# Patient Record
Sex: MALE | Race: WHITE
[De-identification: names, ages, dates, MRNs, and addresses within clinical notes are randomized per-mention and may not be internally consistent; named-entity substitution may affect disease eponyms.]

---

## 2020-09-04 ENCOUNTER — HOSPITAL ENCOUNTER (OUTPATIENT)
Dept: HOSPITAL 53 - M LAB | Age: 70
End: 2020-09-04
Attending: PSYCHIATRY & NEUROLOGY
Payer: MEDICARE

## 2020-09-04 DIAGNOSIS — I10: Primary | ICD-10-CM

## 2020-09-04 LAB
BUN SERPL-MCNC: 28 MG/DL (ref 7–18)
CREAT SERPL-MCNC: 1.61 MG/DL (ref 0.7–1.3)
GFR SERPL CREATININE-BSD FRML MDRD: 45.7 ML/MIN/{1.73_M2} (ref 49–?)

## 2020-10-08 ENCOUNTER — HOSPITAL ENCOUNTER (EMERGENCY)
Dept: HOSPITAL 53 - M ED | Age: 70
Discharge: HOME | End: 2020-10-08
Payer: MEDICARE

## 2020-10-08 VITALS — BODY MASS INDEX: 22.32 KG/M2 | HEIGHT: 67 IN | WEIGHT: 142.2 LBS

## 2020-10-08 VITALS — DIASTOLIC BLOOD PRESSURE: 90 MMHG | SYSTOLIC BLOOD PRESSURE: 210 MMHG
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
## 2020-10-08 DIAGNOSIS — I10: ICD-10-CM

## 2020-10-08 DIAGNOSIS — Z79.899: ICD-10-CM

## 2020-10-08 DIAGNOSIS — M25.421: ICD-10-CM

## 2020-10-08 DIAGNOSIS — S52.001A: Primary | ICD-10-CM

## 2020-10-08 DIAGNOSIS — E11.9: ICD-10-CM
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
## 2020-10-08 DIAGNOSIS — G35: ICD-10-CM

## 2020-10-08 DIAGNOSIS — Z79.4: ICD-10-CM

## 2020-10-08 DIAGNOSIS — M77.9: ICD-10-CM
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
## 2020-10-08 DIAGNOSIS — F17.200: ICD-10-CM

## 2020-10-08 NOTE — REPVR
PROCEDURE INFORMATION: 

Exam: XR Right Elbow 

Exam date and time: 10/8/2020 3:29 PM 

Age: 70 years old 

Clinical indication: Pain and injury or trauma; Fall; Sprain or strain; Right; 

Injury details: Best images possible PT unable to straighten out arm and PT 

states he fell on posterior elbow 



TECHNIQUE: 

Imaging protocol: XR Right elbow. 

Views: 3 or more views. 



COMPARISON: 

No relevant prior studies available. 



FINDINGS: 

Bones/joints: There appears to be a fracture through ulnar enthesophyte which 

likely has associated attachment of the triceps tendon. This likely represents 

an intra-articular fracture. The remainder of the ulna, humerus and radius 

appear intact. 

Soft tissues:  Moderate to large joint effusion.



IMPRESSION: 

There is a large enthesophyte of the proximal ulna with a fracture through the 

space which is likely intra-articular with an associated moderate to large 

joint effusion.



Electronically signed by: Emilia Whitley On 10/08/2020  16:04:36 PM

## 2020-10-21 ENCOUNTER — HOSPITAL ENCOUNTER (OUTPATIENT)
Dept: HOSPITAL 53 - M RAD | Age: 70
End: 2020-10-21
Attending: SPECIALIST
Payer: MEDICARE

## 2020-10-21 DIAGNOSIS — J33.0: Primary | ICD-10-CM

## 2020-10-21 NOTE — REPVR
PROCEDURE INFORMATION: 

Exam: CT Maxillofacial Without Contrast, Sinus 

Exam date and time: 10/21/2020 10:21 AM 

Age: 70 years old 

Clinical indication: Nose pain; Additional info: Polyp of nasal cavity 



TECHNIQUE: 

Imaging protocol: CT Maxillofacial without contrast. Focus on the sinuses. 

Radiation optimization: All CT scans at this facility use at least one of these 

dose optimization techniques: automated exposure control; mA and/or kV 

adjustment per patient size (includes targeted exams where dose is matched to 

clinical indication); or iterative reconstruction. 



COMPARISON: 

No relevant prior studies available. 



FINDINGS: 

Frontal sinuses:  There is opacification of the left frontal sinus.

Ethmoid air cells:  There is opacification of left anterior ethmoid air cells.

Sphenoid sinuses: Normal. No air-fluid levels. 

Maxillary sinuses:  Polypoid soft tissue projects over the left maxillary sinus 

infundibulum and extends into the left nasal cavity.

Nasal cavity/Septum:  There is polypoid soft tissue within the left nasal 

cavity.  There is rightward nasal septal deviation with a spur.  There is 

hypertrophy of the nasal turbinates.



Orbital cavity: Orbits are normal. Globes are unremarkable. 

Bones/joints: Unremarkable. 

Soft tissues: Unremarkable. 



IMPRESSION: 

Findings worrisome for left-sided sinonasal polyposis.



Electronically signed by: Zulay Ramirez On 10/21/2020  10:33:11 AM

## 2020-10-26 ENCOUNTER — HOSPITAL ENCOUNTER (OUTPATIENT)
Dept: HOSPITAL 53 - M LAB REF | Age: 70
End: 2020-10-26
Attending: SPECIALIST
Payer: MEDICARE

## 2020-10-26 DIAGNOSIS — J33.0: Primary | ICD-10-CM

## 2021-03-03 ENCOUNTER — HOSPITAL ENCOUNTER (OUTPATIENT)
Dept: HOSPITAL 53 - M LABSMTC | Age: 71
End: 2021-03-03
Attending: ANESTHESIOLOGY
Payer: MEDICARE

## 2021-03-03 DIAGNOSIS — Z01.812: Primary | ICD-10-CM

## 2021-03-03 DIAGNOSIS — Z20.822: ICD-10-CM

## 2021-03-08 ENCOUNTER — HOSPITAL ENCOUNTER (OUTPATIENT)
Dept: HOSPITAL 53 - M SDC | Age: 71
Discharge: HOME | End: 2021-03-08
Attending: SPECIALIST
Payer: MEDICARE

## 2021-03-08 VITALS — SYSTOLIC BLOOD PRESSURE: 179 MMHG | DIASTOLIC BLOOD PRESSURE: 63 MMHG

## 2021-03-08 VITALS — BODY MASS INDEX: 21.19 KG/M2 | WEIGHT: 135 LBS | HEIGHT: 67 IN

## 2021-03-08 DIAGNOSIS — Z85.038: ICD-10-CM

## 2021-03-08 DIAGNOSIS — J44.9: ICD-10-CM

## 2021-03-08 DIAGNOSIS — J32.2: Primary | ICD-10-CM

## 2021-03-08 DIAGNOSIS — G35: ICD-10-CM

## 2021-03-08 DIAGNOSIS — E11.40: ICD-10-CM

## 2021-03-08 DIAGNOSIS — F32.9: ICD-10-CM

## 2021-03-08 DIAGNOSIS — F17.218: ICD-10-CM

## 2021-03-08 DIAGNOSIS — Z79.899: ICD-10-CM

## 2021-03-08 DIAGNOSIS — Z79.4: ICD-10-CM

## 2021-03-08 DIAGNOSIS — N18.30: ICD-10-CM

## 2021-03-08 DIAGNOSIS — I12.9: ICD-10-CM

## 2021-03-08 DIAGNOSIS — K21.9: ICD-10-CM

## 2021-03-08 DIAGNOSIS — Z79.02: ICD-10-CM

## 2021-03-08 DIAGNOSIS — J33.9: ICD-10-CM

## 2021-03-08 DIAGNOSIS — Z86.73: ICD-10-CM

## 2021-03-08 DIAGNOSIS — F41.9: ICD-10-CM

## 2021-03-08 PROCEDURE — 31237 NSL/SINS NDSC SURG BX POLYPC: CPT

## 2021-03-08 PROCEDURE — 88304 TISSUE EXAM BY PATHOLOGIST: CPT

## 2021-03-08 NOTE — RO
OPERATIVE NOTE



DATE OF OPERATION: 03/08/2021



PREOPERATIVE DIAGNOSIS: Left maxillary sinusitis and ethmoiditis with nasal

polyposis.



POSTOPERATIVE DIAGNOSIS: Left maxillary sinusitis and ethmoiditis with nasal

polyposis.



PROCEDURE: Left-sided endoscopic nasal polypectomy, ethmoidectomy and maxillary

antrostomy.



SURGEON: Valdo Segal MD



ASSISTANT: 



ANESTHESIA: 



INDICATIONS: This is a 70-year-old who presents with left-sided nasal

obstruction, polyp disease filling the left middle meatus into the nasal cavity.



DESCRIPTION OF PROCEDURE: Satisfactory general endotracheal anesthesia was

administered, oropharyngeal pack placed, nose prepared for surgery by placing

Cottonoid pledgets with Afrin solution in nasal cavities bilaterally mostly on

the left side. They were removed from the right. Examination revealed teardrop

polyp emerging from the middle meatus falling anteriorly and obstructing the

nose. 1% Xylocaine with 1:100,000 Epinephrine was used to inject the nasal

polyp as well as the lateral nasal wall. 0-degree telescope and micro debrider

was the primary instrument, the gross polyp disease was resected away back to

the landmarks of the middle turbinate and lateral nasal wall which were quite

identifiable. The uncinate process was then resected with micro debrider, the

ethmoidal bulla was identified and then entered and resected away resecting

more polyp disease here. The ground lamella was resected and entered in medial

inferior quadrant and posterior ethmoid cells entered and working posteriorly

to anteriorly the lamella bone and hyperplastic mucosa was resected. Superiorly

the nasofrontal recess, uncinate processes down, the superior ethmoid cell was

opened and large polyp mass was pulled from the nasofrontal recess. Finally

natural maxillary sinus ostia was identified, it was enlarged using combination

of side-biting and upbiting forceps and polyp disease was resected from the

area of the natural sinus ostia. Adrenalin pledgets were placed for 2 minutes

and removed with no significant bleeding. Sinu-Foam was placed in nasal cavity.

The patient was awakened after throat pack was removed and sent to recovery in

satisfactory condition.

## 2021-04-14 ENCOUNTER — HOSPITAL ENCOUNTER (OUTPATIENT)
Dept: HOSPITAL 53 - M LAB REF | Age: 71
End: 2021-04-14
Attending: INTERNAL MEDICINE
Payer: MEDICARE

## 2021-04-14 DIAGNOSIS — D64.9: Primary | ICD-10-CM

## 2021-04-14 DIAGNOSIS — R80.9: ICD-10-CM

## 2021-04-14 LAB
CREAT UR-MCNC: 121 MG/DL
PROT UR-MCNC: 317.1 MG/DL (ref 0–12)

## 2021-04-15 LAB
ALBUMIN MFR UR ELPH: 57.4 % (ref 55.8–66.1)
ALBUMIN SERPL-MCNC: 3.5 GM/DL (ref 3.29–5.55)
ALPHA1 GLOB MFR SERPL ELPH: 5.1 % (ref 2.9–4.9)
ALPHA1 GLOB SERPL ELPH-MCNC: 0.31 GM/DL (ref 0.17–0.41)
ALPHA2 GLOB SERPL ELPH-MCNC: 0.7 GM/DL (ref 0.42–0.99)
ALPHA2 GLOB SERPL ELPH-MCNC: 11.4 % (ref 7.1–11.8)
B-GLOBULIN SERPL ELPH-MCNC: 0.42 GM/DL (ref 0.28–0.6)
BETA1 GLOB MFR SERPL ELPH: 6.9 % (ref 4.7–7.2)
BETA2 GLOB MFR SERPL ELPH: 7.6 % (ref 3.2–6.5)
BETA2 GLOB SERPL ELPH-MCNC: 0.46 GM/DL (ref 0.19–0.55)
C3 SERPL-MCNC: 86 MG/DL (ref 90–180)
C4 SERPL-MCNC: 25 MG/DL (ref 10–40)
FERRITIN SERPL-MCNC: 116 NG/ML (ref 26–388)
FOLATE SERPL-MCNC: 6.9 NG/ML
GAMMA GLOB 24H MFR UR ELPH: 11.6 % (ref 11.1–18.8)
GAMMA GLOB SERPL ELPH-MCNC: 0.71 GM/DL (ref 0.65–1.58)
HBV CORE IGM SER QL: NEGATIVE
HBV SURFACE AB SER QL: NEGATIVE
HBV SURFACE AB SER-ACNC: NEGATIVE M[IU]/ML
HCV AB SER QL: < 0 INDEX (ref ?–0.8)
IRON SATN MFR SERPL: 31.9 % (ref 19.7–50)
IRON SERPL-MCNC: 60 UG/DL (ref 65–175)
PROT SERPL-MCNC: 6.1 GM/DL (ref 6.4–8.2)
TIBC SERPL-MCNC: 188 UG/DL (ref 250–450)
VIT B12 SERPL-MCNC: 321 PG/ML

## 2021-04-19 LAB
C-ANCA TITR SER IF: (no result) TITER
ENA SS-A AB SER-ACNC: <0.2 AI (ref 0–0.9)
ENA SS-B AB SER-ACNC: <0.2 AI (ref 0–0.9)
KAPPA LC FREE SER-MCNC: 66.9 MG/L (ref 3.3–19.4)
KAPPA LC/LAMBDA SER: 1.26 {RATIO} (ref 0.26–1.65)
LAMBDA LC FREE SERPL-MCNC: 53.3 MG/L (ref 5.7–26.3)
P-ANCA ATYPICAL TITR SER IF: (no result) TITER
P-ANCA TITR SER IF: (no result) TITER

## 2021-10-13 ENCOUNTER — HOSPITAL ENCOUNTER (OUTPATIENT)
Dept: HOSPITAL 53 - M LAB REF | Age: 71
End: 2021-10-13
Attending: INTERNAL MEDICINE
Payer: MEDICARE

## 2021-10-13 DIAGNOSIS — A04.8: Primary | ICD-10-CM

## 2021-10-13 DIAGNOSIS — B96.81: ICD-10-CM

## 2021-11-12 ENCOUNTER — HOSPITAL ENCOUNTER (OUTPATIENT)
Dept: HOSPITAL 53 - M LAB REF | Age: 71
End: 2021-11-12
Attending: NURSE PRACTITIONER
Payer: MEDICARE

## 2021-11-12 DIAGNOSIS — N18.32: ICD-10-CM

## 2021-11-12 DIAGNOSIS — R80.9: Primary | ICD-10-CM

## 2021-11-12 LAB
PROT 24H UR-MRATE: 2277.8 MG/24HR (ref 50–150)
PROT UR-MCNC: 276.1 MG/DL (ref 0–12)

## 2021-11-29 ENCOUNTER — HOSPITAL ENCOUNTER (OUTPATIENT)
Dept: HOSPITAL 53 - M RAD | Age: 71
End: 2021-11-29
Attending: NURSE PRACTITIONER
Payer: MEDICARE

## 2021-11-29 DIAGNOSIS — N18.32: Primary | ICD-10-CM

## 2021-11-29 DIAGNOSIS — I12.9: ICD-10-CM

## 2021-11-29 DIAGNOSIS — R39.198: ICD-10-CM

## 2021-11-29 DIAGNOSIS — R33.9: ICD-10-CM

## 2021-12-10 ENCOUNTER — HOSPITAL ENCOUNTER (EMERGENCY)
Dept: HOSPITAL 53 - M ED | Age: 71
Discharge: LEFT BEFORE DISCHARGE | End: 2021-12-10
Payer: MEDICARE

## 2021-12-10 VITALS — DIASTOLIC BLOOD PRESSURE: 91 MMHG | SYSTOLIC BLOOD PRESSURE: 183 MMHG

## 2021-12-10 VITALS — HEIGHT: 67 IN | BODY MASS INDEX: 22.02 KG/M2 | WEIGHT: 140.3 LBS

## 2021-12-10 DIAGNOSIS — Z53.21: Primary | ICD-10-CM

## 2021-12-10 NOTE — CCD
Continuity of Care Document (CCD)

                             Created on: 10/29/2021



Palladino, Lewis W

External Reference #: [id number]

: 1950

Sex: Male



Demographics





                          Address                   [address] [place]  [address]

 

                          Home Phone                [phone]

 

                          Preferred Language        Unknown

 

                          Marital Status            Unknown

 

                          Catholic Affiliation     Unknown

 

                          Race                      White

 

                          Ethnic Group              Not  or 





Author





                          Author                    Zack HALL DPM

 

                          Organization              Unknown

 

                          Address                   [address] [place] [address], [facility]

 [address]

[place]  [address]



 

                          Phone                     [phone]







Care Team Providers





                    Care Team Member Name Role                Phone

 

                    J Carlos Kiser M.D.                [phone]







Problems





                    Active Problems     Provider            Date

 

                    Hammer toe          Westley Hall DPM Onset: 10/15/2020

 

                    Type 2 diabetes mellitus with diabetic polyneuropathy Westley Hall DPM 

Onset: 10/15/2020

 

                    Onychomycosis       Westley Hall DPM Onset: 10/15/2020

 

                    Corns and callosities Westley Hall DPM Onset: 10/15/2020

 

                    Plantar fascial fibromatosis Westley Hall DPM Onset: 







Social History





                Type            Date            Description     Comments

 

                Birth Sex                       Unknown          

 

                ETOH Use                        Denies alcohol use  

 

                Tobacco Use     Start: Unknown  Patient is a current smoker, smo

kes every day smokes 

1ppd for 42 years 







Allergies and adverse reactions





                                        Description

 

                                        No Known Drug Allergies







Medications





           Active Medications SIG        Qnty       Indications Ordering Provide

r Date

 

                          Ammonium Lactate                     12% Cream        

           apply to feet 

daily           140gm                           Westley Hall DPM 03/10/2021

 

             Oxycontin                     20mg Tab ER 12H Abuse-Det            

                                              

Unknown                                 

 

           Acetazolamide                     250mg Tablets                      

                              Unknown    



 

             Omeprazole                     40mg Capsules DR Rashel QUINTERO, J Carlos                           

 

                          Hydrocodone-Acetaminophen                     5-325mg 

Tablets                   

                                                Unknown         

 

             Ropinirole HCL                     2mg Tablets                     

                                     Rashel QUINTERO, J Carlos                           

 

           Alphagan P                     0.1% Solution                         

                           Unknown    



 

             Clopidogrel Bisulfate                     75mg Tablets             

                                             

Rashel QUINTERO, J Carlos                      

 

                                        Onetouch Delica Plus Lancets Extra Fine 

33G                     Plus 33G Misc   

               U Utd bid                              Unknown      

 

             Duloxetine HCL                     30mg Caps DR Price Kiser M.D., [place]                      

 

             Atorvastatin Calcium                     20mg Tablets              

                                            

Rashel QUINTERO, J Carlos                      

 

             Brimonidine Tartrate                     0.2% Solution             

                                             

Unknown                                 

 

             Ranitidine HCL                     150mg Tablets                   

                                       Rashel QUINTERO, [place]                           

 

             Mirtazapine                     15mg Tablets                       

                                   Jana Sandoval                              







Immunizations





                                        Description

 

                                        No Information Available







Vital Signs





                Date            Vital           Result          Comment

 

                10/05/2020 10:21am Height          67 inches       5'7"

 

                    Weight              140.00 lb            

 

                    BP Systolic         180 mmHg             

 

                    BP Diastolic        82 mmHg              

 

                    Heart Rate          51 /min              

 

                    BMI (Body Mass Index) 21.9 kg/m2           







Results





                                        Description

 

                                        No Information Available







Procedures





                Date            Code            Description     Status

 

                2021      83044           Debridement 6-10 [employer] 

Completed

 

                2021      88668           Debridement 6-10 [employer] 

Completed







Medical Devices





                                        Description

 

                                        No Information Available







Encounters





                                        Description

 

                                        No Information Available







Assessments





                Date            Code            Description     Provider

 

                2021      E11.42          Type 2 diabetes mellitus with di

abetic polyneuropathy Westley Hall DPM

 

                2021      B35.1           Tinea unguium   Westley Hall DPM

 

                2021      E11.42          Type 2 diabetes mellitus with di

abetic polyneuropathy Westley Hall DPM

 

                2021      B35.1           Tinea unguium   Westley Hall DPM

 

                2021      E11.42          Type 2 diabetes mellitus with di

abetic polyneuropathy Westley Hall DPM







Plan of Treatment

Future Appointment(s):* 2022  2:45 pm - Westley Hall DPM at [place] 
  Office





Functional Status





                                        Description

 

                                        No Information Available







Mental Status





                                        Description

 

                                        No Information Available







Referrals





                                        Description

 

                                        No Information Available

## 2021-12-10 NOTE — CCD
Summarization of Episode Note

                             Created on: 10/09/2021



PALLADINO, LEWIS WAYNE

External Reference #: [id number]

: 1950

Sex: Male



Demographics





                          Address                   [address] [place]  [address]

 

                          Home Phone                [phone]

 

                          Preferred Language        Unknown

 

                          Marital Status            Unknown

 

                          Congregation Affiliation     Unknown

 

                          Race                      White

 

                          Ethnic Group              Not  or 





Author





                          Author                    [facility]

ems

 

                          Organization              [facility]

ems

 

                          Address                   Unknown

 

                          Phone                     Unavailable







Support





                Name            Relationship    Address         Phone

 

                    PALLADINO, LEWIS    GUAR                [address] [place]  [address]                    [phone]

 

                    Palladino, Karen    ECON                [address], [place]  [address] [phone]







Care Team Providers





                    Care Team Member Name Role                Phone

 

                    Cesar,  Catarino        Unavailable         [phone]







PROBLEMS





          Type      Condition ICD9-CM Code SPD96-YX Code Onset Dates Condition S

tatus W/U 

Status              Risk                SNOMED Code         Notes

 

       Problem Left peroneal nerve palsy        G57.32        Active confirmed  

      [id number] Stable

in his brace, using a cane

 

       Problem Intervertebral disc disease        M51.9         Active confirmed

        [id number] We 

will continue his pain medications without change (oxycodone and hydrocodone, 
Lyrica, tizanidine)

 

        Problem Hemispheric carotid artery syndrome         G45.1           Acti

ve  confirmed         

[id number]                                

 

       Problem Multiple sclerosis        G35           Active confirmed        2

[id number]  

 

                          Problem                   Controlled type 2 diabetes m

ellitus without complication, without long-

term current use of insulin         E11.9           Active  confirmed         31

[id number]  

 

       Problem Mixed hyperlipidemia        E78.2         Active confirmed       

 [id number]  

 

        Problem Bilateral carotid artery stenosis         I65.23          Active

  confirmed         [id number]

                                         

 

        Problem Current use of long term anticoagulation         Z79.01         

 Active  confirmed         

[id number]                                

 

       Problem Essential hypertension        I10           Active confirmed     

   [id number]  

 

       Problem Smoker        F17.200        Active confirmed        [id number]  







ALLERGIES

No Known Allergies



ENCOUNTERS from 1950 to 2021-10-08





             Encounter    Location     Date         Provider     Diagnosis

 

                    [facility]          [address] [place] [address] [phone] [place] [address] 08 Oct, 2021

                          Catarino Guerrero                Left peroneal nerve palsy G5

7.32







IMMUNIZATIONS

No Information



SOCIAL HISTORY

Tobacco Use:



                    Social History Observation Description         Date

 

                    Details (start date - stop date) Current Smoker       



Sex Assigned At Birth:



                          Social History Observation Description

 

                          Sex Assigned At Birth     Unknown



Adventism:



                    Question            Answer              Notes

 

                    Adventism                                No Orthodoxy beliefs

 that would impact health care.



Alcohol Screening:



                    Question            Answer              Notes

 

                    Did you have a drink containing alcohol in the past year? No

                   

 

                    Points              0                    

 

                    Interpretation      Negative             



Tobacco Use:



                    Question            Answer              Notes

 

                    Are you a:          current smoker      started smoking at 3

2 years old

 

                    Patient counseled on the dangers of tobacco use and urged to

 quit: 2021           

 

                    How many cigarettes a day do you smoke? 11-20               

 

 

                    Are you interested in quitting? Not ready to quit    

 

                    Counseled the patient on smoking effects, education provided

 2021           







REASON FOR REFERRAL

No Information



VITAL SIGNS

No information



MEDICATIONS





           Medication SIG (Take, Route, Frequency, Duration) Notes      Start Da

te End Date   

Status

 

           Atorvastatin Calcium 20 MG 1 tablet Orally Once a day                

                  Active

 

                          Omeprazole 40 MG          1 capsule 30 minutes before 

morning meal Orally Once a day for 

30 day(s)                                                       Active

 

                Brace           as directed for left lower leg, dx=G57.32 daily 

for 99 months                                                                 Active

 

                          Mirtazapine 15 MG         1 tablet Orally for Worker's

 Comp Once a day at bedtime for 30

days                                                Active

 

                          HYDROcodone-Acetaminophen 5-325 MG 1 tablet as needed 

Orally Every 8 hours, 

mdd=3 for 30 Days Please do not fill until 2021 08 Oct, 2021              

      Active

 

           rOPINIRole HCl 2 MG 1 tablet before bedtime Orally twice a day       

                           Active

 

           Toujeo SoloStar 300 UNIT/ML 40 units Subcutaneous once a day         

                         Active

 

             acetaZOLAMIDE 250 MG 1/2 tablet Orally twice a day one in AM and on

e in PM                            

                                        Active

 

           Alphagan P 0.1 % 1 drop into affected eye Ophthalmic every 8 hrs     

                             Active

 

           Clopidogrel Bisulfate 75 MG 1 tablet Orally Once a day               

                   Active

 

           tiZANidine HCl 4 1 tablet as needed Orally Three times a day         

                         Active

 

           iron                                                   Active

 

           DULoxetine HCl 30 MG 1 capsule Orally Once a day                     

             Active

 

                          HYDROcodone-Acetaminophen 5-325 MG 1 tablet as needed,

 Worker's Compensation 

Orally 3 times a day, mdd=3 for 30 Days                             

        Active

 

                    oxyCODONE HCl ER 20 MG 1 tablet Orally every 12 hrs, mdd=2 f

or 30 Days Please do

not fill until due  08 Oct, 2021                            Active







PROCEDURES

No Information



RESULTS

No Results



REASON FOR VISIT

meds refill 



MEDICAL (GENERAL) HISTORY





                    Type                Description         Date

 

                    Medical History     Multiple sclerosis   

 

                    Medical History     Hypertension         

 

                    Medical History     Hyperlipidemia       

 

                    Medical History     Diabetes             

 

                    Medical History     posterior carotid stenosis  

 

                    Medical History     multiple TIAs        

 

                    Medical History     long term anticoagulation  

 

                    Medical History     smoker               

 

                    Medical History     lumbar intervertebral disc disease (Work

er's Compensation)  

 

                    Surgical History    2 lipomas removed   1972

 

                    Surgical History    Dr. Rodriguez, Lumbar spinal surgery - 

?discs 2003

 

                    Surgical History    Left carpal tunnel release, ulnar releas

e 2017

 

                    Surgical History    colectomy of ascending colon due to canc

er 2018

 

                    Surgical History    cataracts both eyes 10/14/202 & 10/28/20

20

 

                    Hospitalization History surgeries as above   







Goals Section

No Information



Health Concerns

No Information



MEDICAL EQUIPMENT

No Information



MENTAL STATUS

No Information



FUNCTIONAL STATUS

No Information



ASSESSMENTS





             Encounter Date Diagnosis    Assessment Notes Treatment Notes Treatm

ent Clinical 

Notes

 

                08 Oct, 2021    Left peroneal nerve palsy (ICD-10 - G57.32)     

            



                                        











PLAN OF TREATMENT

Medication



                Medication Name Sig             Start Date      Stop Date

 

                          HYDROcodone-Acetaminophen 5-325 MG 1 tablet as needed,

 Worker's Compensation 

Orally 3 times a day, mdd=3 for 30 Days                

 

                          HYDROcodone-Acetaminophen 5-325 MG 1 tablet as needed 

Orally Every 8 hours, 

mdd=3 for 30 Days         08 Oct, 2021               

 

                    oxyCODONE HCl ER 20 MG 1 tablet Orally every 12 hrs, mdd=2 f

or 30 Days 08 Oct, 

2021                                     



Next Appt



                                        Details

 

                                        Provider Name:Jana Sharma, -10-2

8 02:00:00 PM, [address] [facility], 

[phone], [place], [address], [phone]







Insurance Providers





             Payer Name   Payer Address Payer Phone  Insured Name Patient Relati

onship to 

Insured                   Coverage Start Date       Coverage End Date

 

                [employer] [facility] [address]  [place] [address] [phone]    

PALLADINO,LEWIS WAYNE self

## 2021-12-10 NOTE — CCD
Continuity of Care Document (CCD)

                             Created on: 2021



Palladino, Lewis W

External Reference #: [id number]

: 1950

Sex: Male



Demographics





                          Address                   [address] [place]  [address]

 

                          Home Phone                [phone]

 

                          Preferred Language        Unknown

 

                          Marital Status            Unknown

 

                          Worship Affiliation     Unknown

 

                          Race                      White

 

                          Ethnic Group              Not  or 





Author





                          Author                    Zack CLEMENTS M.D.

 

                          Organization              Unknown

 

                          Address                   [address]

[place]  [address]



 

                          Phone                     [phone]







Care Team Providers





                    Care Team Member Name Role                Phone

 

                    J Carlos Kiser ALEX DE OLIVEIRA AUTM                [phone]

 

                    Christine Gomez  AUTM                [phone]

 

                    [facility] AUT[facility]                [phone]

 

                    Patsy Marina M.D.   AUTM                [phone]

 

                          AUTM                      Unavailable

 

                    Central Sched[employer]  AUTM                [phone]

 

                    Sdio                AUTM                [phone]







Problems





                    Active Problems     Provider            Date

 

                    Chronic nasopharyngitis Paulo Richmond MD    Onset: 

5

 

                    Deviated nasal septum Paulo Richmond MD    Onset: 2015

 

                    Head and neck swelling Paulo Richmond MD    Onset: 2015

 

                    Essential hypertension Jarrett Kyle M.D. Onset: 2016







Social History





                Type            Date            Description     Comments

 

                Birth Sex                       Unknown          

 

                Smokeless Tobacco                 Never Used Smokeless Tobacco  

 

                ETOH Use                        Denies alcohol use  

 

                Tobacco Use     Start: Unknown  Smokes 1 Pack A Day 52 YEAR HX O

F SMOKING  

 

                Recreational Drug Use                 Denies Drug Use  







Allergies, Adverse Reactions, Alerts





                                        Description

 

                                        No Known Drug Allergies







Medications





           Active Medications SIG        Qnty       Indications Ordering Provide

r Date

 

                                        Benazepril HCL/Hydrochlorothiazide      

               20-12.5mg Tablets        

             1t/d                                   Unknown      

 

                          Oxycontin                     20mg Tab ER 12H Abuse-De

t                   

1t/q12h                                         Unknown         

 

                Atorvastatin Calcium                     20mg Tablets           

        1t/d                             

                          Unknown                   

 

                Clopidogrel Bisulfate                     75mg Tablets          

         1t/d                            

                          Unknown                   

 

             Duloxetine HCL                     30mg Caps DR Part               

    1t/d                                   

Unknown                                 

 

             Ropinirole HCL                     2mg Tablets                   1t

/bid                                 

Unknown                                 

 

                Amlodipine Besylate                     2.5mg Tablets           

        1t/d                             

                          Unknown                   

 

           Bioastin 12MG-OTC 1t/d                             Unknown    

000

 

           Calcium 600MG-Vit D3 10mcg 1t/d                             Unknown  

  

 

             Ferate                     240(27Fe) mg Tablets                   1

t/d                                   

Unknown                                 

 

             Omeprazole                     40mg Capsules DR                   1

t/d                                   

Unknown                                 

 

                                        Hydrocodone Bitartrate/Acetaminophen    

                 5-325mg Tablets        

             1t/tid                                 Unknown      

 

           Alphagan P                     0.1% Solution                   bid   

                           Unknown    



 

           Insulin Glaring Injection 40u/prn                          Unknown   

 







Immunizations





                                        Description

 

                                        No Information Available







Vital Signs





                Date            Vital           Result          Comment

 

                2021 11:23am Height          67 inches       5'7"

 

                2021  9:56am Height          67 inches       5'7"

 

                    Weight              140.00 lb            

 

                    BMI (Body Mass Index) 21.9 kg/m2           

 

                    Ideal Body Weight   148 lb               

 

                    Weight              63.504 kg            

 

                    BSA (Body Surface Area) 1.74 m2              







Results





                                        Description

 

                                        No Information Available







Procedures





                                        Description

 

                                        No Information Available







Medical Devices





                                        Description

 

                                        No Information Available







Encounters





                                        Description

 

                                        No Information Available







Assessments





                Date            Code            Description     Provider

 

                    2021          B96.81              Helicobacter pylori 

[H. pylori] as the cause of diseases 

classified elsewhere                    Aníbal Clements M.D.

 

                2021      K63.5           Polyp of colon  Aníbal Pitts ala, M.D.

 

                2021      D37.4           Neoplasm of uncertain behavior o

f colon Aníbal Clements M.D.

 

                2021      K59.00          Constipation, unspecified Davie Clements M.D.







Plan of Treatment





No Information Available



Functional Status





                                        Description

 

                                        No Information Available







Mental Status





                                        Description

 

                                        No Information Available







Referrals





                Refer to      Reason for Referral Status          Appt Date

 

                Aníbal Clements M.D. COLO SCREEN     Scheduled       

 

                                        [facility]

 

                                        [address], [address]

 

                                        [place] [address]

 

                                        [phone] Patient is waiting for the order for a home health nurse to be put in. Please call.

## 2021-12-10 NOTE — CCD
Continuity of Care Document (CCD)

                             Created on: 10/04/2021



Palladino, Lewis W

External Reference #: [id number]

: 1950

Sex: Male



Demographics





                          Address                   [address] [place]  [address]

 

                          Home Phone                [phone]

 

                          Preferred Language        Unknown

 

                          Marital Status            Unknown

 

                          Yarsanism Affiliation     Unknown

 

                          Race                      White

 

                          Ethnic Group              Not  or 





Author





                          Author                    Zack CLEMENTS M.D.

 

                          Organization              Unknown

 

                          Address                   [address]

[place]  [address]



 

                          Phone                     [phone]







Care Team Providers





                    Care Team Member Name Role                Phone

 

                    J Carlos Kiser ALEX DE OLIVEIRA AUTM                [phone]

 

                    Christine Gomez  AUTM                [phone]

 

                    [facility] AUTM                [phone]

 

                    Patsy Marina M.D.   AUTM                [phone]

 

                          AUTM                      Unavailable

 

                    Central Scheduling  AUTM                [phone]

 

                    Sdio                AUTM                [phone]







Problems





                    Active Problems     Provider            Date

 

                    Chronic nasopharyngitis Paulo Richmond MD    Onset: 

5

 

                    Deviated nasal septum Paulo Richmond MD    Onset: 2015

 

                    Head and neck swelling Paulo Richmond MD    Onset: 2015

 

                    Essential hypertension Jarrett Kyle M.D. Onset: 2016







Social History





                Type            Date            Description     Comments

 

                Birth Sex                       Unknown          

 

                Smokeless Tobacco                 Never Used Smokeless Tobacco  

 

                ETOH Use                        Denies alcohol use  

 

                Tobacco Use     Start: Unknown  Smokes 1 Pack A Day 52 YEAR HX O

F SMOKING  

 

                Recreational Drug Use                 Denies Drug Use  







Allergies, Adverse Reactions, Alerts





                                        Description

 

                                        No Known Drug Allergies







Medications





           Active Medications SIG        Qnty       Indications Ordering Provide

r Date

 

                                        Benazepril HCL/Hydrochlorothiazide      

               20-12.5mg Tablets        

             1t/d                                   Unknown      

 

                          Oxycontin                     20mg Tab ER 12H Abuse-De

t                   

1t/q12h                                         Unknown         

 

                Atorvastatin Calcium                     20mg Tablets           

        1t/d                             

                          Unknown                   

 

                Clopidogrel Bisulfate                     75mg Tablets          

         1t/d                            

                          Unknown                   

 

             Duloxetine HCL                     30mg Caps DR Part               

    1t/d                                   

Unknown                                 

 

             Ropinirole HCL                     2mg Tablets                   1t

/bid                                 

Unknown                                 

 

                Amlodipine Besylate                     2.5mg Tablets           

        1t/d                             

                          Unknown                   

 

           Bioastin 12MG-OTC 1t/d                             Unknown    

000

 

           Calcium 600MG-Vit D3 10mcg 1t/d                             Unknown  

  

 

             Ferate                     240(27Fe) mg Tablets                   1

t/d                                   

Unknown                                 

 

             Omeprazole                     40mg Capsules DR                   1

t/d                                   

Unknown                                 

 

                                        Hydrocodone Bitartrate/Acetaminophen    

                 5-325mg Tablets        

             1t/tid                                 Unknown      

 

           Alphagan P                     0.1% Solution                   bid   

                           Unknown    



 

           Insulin Glaring Injection 40u/prn                          Unknown   

 







Immunizations





                                        Description

 

                                        No Information Available







Vital Signs





                Date            Vital           Result          Comment

 

                2021 11:23am BP Systolic     136 mmHg         

 

                    BP Diastolic        74 mmHg              

 

                    Height              67 inches           5'7"

 

                    Weight              145.00 lb            

 

                    BMI (Body Mass Index) 22.7 kg/m2           

 

                    Ideal Body Weight   148 lb               

 

                    Weight              65.772 kg            

 

                    BSA (Body Surface Area) 1.76 m2              

 

                2021  9:56am Height          67 inches       5'7"

 

                    Weight              140.00 lb            

 

                    BMI (Body Mass Index) 21.9 kg/m2           

 

                    Ideal Body Weight   148 lb               

 

                    Weight              63.504 kg            

 

                    BSA (Body Surface Area) 1.74 m2              







Results





                                        Description

 

                                        No Information Available







Procedures





                Date            Code            Description     Status

 

                2021      23811           Office/Outpatient Established Mo

d MDM 30-39 Min Completed







Medical Devices





                                        Description

 

                                        No Information Available







Encounters





           Type       Date       Location   Provider   Dx         Diagnosis

 

                Office Visit    2021 10:30a [facility] Aníbal Clements M.D.           B96.81                    Helicobacter pylori as the c

ause of diseases classd 

elswhr

 

                          K63.5                     Polyp of colon

 

                          D37.4                     Neoplasm of uncertain behavi

or of colon

 

                          K59.00                    Constipation, unspecified







Assessments





                Date            Code            Description     Provider

 

                    2021          B96.81              Helicobacter pylori 

[H. pylori] as the cause of diseases 

classified elsewhere                    Aníbal Clements M.D.

 

                2021      K63.5           Polyp of colon  Aníbal Pitts ala, M.D.

 

                2021      D37.4           Neoplasm of uncertain behavior o

f colon Aníbal Clements M.D.

 

                2021      K59.00          Constipation, unspecified Heidiita

berny Clements M.D.







Plan of Treatment





No Information Available



Functional Status





                                        Description

 

                                        No Information Available







Mental Status





                                        Description

 

                                        No Information Available







Referrals





                Refer to Dr     Reason for Referral Status          Appt Date

 

                Aníbal Clements M.D. COLO SCREEN     Scheduled       

 

                                        [facility]

 

                                        [address] [address]

 

                                        [phone]

## 2021-12-10 NOTE — CCD
Summarization Of Episode

                             Created on: 12/10/2021



PALLADINO, LEWIS WAYNE

External Reference #: [id number]

: 1950

Sex: Undifferentiated



Demographics





                          Address                   [address] [place]  [address]

 

                          Home Phone                [phone]

 

                          Preferred Language        English

 

                          Marital Status            Unknown

 

                          Druze Affiliation     Unknown

 

                          Race                      Unknown

 

                          Ethnic Group              Not  or 





Author





                          Author                    [facility]

 

                          Organization              [facility]

 

                          Address                   Unknown

 

                          Phone                     Unavailable







Support





                Name            Relationship    Address         Phone

 

                    [facility]       Next Of Kin         [address]

[address]

[place]  [address]                    [phone]

 

                RE              Next Of Kin     Unknown         Unavailable

 

                UE              Next Of Kin     Unknown         Unavailable

 

                    PALLADINO, J JERAMIE Next Of Kin         [address]

[place] [id number]                     [phone]

 

                DISABLED        Next Of Kin     Unknown         Unavailable

 

                    NONE, PT PER        Next Of Kin         -

                          -, -  -                   -

 

                    PALLADINO, KAREN   Next Of Kin         [address] [place]  [address]                    [phone]

 

                    PALLADINO, JERAMIE J Next Of Kin         [address]

[place]  [address]                     [phone]

 

                    P,  DEONNA           ECON                [address] [place]  [address]                Unavailable

 

                    Palladino, Karen   ECON                [address] [place] [id number]                    [phone]







Care Team Providers





                    Care Team Member Name Role                Phone

 

                    LIU Segal MD Unavailable         Unavailable

 

                    Abriss B Valdo ORTIZ Unavailable         Unavailable

 

                    Abriss B Valdo ORTIZ Unavailable         Unavailable

 

                    AbrissLIU MD Unavailable         Unavailable

 

                    Abriss B Valdo ORTIZ Unavailable         Unavailable

 

                    Abriss B Valdo ORTIZ Unavailable         Unavailable

 

                    AbrissLIU MD Unavailable         Unavailable

 

                    Abriss B Valdo ORTIZ Unavailable         Unavailable

 

                    Abriss B Valdo ORTIZ Unavailable         Unavailable

 

                    Abriss B Valdo ORTIZ Unavailable         Unavailable

 

                    Abriss B Valdo ORTIZ Unavailable         Unavailable

 

                    Abriss B Valdo ORTIZ Unavailable         Unavailable

 

                    Abriss B Valdo ORTIZ Unavailable         Unavailable

 

                    Abriss B Valdo ORTIZ Unavailable         Unavailable

 

                    Abriss B Valdo ORTIZ Unavailable         Unavailable

 

                    Abriss B Valdo ORTIZ Unavailable         Unavailable

 

                    Abrgema B Valdo ORTIZ Unavailable         Unavailable

 

                    AbrLIU ribeiro MD Unavailable         Unavailable

 

                    Abrgema B Valdo ORTIZ Unavailable         Unavailable

 

                    Radha Linder MD   Unavailable         Unavailable

 

                    Radha Linder MD   Unavailable         Unavailable

 

                    Radha Linder MD   Unavailable         Unavailable

 

                    Koloms,  Radha MD   Unavailable         Unavailable

 

                    Koloms,  Radha MD   Unavailable         Unavailable

 

                    Koloms,  Radha MD   Unavailable         Unavailable

 

                    Koloms,  Radha MD   Unavailable         Unavailable

 

                    Koloms,  Radha MD   Unavailable         Unavailable

 

                    Koloms,  Radha MD   Unavailable         Unavailable

 

                    Koloms,  Radha MD   Unavailable         Unavailable

 

                    Koloms,  Radha MD   Unavailable         Unavailable

 

                    Koloms,  Radha MD   Unavailable         Unavailable

 

                    Koloms,  Radha MD   Unavailable         Unavailable

 

                    Koloms,  Radha MD   Unavailable         Unavailable

 

                    Koloms,  Radha MD   Unavailable         Unavailable

 

                    Koloms,  Radha MD   Unavailable         Unavailable

 

                    Koloms,  Radha MD   Unavailable         Unavailable

 

                    Koloms,  Radha MD   Unavailable         Unavailable

 

                    Koloms,  Radha MD   Unavailable         Unavailable

 

                    Koloms,  Radha MD   Unavailable         Unavailable

 

                    Koloms,  Radha MD   Unavailable         Unavailable

 

                    Koloms,  Radha MD   Unavailable         Unavailable

 

                    Koloms,  Radha MD   Unavailable         Unavailable

 

                    Koloms,  Radha MD   Unavailable         Unavailable

 

                    Koloms,  Radha MD   Unavailable         Unavailable

 

                    Koloms,  Radha MD   Unavailable         Unavailable

 

                    Koloms,  Radha MD   Unavailable         Unavailable

 

                    Koloms,  Radha MD   Unavailable         Unavailable

 

                    Koloms,  Radha MD   Unavailable         Unavailable

 

                    Koloms,  Radha MD   Unavailable         Unavailable

 

                    Koloms,  Radha MD   Unavailable         Unavailable

 

                    Koloms,  Radha MD   Unavailable         Unavailable

 

                    Koloms,  Radha MD   Unavailable         Unavailable

 

                    Barraclough, M Dulce PA Unavailable         Unavailable

 

                    Barraclough, M Dulce PA Unavailable         Unavailable

 

                    Barraclough, M Dulce PA Unavailable         Unavailable

 

                    Barraclough, M Dulce PA Unavailable         Unavailable

 

                    Barraclough, M Dulce PA Unavailable         Unavailable

 

                    Barraclough, M Dulce PA Unavailable         Unavailable

 

                    Barraclough, M Dulce PA Unavailable         Unavailable

 

                    Maykel, D Paulo PA Unavailable         Unavailable

 

                    Maykel, D Paulo PA Unavailable         Unavailable

 

                    Maykel, D Paulo PA Unavailable         Unavailable

 

                    Maykel, D Paulo PA Unavailable         Unavailable

 

                    Maykel, D Paulo PA Unavailable         Unavailable

 

                    Maykel, D Paulo PA Unavailable         Unavailable

 

                    Maykel, D Paulo PA Unavailable         Unavailable

 

                    Maykel, D Paulo PA Unavailable         Unavailable

 

                    Maykel, D Paulo PA Unavailable         Unavailable

 

                    Maykel, D Paulo PA Unavailable         Unavailable

 

                    Maykel, D Paulo PA Unavailable         Unavailable

 

                    Maykel, D Paulo PA Unavailable         Unavailable

 

                    Maykel, D Paulo PA Unavailable         Unavailable

 

                    Maykel, D Paulo PA Unavailable         Unavailable

 

                    Maykel, D Paulo PA Unavailable         Unavailable

 

                    Maykel, D Paulo PA Unavailable         Unavailable

 

                    Maykel, D Paulo PA Unavailable         Unavailable

 

                    Maykel, D Paulo PA Unavailable         Unavailable

 

                    Maykel, D Paulo PA Unavailable         Unavailable

 

                    Maykel, D Paulo PA Unavailable         Unavailable

 

                    Maykel, D Paulo PA Unavailable         Unavailable

 

                    Maykel, D Paulo PA Unavailable         Unavailable

 

                    Maykel, D Paulo PA Unavailable         Unavailable

 

                    Maykel, D Paulo PA Unavailable         Unavailable

 

                    Maykel, D Paulo PA Unavailable         Unavailable

 

                    Maykel, D Paulo PA Unavailable         Unavailable

 

                    Maykel, D Paulo PA Unavailable         Unavailable

 

                    Maykel, D Paulo PA Unavailable         Unavailable

 

                    Maykel, D Paulo PA Unavailable         Unavailable

 

                    Maykel, D Paulo PA Unavailable         Unavailable

 

                    Maykel, D Paulo PA Unavailable         Unavailable

 

                    Maykel, D Paulo PA Unavailable         Unavailable

 

                    Maykel, D Paulo PA Unavailable         Unavailable

 

                    Maykel, D Paulo PA Unavailable         Unavailable

 

                    Maykel, D Paulo PA Unavailable         Unavailable

 

                    Maykel, D Paulo PA Unavailable         Unavailable

 

                    Maykel, D Paulo PA Unavailable         Unavailable

 

                    Maykel, D Paulo PA Unavailable         Unavailable

 

                    Maykel, D Paulo PA Unavailable         Unavailable

 

                    Maykel, D Paulo PA Unavailable         Unavailable

 

                    Maykel, D Paulo PA Unavailable         Unavailable

 

                    Maykel, D Paulo PA Unavailable         Unavailable

 

                    Maykel, D Paulo PA Unavailable         Unavailable

 

                    Maykel, D Paulo PA Unavailable         Unavailable

 

                    Maykel, D Paulo PA Unavailable         Unavailable

 

                    Maykel, D Paulo PA Unavailable         Unavailable

 

                    Maykel, D Paulo PA Unavailable         Unavailable

 

                    Maykel, D Paulo PA Unavailable         Unavailable

 

                    Maykel, D Paulo PA Unavailable         Unavailable

 

                    Maykel, D Paulo PA Unavailable         Unavailable

 

                    Maykel, D Paulo PA Unavailable         Unavailable

 

                    Maykel, D Paulo PA Unavailable         Unavailable

 

                    Maykel, D Paulo PA Unavailable         Unavailable

 

                    Maykel, D Paulo PA Unavailable         Unavailable

 

                    Maykel, D Paulo PA Unavailable         Unavailable

 

                    Maykel, D Paulo PA Unavailable         Unavailable

 

                    Maykel, D Paulo PA Unavailable         Unavailable

 

                    Maykel, D Paulo PA Unavailable         Unavailable

 

                    Maykel, D Paulo PA Unavailable         Unavailable

 

                    Maykel, D Paulo PA Unavailable         Unavailable

 

                    Maykel, D Paulo PA Unavailable         Unavailable

 

                    Maykel, D Paulo PA Unavailable         Unavailable

 

                    Maykel, D Paulo PA Unavailable         Unavailable

 

                    Maykel, D Paulo PA Unavailable         Unavailable

 

                    Maykel, D Paulo PA Unavailable         Unavailable

 

                    Maykel, D Paulo PA Unavailable         Unavailable

 

                    Maykel, D Paulo PA Unavailable         Unavailable

 

                    Maykel, D Paulo PA Unavailable         Unavailable

 

                    STEVEN HALL DPM Unavailable         Unavailable

 

                    STEVEN HALL DPM Unavailable         Unavailable

 

                    STEVEN HALL DPM Unavailable         Unavailable

 

                    STEVEN HALL DPM Unavailable         Unavailable

 

                    STEVEN HALL DPM Unavailable         Unavailable

 

                    STEVEN HALL DPM Unavailable         Unavailable

 

                    STEVEN HALL DPM Unavailable         Unavailable

 

                    MAJAK, R WILLIAMS DPM Unavailable         Unavailable

 

                    MAJAK, R WILLIAMS DPM Unavailable         Unavailable

 

                    MAJAK, R WILLIAMS DPM Unavailable         Unavailable

 

                    MAJAK, R WILLIAMS DPM Unavailable         Unavailable

 

                    MAJAK, R WILLIAMS DPM Unavailable         Unavailable

 

                    MAJAK, R WILLIAMS DPM Unavailable         Unavailable

 

                    MAJAK, R WILLIAMS DPM Unavailable         Unavailable

 

                    MAJAK, R WILLIAMS DPM Unavailable         Unavailable

 

                    MAJAK, R WILLIAMS DPM Unavailable         Unavailable

 

                    MAJAK, R WILLIAMS DPM Unavailable         Unavailable

 

                    MAJAK, R WILLIAMS DPM Unavailable         Unavailable

 

                    MAJAK, R WILLIAMS DPM Unavailable         Unavailable

 

                    MAJAK, R WILLIAMS DPM Unavailable         Unavailable

 

                    MAJAK, R WILLIAMS DPM Unavailable         Unavailable

 

                    MAJAK, R WILLIAMS DPM Unavailable         Unavailable

 

                    MAJAK, R WILLIAMS DPM Unavailable         Unavailable

 

                    MAJAK, R WILLIAMS DPM Unavailable         Unavailable

 

                    MAJAK, R WILLIAMS DPM Unavailable         Unavailable

 

                    MAJAK, R WILLIAMS DPM Unavailable         Unavailable

 

                    MAJAK, R WILLIAMS DPM Unavailable         Unavailable

 

                    MAJAK, R WILLIAMS DPM Unavailable         Unavailable

 

                    MAJAK, R WILLIAMS DPM Unavailable         Unavailable

 

                    MAJAK, R WILLIAMS DPM Unavailable         Unavailable

 

                    MAJAK, R WILLIAMS DPM Unavailable         Unavailable

 

                    MAJAK, R WILLIAMS DPM Unavailable         Unavailable

 

                    MAJAK, R WILLIAMS DPM Unavailable         Unavailable

 

                    Fish, J Erwin     Unavailable         Unavailable

 

                    Fish, J Erwin     Unavailable         Unavailable

 

                    Fish, J Erwin     Unavailable         Unavailable

 

                    Fish, J Erwin     Unavailable         Unavailable

 

                    Fish, J Erwin     Unavailable         Unavailable

 

                    Fish, J Erwin     Unavailable         Unavailable

 

                    Fish, J Erwin     Unavailable         Unavailable

 

                    Fish, J Erwin     Unavailable         Unavailable

 

                    Fish, J Erwin     Unavailable         Unavailable

 

                    Fish, J Erwin     Unavailable         Unavailable

 

                    Fish, J Erwin     Unavailable         Unavailable

 

                    Fish, J Erwin     Unavailable         Unavailable

 

                    Fish, J Erwin     Unavailable         Unavailable

 

                    Fish, J Erwin     Unavailable         Unavailable

 

                    Fish, J Erwin     Unavailable         Unavailable

 

                    Fish, J Erwin     Unavailable         Unavailable

 

                    Fish, J Erwin     Unavailable         Unavailable

 

                    Fish, J Erwin     Unavailable         Unavailable

 

                    Fish, J Erwin     Unavailable         Unavailable

 

                    Fish, J Erwin     Unavailable         Unavailable

 

                    Fish, J Erwin     Unavailable         Unavailable

 

                    Fish, J Erwin     Unavailable         Unavailable

 

                    Fish, J Erwin     Unavailable         Unavailable

 

                    Fish, J Erwin     Unavailable         Unavailable

 

                    Fish, J Erwin     Unavailable         Unavailable

 

                    Fish, J Erwin     Unavailable         Unavailable

 

                    Fish, J Erwin     Unavailable         Unavailable

 

                    Fish, J Erwin     Unavailable         Unavailable

 

                    Fish, J Erwin     Unavailable         Unavailable

 

                    Fish, J Erwin     Unavailable         Unavailable

 

                    Fish, J Erwin     Unavailable         Unavailable

 

                    Fish, J Erwin     Unavailable         Unavailable

 

                    Fish, J Erwin     Unavailable         Unavailable

 

                    Fish, J Erwin     Unavailable         Unavailable

 

                    Fish, J Erwin     Unavailable         Unavailable

 

                    Fish, J Erwin     Unavailable         Unavailable

 

                    Fish, J Erwin     Unavailable         Unavailable

 

                    Fish, J Erwin     Unavailable         Unavailable

 

                    Fish, J Erwin     Unavailable         Unavailable

 

                    Fish, J Erwin     Unavailable         Unavailable

 

                    Fish, J Erwin     Unavailable         Unavailable

 

                    Fish, J Erwin     Unavailable         Unavailable

 

                    Fish, J Erwin     Unavailable         Unavailable

 

                    Fish, J Erwin     Unavailable         Unavailable

 

                    Fish, J Erwin     Unavailable         Unavailable

 

                    Fish, J Erwin     Unavailable         Unavailable

 

                    Fish, J Erwin     Unavailable         Unavailable

 

                    Fish, J Erwin     Unavailable         Unavailable

 

                    Fish, J Erwin     Unavailable         Unavailable

 

                    Fish, J Erwin     Unavailable         Unavailable

 

                    Fish, J Erwin     Unavailable         Unavailable

 

                    Fish, J Erwin     Unavailable         Unavailable

 

                    Fish, J Erwin     Unavailable         Unavailable

 

                    Fish, J Erwin     Unavailable         Unavailable

 

                    Fish, J Erwin     Unavailable         Unavailable

 

                    Fish, J Erwin     Unavailable         Unavailable

 

                    Fish, J Erwin     Unavailable         Unavailable

 

                    Fish, J Erwin     Unavailable         Unavailable

 

                    Fish, J Erwin     Unavailable         Unavailable

 

                    Fish, J Erwin     Unavailable         Unavailable

 

                    Fish, J Erwin     Unavailable         Unavailable

 

                    Fish, J Erwin     Unavailable         Unavailable

 

                    Fish, J Erwin     Unavailable         Unavailable

 

                    Fish, J Erwin     Unavailable         Unavailable

 

                    Fish, J Erwin     Unavailable         Unavailable

 

                    Fish, J Erwin     Unavailable         Unavailable

 

                    Fish, J Erwin     Unavailable         Unavailable

 

                    Fish, J Erwin     Unavailable         Unavailable

 

                    Fish, J Erwin     Unavailable         Unavailable

 

                    Fish, J Erwin     Unavailable         Unavailable

 

                    Fish, J Erwin     Unavailable         Unavailable

 

                    Fish, J Erwin     Unavailable         Unavailable

 

                    Fish, J Erwin     Unavailable         Unavailable

 

                    Fish, J Erwin     Unavailable         Unavailable

 

                    Fish, J Erwin     Unavailable         Unavailable

 

                    Fish, J Erwin     Unavailable         Unavailable

 

                    Fish, J Erwin     Unavailable         Unavailable

 

                    Fish, J Erwin     Unavailable         Unavailable

 

                    Fish, J Erwin     Unavailable         Unavailable

 

                    Fish, J Erwin     Unavailable         Unavailable

 

                    Fish, J Erwin     Unavailable         Unavailable

 

                    Fish, J Erwin     Unavailable         Unavailable

 

                    Fish, J Erwin     Unavailable         Unavailable

 

                    Fish, J Erwin     Unavailable         Unavailable

 

                    Fish, J Erwin     Unavailable         Unavailable

 

                    Fish, J Erwin     Unavailable         Unavailable

 

                    Maykel, D Paulo PA Unavailable         Unavailable

 

                    Maykel, D Paulo PA Unavailable         Unavailable

 

                    Maykel, D Paulo PA Unavailable         Unavailable

 

                    Maykel, D Paulo PA Unavailable         Unavailable

 

                    Maykel, D Paulo PA Unavailable         Unavailable

 

                    Maykel, D Paulo PA Unavailable         Unavailable

 

                    Maykel, D Paulo PA Unavailable         Unavailable

 

                    Maykel, D Paulo PA Unavailable         Unavailable

 

                    Maykel, D Paulo PA Unavailable         Unavailable

 

                    Maykel, D Paulo PA Unavailable         Unavailable

 

                    Maykel, D Paulo PA Unavailable         Unavailable

 

                    Maykel, D Paulo PA Unavailable         Unavailable

 

                    Maykel, D Paulo PA Unavailable         Unavailable

 

                    Maykle, D Paulo PA Unavailable         Unavailable

 

                    Maykel, D Paulo PA Unavailable         Unavailable

 

                    Maykel, D Paulo PA Unavailable         Unavailable

 

                    Maykel, D Paulo PA Unavailable         Unavailable

 

                    Maykel, D Paulo PA Unavailable         Unavailable

 

                    Maykel, D Paulo PA Unavailable         Unavailable

 

                    Maykel, D Paulo PA Unavailable         Unavailable

 

                    Maykel, D Paulo PA Unavailable         Unavailable

 

                    Maykel, D Paulo PA Unavailable         Unavailable

 

                    Maykel, D Paulo PA Unavailable         Unavailable

 

                    Maykel, D Paulo PA Unavailable         Unavailable

 

                    Maykel, D Paulo PA Unavailable         Unavailable

 

                    Maykel, D Paulo PA Unavailable         Unavailable

 

                    Maykel, D Paulo PA Unavailable         Unavailable

 

                    Maykel, D Paulo PA Unavailable         Unavailable

 

                    Maykel, D Paulo PA Unavailable         Unavailable

 

                    Maykel, D Paulo PA Unavailable         Unavailable

 

                    Maykel, D Paulo PA Unavailable         Unavailable

 

                    Maykel, D Paulo PA Unavailable         Unavailable

 

                    Maykel, D Paulo PA Unavailable         Unavailable

 

                    Maykel, D Paulo PA Unavailable         Unavailable

 

                    Maykel, D Paulo PA Unavailable         Unavailable

 

                    Maykel, D Paulo PA Unavailable         Unavailable

 

                    Maykel, D Paulo PA Unavailable         Unavailable

 

                    Maykel, D Paulo PA Unavailable         Unavailable

 

                    Maykel, D Paulo PA Unavailable         Unavailable

 

                    Maykel, D Paulo PA Unavailable         Unavailable

 

                    Maykel, D Paulo PA Unavailable         Unavailable

 

                    Maykel, D Paulo PA Unavailable         Unavailable

 

                    Maykel, D Paulo PA Unavailable         Unavailable

 

                    Maykel, D Paulo PA Unavailable         Unavailable

 

                    Maykel, D Paulo PA Unavailable         Unavailable

 

                    Maykel, D Paulo PA Unavailable         Unavailable

 

                    Maykel, D Paulo PA Unavailable         Unavailable

 

                    Maykel, D Paulo PA Unavailable         Unavailable

 

                    Maykel, D Paulo PA Unavailable         Unavailable

 

                    Maykel, D Paulo PA Unavailable         Unavailable

 

                    Maykel, D Paulo PA Unavailable         Unavailable

 

                    Maykel, D Paulo PA Unavailable         Unavailable

 

                    Maykel, D Paulo PA Unavailable         Unavailable

 

                    Maykel, D Paulo PA Unavailable         Unavailable

 

                    Maykel, D Paulo PA Unavailable         Unavailable

 

                    Maykel, D Paulo PA Unavailable         Unavailable

 

                    Maykel, D Paulo PA Unavailable         Unavailable

 

                    Maykel, D Paulo PA Unavailable         Unavailable

 

                    Maykel, D Paulo PA Unavailable         Unavailable

 

                    Maykel, D Paulo PA Unavailable         Unavailable

 

                    Maykel, D Paulo PA Unavailable         Unavailable

 

                    Maykel, D Paulo PA Unavailable         Unavailable

 

                    Maykel, D Paulo PA Unavailable         Unavailable

 

                    Maykel, D Paulo PA Unavailable         Unavailable

 

                    Maykel, D Paulo PA Unavailable         Unavailable

 

                    Maykel, D Paulo PA Unavailable         Unavailable

 

                    Maykel, D Paulo PA Unavailable         Unavailable

 

                    Maykel, D Paulo PA Unavailable         Unavailable

 

                    DRAZEK, I ALDO PA   Unavailable         Unavailable

 

                    DRAZEK, I ALDO PA   Unavailable         Unavailable

 

                    DRAZEK, I ALDO PA   Unavailable         Unavailable

 

                    DRAZEK, I ALDO PA   Unavailable         Unavailable

 

                    DRAZEK, I ALDO PA   Unavailable         Unavailable

 

                    DRAZEK, I ALDO PA   Unavailable         Unavailable

 

                    DRAZEK, I ALDO PA   Unavailable         Unavailable

 

                    DRAZEK, I ALDO PA   Unavailable         Unavailable

 

                    DRAZEK, I ALDO PA   Unavailable         Unavailable

 

                    DRAZEK, I ALDO PA   Unavailable         Unavailable

 

                    DRAZEK, I ALDO PA   Unavailable         Unavailable

 

                    DRAZEK, I ALDO PA   Unavailable         Unavailable

 

                    DRAZEK, I ALDO PA   Unavailable         Unavailable

 

                    DRAZEK, I ALDO PA   Unavailable         Unavailable

 

                    DRAZEK, I ALDO PA   Unavailable         Unavailable

 

                    DRAZEK, I ALDO PA   Unavailable         Unavailable

 

                    DRAZEK, I ALDO PA   Unavailable         Unavailable

 

                    DRAZEK, I ALDO PA   Unavailable         Unavailable

 

                    DRAZEK, I ALDO PA   Unavailable         Unavailable

 

                    DRAZEK, I ALDO PA   Unavailable         Unavailable

 

                    DRAZEK, I ALDO PA   Unavailable         Unavailable

 

                    DRAZEK, I ALDO PA   Unavailable         Unavailable

 

                    DRAZEK, I ALDO PA   Unavailable         Unavailable

 

                    DRAZEK, I ALDO PA   Unavailable         Unavailable

 

                    DRAZEK, I ALDO PA   Unavailable         Unavailable

 

                    DRAZEK, I ALDO PA   Unavailable         Unavailable

 

                    DRAZEK, I ALDO PA   Unavailable         Unavailable

 

                    DRAZEK, I ALDO PA   Unavailable         Unavailable

 

                    DRAZEK, I ALDO PA   Unavailable         Unavailable

 

                    DRAZEK, I ALDO PA   Unavailable         Unavailable

 

                    KARIE CLEMENTS MD Unavailable         Unavailable

 

                    KARIE CLEMENTS MD Unavailable         Unavailable

 

                    KARIE CLEMENTS MD Unavailable         Unavailable

 

                    KARIE CLEMENTS MD Unavailable         Unavailable

 

                    KARIE CLEMENTS MD Unavailable         Unavailable

 

                    KARIE CLEMENTS MD Unavailable         Unavailable

 

                    KARIE CLEMENTS MD Unavailable         Unavailable

 

                    KARIE CLEMENTS MD Unavailable         Unavailable

 

                    KARIE CLEMENTS MD Unavailable         Unavailable

 

                    KARIE CLEMENTS MD Unavailable         Unavailable

 

                    KARIE CLEMENTS MD Unavailable         Unavailable

 

                    KARIE CLEMENTS MD Unavailable         Unavailable

 

                    KARIE CLEMENTS MD Unavailable         Unavailable

 

                    KARIE CLEMENTS MD Unavailable         Unavailable

 

                    KARIE CLEMENTS MD Unavailable         Unavailable

 

                    KARIE CLEMENTS MD Unavailable         Unavailable

 

                    KARIE CLEMENTS MD Unavailable         Unavailable

 

                    KARIE CLEMENTS MD Unavailable         Unavailable

 

                    KARIE CLEMENTS MD Unavailable         Unavailable

 

                    KARIE CLEMENTS MD Unavailable         Unavailable

 

                    KARIE CLEMENTS MD Unavailable         Unavailable

 

                    KARIE CLEMENTS MD Unavailable         Unavailable

 

                    KARIE CLEMENTS MD Unavailable         Unavailable

 

                    KARIE CLEMENTS MD Unavailable         Unavailable

 

                    KARIE CLEMENTS MD Unavailable         Unavailable

 

                    KARIE CLEMENTS MD Unavailable         Unavailable

 

                    KARIE CLEMENTS MD Unavailable         Unavailable

 

                    KARIE CLEMENTS MD Unavailable         Unavailable

 

                    KARIE CLEMENTS MD Unavailable         Unavailable

 

                    KARIE CLEMENTS MD Unavailable         Unavailable

 

                    KARIE CLEMENTS MD Unavailable         Unavailable

 

                    KARIE CLEMENTS MD Unavailable         Unavailable

 

                    KARIE CLEMENTS MD Unavailable         Unavailable

 

                    KARIE CLEMENTS MD Unavailable         Unavailable

 

                    Fish, J Erwin     Unavailable         Unavailable

 

                    Fish, J Erwin     Unavailable         Unavailable

 

                    Fish, J Erwin     Unavailable         Unavailable

 

                    Fish, J Erwin     Unavailable         Unavailable

 

                    Fish, J Erwin     Unavailable         Unavailable

 

                    Fish, J Erwin     Unavailable         Unavailable

 

                    Fish, J Erwin     Unavailable         Unavailable

 

                    Fish, J Erwin     Unavailable         Unavailable

 

                    Fish, J Erwin     Unavailable         Unavailable

 

                    Fish, J Erwin     Unavailable         Unavailable

 

                    Fish, J Erwin     Unavailable         Unavailable

 

                    Fish, J Erwin     Unavailable         Unavailable

 

                    Fish, J Erwin     Unavailable         Unavailable

 

                    Fish, J Erwin     Unavailable         Unavailable

 

                    Fish, J Erwin     Unavailable         Unavailable

 

                    Fish, J Erwin     Unavailable         Unavailable

 

                    Fish, J Erwin     Unavailable         Unavailable

 

                    Fish, J Erwin     Unavailable         Unavailable

 

                    Fish, J Erwin     Unavailable         Unavailable

 

                    Fish, J Erwin     Unavailable         Unavailable

 

                    Fish, J Erwin     Unavailable         Unavailable

 

                    Fish, J Erwin     Unavailable         Unavailable

 

                    Fish, J Erwin     Unavailable         Unavailable

 

                    Fish, J Erwin     Unavailable         Unavailable

 

                    Fish, J Erwin     Unavailable         Unavailable

 

                    Fish, J Erwin     Unavailable         Unavailable

 

                    Fish, J Erwin     Unavailable         Unavailable

 

                    Fish, J Erwin     Unavailable         Unavailable

 

                    Fish, J Erwin     Unavailable         Unavailable

 

                    Fish, J Erwin     Unavailable         Unavailable

 

                    Fish, J Erwin     Unavailable         Unavailable

 

                    Fish, J Erwin     Unavailable         Unavailable

 

                    Fish, J Erwin     Unavailable         Unavailable

 

                    Fish, J Erwin     Unavailable         Unavailable

 

                    Fish, J Erwin     Unavailable         Unavailable

 

                    Fish, J Erwin     Unavailable         Unavailable

 

                    Fish, J Erwin     Unavailable         Unavailable

 

                    Fish, J Erwin     Unavailable         Unavailable

 

                    Fish, J Erwin     Unavailable         Unavailable

 

                    Fish, J Erwin     Unavailable         Unavailable

 

                    Fish, J Erwin     Unavailable         Unavailable

 

                    Fish, J Erwin     Unavailable         Unavailable

 

                    Fish, J Erwin     Unavailable         Unavailable

 

                    Fish, J Erwin     Unavailable         Unavailable

 

                    Fish, J Erwin     Unavailable         Unavailable

 

                    Fish, J Erwin     Unavailable         Unavailable

 

                    Fish, J Erwin     Unavailable         Unavailable

 

                    Fish, J Erwin     Unavailable         Unavailable

 

                    Fish, J Erwin     Unavailable         Unavailable

 

                    Fish, J Erwin     Unavailable         Unavailable

 

                    Fish, J Erwin     Unavailable         Unavailable

 

                    Fish, J Erwin     Unavailable         Unavailable

 

                    Fish, J Erwin     Unavailable         Unavailable

 

                    Fish, J Erwin     Unavailable         Unavailable

 

                    Fish, J Erwin     Unavailable         Unavailable

 

                    Fish, J Erwin     Unavailable         Unavailable

 

                    Fish, J Erwin     Unavailable         Unavailable

 

                    Fish, J Erwin     Unavailable         Unavailable

 

                    Fish, J Erwin     Unavailable         Unavailable

 

                    Fish, J Erwin     Unavailable         Unavailable

 

                    Fish, J Erwin     Unavailable         Unavailable

 

                    Fish, J Erwin     Unavailable         Unavailable

 

                    Fish, J Erwin     Unavailable         Unavailable

 

                    Fish, J Erwin     Unavailable         Unavailable

 

                    Fish, J Erwin     Unavailable         Unavailable

 

                    Fish, J Erwin     Unavailable         Unavailable

 

                    Fish, J Erwin     Unavailable         Unavailable

 

                    Fish, J Erwin     Unavailable         Unavailable

 

                    Fish, J Erwin     Unavailable         Unavailable

 

                    Fish, J Erwin     Unavailable         Unavailable

 

                    Fish, J Erwin     Unavailable         Unavailable

 

                    Fish, J Erwin     Unavailable         Unavailable

 

                    Fish, J Erwin     Unavailable         Unavailable

 

                    Fish, J Erwin     Unavailable         Unavailable

 

                    Fish, J Erwin     Unavailable         Unavailable

 

                    Fish, J Erwin     Unavailable         Unavailable

 

                    Fish, J Erwin     Unavailable         Unavailable

 

                    Fish, J Erwin     Unavailable         Unavailable

 

                    Fish, J Erwin     Unavailable         Unavailable

 

                    Fish, J Erwin     Unavailable         Unavailable

 

                    Fish, J Erwin     Unavailable         Unavailable

 

                    Fish, J Erwin     Unavailable         Unavailable

 

                    Fish, J Erwin     Unavailable         Unavailable

 

                    Fish, J Erwin     Unavailable         Unavailable

 

                    Fish, J Erwin     Unavailable         Unavailable

 

                    Fish, J Erwin     Unavailable         Unavailable



                                  



Re-disclosure Warning

          The records that you are about to access may contain information from 
federally-assisted alcohol or drug abuse programs. If such information is 
present, then the following federally mandated warning applies: This information
has been disclosed to you from records protected by federal confidentiality 
rules (42 CFR part 2). The federal rules prohibit you from making any further 
disclosure of this information unless further disclosure is expressly permitted 
by the written consent of the person to whom it pertains or as otherwise 
permitted by 42 CFR part 2. A general authorization for the release of medical 
or other information is NOT sufficient for this purpose. The Federal rules 
restrict any use of the information to criminally investigate or prosecute any 
alcohol or drug abuse patient.The records that you are about to access may 
contain highly sensitive health information, the redisclosure of which is 
protected by Article 27-F of the [facility] Public [facility] law. If you 
continue you may have access to information: Regarding HIV / AIDS; Provided by 
facilities licensed or operated by the [facility] Office of Mental Health; 
or Provided by the [facility] Office for People With Developmental 
Disabilities. If such information is present, then the following [place] 
mandated warning applies: This information has been disclosed to you from 
confidential records which are protected by state law. State law prohibits you 
from making any further disclosure of this information without the specific 
written consent of the person to whom it pertains, or as otherwise permitted by 
law. Any unauthorized further disclosure in violation of state law may result in
a fine or residential sentence or both. A general authorization for the release of 
medical or other information is NOT sufficient authorization for further disc
losure.                                                                         
    



Allergies and Adverse Reactions

          



           Type       Description Substance  Reaction   Status     Data Source(s

)

 

                      No Known Environmental Allergies No Known Environmental Al

lergies                       [facility]

 

                      No Known Food Allergies No Known Food Allergies           

            [facility]

 

           Propensity to adverse reactions NSAID      NSAID                     

       [facility]

 

                      No Known Drug Allergies No Known Drug Allergies           

            [facility]



                                                                                
                                     



Family History

          



             Family Member Name Family Member Gender Family Member Status Date o

f Status 

Description                             Data Source(s)

 

           Unknown    Unknown    Problem                          [facility]

 

                                        grandmother 

 

           Unknown    Male       Problem                          [facility]

 

           Unknown    Unknown    Problem                          [facility] ([facility]

 

           Unknown    Female     Problem                          [facility] ([facility]

 

           Unknown    Female     Problem                          [facility] ([facility])

 

           Unknown    Female     Problem                          [facility] ([facility])



                                                                                
                                     



Encounters

          



           Encounter  Providers  Location   Date       Indications Data Source(s

)

 

                Unknown                         [address] [facility]

[place] [address] 2021 12:00:00 AM 

EST                                                 eCW1 ([facility])

 

                Outpatient                      [address] [facility], 

[place] [address] 10/28/2021 12:00:00 AM

EDT                                                 [facility] ([facility]

 

                Unknown                         [address] [facility]

[place] [address] 10/08/2021 12:00:00 AM 

EDT                                                 eCW1 ([facility])

 

                Outpatient      Attender: HOMER Ndiaye/Negra/Chaz juan/Robles 2021 

10:30:00 AM EDT                                     [facility] ([facility]

 

                Unknown                         [address] [facility], 

[place] [address] 2021 12:00:00 AM 

EDT                                                 [facility]

 

           Outpatient Attender: [facility] Office 2021 01:30:0

0 PM EDT            

[facility] ([facility]

 

                Unknown                         [address] [facility]

[place] [address] 2021 12:00:00 AM 

EDT                                                 eCW1 ([facility]

 

                Unknown                         [address] [facility]

[place] [address] 2021 12:00:00 AM 

EDT                                                 [facility]

 

                Unknown                         [address] [facility]

[place] [address] 2021 12:00:00 AM 

EDT                                                 [facility]

 

                Unknown                         [address] [facility]

[place] [address] 2021 12:00:00 AM 

EDT                                                 eCW1 ([facility]

 

                Unknown                         [address] [facility], 

[place] [address] 2021 12:00:00 AM 

EDT                                                 [facility]

 

                Unknown                         [address] [facility]

[place] [address] 2021 12:00:00 AM 

EDT                                                 [facility])

 

           Outpatient Attender: [facility] Office 2021 01:30:0

0 PM EDT            

[facility] ([facility]

 

                Unknown                         [address] [facility]

[place] [address] 2021 12:00:00 AM 

EDT                                                 [facility]

 

                Outpatient                      [address] [facility] [address] [facility]

[place] [address] 2021 12:00:00 AM

EDT                                                 [facility]

[facility])

 

                Unknown                         [address] [facility] [facility], 

[place] [address] 2021 12:00:00 AM 

EDT                                                 [facility]CW[facility] ([facility]

[facility] [place])

 

                Unknown                         [address]

[place] [address] 2021 12:00:00 AM 

EDT                                                 [facility]CW1 [facility]

[facility])

 

                Outpatient      Attender: Valdo Ndiaye/Negra/Basilio/Re

indl 2021 

10:00:00 AM EDT                                     [facility]

 

                Unknown                         [address] [facility]

[place] [address] 2021 12:00:00 AM 

EDT                                                 eCW1 [facility]

[facility])

 

                Outpatient      Attender: Valdo Ndiaye/Negra/Basilio/Re

indl 03/15/2021 

03:00:00 PM EDT                                     [facility]

 

                Outpatient      Attender: WILLIAMS HALL [facility] Office  09:00:00 AM 

EST                                                 [facility] ([facility]

 

                Outpatient      Attender: Paulo Brar PAConsultant: Erwin knott                 2021 

07:19:00 AM EST - 2021 08:19:00 AM EST                           [facility]

 

                                        Patient discharged. 

 

                Unknown                         [address] [facility], 

[place] [address] 2021 12:00:00 AM 

EST                                                 [facility]

 [facility])

 

                Outpatient      Attender: Paulo Brar [facility] Office 2021 10:15:00 AM 

EST                                                 [facility] ([facility]

 

                Outpatient      Attender: Valdo Ndiaye/Negra/Basilio/Re

indl 2021 

12:00:00 PM EST                                     [facility] ([facility]

[facility])

 

                Unknown                         [address] [facility], 

[place] [address] 2021 12:00:00 AM 

EST                                                 eCW1 [facility]

[facility])

 

                Unknown                         [address] [facility]

[place] [address] 2021 12:00:00 AM 

EST                                                 eCW1 ([facility])

 

           Outpatient Attender: [facility] Office 2021 12:15:0

0 PM EST            

[facility] ([facility]

 

                Unknown                         [address] [facility]

[place] [address] 2021 12:00:00 AM 

EST                                                 eCW1 ([facility]

 [facility])

 

                Outpatient                      [address] [facility]

[place] [address] 2021 12:00:00 AM

EST                                                 eCW1 ([facility])

 

                Unknown                         [address] [facility]

[place] [address] 2021 12:00:00 AM 

EST                                                 [facility]CW1 ([facility]

 [facility])

 

           Outpatient Attender: [facility] Office 2021 01:00:0

0 PM EST            

[facility] ([facility]

 

                Unknown                         [address] [facility]

[place] [address] 2021 12:00:00 AM 

EST                                                 [facility]

 

                Unknown                         [address] [facility]

[place] [address] 2021 12:00:00 AM 

EST                                                 [facility]CW[facility] ([facility]

 

                Unknown                         [address] [facility]

[place] [address] 2021 12:00:00 AM 

EST                                                 [facility]CW[facility] ([facility]

 

                Outpatient      Attender: Valdo Ndiaye/Negra/Basilio/Re

indl 2021 

12:00:00 PM EST                                     [facility] ([facility]

 

                Unknown                         [address] [facility], 

[place] [address] 2020 12:00:00 AM 

EST                                                 eCW1 ([facility]

 

                Unknown                         [address] [facility], 

[place] [address] 12/10/2020 12:00:00 AM 

EST                                                 eCW1 ([facility]

 

                Unknown                         [address] [facility], 

[place] [address] 2020 12:00:00 AM 

EST                                                 eCW1 ([facility])

 

           Outpatient Attender: Erwin Kiser [place] [facility] 2020 02:20:0

0 PM EST            

MEDENT ([facility]

 

             Outpatient   Attender: ALDO GALLARDO Physical Therapy 2020 0

3:30:00 PM EST 

                                        MEDENT ([facility])

 

                Outpatient                      [address] [facility]

[place] [address] 2020 12:00:00 AM

EST                                                 eCW1 ([facility]

 

                Office Visit    Attender: Valdo Ndiaye/Negra/Basilio/Re

indl 2020 

10:15:00 AM EST                                     [facility] ([facility]

 

                Unknown                         [address] [facility], 

[place] [address] 10/30/2020 12:00:00 AM 

EDT                                                 eCW1 ([facility]

 

             Outpatient   Attender: ALDO GALLARDO Physical Therapy 10/29/2020 0

5:15:00 PM EDT 

                                        MEDENT ([facility])

 

                Outpatient      Attender: Radha Linder MDConsultant: Erwin [facility]                 10/28/2020 

08:00:00 AM EDT - 10/28/2020 11:58:00 AM EDT                           [facility]

 

                                        Patient discharged. 

 

                Outpatient      Attender: Dulce GALLARDO [place] [facility]

ce 10/27/2020 

11:15:00 AM EDT                                     [facility]

 

                Outpatient      Attender: Valdo Ndiaye/Negra/Basilio/Re

indl 10/23/2020 

11:15:00 AM EDT                                     MED[facility] ([facility]

 

                Outpatient      Attender: Radha Linder MDConsultant: Erwin [facility]

h                 10/14/2020 

06:45:00 AM EDT - 10/14/2020 09:44:00 AM EDT                           [facility]

 

                                        Patient discharged. 

 

                Unknown                         [address] [facility] [place]

[place] [address] 10/13/2020 12:00:00 AM 

EDT                                                 eCW1 ([facility])

 

                OFFICE OUTPATIENT NEW 30 MINUTES Attender: ALDO GALLARDO Physic

al Therapy 

10/12/2020 11:15:00 AM EDT                           [facility] ([facility]



                                                                                
                                                                                
                                                                                
                                                                                
                                                                                
                                                                                
                                                                                
                     



Immunizations

          



             Vaccine      Date         Status       Description  Data Source(s)

 

             New in .  IIV4 2021 01:32:00 PM EDT completed            

     [facility] ([facility]

 

                    COVID-19 VACCINE, MRNA-1273, LNP-S (MODERNA)/PF 2021 1

2:00:00 AM EDT 

completed                                           [facility]

 

             COVID-19 VACCINE Moderna 2021 12:00:00 AM EDT completed      

           [facility]

 

                                        Vaccine Series Complete: YESThis Data wa

s Submitted to [facility]. 

 

             COVID-19 VACCINE Moderna 2021 12:00:00 AM EST completed      

           [facility]

 

                                        Vaccine Series Complete: NOThis Data was

 Submitted to [facility]. 

 

                    COVID-19 VACCINE, MRNA-1273, LNP-S (MODERNA)/PF 2021 1

2:00:00 AM EST 

completed                                           [facility]



                                                                                
                                               



Medications

          



          Medication Brand Name Start Date Product Form Dose      Route     Admi

nistrative 

Instructions Pharmacy Instructions Status     Indications Reaction   Description

 Data 

Source(s)

 

             oxyCODONE HCl ER 20 MG oxyCODONE HCl ER 20 MG 2021 12:00:00 A

M EST              1.0 

{tablet}                         active                  oxyCODONE HCl ER 20 MG 

eCW1 ([facility])

 

                                        Acetaminophen 325 MG / Hydrocodone Igor

trate 5 MG Oral Tablet HYDROcodone-

Acetaminophen 5-325 MG    HYDROcodone-Acetaminophen 5-325 MG 2021 12:00:00

 AM

EST             1.0 {tablet_as_needed}                         active           

       HYDROcodone-Acetaminophen 5-325 MG

                                        eCW1 ([facility])

 

                                        Acetaminophen 325 MG / Hydrocodone Igor

trate 5 MG Oral Tablet HYDROcodone-

Acetaminophen 5-325 MG    HYDROcodone-Acetaminophen 5-325 MG 10/28/2021 12:00:00

 AM

EDT             1.0 {tablet_as_needed}                         active           

       HYDROcodone-Acetaminophen 5-325 MG

                                        eCW1 ([facility])

 

             oxyCODONE HCl ER 20 MG oxyCODONE HCl ER 20 MG 10/28/2021 12:00:00 A

M EDT              1.0 

{tablet}                         active                  oxyCODONE HCl ER 20 MG 

eCW1 ([facility])

 

             oxyCODONE HCl ER 20 MG oxyCODONE HCl ER 20 MG 10/08/2021 12:00:00 A

M EDT              1.0 

{tablet}                         active                  oxyCODONE HCl ER 20 MG 

eCW1 ([facility])

 

                                        Acetaminophen 325 MG / Hydrocodone Igor

trate 5 MG Oral Tablet HYDROcodone-

Acetaminophen 5-325 MG    HYDROcodone-Acetaminophen 5-325 MG 10/08/2021 12:00:00

 AM

EDT             1.0 {tablet_as_needed}                         active           

       HYDROcodone-Acetaminophen 5-325 MG

                                        eCW1 ([facility])

 

             oxyCODONE HCl ER 20 MG oxyCODONE HCl ER 20 MG 09/10/2021 12:00:00 A

M EDT              1.0 

{tablet}                         active                  oxyCODONE HCl ER 20 MG 

eCW1 ([facility])

 

                                        Acetaminophen 325 MG / Hydrocodone Igor

trate 5 MG Oral Tablet HYDROcodone-

Acetaminophen 5-325 MG    HYDROcodone-Acetaminophen 5-325 MG 09/10/2021 12:00:00

 AM

EDT             1.0 {tablet_as_needed}                         active           

       HYDROcodone-Acetaminophen 5-325 MG

                                        eCW1 ([facility])

 

             oxyCODONE HCl ER 20 MG oxyCODONE HCl ER 20 MG 2021 12:00:00 A

M EDT              1.0 

{tablet}                         active                  oxyCODONE HCl ER 20 MG 

eCW1 ([facility])

 

                                        Acetaminophen 325 MG / Hydrocodone Igor

trate 5 MG Oral Tablet HYDROcodone-

Acetaminophen 5-325 MG    HYDROcodone-Acetaminophen 5-325 MG 2021 12:00:00

 AM

EDT             1.0 {tablet_as_needed}                         active           

       HYDROcodone-Acetaminophen 5-325 MG

                                        eCW1 ([facility])

 

                          Benazepril hydrochloride 20 MG / Hydrochlorothiazide 1

2.5 MG Oral Tablet 

Benazepril HCL/Hydrochlorothiazide 07/15/2021 12:00:00 AM EDT                   

                      active   

                                                            MEDENT ([facility]

 

             oxyCODONE HCl ER 20 MG oxyCODONE HCl ER 20 MG 2021 12:00:00 A

M EDT              1.0 

{tablet}                         active                  oxyCODONE HCl ER 20 MG 

eCW1 ([facility])

 

                                        Acetaminophen 325 MG / Hydrocodone Igor

trate 5 MG Oral Tablet HYDROcodone-

Acetaminophen 5-325 MG    HYDROcodone-Acetaminophen 5-325 MG 2021 12:00:00

 AM

EDT             1.0 {tablet_as_needed}                         active           

       HYDROcodone-Acetaminophen 5-325 MG

                                        eCW1 ([facility])

 

                                        Acetaminophen 325 MG / Hydrocodone Igor

trate 5 MG Oral Tablet HYDROcodone-

Acetaminophen 5-325 MG    HYDROcodone-Acetaminophen 5-325 MG 2021 12:00:00

 AM

EDT             1.0 {tablet_as_needed}                         active           

       HYDROcodone-Acetaminophen 5-325 MG

                                        eCW1 ([facility])

 

                                        Acetaminophen 325 MG / Hydrocodone Igor

trate 5 MG Oral Tablet Hydrocodone-

Acetaminophen 5-325 MG    Hydrocodone-Acetaminophen 5-325 MG 2021 12:00:00

 AM

EDT                                       active               Hydrocodone-Aceta

minophen 5-325 MG eCW1 ([facility])

 

                                        Acetaminophen 325 MG / Hydrocodone Igor

trate 5 MG Oral Tablet HYDROcodone-

Acetaminophen 5-325 MG    HYDROcodone-Acetaminophen 5-325 MG 2021 12:00:00

 AM

EDT                                       active               HYDROcodone-Aceta

minophen 5-325 MG eCW1 ([facility])

 

                                        Acetaminophen 325 MG / Hydrocodone Igor

trate 5 MG Oral Tablet HYDROcodone-

Acetaminophen 5-325 MG    HYDROcodone-Acetaminophen 5-325 MG 2021 12:00:00

 AM

EDT                                       active               HYDROcodone-Aceta

minophen 5-325 MG eCW1 ([facility])

 

             oxyCODONE HCl ER 20 MG oxyCODONE HCl ER 20 MG 2021 12:00:00 A

M EDT              1.0 

{tablet}                         active                  oxyCODONE HCl ER 20 MG 

eCW1 ([facility])

 

                                        Acetaminophen 325 MG / Hydrocodone Igor

trate 5 MG Oral Tablet HYDROcodone-

Acetaminophen 5-325 MG    HYDROcodone-Acetaminophen 5-325 MG 2021 12:00:00

 AM

EDT                                       active               HYDROcodone-Aceta

minophen 5-325 MG eCW1 ([facility])

 

             OxyCODONE HCl ER 20 MG OxyCODONE HCl ER 20 MG 2021 12:00:00 A

M EDT              1.0 

{tablet}                         active                  OxyCODONE HCl ER 20 MG 

eCW1 ([facility])

 

                                        Acetaminophen 325 MG / Hydrocodone Igor

trate 5 MG Oral Tablet HYDROcodone-

Acetaminophen 5-325 MG    HYDROcodone-Acetaminophen 5-325 MG 2021 12:00:00

 AM

EDT                                       active               HYDROcodone-Aceta

minophen 5-325 MG eCW1 ([facility])

 

                                        Acetaminophen 325 MG / Hydrocodone Igor

trate 5 MG Oral Tablet Hydrocodone-

Acetaminophen 5-325 MG    Hydrocodone-Acetaminophen 5-325 MG 2021 12:00:00

 AM

EDT                                       active               Hydrocodone-Aceta

minophen 5-325 MG eCW1 ([facility])

 

             OxyCODONE HCl ER 20 MG OxyCODONE HCl ER 20 MG 2021 12:00:00 A

M EDT              1.0 

{tablet}                         active                  OxyCODONE HCl ER 20 MG 

eCW1 ([facility])

 

                                        Acetaminophen 325 MG / Hydrocodone Igor

trate 5 MG Oral Tablet Hydrocodone-

Acetaminophen 5-325 MG    Hydrocodone-Acetaminophen 5-325 MG 2021 12:00:00

 AM

EDT                                       active               Hydrocodone-Aceta

minophen 5-325 MG eCW1 ([facility])

 

                                        Acetaminophen 325 MG / Hydrocodone Igor

trate 5 MG Oral Tablet HYDROcodone-

Acetaminophen 5-325 MG    HYDROcodone-Acetaminophen 5-325 MG 2021 12:00:00

 AM

EDT                                       active               HYDROcodone-Aceta

minophen 5-325 MG eCW1 ([facility])

 

             OxyCODONE HCl ER 20 MG OxyCODONE HCl ER 20 MG 2021 12:00:00 A

M EDT              1.0 

{tablet}                         active                  OxyCODONE HCl ER 20 MG 

eCW1 ([facility])

 

                                        Acetaminophen 325 MG / Hydrocodone Igor

trate 5 MG Oral Tablet Hydrocodone-

Acetaminophen 5-325 MG    Hydrocodone-Acetaminophen 5-325 MG 2021 12:00:00

 AM

EDT             1.0 {tablet_as_needed}                         active           

       Hydrocodone-Acetaminophen 5-325 MG

                                        eCW1 ([facility])

 

                                        Acetaminophen 325 MG / Hydrocodone Igor

trate 5 MG Oral Tablet Hydrocodone-

Acetaminophen 5-325 MG    Hydrocodone-Acetaminophen 5-325 MG 2021 12:00:00

 AM

EDT             1.0 {tablet_as_needed}                         active           

       Hydrocodone-Acetaminophen 5-325 MG

                                        eCW1 ([facility])

 

                                        Acetaminophen 325 MG / Hydrocodone Igor

trate 5 MG Oral Tablet Hydrocodone-

Acetaminophen 5-325 MG    Hydrocodone-Acetaminophen 5-325 MG 2021 12:00:00

 AM

EDT           1.0 {tablet_as_needed}                      active                

      eCW1 ([facility])

 

                                        Acetaminophen 325 MG / Hydrocodone Igor

trate 5 MG Oral Tablet Hydrocodone-

Acetaminophen 5-325 MG    Hydrocodone-Acetaminophen 5-325 MG 2021 12:00:00

 AM

EDT             1.0 {tablet_as_needed}                         active           

       Hydrocodone-Acetaminophen 5-325 MG

                                        eCW1 ([facility])

 

             OxyCODONE HCl ER 20 MG OxyCODONE HCl ER 20 MG 2021 12:00:00 A

M EDT              1.0 

{tablet}                         active                          eCW1 ([facility])

 

             OxyCODONE HCl ER 20 MG OxyCODONE HCl ER 20 MG 2021 12:00:00 A

M EDT              1.0 

{tablet}                         active                  OxyCODONE HCl ER 20 MG 

eCW1 ([facility])

 

             OxyCODONE HCl ER 20 MG OxyCODONE HCl ER 20 MG 2021 12:00:00 A

M EDT              1.0 

{tablet}                         active                  OxyCODONE HCl ER 20 MG 

eCW1 ([facility])

 

                                        Acetaminophen 325 MG / Hydrocodone Igor

trate 5 MG Oral Tablet Hydrocodone-

Acetaminophen 5-325 MG    Hydrocodone-Acetaminophen 5-325 MG 2021 12:00:00

 AM

EDT             1.0 {tablet_as_needed}                         active           

       Hydrocodone-Acetaminophen 5-325 MG

                                        eCW1 ([facility])

 

                                        Acetaminophen 325 MG / Hydrocodone Igor

trate 5 MG Oral Tablet Hydrocodone-

Acetaminophen 5-325 MG    Hydrocodone-Acetaminophen 5-325 MG 2021 12:00:00

 AM

EDT             1.0 {tablet_as_needed}                         active           

       Hydrocodone-Acetaminophen 5-325 MG

                                        eCW1 ([facility])

 

                                        Acetaminophen 325 MG / Hydrocodone Igor

trate 5 MG Oral Tablet Hydrocodone-

Acetaminophen 5-325 MG    Hydrocodone-Acetaminophen 5-325 MG 2021 12:00:00

 AM

EDT             1.0 {tablet_as_needed}                         active           

       Hydrocodone-Acetaminophen 5-325 MG

                                        eCW1 ([facility])

 

             OxyCODONE HCl ER 20 MG OxyCODONE HCl ER 20 MG 2021 12:00:00 A

M EDT              1.0 

{tablet}                         active                  OxyCODONE HCl ER 20 MG 

eCW1 ([facility])

 

                                        Acetaminophen 325 MG / Hydrocodone Igor

trate 5 MG Oral Tablet Hydrocodone-

Acetaminophen 5-325 MG    Hydrocodone-Acetaminophen 5-325 MG 2021 12:00:00

 AM

EDT             1.0 {tablet_as_needed}                         active           

       Hydrocodone-Acetaminophen 5-325 MG

                                        eCW1 ([facility])

 

                    ammonium lactate 120 MG/ML Topical Cream Ammonium Lactate   

 03/10/2021 12:00:00 AM

EST                                       active                      MED[facility] ([facility]

 

                    Doxycycline Monohydrate 100 MG Oral Capsule Doxycycline Mono

hydrate 2021 

12:00:00 AM EST               ORAL                 active                      M

EDENT ([facility], 

)

 

        Ibuprofen 800 MG Oral Tablet [Ibu] Ibu     2021 12:00:00 AM EST   

              ORAL                    

active                                                          MEDENT ([facility], )

 

             OxyCODONE HCl ER 20 MG OxyCODONE HCl ER 20 MG 2021 12:00:00 A

M EST              1.0 

{tablet}                         active                  OxyCODONE HCl ER 20 MG 

eCW1 ([facility])

 

             OxyCODONE HCl ER 20 MG OxyCODONE HCl ER 20 MG 2021 12:00:00 A

M EST              1.0 

{tablet}                         active                  OxyCODONE HCl ER 20 MG 

eCW1 ([facility])

 

                                        Acetaminophen 325 MG / Hydrocodone Igor

trate 5 MG Oral Tablet Hydrocodone-

Acetaminophen 5-325 MG    Hydrocodone-Acetaminophen 5-325 MG 2021 12:00:00

 AM

 EST            1.0 {tablet_as_needed}                         active           

       Hydrocodone-Acetaminophen 5-325 

MG                                      eCW1 ([facility])

 

      Norco 5-325 MG UNK   2021 12:00:00 AM EST                           

    active             Norco 5-325 

MG                                      eCW1 ([facility])

 

      Norco 5-325 MG UNK   2021 12:00:00 AM EST                           

    active             Norco 5-325 

MG                                      eCW1 ([facility])

 

      Norco 5-325 MG UNK   2021 12:00:00 AM EST                           

    active             Norco 5-325 

MG                                      eCW1 ([facility])

 

      Norco 5-325 MG UNK   2021 12:00:00 AM EST                           

    active             Norco 5-325 

MG                                      eCW1 ([facility])

 

                                        Acetaminophen 325 MG / Hydrocodone Igor

trate 5 MG Oral Tablet Hydrocodone-

Acetaminophen 5-325 MG    Hydrocodone-Acetaminophen 5-325 MG 2021 12:00:00

 AM

 EST            1.0 {tablet_as_needed}                         active           

       Hydrocodone-Acetaminophen 5-325 

MG                                      eCW1 ([facility])

 

                                        Acetaminophen 325 MG / Hydrocodone Igor

trate 5 MG Oral Tablet Hydrocodone-

Acetaminophen 5-325 MG    Hydrocodone-Acetaminophen 5-325 MG 2021 12:00:00

 AM

 EST            1.0 {tablet_as_needed}                         active           

       Hydrocodone-Acetaminophen 5-325 

MG                                      eCW1 ([facility])

 

      Norco 5-325 MG UNK   2021 12:00:00 AM EST                           

    active             Norco 5-325 

MG                                      eCW1 ([facility])

 

     Norco 5-325 MG UNK  2021 12:00:00 AM EST                          act

rafal                eCW1 

([facility])

 

      Norco 5-325 MG UNK   2021 12:00:00 AM EST                           

    active             Norco 5-325 

MG                                      eCW1 ([facility])

 

                                        Acetaminophen 325 MG / Hydrocodone Igor

trate 5 MG Oral Tablet Hydrocodone-

Acetaminophen 5-325 MG    Hydrocodone-Acetaminophen 5-325 MG 2021 12:00:00

 AM

 EST            1.0 {tablet_as_needed}                         active           

       Hydrocodone-Acetaminophen 5-325 

MG                                      eCW1 ([facility])

 

      Norco 5-325 MG UNK   2021 12:00:00 AM EST                           

    active             Norco 5-325 

MG                                      eCW1 ([facility])

 

      Norco 5-325 MG UNK   2021 12:00:00 AM EST                           

    active             Norco 5-325 

MG                                      eCW1 ([facility])

 

             OxyCODONE HCl ER 20 MG OxyCODONE HCl ER 20 MG 2021 12:00:00 A

M EST              1.0 

{tablet}                         active                  OxyCODONE HCl ER 20 MG 

eCW1 ([facility])

 

             OxyCODONE HCl ER 20 MG OxyCODONE HCl ER 20 MG 2021 12:00:00 A

M EST              1.0 

{tablet}                         active                  OxyCODONE HCl ER 20 MG 

eCW1 ([facility])

 

             OxyCODONE HCl ER 20 MG OxyCODONE HCl ER 20 MG 2021 12:00:00 A

M EST              1.0 

{tablet}                         active                  OxyCODONE HCl ER 20 MG 

eCW1 ([facility])

 

             OxyCODONE HCl ER 20 MG OxyCODONE HCl ER 20 MG 2021 12:00:00 A

M EST              1.0 

{tablet}                         active                  OxyCODONE HCl ER 20 MG 

eCW1 ([facility])

 

             OxyCODONE HCl ER 20 MG OxyCODONE HCl ER 20 MG 2021 12:00:00 A

M EST              1.0 

{tablet}                         active                  OxyCODONE HCl ER 20 MG 

eCW1 ([facility])

 

                                        Acetaminophen 325 MG / Hydrocodone Igor

trate 5 MG Oral Tablet [Norco] Norco 5-

325 MG Norco 5-325 MG 2021 12:00:00 AM EST                               a

ctive             Norco 5-

325 MG                                  eCW1 ([facility])

 

                                        Acetaminophen 325 MG / Hydrocodone Igor

trate 5 MG Oral Tablet [Norco] Norco 5-

325 MG Norco 5-325 MG 2021 12:00:00 AM EST                               a

ctive             Norco 5-

325 MG                                  eCW1 ([facility])

 

                                        Acetaminophen 325 MG / Hydrocodone Igor

trate 5 MG Oral Tablet [Norco] Norco 5-

325 MG Norco 5-325 MG 2021 12:00:00 AM EST                               a

ctive             Norco 5-

325 MG                                  eCW1 ([facility])

 

             OxyCODONE HCl ER 20 MG OxyCODONE HCl ER 20 MG 01/15/2021 12:00:00 A

M EST              1.0 

{tablet}                         active                  OxyCODONE HCl ER 20 MG 

eCW1 ([facility])

 

             OxyCODONE HCl ER 20 MG OxyCODONE HCl ER 20 MG 01/15/2021 12:00:00 A

M EST              1.0 

{tablet}                         active                  OxyCODONE HCl ER 20 MG 

eCW1 ([facility])

 

             OxyCODONE HCl ER 20 MG OxyCODONE HCl ER 20 MG 2020 12:00:00 A

M EST              1.0 

{tablet}                         active                  OxyCODONE HCl ER 20 MG 

eCW1 ([facility])

 

             OxyCODONE HCl ER 20 MG OxyCODONE HCl ER 20 MG 2020 12:00:00 A

M EST              1.0 

{tablet}                         active                  OxyCODONE HCl ER 20 MG 

eCW1 ([facility])

 

                                        Acetaminophen 325 MG / Hydrocodone Igor

trate 5 MG Oral Tablet [Norco] Norco 5-

325 MG Norco 5-325 MG 2020 12:00:00 AM EST                               a

ctive             Norco 5-

325 MG                                  eCW1 ([facility])

 

                                        Acetaminophen 325 MG / Hydrocodone Igor

trate 5 MG Oral Tablet [Norco] Norco 5-

325 MG Norco 5-325 MG 2020 12:00:00 AM EST                               a

ctive             Norco 5-

325 MG                                  eCW1 ([facility])

 

                                        Acetaminophen 325 MG / Hydrocodone Igor

trate 5 MG Oral Tablet [Norco] Norco 5-

325 MG Norco 5-325 MG 2020 12:00:00 AM EST                               a

ctive             Norco 5-

325 MG                                  eCW1 ([facility])

 

                                        Acetaminophen 325 MG / Hydrocodone Igor

trate 5 MG Oral Tablet [Norco] Norco 5-

325 MG Norco 5-325 MG 2020 12:00:00 AM EST                               a

ctive             Norco 5-

325 MG                                  eCW1 ([facility])

 

             OxyCODONE HCl ER 20 MG OxyCODONE HCl ER 20 MG 2020 12:00:00 A

M EST              1.0 

{tablet}                         active                  OxyCODONE HCl ER 20 MG 

eCW1 ([facility])

 

             OxyCODONE HCl ER 20 MG OxyCODONE HCl ER 20 MG 2020 12:00:00 A

M EST              1.0 

{tablet}                         active                  OxyCODONE HCl ER 20 MG 

eCW1 ([facility])

 

                                        Acetaminophen 325 MG / Hydrocodone Igor

trate 5 MG Oral Tablet [Norco] Norco 5-

325 MG Norco 5-325 MG 2020 12:00:00 AM EST                               a

ctive             Norco 5-

325 MG                                  eCW1 ([facility])

 

                                        Acetaminophen 325 MG / Hydrocodone Igor

trate 5 MG Oral Tablet [Norco] Norco 5-

325 MG Norco 5-325 MG 2020 12:00:00 AM EST                               a

ctive             Norco 5-

325 MG                                  eCW1 ([facility])

 

             OxyCODONE HCl ER 20 MG OxyCODONE HCl ER 20 MG 10/30/2020 12:00:00 A

M EDT              1.0 

{tablet}                         active                  OxyCODONE HCl ER 20 MG 

eCW1 ([facility])

 

           Cephalexin 500 MG Oral Capsule [Keflex] Keflex     10/29/2020 12:00:0

0 AM EDT                       

ORAL                          active                                  [facility] ([facility]

[facility] )

 

                          Benazepril hydrochloride 20 MG / Hydrochlorothiazide 1

2.5 MG Oral Tablet 

Benazepril Hydrochloride/Hydrochlorothiazide 10/27/2020 12:00:00 AM EDT         

                        ORAL

                              active                                  [facility] ([facility]

[facility]

 

                                        Acetaminophen 325 MG / Hydrocodone Igor

trate 5 MG Oral Tablet [Norco] Norco 5-

325 MG Norco 5-325 MG 10/13/2020 12:00:00 AM EDT                               a

ctive             Norco 5-

325 MG                                  eCW1 ([facility])

 

                                        Acetaminophen 325 MG / Hydrocodone Igor

trate 5 MG Oral Tablet [Norco] Norco 5-

325 MG Norco 5-325 MG 10/13/2020 12:00:00 AM EDT                               a

ctive             Norco 5-

325 MG                                  eCW1 ([facility])



                                                                                
                                                                                
                                                                                
                                                                                
                                                                                
                                                                                
                                                                                
                                                                                
                                                                                
                                                                                
                                                                    



Insurance Providers

          



             Payer name   Policy type / Coverage type Policy ID    Covered party

 ID Covered 

party's relationship to cisneros Policy C[employer]             Plan Information

 

          Esis      Workers Compensation           [id number]     Self               

  

 

             Esis         Workers Compensation [id number].

[id number] 

Self                                                [id number]

 

           Medicare   Medicare Primary [id number]

.[id number] Self        

                                        [id number]

 

          MEDICARE COMPLETE           [id number]           SP                  [id number]

[id number]

 

          MEDICARE            [id number]           SP                  [id number]

A

 

          MEDICARE            [id number]           Lesley                 [id number]

A

 

           Esis       Workers Compensation [id number]

[id number] Self       

                                        [id number]

 

                Medicare [employer] Med[employer] Part B  [id number]      

[id number] Self                                    1

[id number]

 

          Medicare [employer] Med[employer]ap Part B           .[id number]

[id number] Self                

 

 

                Medicare [employer] Med[employer]ap Part B  [id number]      

.[id number] Self                                    1

[id number]

 

             [facility] Commercial   [id number]

[id number] 

Self                                                [id number]

 

             [facility]) Commercial                [id number]

[id number] Self

                                                     

 

                [facility]) Commercial      [id number]       

[id number] Self                                    [id number]

[id number]

 

          [employer]     Commercial [id number]  [id number] Se

lf                [id number]

 

          COMMERCIAL GENERIC U         [id number]           Self                [id number]

[id number]

 

          TODAYS OPTIONS           [id number]           SP                  [id number]

[id number]

 

          WELLCARE MEDICARE HMO G         [id number]           Self              

  [id number]

 

          [employer] MEDICARE HMO G         [id number]           Self              

  [id number]

 

                [employer] Commercial      [id number]       

[id number] Self                                    

[id number]

 

                    ANSI-Not a Secondary Insurance [id number]

[id number]                               

[id number]

 

                    ANSI-Not a Secondary Insurance [id number]

[id number]                               

[id number]

 

                    ANSI-Not a Secondary Insurance [id number]

7549tc4                               

[id number]

 

                [employer] Commercial      [id number]       

[id number] Self                                    [id number]

[id number]

 

                    ANSI-Not a Secondary Insurance [id number]

[id number]                               

[id number]

 

                [employer] Medicare Commercial      [id number]       

[id number] Self                                    

[id number]

 

                    ANSI-Not a Secondary Insurance [id number]

[id number]                               

[id number]

 

                    ANSI-Not a Secondary Insurance [id number]

[id number]                               

[id number]

 

                    ANSI-Not a Secondary Insurance [id number]

[id number]                               

[id number]

 

                    ANSI-Not a Secondary Insurance [id number]

y6ps1j6                               

[id number]

 

                    ANSI-Not a Secondary Insurance [id number]

[id number]                               

[id number]

 

                Today[employer] Options Medicare Commercial      [id number]       

[id number] Self                                    

[id number]

 

          [facility]            [id number]                             [id number]

 

                    ANSI-Not a Secondary Insurance [id number]

[id number]                               

[id number]

 

                    ANSI-Not a Secondary Insurance [id number]

[id number]                               

[id number]

 

                    ANSI-Not a Secondary Insurance [id number]

z26avd6                               

[id number]

 

             [employer] Options Ppo Commercial   [id number]    [id number]

[id number] 

Self                                                [id number]

 

             [employer] Options Ppo Commercial   [id number]    [id number]

[id number] 

Self                                                [id number]

 

                [employer] Medicare Commercial      [id number]       

[id number] Self                                    

[id number]

 

          TODAYS OPTION               -O/P           [id number]           18     

             [id number]

 

                    ANSI-Not a Secondary Insurance [id number]

ij6h77v                               

[id number]

 

                    ANSI-Not a Secondary Insurance [id number]

[id number]                               

[id number]

 

           [employer] Options Commercial [id number]  [id number].

477.0 Self        

                                        [id number]

 

                    ANSI-Not a Secondary Insurance [id number]

0aq26d0                               

[id number]

 

          [facility]            [id number]                              [id number]

 

                    ANSI-Not a Secondary Insurance [id number]

[id number]                               

[id number]

 

          [employer]NA MEDICARE -O/P           [id number]            18                  

[id number]

 

                    ANSI-Not a Secondary Insurance [id number]

[id number]                               

[id number]

 

                    ANSI-Not a Secondary Insurance [id number]

[id number]                               

[id number]

 

                    ANSI-Not a Secondary Insurance [id number]

i7835to                               

[id number]

 

          AETNA                       -O/P           [id number]            18     

             [id number]

 

          MEDICARE PART A-O/P           [id number]           18                 

 [id number]

 

          MEDICARE PART A-O/P           [id number]           18                  

[id number]

 

                [employer] Workers Compensation [id number]   

[id number] Self                                    1

[id number]

 

                [employer] Workers Compensation [id number]   

[id number] Self                                    1

[id number]

 

                [employer] Workers Compensation                 

[id number] Self                                     

 

          MEDICARE COMPLETE-[facility] O         [id number] S                 

  [id number]

 

          MEDICARE COMPLETE-[facility] O         [id number] S                 

  [id number]

 

          MEDICARE  C         [id number] S                   [id number]

A

 

          Medicare  Medicare Primary           [id number]     Self                 

 

          ONE CALL CARE MANAGEMENT O         [id number] S           

        [id number]

 

          MEDICARE                    -O/P           [id number]           18    

              [id number]

 

                    ANSI-Not a Secondary Insurance [id number]

863abfb                               

[id number]

 

          WELLCARE            [id number]           SP                  [id number]

 

          WELLCARE            [id number]            SP                  [id number]

 

          [employer] [place]           [id number]           SP        

          [id number]

 

          [employer] [place] CLAIMS           [id number]           SP        

          [id number]

 

          WELLCARE            [id number]            SP                  [id number]

 

          WELLCARE            -O/P           [id number]            18             

     [id number]

 

          WELLCARE            [id number]           SP                  [id number]

 

          WELLCARE            -O/P           [id number]           18             

     [id number]

 

          MEDICARE            [id number]           SP                  [id number]

[id number]

 

          TODAYS OPTIONS           [id number]           SP                  [id number]

[id number]

 

                    ANSI-Not a Secondary Insurance [id number]

[id number]                               

[id number]

 

                    ANSI-Not a Secondary Insurance [id number]

[id number]                               

[id number]

 

                    ANSI-Not a Secondary Insurance [id number]

[id number]                               

[id number]

 

                    ANSI-Not a Secondary Insurance [id number]

[id number]                               

[id number]

 

                    ANSI-Not a Secondary Insurance [id number]

6421r36                               

[id number]

 

                    ANSI-Not a Secondary Insurance [id number]

3xq370s                               

[id number]

 

                    ANSI-Not a Secondary Insurance [id number]

[id number]                               

[id number]

 

                    ANSI-Not a Secondary Insurance [id number]

1rjf4fs                               

[id number]



                                                                                
                                                                                
                                                                                
                                                                                
                                                                                
                                                                                
                                                                                
                                                                                
                                                                                
                                                                                
                                                                              



Problems, Conditions, and Diagnoses

          



           Code       Display Name Description Problem Type Effective Dates Data

 Source(s)

 

             [id number]       Abdominal aortic ectasia Abdominal aortic ectasia Diag

nosis    2021 

07:19:00 AM EST                         [facility]

 

                                   Age-related nuclear cataract, right eye 

Age-related nuclear cataract, 

right eye           Diagnosis           10/28/2020 08:00:00 AM EDT [facility]

 

                                   Age-related nuclear cataract, left eye A

ge-related nuclear cataract, left 

eye                 Diagnosis           10/14/2020 06:45:00 AM EDT [facility]

 

             M72.2        Plantar fascial fibromatosis Plantar fascial fibromato

sis Problem      

2021 12:00:00 AM EDT              [facility] ([facility]

 

             I73.9        Peripheral vascular disease Peripheral vascular diseas

e Problem      2021

 12:00:00 AM EST                        [facility] ([facility]

)

 

             Z79.4        Long-term current use of insulin Long-term current use

 of insulin Problem      

2021 12:00:00 AM EST              [facility] ([facility]

)

 

             [id number]    Chronic kidney disease stage 3 Chronic kidney disease 

stage 3 Problem      

2021 12:00:00 AM EST              [facility] ([facility]

)

 

             L84          Corns and callosities Corns and callosities Problem   

   10/15/2020 12:00:00 AM 

EDT                                     [facility] ([facility]

 

           B35.1      Onychomycosis Onychomycosis Problem    10/15/2020 12:00:00

 AM EDT [facility] 

([facility]

 

                    E11.42              Type 2 diabetes mellitus with diabetic p

olyneuropathy Type 2 diabetes 

mellitus with diabetic polyneuropathy Problem             10/15/2020 12:00:00 AM

 EDT [facility] 

([facility]

 

           M20.40     Hammer toe Hammer toe Problem    10/15/2020 12:00:00 AM ED

T [facility] ([facility]



                                                                                
                                                                                
                                      



Surgeries/Procedures

          



             Procedure    Description  Date         Indications  Data Source(s)

 

             DEBRIDEMENT NAIL ANY METHOD 6/>              10/29/2021 12:00:00 AM

 EDT              [facility] ([facility]

 

             OFFICE OUTPATIENT VISIT 25 MINUTES              2021 12:00:00

 AM EDT              [facility] 

([facility])

 

             OFFICE OUTPATIENT VISIT 25 MINUTES              2021 12:00:00

 AM EDT              [facility] ([facility]

 

             DEBRIDEMENT NAIL ANY METHOD 2021 12:00:00 AM

 EDT              [facility] ([facility]

 

             OFFICE OUTPATIENT VISIT 25 MINUTES              2021 12:00:00

 AM EDT              [facility] ([facility]

 

             DEBRIDEMENT NAIL ANY METHOD 2021 12:00:00 AM

 EDT              [facility] ([facility]

 

             OFFICE OUTPATIENT VISIT 15 MINUTES              2021 12:00:00

 AM EDT              [facility] 

([facility])

 

             OFFICE OUTPATIENT VISIT 10 MINUTES              03/15/2021 12:00:00

 AM EDT              [facility] 

([facility])

 

             PARING/CUTTING BENIGN HYPERKERATOTIC LESION 2-4              03/10/

2021 12:00:00 AM EST              

[facility] ([facility]

 

             DEBRIDEMENT NAIL ANY METHOD 6/>              03/10/2021 12:00:00 AM

 EST              [facility] ([facility]

 

             OFFICE OUTPATIENT VISIT 15 MINUTES              03/10/2021 12:00:00

 AM EST              [facility] ([facility]

 

                    Endoscopy Nasal/Sinus Surgical W/ Ethmoidectomy Total       

              2021 12:00:00 AM 

EST                                                 MED[facility] ([facility])

 

                    Endoscopy Nasal/Sinus Surgical Remove Tissue From Maxillary 

Sinus                     2021 

12:00:00 AM EST                                     [facility] ([facility])

 

             Electrocardiogram Complete              2021 12:00:00 AM EST 

             [facility]

 

             DEBRIDEMENT NAIL ANY METHOD 6/>              2021 12:00:00 AM

 EST              [facility] ([facility]

 

             EXCISION NASAL POLYP SIMPLE              10/26/2020 12:00:00 AM EDT

              [facility] ([facility] )

 

             MRI Upper Extremity Any Joint              10/16/2020 12:00:00 AM E

DT              [facility] ([facility] )

 

             MRI Upper Extremity Any Joint              10/16/2020 12:00:00 AM E

DT              [facility] ([facility] )

 

                    FX Ulna Proximal End (eg:Olecranon or Coronoid Proc) W/O Man

ipula                     10/12/2020 

12:00:00 AM EDT                                     [facility] ([facility] )



                                                                                
                                                                                
                                                                                
                            



Results

          



                    ID                  Date                Data Source

 

                    [id number]            2021 10:14:00 AM EDT [facility] ([facility]

[facility])









          Name      Value     Range     Interpretation Code Description Data Va

rce(s) Supporting 

Document(s)

 

          Uric Acid 5.6       2.6-6                         [facility] ([facility] [facility])  









                    ID                  Date                Data Source

 

                    [id number]            2021 10:14:00 AM EDT [facility] ([facility]

[facility])









          Name      Value     Range     Interpretation Code Description Data Va

rce(s) Supporting 

Document(s)

 

          White Blood Count 8.1       5.0-10.0                      [facility] (Card

[facility])  

 

          Platelets 159       172-450                       [facility] (Cardiology A

[facility])  

 

          Red Blood Count 4.09      4.70-6.10                     [facility] (Cardio

logy [facility])  

 

          Hemoglobin 13.3      14.0-18.0                     [facility] ([facility] 

[facility])  

 

          Hematocrit 39.2      42.0-52.0                     [facility] ([facility])  









                    ID                  Date                Data Source

 

                    [id number]            2021 10:14:00 AM EDT [facility] ([facility]

[facility])









          Name      Value     Range     Interpretation Code Description Data Va

rce(s) Supporting 

Document(s)

 

          Glucose   165                               [facility] ([facility]

[facility])  

 

          Blood Urea Nitrogen 30.1      7-18                          [facility] (Ca

rdiology [facility])  

 

          Creatinine 1.6       0.55-1.3                      [facility] ([facility]  

 

          Sodium    139.5     135-145                       [facility] ([facility])  

 

                                        Glomerular filtration rate/1.73 sq M.pre

dicted [Volume Rate/Area] in Serum or 

Plasma by Creatinine-based formula (MDRD) 43                                    

              [facility] ([facility])                       

 

          Chloride  109.3                             [facility] ([facility]  

 

          Potassium 4.04      3.5-5.5                       [facility] ([facility]  

 

          Calcium   8.1       8.5-10.1                      [facility] ([facility])  

 

          Carbon Dioxide 28.0      21-32                         [facility] ([facility]iol

[facility])  

 

          Phosphorus 3.3       2.5-4.9                       [facility] ([facility]  

 

          Albumin   3.4       3.2-5.2                       [facility] ([facility]  









                    ID                  Date                Data Source

 

                    [id number]            2021 10:40:00 AM EDT [facility] (Cardi

[facility])









          Name      Value     Range     Interpretation Code Description Data Va

rce(s) Supporting 

Document(s)

 

          Magnesium Level 2.02      1.6-2.6                       [facility] ([facility])  









                    ID                  Date                Data Source

 

                    [id number]            2021 10:40:00 AM EDT [facility] (Cardi

[facility])









          Name      Value     Range     Interpretation Code Description Data Va

rce(s) Supporting 

Document(s)

 

          White Blood Count 7.9       5.0-10.0                      [facility] ([facility])  

 

          Red Blood Count 3.61      4.70-6.10                     [facility] ([facility])  

 

          Hemoglobin 11.7      14.0-18.0                     [facility] ([facility]  

 

          Platelets 168       172-450                       [facility] ([facility]  

 

          Hematocrit 35.6      42.0-52.0                     [facility] ([facility]  









                    ID                  Date                Data Source

 

                    [id number]            2021 10:40:00 AM EDT [facility] ([facility]i

[facility])









          Name      Value     Range     Interpretation Code Description Data Va

rce(s) Supporting 

Document(s)

 

          Glucose   157                               [facility] ([facility]  

 

          Creatinine 1.8       0.55-1.3                      [facility] ([facility])  

 

          Blood Urea Nitrogen 23.5      7-18                          [facility] (Ca

[facility])  

 

          Sodium    145.3     135-145                       [facility] ([facility]  

 

                                        Glomerular filtration rate/1.73 sq M.pre

dicted [Volume Rate/Area] in Serum or 

Plasma by Creatinine-based formula (MDRD) 38                                    

              [facility] ([facility])                       

 

          Chloride  110.2                             [facility] ([facility]

[facility])  

 

          Potassium 4.44      3.5-5.3                       [facility] ([facility]

[facility])  

 

          Carbon Dioxide 24.3      20-32                         [facility] ([facility]iol

[facility])  

 

          Phosphorus 3.6                                     [facility] ([facility])  

 

          Calcium   8.3       8.5-10.1                      [facility] ([facility]

[facility])  

 

          Albumin   3.4       3.2-5.2                       [facility] ([facility]

[facility])  









                    ID                  Date                Data Source

 

                    [id number]            2021 01:04:00 PM EDT [facility] ([facility]i

[facility])









          Name      Value     Range     Interpretation Code Description Data Va

rce(s) Supporting 

Document(s)

 

          Magnesium Level 2.00                                    [facility] ([facility]io

[facility])  









                    ID                  Date                Data Source

 

                    [id number]            2021 01:04:00 PM EDT [facility] ([facility]i

[facility])









          Name      Value     Range     Interpretation Code Description Data Va

rce(s) Supporting 

Document(s)

 

          White Blood Count 10.1      4.3-10.9                      [facility] (Card

[facility])  

 

          Platelets 215       130-400                       [facility] ([facility]

[facility])  

 

          Red Blood Count 3.95      4.70-6.20                     [facility] ([facility]io

[facility])  

 

          Hemoglobin 12.9      13.0-17.0                     [facility] ([facility]  

 

          Hematocrit 39.0      39.0-50.0                     [facility] ([facility]  









                    ID                  Date                Data Source

 

                    [id number]            2021 01:04:00 PM EDT [facility] ([facility]i

[facility])









          Name      Value     Range     Interpretation Code Description Data Va

rce(s) Supporting 

Document(s)

 

          Blood Urea Nitrogen 27.0      5-21                          [facility] ([facility]

[facility])  

 

          Glucose   129                               [facility] ([facility]  

 

                                        Glomerular filtration rate/1.73 sq M.pre

dicted [Volume Rate/Area] in Serum or 

Plasma by Creatinine-based formula (MDRD) 50                                    

              [facility] ([facility]                       

 

          Creatinine 1.4       0.6-1.5                       [facility] ([facility]  

 

          Sodium    138.6     136-146                       [facility] ([facility]  

 

          Potassium 4.28      3.5-5.3                       [facility] ([facility]  

 

          Carbon Dioxide 29.1      20-32                         [facility] ([facility]

[facility])  

 

          Chloride  106.2                             [facility] ([facility]  

 

          Phosphorus 3.4                                     [facility] ([facility]  

 

          Calcium   8.3       8.4-10.4                      [facility] ([facility]  

 

          Albumin   3.6       3.5-4.7                       [facility] ([facility])  









                    ID                  Date                Data Source

 

                    [id number]     2021 11:33:00 AM [facility] [address] [place] [address] PHONE: [phone]

FAX: [phone]  Name .................. : PALLADINO LEWIS W            Acct 
Number.................. : [id number] ROOM. ................. :                   
           Number ................... : [id number] Stay type ............. : O/P  
                       Discharge Date......... ... : 21 Admit Date .......
.. : 21                        Admit Phys .................... : MAYKEL 
[facility] Date of Birth ....... : 1950                     Family Phys 
................... : ERIKA ROGERS Phone .................. : [phone]        
       Age ................................ : 70 Film# .................. 
.:[id number]                      Sex ................................. : M  
Unsigned transcriptions are preliminary reports and do not represent a medical 
or legal document           US ABD COMPLETE      [id number]   COMPLETE:21 07:59
KNB 5457             (REASON FOR ABDOMEN: Abdominal aortic ectasia, check 
kidneys as well     ABDOMINAL ULTRASOUND, 21:  FINDINGS: Cystic foci are 
noted in bilateral kidneys measuring up to 1.2 cm. Solid renal mass, 
hydronephrosis, or calculus is not identified. The right and left kidneys 
measure 9.5 cm and 10.0 cm, respectively. The proximal most abdominal aorta was 
obscured by bowel gas. The visualized mid and distal abdominal aorta are normal 
in caliber with maximal diameter of 2.2 cm. The spleen is homogeneous and not 
enlarged measuring 10.0 x 5.9 x 10.5 cm. Hepatic mass, intrahepatic biliary 
dilatation, or fatty infiltrate of the liver is not seen. The common bile duct 
has a maximal diameter of 6.7 mm, which is along the upper limits of normal. 
Gallstone, gallbladder, sludge, gallbladder wall thickening, or pericholecystic 
fluid is not seen. There is no ascites. Evaluation of the pancreas is limited by
underlying bowel gas. Pancreatic pathology is not identified.  IMPRESSION: 
Incomplete visualization of the abdominal aorta due to underlying bowel gas. Vi
sualized aorta is normal in caliber. Aortic aneurysm not seen, but not 
definitively excluded based on current examination.  Small cystic foci bilateral
kidneys.  Otherwise, unremarkable examination.   _
__________________________________ Electronically Reviewed and Signed By Castillo Larson MD                , 21 11:33, ABE  Transcribe Initials: SSR, 
Transcribe Date: 21 10:47, Dictation Date:   Copy for: MAYKEL BULLARD     
          via fax                                                               
                                    Page 1 of 2 [facility] [address] [place] [address] PHONE: [phone] FAX: [phone]  Name 
.................. : PALLADINO LEWIS W            Acct Number.................. 
: [id number] ROOM. ................. :                              MR Number 
................... : [id number] Stay type ............. : O/P                      
   Discharge Date......... ... : 21 Admit Date ......... : 21       
                Admit Phys .................... : MAYKEL [facility] Date of Birth 
....... : 1950                     Family Phys ................... : ERIKA DIASNE Phone .................. : [phone]                Age ....
............................ : 70 Film# .................. .:[id number]             
        Sex ................................. : M  Unsigned transcriptions are 
preliminary reports and do not represent a medical or legal document           
US ABD COMPLETE      [id number]   COMPLETE:21 07:59 KNB 5457             
(REASON FOR ABDOMEN: Abdominal aortic ectasia, check kidneys as well   Copy for:
ERIKA ERWIN               via fax Copy for: 710 MED REC                        
                                                                             
Page 2 of 2   









          Name      Value     Range     Interpretation Code Description Data Va

rce(s) Supporting 

Document(s)

 

                                                                       









                    ID                  Date                Data Source

 

                    [id number]         2021 12:15:00 PM EST [facility] ([facility]









          Name      Value     Range     Interpretation Code Description Data Va

rce(s) Supporting 

Document(s)

 

                Glucose [Mass/volume] in Capillary blood by Glucometer 97 mg/dL 

                 Normal 

(applies to non-numeric results)                     [facility]  









                    ID                  Date                Data Source

 

                    [id number]         2021 12:15:00 PM EST [facility] ([facility])









          Name      Value     Range     Interpretation Code Description Data Va

rce(s) Supporting 

Document(s)

 

                Glucose [Mass/volume] in Capillary blood by Glucometer 97 mg/dL 

                 Normal 

(applies to non-numeric results)                     [facility] ([facility])  









                    ID                  Date                Data Source

 

                    [id number]         2021 11:52:00 AM EST [facility] ([facility])









          Name      Value     Range     Interpretation Code Description Data Va

rce(s) Supporting 

Document(s)

 

           Surgical pathology study Laboratory test result                      

            [facility]                            

 

                                        FINAL DIAGNOSIS



Submitted as "ethmoid polyp", excision:

Inflamed edematous sinonasal mucosa with plasma cells, lymphocytes,

neutrophils and fragments of bone.

Negative for malignancy.

                                        2021 - 1017



CLINICAL DIAGNOSIS



Chronic sinusitis

                                        2021 - 1513



GROSS DIAGNOSIS



Received in formalin labeled "ethmoid  polyp" and consists of fragments

of tissue, 4 x 3 x 1 cm in aggregate.  Representative sections submitted

in one.

                                        -OA

                                        2021 - 1017



Signed________ JUAN JOSE RAJAN MD 2021 1018

 









                    ID                  Date                Data Source

 

                    [id number]         2021 09:25:00 AM EST [facility] ([facility]









          Name      Value     Range     Interpretation Code Description Data Va

rce(s) Supporting 

Document(s)

 

                Glucose [Mass/volume] in Capillary blood by Glucometer 94 mg/dL 

                 Normal 

(applies to non-numeric results)                     [facility] ([facility]

[facility]  









                    ID                  Date                Data Source

 

                    [id number]         2021 09:25:00 AM EST [facility] ([facility]









          Name      Value     Range     Interpretation Code Description Data Va

rce(s) Supporting 

Document(s)

 

                Glucose [Mass/volume] in Capillary blood by Glucometer 94 mg/dL 

                 Normal 

(applies to non-numeric results)                     [facility] ([facility], )  









                    ID                  Date                Data Source

 

                    [id number]         2021 02:00:00 PM EST [facility]









          Name      Value     Range     Interpretation Code Description Data Va

rce(s) Supporting 

Document(s)

 

          SARS coronavirus 2 RNA Not Detected                               [facility]

[place]     

 

                                        This lab was ordered by [facility] and reported by [facility]. 









                    ID                  Date                Data Source

 

                    [id number]         2021 01:43:00 PM EST [facility] ([facility]









          Name      Value     Range     Interpretation Code Description Data Va

rce(s) Supporting 

Document(s)

 

           Hemoglobin A1c/Hemoglobin.total in Blood 6.3 %      4.50-6.20  Above 

high normal            

[facility] ([facility]  









                    ID                  Date                Data Source

 

                    [id number]         2021 01:43:00 PM EST [facility] ([facility]









          Name      Value     Range     Interpretation Code Description Data Va

rce(s) Supporting 

Document(s)

 

          BUN       30 mg/dL  8-23      Above high normal           [facility] ([facility] 

 

 

                                        CHRONIC KIDNEY DISEASE STAGING PER NKF: 

  MALE GFR INTERPRETATION:  20-49 YRS:  

   >60 mL/min  Normal 50-59 YRS:     >56 mL/min  Normal 60-69 YRS:     >49 
mL/min  Normal 70-79 YRS:     >42 mL/min  Normal 80 and above  >35 mL/min  
Normal   FEMALE GRF INTERPRETATION:  20-39 YRS:    >60 mL/min  Normal 40-49 YRS:
    >58 mL/min  Normal 50-59 YRS:    >51 mL/min  Normal 60-69 YRS:    >45 mL/min
  Normal 70-79 YRS:    >39 mL/min  Normal 80 and above >32 mL/min  
NormalCLASSIFICATION            CHOLESTEROL FOR ADULTS       
CHILDREN/ADOLESCENTS*   DESIRABLE:                <200 MG/DL                   
<170 MG/DL  BORDER-LINE HIGH RISK:    200-239 MG/DL                170-199 MG/DL
  HIGH RISK:                >240 MG/DL                   >200 MG/DL    CLASS. 
FOR PRIMARY LDL CHOL PREVENTION:        LDL CHOL-CHILD/ADOLESCENTS*   DESIRABLE:
              <130 MG/DL              <110 MG/DL  BORDERLINE-HIGH RISK:   130-
159 MG/DL           110-129 MG/DL  HIGH RISK:              >160 MG/DL           
   >130 MG/DL   *CHILDREN AND ADOLESCENTS REPRESENTS INDIVIDUALA AGED 2-19 YEARS
 EXCLUSIVE. 

 

           Glu        113 mg/dL       Above high normal            [facility] 

([facility]                                    

 

                                        CHRONIC KIDNEY DISEASE STAGING PER NKF: 

  MALE GFR INTERPRETATION:  20-49 YRS:  

   >60 mL/min  Normal 50-59 YRS:     >56 mL/min  Normal 60-69 YRS:     >49 
mL/min  Normal 70-79 YRS:     >42 mL/min  Normal 80 and above  >35 mL/min  
Normal   FEMALE GRF INTERPRETATION:  20-39 YRS:    >60 mL/min  Normal 40-49 YRS:
    >58 mL/min  Normal 50-59 YRS:    >51 mL/min  Normal 60-69 YRS:    >45 mL/min
  Normal 70-79 YRS:    >39 mL/min  Normal 80 and above >32 mL/min  
NormalCLASSIFICATION            CHOLESTEROL FOR ADULTS       
CHILDREN/ADOLESCENTS*   DESIRABLE:                <200 MG/DL                   
<170 MG/DL  BORDER-LINE HIGH RISK:    200-239 MG/DL                170-199 MG/DL
  HIGH RISK:                >240 MG/DL                   >200 MG/DL    CLASS. 
FOR PRIMARY LDL CHOL PREVENTION:        LDL CHOL-CHILD/ADOLESCENTS*   DESIRABLE:
              <130 MG/DL              <110 MG/DL  BORDERLINE-HIGH RISK:   130-
159 MG/DL           110-129 MG/DL  HIGH RISK:              >160 MG/DL           
   >130 MG/DL   *CHILDREN AND ADOLESCENTS REPRESENTS INDIVIDUALA AGED 2-19 YEARS
 EXCLUSIVE. 

 

           Creat      2.0 mg/dL  0.7-1.2    Above high normal            [facility] 

([facility]                                    

 

                                        CHRONIC KIDNEY DISEASE STAGING PER NKF: 

  MALE GFR INTERPRETATION:  20-49 YRS:  

   >60 mL/min  Normal 50-59 YRS:     >56 mL/min  Normal 60-69 YRS:     >49 
mL/min  Normal 70-79 YRS:     >42 mL/min  Normal 80 and above  >35 mL/min  
Normal   FEMALE GRF INTERPRETATION:  20-39 YRS:    >60 mL/min  Normal 40-49 YRS:
    >58 mL/min  Normal 50-59 YRS:    >51 mL/min  Normal 60-69 YRS:    >45 mL/min
  Normal 70-79 YRS:    >39 mL/min  Normal 80 and above >32 mL/min  
NormalCLASSIFICATION            CHOLESTEROL FOR ADULTS       
CHILDREN/ADOLESCENTS*   DESIRABLE:                <200 MG/DL                   
<170 MG/DL  BORDER-LINE HIGH RISK:    200-239 MG/DL                170-199 MG/DL
  HIGH RISK:                >240 MG/DL                   >200 MG/DL    CLASS. 
FOR PRIMARY LDL CHOL PREVENTION:        LDL CHOL-CHILD/ADOLESCENTS*   DESIRABLE:
              <130 MG/DL              <110 MG/DL  BORDERLINE-HIGH RISK:   130-
159 MG/DL           110-129 MG/DL  HIGH RISK:              >160 MG/DL           
   >130 MG/DL   *CHILDREN AND ADOLESCENTS REPRESENTS INDIVIDUALA AGED 2-19 YEARS
 EXCLUSIVE. 

 

          BUN/Creatinine Ratio 14.7 CALC                               [facility] ([facility]  

 

                                        CHRONIC KIDNEY DISEASE STAGING PER NKF: 

  MALE GFR INTERPRETATION:  20-49 YRS:  

   >60 mL/min  Normal 50-59 YRS:     >56 mL/min  Normal 60-69 YRS:     >49 
mL/min  Normal 70-79 YRS:     >42 mL/min  Normal 80 and above  >35 mL/min  
Normal   FEMALE GRF INTERPRETATION:  20-39 YRS:    >60 mL/min  Normal 40-49 YRS:
    >58 mL/min  Normal 50-59 YRS:    >51 mL/min  Normal 60-69 YRS:    >45 mL/min
  Normal 70-79 YRS:    >39 mL/min  Normal 80 and above >32 mL/min  
NormalCLASSIFICATION            CHOLESTEROL FOR ADULTS       
CHILDREN/ADOLESCENTS*   DESIRABLE:                <200 MG/DL                   
<170 MG/DL  BORDER-LINE HIGH RISK:    200-239 MG/DL                170-199 MG/DL
  HIGH RISK:                >240 MG/DL                   >200 MG/DL    CLASS. 
FOR PRIMARY LDL CHOL PREVENTION:        LDL CHOL-CHILD/ADOLESCENTS*   DESIRABLE:
              <130 MG/DL              <110 MG/DL  BORDERLINE-HIGH RISK:   130-
159 MG/DL           110-129 MG/DL  HIGH RISK:              >160 MG/DL           
   >130 MG/DL   *CHILDREN AND ADOLESCENTS REPRESENTS INDIVIDUALA AGED 2-19 YEARS
 EXCLUSIVE. 

 

           Co2        19.7 mmol/L 22.0-29.0  Below low normal            [facility] 

([facility]                                   

 

                                        CHRONIC KIDNEY DISEASE STAGING PER NKF: 

  MALE GFR INTERPRETATION:  20-49 YRS:  

   >60 mL/min  Normal 50-59 YRS:     >56 mL/min  Normal 60-69 YRS:     >49 
mL/min  Normal 70-79 YRS:     >42 mL/min  Normal 80 and above  >35 mL/min  
Normal   FEMALE GRF INTERPRETATION:  20-39 YRS:    >60 mL/min  Normal 40-49 YRS:
    >58 mL/min  Normal 50-59 YRS:    >51 mL/min  Normal 60-69 YRS:    >45 mL/min
  Normal 70-79 YRS:    >39 mL/min  Normal 80 and above >32 mL/min  
NormalCLASSIFICATION            CHOLESTEROL FOR ADULTS       
CHILDREN/ADOLESCENTS*   DESIRABLE:                <200 MG/DL                   
<170 MG/DL  BORDER-LINE HIGH RISK:    200-239 MG/DL                170-199 MG/DL
  HIGH RISK:                >240 MG/DL                   >200 MG/DL    CLASS. 
FOR PRIMARY LDL CHOL PREVENTION:        LDL CHOL-CHILD/ADOLESCENTS*   DESIRABLE:
              <130 MG/DL              <110 MG/DL  BORDERLINE-HIGH RISK:   130-
159 MG/DL           110-129 MG/DL  HIGH RISK:              >160 MG/DL           
   >130 MG/DL   *CHILDREN AND ADOLESCENTS REPRESENTS INDIVIDUALA AGED 2-19 YEARS
 EXCLUSIVE. 

 

          CA        8.7 mg/dL 8.6-10.2                      [facility] ([facility]  

 

                                        CHRONIC KIDNEY DISEASE STAGING PER NKF: 

  MALE GFR INTERPRETATION:  20-49 YRS:  

   >60 mL/min  Normal 50-59 YRS:     >56 mL/min  Normal 60-69 YRS:     >49 
mL/min  Normal 70-79 YRS:     >42 mL/min  Normal 80 and above  >35 mL/min  
Normal   FEMALE GRF INTERPRETATION:  20-39 YRS:    >60 mL/min  Normal 40-49 YRS:
    >58 mL/min  Normal 50-59 YRS:    >51 mL/min  Normal 60-69 YRS:    >45 mL/min
  Normal 70-79 YRS:    >39 mL/min  Normal 80 and above >32 mL/min  
NormalCLASSIFICATION            CHOLESTEROL FOR ADULTS       
CHILDREN/ADOLESCENTS*   DESIRABLE:                <200 MG/DL                   
<170 MG/DL  BORDER-LINE HIGH RISK:    200-239 MG/DL                170-199 MG/DL
  HIGH RISK:                >240 MG/DL                   >200 MG/DL    CLASS. 
FOR PRIMARY LDL CHOL PREVENTION:        LDL CHOL-CHILD/ADOLESCENTS*   DESIRABLE:
              <130 MG/DL              <110 MG/DL  BORDERLINE-HIGH RISK:   130-
159 MG/DL           110-129 MG/DL  HIGH RISK:              >160 MG/DL           
   >130 MG/DL   *CHILDREN AND ADOLESCENTS REPRESENTS INDIVIDUALA AGED 2-19 YEARS
 EXCLUSIVE. 

 

          Na        142 mmol/L 136-145                       [facility] ([facility]

[facility]  

 

                                        CHRONIC KIDNEY DISEASE STAGING PER NKF: 

  MALE GFR INTERPRETATION:  20-49 YRS:  

   >60 mL/min  Normal 50-59 YRS:     >56 mL/min  Normal 60-69 YRS:     >49 
mL/min  Normal 70-79 YRS:     >42 mL/min  Normal 80 and above  >35 mL/min  
Normal   FEMALE GRF INTERPRETATION:  20-39 YRS:    >60 mL/min  Normal 40-49 YRS:
    >58 mL/min  Normal 50-59 YRS:    >51 mL/min  Normal 60-69 YRS:    >45 mL/min
  Normal 70-79 YRS:    >39 mL/min  Normal 80 and above >32 mL/min  
NormalCLASSIFICATION            CHOLESTEROL FOR ADULTS       
CHILDREN/ADOLESCENTS*   DESIRABLE:                <200 MG/DL                   
<170 MG/DL  BORDER-LINE HIGH RISK:    200-239 MG/DL                170-199 MG/DL
  HIGH RISK:                >240 MG/DL                   >200 MG/DL    CLASS. 
FOR PRIMARY LDL CHOL PREVENTION:        LDL CHOL-CHILD/ADOLESCENTS*   DESIRABLE:
              <130 MG/DL              <110 MG/DL  BORDERLINE-HIGH RISK:   130-
159 MG/DL           110-129 MG/DL  HIGH RISK:              >160 MG/DL           
   >130 MG/DL   *CHILDREN AND ADOLESCENTS REPRESENTS INDIVIDUALA AGED 2-19 YEARS
 EXCLUSIVE. 

 

           CL         110.9 mmol/L 98.0-107.0 Above high normal            [facility]

[facility] ([facility]                        

 

                                        CHRONIC KIDNEY DISEASE STAGING PER NKF: 

  MALE GFR INTERPRETATION:  20-49 YRS:  

   >60 mL/min  Normal 50-59 YRS:     >56 mL/min  Normal 60-69 YRS:     >49 
mL/min  Normal 70-79 YRS:     >42 mL/min  Normal 80 and above  >35 mL/min  
Normal   FEMALE GRF INTERPRETATION:  20-39 YRS:    >60 mL/min  Normal 40-49 YRS:
    >58 mL/min  Normal 50-59 YRS:    >51 mL/min  Normal 60-69 YRS:    >45 mL/min
  Normal 70-79 YRS:    >39 mL/min  Normal 80 and above >32 mL/min  
NormalCLASSIFICATION            CHOLESTEROL FOR ADULTS       
CHILDREN/ADOLESCENTS*   DESIRABLE:                <200 MG/DL                   
<170 MG/DL  BORDER-LINE HIGH RISK:    200-239 MG/DL                170-199 MG/DL
  HIGH RISK:                >240 MG/DL                   >200 MG/DL    CLASS. 
FOR PRIMARY LDL CHOL PREVENTION:        LDL CHOL-CHILD/ADOLESCENTS*   DESIRABLE:
              <130 MG/DL              <110 MG/DL  BORDERLINE-HIGH RISK:   130-
159 MG/DL           110-129 MG/DL  HIGH RISK:              >160 MG/DL           
   >130 MG/DL   *CHILDREN AND ADOLESCENTS REPRESENTS INDIVIDUALA AGED 2-19 YEARS
 EXCLUSIVE. 

 

          Anion Gap 14 mmol/L                               [facility]  

 

                                        CHRONIC KIDNEY DISEASE STAGING PER NKF: 

  MALE GFR INTERPRETATION:  20-49 YRS:  

   >60 mL/min  Normal 50-59 YRS:     >56 mL/min  Normal 60-69 YRS:     >49 
mL/min  Normal 70-79 YRS:     >42 mL/min  Normal 80 and above  >35 mL/min  
Normal   FEMALE GRF INTERPRETATION:  20-39 YRS:    >60 mL/min  Normal 40-49 YRS:
    >58 mL/min  Normal 50-59 YRS:    >51 mL/min  Normal 60-69 YRS:    >45 mL/min
  Normal 70-79 YRS:    >39 mL/min  Normal 80 and above >32 mL/min  
NormalCLASSIFICATION            CHOLESTEROL FOR ADULTS       
CHILDREN/ADOLESCENTS*   DESIRABLE:                <200 MG/DL                   
<170 MG/DL  BORDER-LINE HIGH RISK:    200-239 MG/DL                170-199 MG/DL
  HIGH RISK:                >240 MG/DL                   >200 MG/DL    CLASS. 
FOR PRIMARY LDL CHOL PREVENTION:        LDL CHOL-CHILD/ADOLESCENTS*   DESIRABLE:
              <130 MG/DL              <110 MG/DL  BORDERLINE-HIGH RISK:   130-
159 MG/DL           110-129 MG/DL  HIGH RISK:              >160 MG/DL           
   >130 MG/DL   *CHILDREN AND ADOLESCENTS REPRESENTS INDIVIDUALA AGED 2-19 YEARS
 EXCLUSIVE. 

 

          K         3.6 mmol/L 3.5-5.1                       [facility] ([facility]

[facility]  

 

                                        CHRONIC KIDNEY DISEASE STAGING PER NKF: 

  MALE GFR INTERPRETATION:  20-49 YRS:  

   >60 mL/min  Normal 50-59 YRS:     >56 mL/min  Normal 60-69 YRS:     >49 
mL/min  Normal 70-79 YRS:     >42 mL/min  Normal 80 and above  >35 mL/min  
Normal   FEMALE GRF INTERPRETATION:  20-39 YRS:    >60 mL/min  Normal 40-49 YRS:
    >58 mL/min  Normal 50-59 YRS:    >51 mL/min  Normal 60-69 YRS:    >45 mL/min
  Normal 70-79 YRS:    >39 mL/min  Normal 80 and above >32 mL/min  
NormalCLASSIFICATION            CHOLESTEROL FOR ADULTS       
CHILDREN/ADOLESCENTS*   DESIRABLE:                <200 MG/DL                   
<170 MG/DL  BORDER-LINE HIGH RISK:    200-239 MG/DL                170-199 MG/DL
  HIGH RISK:                >240 MG/DL                   >200 MG/DL    CLASS. 
FOR PRIMARY LDL CHOL PREVENTION:        LDL CHOL-CHILD/ADOLESCENTS*   DESIRABLE:
              <130 MG/DL              <110 MG/DL  BORDERLINE-HIGH RISK:   130-
159 MG/DL           110-129 MG/DL  HIGH RISK:              >160 MG/DL           
   >130 MG/DL   *CHILDREN AND ADOLESCENTS REPRESENTS INDIVIDUALA AGED 2-19 YEARS
 EXCLUSIVE. 

 

          eGFR Non-Afr. American 33 #                                    [facility] 

([facility]  

 

                                        CHRONIC KIDNEY DISEASE STAGING PER NKF: 

  MALE GFR INTERPRETATION:  20-49 YRS:  

   >60 mL/min  Normal 50-59 YRS:     >56 mL/min  Normal 60-69 YRS:     >49 
mL/min  Normal 70-79 YRS:     >42 mL/min  Normal 80 and above  >35 mL/min  
Normal   FEMALE GRF INTERPRETATION:  20-39 YRS:    >60 mL/min  Normal 40-49 YRS:
    >58 mL/min  Normal 50-59 YRS:    >51 mL/min  Normal 60-69 YRS:    >45 mL/min
  Normal 70-79 YRS:    >39 mL/min  Normal 80 and above >32 mL/min  
NormalCLASSIFICATION            CHOLESTEROL FOR ADULTS       
CHILDREN/ADOLESCENTS*   DESIRABLE:                <200 MG/DL                   
<170 MG/DL  BORDER-LINE HIGH RISK:    200-239 MG/DL                170-199 MG/DL
  HIGH RISK:                >240 MG/DL                   >200 MG/DL    CLASS. 
FOR PRIMARY LDL CHOL PREVENTION:        LDL CHOL-CHILD/ADOLESCENTS*   DESIRABLE:
              <130 MG/DL              <110 MG/DL  BORDERLINE-HIGH RISK:   130-
159 MG/DL           110-129 MG/DL  HIGH RISK:              >160 MG/DL           
   >130 MG/DL   *CHILDREN AND ADOLESCENTS REPRESENTS INDIVIDUALA AGED 2-19 YEARS
 EXCLUSIVE. 

 

          eGFR  38 #                                    [facility] (

[facility]  

 

                                        CHRONIC KIDNEY DISEASE STAGING PER NKF: 

  MALE GFR INTERPRETATION:  20-49 YRS:  

   >60 mL/min  Normal 50-59 YRS:     >56 mL/min  Normal 60-69 YRS:     >49 
mL/min  Normal 70-79 YRS:     >42 mL/min  Normal 80 and above  >35 mL/min  
Normal   FEMALE GRF INTERPRETATION:  20-39 YRS:    >60 mL/min  Normal 40-49 YRS:
    >58 mL/min  Normal 50-59 YRS:    >51 mL/min  Normal 60-69 YRS:    >45 mL/min
  Normal 70-79 YRS:    >39 mL/min  Normal 80 and above >32 mL/min  
NormalCLASSIFICATION            CHOLESTEROL FOR ADULTS       
CHILDREN/ADOLESCENTS*   DESIRABLE:                <200 MG/DL                   
<170 MG/DL  BORDER-LINE HIGH RISK:    200-239 MG/DL                170-199 MG/DL
  HIGH RISK:                >240 MG/DL                   >200 MG/DL    CLASS. 
FOR PRIMARY LDL CHOL PREVENTION:        LDL CHOL-CHILD/ADOLESCENTS*   DESIRABLE:
              <130 MG/DL              <110 MG/DL  BORDERLINE-HIGH RISK:   130-
159 MG/DL           110-129 MG/DL  HIGH RISK:              >160 MG/DL           
   >130 MG/DL   *CHILDREN AND ADOLESCENTS REPRESENTS INDIVIDUALA AGED 2-19 YEARS
 EXCLUSIVE. 









                    ID                  Date                Data Source

 

                    [id number]         2021 01:43:00 PM EST [facility] ([facility]

[facility]









          Name      Value     Range     Interpretation Code Description Data Va

rce(s) Supporting 

Document(s)

 

          Chol      116 mg/dL 0-200                         [facility] ([facility]  

 

                                        CHRONIC KIDNEY DISEASE STAGING PER NKF: 

  MALE GFR INTERPRETATION:  20-49 YRS:  

   >60 mL/min  Normal 50-59 YRS:     >56 mL/min  Normal 60-69 YRS:     >49 
mL/min  Normal 70-79 YRS:     >42 mL/min  Normal 80 and above  >35 mL/min  
Normal   FEMALE GRF INTERPRETATION:  20-39 YRS:    >60 mL/min  Normal 40-49 YRS:
    >58 mL/min  Normal 50-59 YRS:    >51 mL/min  Normal 60-69 YRS:    >45 mL/min
  Normal 70-79 YRS:    >39 mL/min  Normal 80 and above >32 mL/min  
NormalCLASSIFICATION            CHOLESTEROL FOR ADULTS       
CHILDREN/ADOLESCENTS*   DESIRABLE:                <200 MG/DL                   
<170 MG/DL  BORDER-LINE HIGH RISK:    200-239 MG/DL                170-199 MG/DL
  HIGH RISK:                >240 MG/DL                   >200 MG/DL    CLASS. 
FOR PRIMARY LDL CHOL PREVENTION:        LDL CHOL-CHILD/ADOLESCENTS*   DESIRABLE:
              <130 MG/DL              <110 MG/DL  BORDERLINE-HIGH RISK:   130-
159 MG/DL           110-129 MG/DL  HIGH RISK:              >160 MG/DL           
   >130 MG/DL   *CHILDREN AND ADOLESCENTS REPRESENTS INDIVIDUALA AGED 2-19 YEARS
 EXCLUSIVE. 

 

          Trig      140 mg/dL                         [facility] ([facility]  

 

                                        CHRONIC KIDNEY DISEASE STAGING PER NKF: 

  MALE GFR INTERPRETATION:  20-49 YRS:  

   >60 mL/min  Normal 50-59 YRS:     >56 mL/min  Normal 60-69 YRS:     >49 
mL/min  Normal 70-79 YRS:     >42 mL/min  Normal 80 and above  >35 mL/min  
Normal   FEMALE GRF INTERPRETATION:  20-39 YRS:    >60 mL/min  Normal 40-49 YRS:
    >58 mL/min  Normal 50-59 YRS:    >51 mL/min  Normal 60-69 YRS:    >45 mL/min
  Normal 70-79 YRS:    >39 mL/min  Normal 80 and above >32 mL/min  
NormalCLASSIFICATION            CHOLESTEROL FOR ADULTS       
CHILDREN/ADOLESCENTS*   DESIRABLE:                <200 MG/DL                   
<170 MG/DL  BORDER-LINE HIGH RISK:    200-239 MG/DL                170-199 MG/DL
  HIGH RISK:                >240 MG/DL                   >200 MG/DL    CLASS. 
FOR PRIMARY LDL CHOL PREVENTION:        LDL CHOL-CHILD/ADOLESCENTS*   DESIRABLE:
              <130 MG/DL              <110 MG/DL  BORDERLINE-HIGH RISK:   130-
159 MG/DL           110-129 MG/DL  HIGH RISK:              >160 MG/DL           
   >130 MG/DL   *CHILDREN AND ADOLESCENTS REPRESENTS INDIVIDUALA AGED 2-19 YEARS
 EXCLUSIVE. 

 

          Cho/HDL Ratio 3.0 CALC                                [facility] ([facility]  

 

                                        CHRONIC KIDNEY DISEASE STAGING PER NKF: 

  MALE GFR INTERPRETATION:  20-49 YRS:  

   >60 mL/min  Normal 50-59 YRS:     >56 mL/min  Normal 60-69 YRS:     >49 
mL/min  Normal 70-79 YRS:     >42 mL/min  Normal 80 and above  >35 mL/min  
Normal   FEMALE GRF INTERPRETATION:  20-39 YRS:    >60 mL/min  Normal 40-49 YRS:
    >58 mL/min  Normal 50-59 YRS:    >51 mL/min  Normal 60-69 YRS:    >45 mL/min
  Normal 70-79 YRS:    >39 mL/min  Normal 80 and above >32 mL/min  
NormalCLASSIFICATION            CHOLESTEROL FOR ADULTS       
CHILDREN/ADOLESCENTS*   DESIRABLE:                <200 MG/DL                   
<170 MG/DL  BORDER-LINE HIGH RISK:    200-239 MG/DL                170-199 MG/DL
  HIGH RISK:                >240 MG/DL                   >200 MG/DL    CLASS. 
FOR PRIMARY LDL CHOL PREVENTION:        LDL CHOL-CHILD/ADOLESCENTS*   DESIRABLE:
              <130 MG/DL              <110 MG/DL  BORDERLINE-HIGH RISK:   130-
159 MG/DL           110-129 MG/DL  HIGH RISK:              >160 MG/DL           
   >130 MG/DL   *CHILDREN AND ADOLESCENTS REPRESENTS INDIVIDUALA AGED 2-19 YEARS
 EXCLUSIVE. 

 

           LDL_C      49 Calc         Below low normal            [facility] (

[facility]                                    

 

                                        CHRONIC KIDNEY DISEASE STAGING PER NKF: 

  MALE GFR INTERPRETATION:  20-49 YRS:  

   >60 mL/min  Normal 50-59 YRS:     >56 mL/min  Normal 60-69 YRS:     >49 
mL/min  Normal 70-79 YRS:     >42 mL/min  Normal 80 and above  >35 mL/min  
Normal   FEMALE GRF INTERPRETATION:  20-39 YRS:    >60 mL/min  Normal 40-49 YRS:
    >58 mL/min  Normal 50-59 YRS:    >51 mL/min  Normal 60-69 YRS:    >45 mL/min
  Normal 70-79 YRS:    >39 mL/min  Normal 80 and above >32 mL/min  
NormalCLASSIFICATION            CHOLESTEROL FOR ADULTS       
CHILDREN/ADOLESCENTS*   DESIRABLE:                <200 MG/DL                   
<170 MG/DL  BORDER-LINE HIGH RISK:    200-239 MG/DL                170-199 MG/DL
  HIGH RISK:                >240 MG/DL                   >200 MG/DL    CLASS. 
FOR PRIMARY LDL CHOL PREVENTION:        LDL CHOL-CHILD/ADOLESCENTS*   DESIRABLE:
              <130 MG/DL              <110 MG/DL  BORDERLINE-HIGH RISK:   130-
159 MG/DL           110-129 MG/DL  HIGH RISK:              >160 MG/DL           
   >130 MG/DL   *CHILDREN AND ADOLESCENTS REPRESENTS INDIVIDUALA AGED 2-19 YEARS
 EXCLUSIVE. 

 

           Cholesterol in HDL [Mass/volume] in Serum or Plasma 39 mg/dL   35-55 

                           [facility] 

([facility]       

 

                                        CHRONIC KIDNEY DISEASE STAGING PER NKF: 

  MALE GFR INTERPRETATION:  20-49 YRS:  

   >60 mL/min  Normal 50-59 YRS:     >56 mL/min  Normal 60-69 YRS:     >49 
mL/min  Normal 70-79 YRS:     >42 mL/min  Normal 80 and above  >35 mL/min  
Normal   FEMALE GRF INTERPRETATION:  20-39 YRS:    >60 mL/min  Normal 40-49 YRS:
    >58 mL/min  Normal 50-59 YRS:    >51 mL/min  Normal 60-69 YRS:    >45 mL/min
  Normal 70-79 YRS:    >39 mL/min  Normal 80 and above >32 mL/min  
NormalCLASSIFICATION            CHOLESTEROL FOR ADULTS       
CHILDREN/ADOLESCENTS*   DESIRABLE:                <200 MG/DL                   
<170 MG/DL  BORDER-LINE HIGH RISK:    200-239 MG/DL                170-199 MG/DL
  HIGH RISK:                >240 MG/DL                   >200 MG/DL    CLASS. 
FOR PRIMARY LDL CHOL PREVENTION:        LDL CHOL-CHILD/ADOLESCENTS*   DESIRABLE:
              <130 MG/DL              <110 MG/DL  BORDERLINE-HIGH RISK:   130-
159 MG/DL           110-129 MG/DL  HIGH RISK:              >160 MG/DL           
   >130 MG/DL   *CHILDREN AND ADOLESCENTS REPRESENTS INDIVIDUALA AGED 2-19 YEARS
 EXCLUSIVE. 









                    ID                  Date                Data Source

 

                    [id number]         2021 02:40:00 PM EST [facility] ([facility]









          Name      Value     Range     Interpretation Code Description Data Va

rce(s) Supporting 

Document(s)

 

           Glu        160 mg/dL       Above high normal            [facility] 

([facility]                                    

 

                                        NORMAL RANGES 

****************************************************************************** 
Age         WBC      RBC        HGB           HCT         MCV        PLT 
------------------------------------------------------------------------------ 
Adult M   4.1-10.9    4.20-6.30   12.0-18.0   37.0-51.0   80-97      140-440  
Adult F   4.1-10.9    4.04-5.48   12.0-18.0   37.0-51.0   80-97      140-440  0
-1 Yr    5.0-20.0    3.9-5.9     15-18       MV: 44      MV: 91     MV: 277  2-9
 Yr.   6.0-17.0    3.8-5.4     11-13       MV: 37      MV: 78     MV: 300  10 
Yrs.   5.0-13.0    3.8-5.4     12-15       MV: 39      MV: 80     MV: 250    
NOTE:  * FOR ADULT BLACK MALES AND FEMALES, NORMAL WBC IS 2.9-7.7 K/ML  * FOR 
ADULT BLACK MALES AND FEMALES, NORMAL RBC,HGB, AND HCT IS 5% LESS  SOURCE FOR 
DATA: [employer]PERATION MANUAL( AUTOMATED BLOOD COUNTS AND DIFF.)  APPENDIX
  B-3                      CHRONIC KIDNEY DISEASE STAGING PER NKF:   MALE GFR 
INTERPRETATION:  20-49 YRS:     >60 mL/min  Normal 50-59 YRS:     >56 mL/min  
Normal 60-69 YRS:     >49 mL/min  Normal 70-79 YRS:     >42 mL/min  Normal 80 
and above  >35 mL/min  Normal   FEMALE GRF INTERPRETATION:  20-39 YRS:    >60 
mL/min  Normal 40-49 YRS:    >58 mL/min  Normal 50-59 YRS:    >51 mL/min  Normal
 60-69 YRS:    >45 mL/min  Normal 70-79 YRS:    >39 mL/min  Normal 80 and above 
>32 mL/min  Normal 

 

          BUN/Creatinine Ratio 19.6 Calc                               [facility]  

 

                                        NORMAL RANGES 

****************************************************************************** 
Age         WBC      RBC        HGB           HCT         MCV        PLT 
------------------------------------------------------------------------------ 
Adult M   4.1-10.9    4.20-6.30   12.0-18.0   37.0-51.0   80-97      140-440  
Adult F   4.1-10.9    4.04-5.48   12.0-18.0   37.0-51.0   80-97      140-440  0
-1 Yr    5.0-20.0    3.9-5.9     15-18       MV: 44      MV: 91     MV: 277  2-9
 Yr.   6.0-17.0    3.8-5.4     11-13       MV: 37      MV: 78     MV: 300  10 
Yrs.   5.0-13.0    3.8-5.4     12-15       MV: 39      MV: 80     MV: 250    
NOTE:  * FOR ADULT BLACK MALES AND FEMALES, NORMAL WBC IS 2.9-7.7 K/ML  * FOR 
ADULT BLACK MALES AND FEMALES, NORMAL RBC,HGB, AND HCT IS 5% LESS  SOURCE FOR 
DATA: [employer] OPERATION MANUAL( AUTOMATED BLOOD COUNTS AND DIFF.)  APPENDIX
  B-3                      CHRONIC KIDNEY DISEASE STAGING PER NKF:   MALE GFR 
INTERPRETATION:  20-49 YRS:     >60 mL/min  Normal 50-59 YRS:     >56 mL/min  
Normal 60-69 YRS:     >49 mL/min  Normal 70-79 YRS:     >42 mL/min  Normal 80 
and above  >35 mL/min  Normal   FEMALE GRF INTERPRETATION:  20-39 YRS:    >60 
mL/min  Normal 40-49 YRS:    >58 mL/min  Normal 50-59 YRS:    >51 mL/min  Normal
 60-69 YRS:    >45 mL/min  Normal 70-79 YRS:    >39 mL/min  Normal 80 and above 
>32 mL/min  Normal 

 

          BUN       41 mg/dL  8-23      Above high normal           [facility] 

 

 

                                        NORMAL RANGES 

****************************************************************************** 
Age         WBC      RBC        HGB           HCT         MCV        PLT 
------------------------------------------------------------------------------ 
Adult M   4.1-10.9    4.20-6.30   12.0-18.0   37.0-51.0   80-97      140-440  
Adult F   4.1-10.9    4.04-5.48   12.0-18.0   37.0-51.0   80-97      140-440  0
-1 Yr    5.0-20.0    3.9-5.9     15-18       MV: 44      MV: 91     MV: 277  2-9
 Yr.   6.0-17.0    3.8-5.4     11-13       MV: 37      MV: 78     MV: 300  10 
Yrs.   5.0-13.0    3.8-5.4     12-15       MV: 39      MV: 80     MV: 250    
NOTE:  * FOR ADULT BLACK MALES AND FEMALES, NORMAL WBC IS 2.9-7.7 K/ML  * FOR 
ADULT BLACK MALES AND FEMALES, NORMAL RBC,HGB, AND HCT IS 5% LESS  SOURCE FOR 
DATA: [employer]PERATION MANUAL( AUTOMATED BLOOD COUNTS AND DIFF.)  APPENDIX
  B-3                      CHRONIC KIDNEY DISEASE STAGING PER NKF:   MALE GFR 
INTERPRETATION:  20-49 YRS:     >60 mL/min  Normal 50-59 YRS:     >56 mL/min  
Normal 60-69 YRS:     >49 mL/min  Normal 70-79 YRS:     >42 mL/min  Normal 80 
and above  >35 mL/min  Normal   FEMALE GRF INTERPRETATION:  20-39 YRS:    >60 
mL/min  Normal 40-49 YRS:    >58 mL/min  Normal 50-59 YRS:    >51 mL/min  Normal
 60-69 YRS:    >45 mL/min  Normal 70-79 YRS:    >39 mL/min  Normal 80 and above 
>32 mL/min  Normal 

 

           Creat      2.1 mg/dL  0.7-1.2    Above high normal            [facility] 

([facility]                                    

 

                                        NORMAL RANGES 

****************************************************************************** 
Age         WBC      RBC        HGB           HCT         MCV        PLT 
------------------------------------------------------------------------------ 
Adult M   4.1-10.9    4.20-6.30   12.0-18.0   37.0-51.0   80-97      140-440  
Adult F   4.1-10.9    4.04-5.48   12.0-18.0   37.0-51.0   80-97      140-440  0
-1 Yr    5.0-20.0    3.9-5.9     15-18       MV: 44      MV: 91     MV: 277  2-9
 Yr.   6.0-17.0    3.8-5.4     11-13       MV: 37      MV: 78     MV: 300  10 
Yrs.   5.0-13.0    3.8-5.4     12-15       MV: 39      MV: 80     MV: 250    
NOTE:  * FOR ADULT BLACK MALES AND FEMALES, NORMAL WBC IS 2.9-7.7 K/ML  * FOR 
ADULT BLACK MALES AND FEMALES, NORMAL RBC,HGB, AND HCT IS 5% LESS  SOURCE FOR 
DATA: [employer] OPERATION MANUAL( AUTOMATED BLOOD COUNTS AND DIFF.)  APPENDIX
  B-3                      CHRONIC KIDNEY DISEASE STAGING PER NKF:   MALE GFR 
INTERPRETATION:  20-49 YRS:     >60 mL/min  Normal 50-59 YRS:     >56 mL/min  
Normal 60-69 YRS:     >49 mL/min  Normal 70-79 YRS:     >42 mL/min  Normal 80 
and above  >35 mL/min  Normal   FEMALE GRF INTERPRETATION:  20-39 YRS:    >60 
mL/min  Normal 40-49 YRS:    >58 mL/min  Normal 50-59 YRS:    >51 mL/min  Normal
 60-69 YRS:    >45 mL/min  Normal 70-79 YRS:    >39 mL/min  Normal 80 and above 
>32 mL/min  Normal 

 

          Na        140 mmol/L 136-145                       [facility] ([facility]  

 

                                        NORMAL RANGES 

****************************************************************************** 
Age         WBC      RBC        HGB           HCT         MCV        PLT 
------------------------------------------------------------------------------ 
Adult M   4.1-10.9    4.20-6.30   12.0-18.0   37.0-51.0   80-97      140-440  
Adult F   4.1-10.9    4.04-5.48   12.0-18.0   37.0-51.0   80-97      140-440  0
-1 Yr    5.0-20.0    3.9-5.9     15-18       MV: 44      MV: 91     MV: 277  2-9
 Yr.   6.0-17.0    3.8-5.4     11-13       MV: 37      MV: 78     MV: 300  10 
Yrs.   5.0-13.0    3.8-5.4     12-15       MV: 39      MV: 80     MV: 250    
NOTE:  * FOR ADULT BLACK MALES AND FEMALES, NORMAL WBC IS 2.9-7.7 K/ML  * FOR 
ADULT BLACK MALES AND FEMALES, NORMAL RBC,HGB, AND HCT IS 5% LESS  SOURCE FOR 
DATA: [employer] 1800 OPERATION MANUAL( AUTOMATED BLOOD COUNTS AND DIFF.)  APPENDIX
  B-3                      CHRONIC KIDNEY DISEASE STAGING PER NKF:   MALE GFR 
INTERPRETATION:  20-49 YRS:     >60 mL/min  Normal 50-59 YRS:     >56 mL/min  
Normal 60-69 YRS:     >49 mL/min  Normal 70-79 YRS:     >42 mL/min  Normal 80 
and above  >35 mL/min  Normal   FEMALE GRF INTERPRETATION:  20-39 YRS:    >60 
mL/min  Normal 40-49 YRS:    >58 mL/min  Normal 50-59 YRS:    >51 mL/min  Normal
 60-69 YRS:    >45 mL/min  Normal 70-79 YRS:    >39 mL/min  Normal 80 and above 
>32 mL/min  Normal 

 

          K         4.0 mmol/L 3.5-5.1                       [facility] ([facility]  

 

                                        NORMAL RANGES 

****************************************************************************** 
Age         WBC      RBC        HGB           HCT         MCV        PLT 
------------------------------------------------------------------------------ 
Adult M   4.1-10.9    4.20-6.30   12.0-18.0   37.0-51.0   80-97      140-440  
Adult F   4.1-10.9    4.04-5.48   12.0-18.0   37.0-51.0   80-97      140-440  0
-1 Yr    5.0-20.0    3.9-5.9     15-18       MV: 44      MV: 91     MV: 277  2-9
 Yr.   6.0-17.0    3.8-5.4     11-13       MV: 37      MV: 78     MV: 300  10 
Yrs.   5.0-13.0    3.8-5.4     12-15       MV: 39      MV: 80     MV: 250    
NOTE:  * FOR ADULT BLACK MALES AND FEMALES, NORMAL WBC IS 2.9-7.7 K/ML  * FOR 
ADULT BLACK MALES AND FEMALES, NORMAL RBC,HGB, AND HCT IS 5% LESS  SOURCE FOR 
DATA: [employer] 1800 OPERATION MANUAL( AUTOMATED BLOOD COUNTS AND DIFF.)  APPENDIX
  B-3                      CHRONIC KIDNEY DISEASE STAGING PER NKF:   MALE GFR 
INTERPRETATION:  20-49 YRS:     >60 mL/min  Normal 50-59 YRS:     >56 mL/min  
Normal 60-69 YRS:     >49 mL/min  Normal 70-79 YRS:     >42 mL/min  Normal 80 
and above  >35 mL/min  Normal   FEMALE GRF INTERPRETATION:  20-39 YRS:    >60 
mL/min  Normal 40-49 YRS:    >58 mL/min  Normal 50-59 YRS:    >51 mL/min  Normal
 60-69 YRS:    >45 mL/min  Normal 70-79 YRS:    >39 mL/min  Normal 80 and above 
>32 mL/min  Normal 

 

           Co2        15.9 mmol/L 22.0-29.0  Below low normal            [facility] 

([facility]                                   

 

                                        NORMAL RANGES 

****************************************************************************** 
Age         WBC      RBC        HGB           HCT         MCV        PLT 
------------------------------------------------------------------------------ 
Adult M   4.1-10.9    4.20-6.30   12.0-18.0   37.0-51.0   80-97      140-440  
Adult F   4.1-10.9    4.04-5.48   12.0-18.0   37.0-51.0   80-97      140-440  0
-1 Yr    5.0-20.0    3.9-5.9     15-18       MV: 44      MV: 91     MV: 277  2-9
 Yr.   6.0-17.0    3.8-5.4     11-13       MV: 37      MV: 78     MV: 300  10 
Yrs.   5.0-13.0    3.8-5.4     12-15       MV: 39      MV: 80     MV: 250    
NOTE:  * FOR ADULT BLACK MALES AND FEMALES, NORMAL WBC IS 2.9-7.7 K/ML  * FOR 
ADULT BLACK MALES AND FEMALES, NORMAL RBC,HGB, AND HCT IS 5% LESS  SOURCE FOR 
DATA: [employer] 1800 OPERATION MANUAL( AUTOMATED BLOOD COUNTS AND DIFF.)  APPENDIX
  B-3                      CHRONIC KIDNEY DISEASE STAGING PER NKF:   MALE GFR 
INTERPRETATION:  20-49 YRS:     >60 mL/min  Normal 50-59 YRS:     >56 mL/min  
Normal 60-69 YRS:     >49 mL/min  Normal 70-79 YRS:     >42 mL/min  Normal 80 
and above  >35 mL/min  Normal   FEMALE GRF INTERPRETATION:  20-39 YRS:    >60 
mL/min  Normal 40-49 YRS:    >58 mL/min  Normal 50-59 YRS:    >51 mL/min  Normal
 60-69 YRS:    >45 mL/min  Normal 70-79 YRS:    >39 mL/min  Normal 80 and above 
>32 mL/min  Normal 

 

           CL         114.0 mmol/L 98.0-107.0 Above high normal            [facility]

T ([facility]                        

 

                                        NORMAL RANGES 

****************************************************************************** 
Age         WBC      RBC        HGB           HCT         MCV        PLT 
------------------------------------------------------------------------------ 
Adult M   4.1-10.9    4.20-6.30   12.0-18.0   37.0-51.0   80-97      140-440  
Adult F   4.1-10.9    4.04-5.48   12.0-18.0   37.0-51.0   80-97      140-440  0
-1 Yr    5.0-20.0    3.9-5.9     15-18       MV: 44      MV: 91     MV: 277  2-9
 Yr.   6.0-17.0    3.8-5.4     11-13       MV: 37      MV: 78     MV: 300  10 
Yrs.   5.0-13.0    3.8-5.4     12-15       MV: 39      MV: 80     MV: 250    
NOTE:  * FOR ADULT BLACK MALES AND FEMALES, NORMAL WBC IS 2.9-7.7 K/ML  * FOR 
ADULT BLACK MALES AND FEMALES, NORMAL RBC,HGB, AND HCT IS 5% LESS  SOURCE FOR 
DATA: [employer] 1800 OPERATION MANUAL( AUTOMATED BLOOD COUNTS AND DIFF.)  APPENDIX
  B-3                      CHRONIC KIDNEY DISEASE STAGING PER NKF:   MALE GFR 
INTERPRETATION:  20-49 YRS:     >60 mL/min  Normal 50-59 YRS:     >56 mL/min  
Normal 60-69 YRS:     >49 mL/min  Normal 70-79 YRS:     >42 mL/min  Normal 80 
and above  >35 mL/min  Normal   FEMALE GRF INTERPRETATION:  20-39 YRS:    >60 
mL/min  Normal 40-49 YRS:    >58 mL/min  Normal 50-59 YRS:    >51 mL/min  Normal
 60-69 YRS:    >45 mL/min  Normal 70-79 YRS:    >39 mL/min  Normal 80 and above 
>32 mL/min  Normal 

 

          CA        8.8 mg/dL 8.6-10.2                      [facility] ([facility]

[facility]  

 

                                        NORMAL RANGES 

****************************************************************************** 
Age         WBC      RBC        HGB           HCT         MCV        PLT 
------------------------------------------------------------------------------ 
Adult M   4.1-10.9    4.20-6.30   12.0-18.0   37.0-51.0   80-97      140-440  
Adult F   4.1-10.9    4.04-5.48   12.0-18.0   37.0-51.0   80-97      140-440  0
-1 Yr    5.0-20.0    3.9-5.9     15-18       MV: 44      MV: 91     MV: 277  2-9
 Yr.   6.0-17.0    3.8-5.4     11-13       MV: 37      MV: 78     MV: 300  10 
Yrs.   5.0-13.0    3.8-5.4     12-15       MV: 39      MV: 80     MV: 250    
NOTE:  * FOR ADULT BLACK MALES AND FEMALES, NORMAL WBC IS 2.9-7.7 K/ML  * FOR 
ADULT BLACK MALES AND FEMALES, NORMAL RBC,HGB, AND HCT IS 5% LESS  SOURCE FOR 
DATA: [employer] OPERATION MANUAL( AUTOMATED BLOOD COUNTS AND DIFF.)  APPENDIX
  B-3                      CHRONIC KIDNEY DISEASE STAGING PER NKF:   MALE GFR 
INTERPRETATION:  20-49 YRS:     >60 mL/min  Normal 50-59 YRS:     >56 mL/min  
Normal 60-69 YRS:     >49 mL/min  Normal 70-79 YRS:     >42 mL/min  Normal 80 
and above  >35 mL/min  Normal   FEMALE GRF INTERPRETATION:  20-39 YRS:    >60 
mL/min  Normal 40-49 YRS:    >58 mL/min  Normal 50-59 YRS:    >51 mL/min  Normal
 60-69 YRS:    >45 mL/min  Normal 70-79 YRS:    >39 mL/min  Normal 80 and above 
>32 mL/min  Normal 

 

           TP         6.1 g/dL   6.6-8.7    Below low normal            [facility] (

[facility]

                                         

 

                                        NORMAL RANGES 

****************************************************************************** 
Age         WBC      RBC        HGB           HCT         MCV        PLT 
------------------------------------------------------------------------------ 
Adult M   4.1-10.9    4.20-6.30   12.0-18.0   37.0-51.0   80-97      140-440  
Adult F   4.1-10.9    4.04-5.48   12.0-18.0   37.0-51.0   80-97      140-440  0
-1 Yr    5.0-20.0    3.9-5.9     15-18       MV: 44      MV: 91     MV: 277  2-9
 Yr.   6.0-17.0    3.8-5.4     11-13       MV: 37      MV: 78     MV: 300  10 
Yrs.   5.0-13.0    3.8-5.4     12-15       MV: 39      MV: 80     MV: 250    
NOTE:  * FOR ADULT BLACK MALES AND FEMALES, NORMAL WBC IS 2.9-7.7 K/ML  * FOR 
ADULT BLACK MALES AND FEMALES, NORMAL RBC,HGB, AND HCT IS 5% LESS  SOURCE FOR 
DATA: [employer] 1800 OPERATION MANUAL( AUTOMATED BLOOD COUNTS AND DIFF.)  APPENDIX
  B-3                      CHRONIC KIDNEY DISEASE STAGING PER NKF:   MALE GFR 
INTERPRETATION:  20-49 YRS:     >60 mL/min  Normal 50-59 YRS:     >56 mL/min  
Normal 60-69 YRS:     >49 mL/min  Normal 70-79 YRS:     >42 mL/min  Normal 80 
and above  >35 mL/min  Normal   FEMALE GRF INTERPRETATION:  20-39 YRS:    >60 
mL/min  Normal 40-49 YRS:    >58 mL/min  Normal 50-59 YRS:    >51 mL/min  Normal
 60-69 YRS:    >45 mL/min  Normal 70-79 YRS:    >39 mL/min  Normal 80 and above 
>32 mL/min  Normal 

 

          Alb       4.0 g/dL  3.5-5.2                       [facility] ([facility]  

 

                                        NORMAL RANGES 

****************************************************************************** 
Age         WBC      RBC        HGB           HCT         MCV        PLT 
------------------------------------------------------------------------------ 
Adult M   4.1-10.9    4.20-6.30   12.0-18.0   37.0-51.0   80-97      140-440  
Adult F   4.1-10.9    4.04-5.48   12.0-18.0   37.0-51.0   80-97      140-440  0
-1 Yr    5.0-20.0    3.9-5.9     15-18       MV: 44      MV: 91     MV: 277  2-9
 Yr.   6.0-17.0    3.8-5.4     11-13       MV: 37      MV: 78     MV: 300  10 
Yrs.   5.0-13.0    3.8-5.4     12-15       MV: 39      MV: 80     MV: 250    
NOTE:  * FOR ADULT BLACK MALES AND FEMALES, NORMAL WBC IS 2.9-7.7 K/ML  * FOR 
ADULT BLACK MALES AND FEMALES, NORMAL RBC,HGB, AND HCT IS 5% LESS  SOURCE FOR 
DATA: [employer] 1800 OPERATION MANUAL( AUTOMATED BLOOD COUNTS AND DIFF.)  APPENDIX
  B-3                      CHRONIC KIDNEY DISEASE STAGING PER NKF:   MALE GFR 
INTERPRETATION:  20-49 YRS:     >60 mL/min  Normal 50-59 YRS:     >56 mL/min  
Normal 60-69 YRS:     >49 mL/min  Normal 70-79 YRS:     >42 mL/min  Normal 80 
and above  >35 mL/min  Normal   FEMALE GRF INTERPRETATION:  20-39 YRS:    >60 
mL/min  Normal 40-49 YRS:    >58 mL/min  Normal 50-59 YRS:    >51 mL/min  Normal
 60-69 YRS:    >45 mL/min  Normal 70-79 YRS:    >39 mL/min  Normal 80 and above 
>32 mL/min  Normal 

 

          A/G Ratio 1.9 Calc                                [facility] ([facility]  

 

                                        NORMAL RANGES 

****************************************************************************** 
Age         WBC      RBC        HGB           HCT         MCV        PLT 
------------------------------------------------------------------------------ 
Adult M   4.1-10.9    4.20-6.30   12.0-18.0   37.0-51.0   80-97      140-440  
Adult F   4.1-10.9    4.04-5.48   12.0-18.0   37.0-51.0   80-97      140-440  0
-1 Yr    5.0-20.0    3.9-5.9     15-18       MV: 44      MV: 91     MV: 277  2-9
 Yr.   6.0-17.0    3.8-5.4     11-13       MV: 37      MV: 78     MV: 300  10 
Yrs.   5.0-13.0    3.8-5.4     12-15       MV: 39      MV: 80     MV: 250    
NOTE:  * FOR ADULT BLACK MALES AND FEMALES, NORMAL WBC IS 2.9-7.7 K/ML  * FOR 
ADULT BLACK MALES AND FEMALES, NORMAL RBC,HGB, AND HCT IS 5% LESS  SOURCE FOR 
DATA: [employer] 1800 OPERATION MANUAL( AUTOMATED BLOOD COUNTS AND DIFF.)  APPENDIX
  B-3                      CHRONIC KIDNEY DISEASE STAGING PER NKF:   MALE GFR 
INTERPRETATION:  20-49 YRS:     >60 mL/min  Normal 50-59 YRS:     >56 mL/min  
Normal 60-69 YRS:     >49 mL/min  Normal 70-79 YRS:     >42 mL/min  Normal 80 
and above  >35 mL/min  Normal   FEMALE GRF INTERPRETATION:  20-39 YRS:    >60 
mL/min  Normal 40-49 YRS:    >58 mL/min  Normal 50-59 YRS:    >51 mL/min  Normal
 60-69 YRS:    >45 mL/min  Normal 70-79 YRS:    >39 mL/min  Normal 80 and above 
>32 mL/min  Normal 

 

          Alp       112.6 U/L                         [facility]  

 

                                        NORMAL RANGES 

****************************************************************************** 
Age         WBC      RBC        HGB           HCT         MCV        PLT 
------------------------------------------------------------------------------ 
Adult M   4.1-10.9    4.20-6.30   12.0-18.0   37.0-51.0   80-97      140-440  
Adult F   4.1-10.9    4.04-5.48   12.0-18.0   37.0-51.0   80-97      140-440  0
-1 Yr    5.0-20.0    3.9-5.9     15-18       MV: 44      MV: 91     MV: 277  2-9
 Yr.   6.0-17.0    3.8-5.4     11-13       MV: 37      MV: 78     MV: 300  10 
Yrs.   5.0-13.0    3.8-5.4     12-15       MV: 39      MV: 80     MV: 250    
NOTE:  * FOR ADULT BLACK MALES AND FEMALES, NORMAL WBC IS 2.9-7.7 K/ML  * FOR 
ADULT BLACK MALES AND FEMALES, NORMAL RBC,HGB, AND HCT IS 5% LESS  SOURCE FOR 
DATA: [employer] 1800 OPERATION MANUAL( AUTOMATED BLOOD COUNTS AND DIFF.)  APPENDIX
  B-3                      CHRONIC KIDNEY DISEASE STAGING PER NKF:   MALE GFR 
INTERPRETATION:  20-49 YRS:     >60 mL/min  Normal 50-59 YRS:     >56 mL/min  
Normal 60-69 YRS:     >49 mL/min  Normal 70-79 YRS:     >42 mL/min  Normal 80 
and above  >35 mL/min  Normal   FEMALE GRF INTERPRETATION:  20-39 YRS:    >60 
mL/min  Normal 40-49 YRS:    >58 mL/min  Normal 50-59 YRS:    >51 mL/min  Normal
 60-69 YRS:    >45 mL/min  Normal 70-79 YRS:    >39 mL/min  Normal 80 and above 
>32 mL/min  Normal 

 

          Globulin  2.1 Calc                                [facility] ([facility]  

 

                                        NORMAL RANGES 

****************************************************************************** 
Age         WBC      RBC        HGB           HCT         MCV        PLT 
------------------------------------------------------------------------------ 
Adult M   4.1-10.9    4.20-6.30   12.0-18.0   37.0-51.0   80-97      140-440  
Adult F   4.1-10.9    4.04-5.48   12.0-18.0   37.0-51.0   80-97      140-440  0
-1 Yr    5.0-20.0    3.9-5.9     15-18       MV: 44      MV: 91     MV: 277  2-9
 Yr.   6.0-17.0    3.8-5.4     11-13       MV: 37      MV: 78     MV: 300  10 
Yrs.   5.0-13.0    3.8-5.4     12-15       MV: 39      MV: 80     MV: 250    
NOTE:  * FOR ADULT BLACK MALES AND FEMALES, NORMAL WBC IS 2.9-7.7 K/ML  * FOR 
ADULT BLACK MALES AND FEMALES, NORMAL RBC,HGB, AND HCT IS 5% LESS  SOURCE FOR 
DATA: [employer] 1800 OPERATION MANUAL( AUTOMATED BLOOD COUNTS AND DIFF.)  APPENDIX
  B-3                      CHRONIC KIDNEY DISEASE STAGING PER NKF:   MALE GFR 
INTERPRETATION:  20-49 YRS:     >60 mL/min  Normal 50-59 YRS:     >56 mL/min  
Normal 60-69 YRS:     >49 mL/min  Normal 70-79 YRS:     >42 mL/min  Normal 80 
and above  >35 mL/min  Normal   FEMALE GRF INTERPRETATION:  20-39 YRS:    >60 
mL/min  Normal 40-49 YRS:    >58 mL/min  Normal 50-59 YRS:    >51 mL/min  Normal
 60-69 YRS:    >45 mL/min  Normal 70-79 YRS:    >39 mL/min  Normal 80 and above 
>32 mL/min  Normal 

 

          Alt (SGPT) 10 U/L    0-41                          [facility] ([facility]  

 

                                        NORMAL RANGES 

****************************************************************************** 
Age         WBC      RBC        HGB           HCT         MCV        PLT 
------------------------------------------------------------------------------ 
Adult M   4.1-10.9    4.20-6.30   12.0-18.0   37.0-51.0   80-97      140-440  
Adult F   4.1-10.9    4.04-5.48   12.0-18.0   37.0-51.0   80-97      140-440  0
-1 Yr    5.0-20.0    3.9-5.9     15-18       MV: 44      MV: 91     MV: 277  2-9
 Yr.   6.0-17.0    3.8-5.4     11-13       MV: 37      MV: 78     MV: 300  10 
Yrs.   5.0-13.0    3.8-5.4     12-15       MV: 39      MV: 80     MV: 250    
NOTE:  * FOR ADULT BLACK MALES AND FEMALES, NORMAL WBC IS 2.9-7.7 K/ML  * FOR 
ADULT BLACK MALES AND FEMALES, NORMAL RBC,HGB, AND HCT IS 5% LESS  SOURCE FOR 
DATA: [employer] 1800 OPERATION MANUAL( AUTOMATED BLOOD COUNTS AND DIFF.)  APPENDIX
  B-3                      CHRONIC KIDNEY DISEASE STAGING PER NKF:   MALE GFR 
INTERPRETATION:  20-49 YRS:     >60 mL/min  Normal 50-59 YRS:     >56 mL/min  
Normal 60-69 YRS:     >49 mL/min  Normal 70-79 YRS:     >42 mL/min  Normal 80 
and above  >35 mL/min  Normal   FEMALE GRF INTERPRETATION:  20-39 YRS:    >60 
mL/min  Normal 40-49 YRS:    >58 mL/min  Normal 50-59 YRS:    >51 mL/min  Normal
 60-69 YRS:    >45 mL/min  Normal 70-79 YRS:    >39 mL/min  Normal 80 and above 
>32 mL/min  Normal 

 

          Ast (Sgot) 10 U/L    0-40                          [facility] ([facility]  

 

                                        NORMAL RANGES 

****************************************************************************** 
Age         WBC      RBC        HGB           HCT         MCV        PLT 
------------------------------------------------------------------------------ 
Adult M   4.1-10.9    4.20-6.30   12.0-18.0   37.0-51.0   80-97      140-440  
Adult F   4.1-10.9    4.04-5.48   12.0-18.0   37.0-51.0   80-97      140-440  0
-1 Yr    5.0-20.0    3.9-5.9     15-18       MV: 44      MV: 91     MV: 277  2-9
 Yr.   6.0-17.0    3.8-5.4     11-13       MV: 37      MV: 78     MV: 300  10 
Yrs.   5.0-13.0    3.8-5.4     12-15       MV: 39      MV: 80     MV: 250    
NOTE:  * FOR ADULT BLACK MALES AND FEMALES, NORMAL WBC IS 2.9-7.7 K/ML  * FOR 
ADULT BLACK MALES AND FEMALES, NORMAL RBC,HGB, AND HCT IS 5% LESS  SOURCE FOR 
DATA: [employer] 1800 OPERATION MANUAL( AUTOMATED BLOOD COUNTS AND DIFF.)  APPENDIX
  B-3                      CHRONIC KIDNEY DISEASE STAGING PER NKF:   MALE GFR 
INTERPRETATION:  20-49 YRS:     >60 mL/min  Normal 50-59 YRS:     >56 mL/min  
Normal 60-69 YRS:     >49 mL/min  Normal 70-79 YRS:     >42 mL/min  Normal 80 
and above  >35 mL/min  Normal   FEMALE GRF INTERPRETATION:  20-39 YRS:    >60 
mL/min  Normal 40-49 YRS:    >58 mL/min  Normal 50-59 YRS:    >51 mL/min  Normal
 60-69 YRS:    >45 mL/min  Normal 70-79 YRS:    >39 mL/min  Normal 80 and above 
>32 mL/min  Normal 

 

          Anion Gap 14 mmol/L                               [facility] ([facility]

[facility]  

 

                                        NORMAL RANGES 

****************************************************************************** 
Age         WBC      RBC        HGB           HCT         MCV        PLT 
------------------------------------------------------------------------------ 
Adult M   4.1-10.9    4.20-6.30   12.0-18.0   37.0-51.0   80-97      140-440  
Adult F   4.1-10.9    4.04-5.48   12.0-18.0   37.0-51.0   80-97      140-440  0
-1 Yr    5.0-20.0    3.9-5.9     15-18       MV: 44      MV: 91     MV: 277  2-9
 Yr.   6.0-17.0    3.8-5.4     11-13       MV: 37      MV: 78     MV: 300  10 
Yrs.   5.0-13.0    3.8-5.4     12-15       MV: 39      MV: 80     MV: 250    
NOTE:  * FOR ADULT BLACK MALES AND FEMALES, NORMAL WBC IS 2.9-7.7 K/ML  * FOR 
ADULT BLACK MALES AND FEMALES, NORMAL RBC,HGB, AND HCT IS 5% LESS  SOURCE FOR 
DATA: [employer] 1800 OPERATION MANUAL( AUTOMATED BLOOD COUNTS AND DIFF.)  APPENDIX
  B-3                      CHRONIC KIDNEY DISEASE STAGING PER NKF:   MALE GFR 
INTERPRETATION:  20-49 YRS:     >60 mL/min  Normal 50-59 YRS:     >56 mL/min  
Normal 60-69 YRS:     >49 mL/min  Normal 70-79 YRS:     >42 mL/min  Normal 80 
and above  >35 mL/min  Normal   FEMALE GRF INTERPRETATION:  20-39 YRS:    >60 
mL/min  Normal 40-49 YRS:    >58 mL/min  Normal 50-59 YRS:    >51 mL/min  Normal
 60-69 YRS:    >45 mL/min  Normal 70-79 YRS:    >39 mL/min  Normal 80 and above 
>32 mL/min  Normal 

 

           Osmolality-Calculated 292.3 Calc                                  [facility]

[facility] ([facility]

                                         

 

                                        NORMAL RANGES 

****************************************************************************** 
Age         WBC      RBC        HGB           HCT         MCV        PLT 
------------------------------------------------------------------------------ 
Adult M   4.1-10.9    4.20-6.30   12.0-18.0   37.0-51.0   80-97      140-440  
Adult F   4.1-10.9    4.04-5.48   12.0-18.0   37.0-51.0   80-97      140-440  0
-1 Yr    5.0-20.0    3.9-5.9     15-18       MV: 44      MV: 91     MV: 277  2-9
 Yr.   6.0-17.0    3.8-5.4     11-13       MV: 37      MV: 78     MV: 300  10 
Yrs.   5.0-13.0    3.8-5.4     12-15       MV: 39      MV: 80     MV: 250    
NOTE:  * FOR ADULT BLACK MALES AND FEMALES, NORMAL WBC IS 2.9-7.7 K/ML  * FOR 
ADULT BLACK MALES AND FEMALES, NORMAL RBC,HGB, AND HCT IS 5% LESS  SOURCE FOR 
DATA: [employer] 1800 OPERATION MANUAL( AUTOMATED BLOOD COUNTS AND DIFF.)  APPENDIX
  B-3                      CHRONIC KIDNEY DISEASE STAGING PER NKF:   MALE GFR 
INTERPRETATION:  20-49 YRS:     >60 mL/min  Normal 50-59 YRS:     >56 mL/min  
Normal 60-69 YRS:     >49 mL/min  Normal 70-79 YRS:     >42 mL/min  Normal 80 
and above  >35 mL/min  Normal   FEMALE GRF INTERPRETATION:  20-39 YRS:    >60 
mL/min  Normal 40-49 YRS:    >58 mL/min  Normal 50-59 YRS:    >51 mL/min  Normal
 60-69 YRS:    >45 mL/min  Normal 70-79 YRS:    >39 mL/min  Normal 80 and above 
>32 mL/min  Normal 

 

          Tbili     0.26 mg/dL 0.0-1.2                       [facility] ([facility]  

 

                                        NORMAL RANGES 

****************************************************************************** 
Age         WBC      RBC        HGB           HCT         MCV        PLT 
------------------------------------------------------------------------------ 
Adult M   4.1-10.9    4.20-6.30   12.0-18.0   37.0-51.0   80-97      140-440  
Adult F   4.1-10.9    4.04-5.48   12.0-18.0   37.0-51.0   80-97      140-440  0
-1 Yr    5.0-20.0    3.9-5.9     15-18       MV: 44      MV: 91     MV: 277  2-9
 Yr.   6.0-17.0    3.8-5.4     11-13       MV: 37      MV: 78     MV: 300  10 
Yrs.   5.0-13.0    3.8-5.4     12-15       MV: 39      MV: 80     MV: 250    
NOTE:  * FOR ADULT BLACK MALES AND FEMALES, NORMAL WBC IS 2.9-7.7 K/ML  * FOR 
ADULT BLACK MALES AND FEMALES, NORMAL RBC,HGB, AND HCT IS 5% LESS  SOURCE FOR 
DATA: [employer] 1800 OPERATION MANUAL( AUTOMATED BLOOD COUNTS AND DIFF.)  APPENDIX
  B-3                      CHRONIC KIDNEY DISEASE STAGING PER NKF:   MALE GFR 
INTERPRETATION:  20-49 YRS:     >60 mL/min  Normal 50-59 YRS:     >56 mL/min  
Normal 60-69 YRS:     >49 mL/min  Normal 70-79 YRS:     >42 mL/min  Normal 80 
and above  >35 mL/min  Normal   FEMALE GRF INTERPRETATION:  20-39 YRS:    >60 
mL/min  Normal 40-49 YRS:    >58 mL/min  Normal 50-59 YRS:    >51 mL/min  Normal
 60-69 YRS:    >45 mL/min  Normal 70-79 YRS:    >39 mL/min  Normal 80 and above 
>32 mL/min  Normal 

 

          eGFR  36 #                                    [facility] (

[facility]  

 

                                        NORMAL RANGES 

****************************************************************************** 
Age         WBC      RBC        HGB           HCT         MCV        PLT 
------------------------------------------------------------------------------ 
Adult M   4.1-10.9    4.20-6.30   12.0-18.0   37.0-51.0   80-97      140-440  
Adult F   4.1-10.9    4.04-5.48   12.0-18.0   37.0-51.0   80-97      140-440  0
-1 Yr    5.0-20.0    3.9-5.9     15-18       MV: 44      MV: 91     MV: 277  2-9
 Yr.   6.0-17.0    3.8-5.4     11-13       MV: 37      MV: 78     MV: 300  10 
Yrs.   5.0-13.0    3.8-5.4     12-15       MV: 39      MV: 80     MV: 250    
NOTE:  * FOR ADULT BLACK MALES AND FEMALES, NORMAL WBC IS 2.9-7.7 K/ML  * FOR 
ADULT BLACK MALES AND FEMALES, NORMAL RBC,HGB, AND HCT IS 5% LESS  SOURCE FOR 
DATA: [employer] 1800 OPERATION MANUAL( AUTOMATED BLOOD COUNTS AND DIFF.)  APPENDIX
  B-3                      CHRONIC KIDNEY DISEASE STAGING PER NKF:   MALE GFR 
INTERPRETATION:  20-49 YRS:     >60 mL/min  Normal 50-59 YRS:     >56 mL/min  
Normal 60-69 YRS:     >49 mL/min  Normal 70-79 YRS:     >42 mL/min  Normal 80 
and above  >35 mL/min  Normal   FEMALE GRF INTERPRETATION:  20-39 YRS:    >60 
mL/min  Normal 40-49 YRS:    >58 mL/min  Normal 50-59 YRS:    >51 mL/min  Normal
 60-69 YRS:    >45 mL/min  Normal 70-79 YRS:    >39 mL/min  Normal 80 and above 
>32 mL/min  Normal 

 

          eGFR Non-Afr. American 31 #                                    [facility] 

([facility]  

 

                                        NORMAL RANGES 

****************************************************************************** 
Age         WBC      RBC        HGB           HCT         MCV        PLT 
------------------------------------------------------------------------------ 
Adult M   4.1-10.9    4.20-6.30   12.0-18.0   37.0-51.0   80-97      140-440  
Adult F   4.1-10.9    4.04-5.48   12.0-18.0   37.0-51.0   80-97      140-440  0
-1 Yr    5.0-20.0    3.9-5.9     15-18       MV: 44      MV: 91     MV: 277  2-9
 Yr.   6.0-17.0    3.8-5.4     11-13       MV: 37      MV: 78     MV: 300  10 
Yrs.   5.0-13.0    3.8-5.4     12-15       MV: 39      MV: 80     MV: 250    
NOTE:  * FOR ADULT BLACK MALES AND FEMALES, NORMAL WBC IS 2.9-7.7 K/ML  * FOR 
ADULT BLACK MALES AND FEMALES, NORMAL RBC,HGB, AND HCT IS 5% LESS  SOURCE FOR 
DATA: [employer] 1800 OPERATION MANUAL( AUTOMATED BLOOD COUNTS AND DIFF.)  APPENDIX
  B-3                      CHRONIC KIDNEY DISEASE STAGING PER NKF:   MALE GFR 
INTERPRETATION:  20-49 YRS:     >60 mL/min  Normal 50-59 YRS:     >56 mL/min  
Normal 60-69 YRS:     >49 mL/min  Normal 70-79 YRS:     >42 mL/min  Normal 80 
and above  >35 mL/min  Normal   FEMALE GRF INTERPRETATION:  20-39 YRS:    >60 
mL/min  Normal 40-49 YRS:    >58 mL/min  Normal 50-59 YRS:    >51 mL/min  Normal
 60-69 YRS:    >45 mL/min  Normal 70-79 YRS:    >39 mL/min  Normal 80 and above 
>32 mL/min  Normal 









                    ID                  Date                Data Source

 

                    [id number]         2021 02:40:00 PM EST [facility] ([facility]









          Name      Value     Range     Interpretation Code Description Data Va

rce(s) Supporting 

Document(s)

 

          WBC       10.6 10E3/uL 4.1-10.9                      [facility] ([facility]  

 

                                        NORMAL RANGES 

****************************************************************************** 
Age         WBC      RBC        HGB           HCT         MCV        PLT 
------------------------------------------------------------------------------ 
Adult M   4.1-10.9    4.20-6.30   12.0-18.0   37.0-51.0   80-97      140-440  
Adult F   4.1-10.9    4.04-5.48   12.0-18.0   37.0-51.0   80-97      140-440  0
-1 Yr    5.0-20.0    3.9-5.9     15-18       MV: 44      MV: 91     MV: 277  2-9
 Yr.   6.0-17.0    3.8-5.4     11-13       MV: 37      MV: 78     MV: 300  10 
Yrs.   5.0-13.0    3.8-5.4     12-15       MV: 39      MV: 80     MV: 250    
NOTE:  * FOR ADULT BLACK MALES AND FEMALES, NORMAL WBC IS 2.9-7.7 K/ML  * FOR 
ADULT BLACK MALES AND FEMALES, NORMAL RBC,HGB, AND HCT IS 5% LESS  SOURCE FOR 
DATA: [employer] 1800 OPERATION MANUAL( AUTOMATED BLOOD COUNTS AND DIFF.)  APPENDIX
  B-3                      CHRONIC KIDNEY DISEASE STAGING PER NKF:   MALE GFR 
INTERPRETATION:  20-49 YRS:     >60 mL/min  Normal 50-59 YRS:     >56 mL/min  
Normal 60-69 YRS:     >49 mL/min  Normal 70-79 YRS:     >42 mL/min  Normal 80 
and above  >35 mL/min  Normal   FEMALE GRF INTERPRETATION:  20-39 YRS:    >60 
mL/min  Normal 40-49 YRS:    >58 mL/min  Normal 50-59 YRS:    >51 mL/min  Normal
 60-69 YRS:    >45 mL/min  Normal 70-79 YRS:    >39 mL/min  Normal 80 and above 
>32 mL/min  Normal 

 

          HGB       13.2 g/dL 12.0-18.0                     [facility] ([facility]  

 

                                        NORMAL RANGES 

****************************************************************************** 
Age         WBC      RBC        HGB           HCT         MCV        PLT 
------------------------------------------------------------------------------ 
Adult M   4.1-10.9    4.20-6.30   12.0-18.0   37.0-51.0   80-97      140-440  
Adult F   4.1-10.9    4.04-5.48   12.0-18.0   37.0-51.0   80-97      140-440  0
-1 Yr    5.0-20.0    3.9-5.9     15-18       MV: 44      MV: 91     MV: 277  2-9
 Yr.   6.0-17.0    3.8-5.4     11-13       MV: 37      MV: 78     MV: 300  10 
Yrs.   5.0-13.0    3.8-5.4     12-15       MV: 39      MV: 80     MV: 250    
NOTE:  * FOR ADULT BLACK MALES AND FEMALES, NORMAL WBC IS 2.9-7.7 K/ML  * FOR 
ADULT BLACK MALES AND FEMALES, NORMAL RBC,HGB, AND HCT IS 5% LESS  SOURCE FOR 
DATA: [employer] 1800 OPERATION MANUAL( AUTOMATED BLOOD COUNTS AND DIFF.)  APPENDIX
  B-3                      CHRONIC KIDNEY DISEASE STAGING PER NKF:   MALE GFR 
INTERPRETATION:  20-49 YRS:     >60 mL/min  Normal 50-59 YRS:     >56 mL/min  
Normal 60-69 YRS:     >49 mL/min  Normal 70-79 YRS:     >42 mL/min  Normal 80 
and above  >35 mL/min  Normal   FEMALE GRF INTERPRETATION:  20-39 YRS:    >60 
mL/min  Normal 40-49 YRS:    >58 mL/min  Normal 50-59 YRS:    >51 mL/min  Normal
 60-69 YRS:    >45 mL/min  Normal 70-79 YRS:    >39 mL/min  Normal 80 and above 
>32 mL/min  Normal 

 

           RBC        4.04 10E6/uL 4.20-6.30  Below low normal            [facility]

 ([facility]                        

 

                                        NORMAL RANGES 

****************************************************************************** 
Age         WBC      RBC        HGB           HCT         MCV        PLT 
------------------------------------------------------------------------------ 
Adult M   4.1-10.9    4.20-6.30   12.0-18.0   37.0-51.0   80-97      140-440  
Adult F   4.1-10.9    4.04-5.48   12.0-18.0   37.0-51.0   80-97      140-440  0
-1 Yr    5.0-20.0    3.9-5.9     15-18       MV: 44      MV: 91     MV: 277  2-9
 Yr.   6.0-17.0    3.8-5.4     11-13       MV: 37      MV: 78     MV: 300  10 
Yrs.   5.0-13.0    3.8-5.4     12-15       MV: 39      MV: 80     MV: 250    
NOTE:  * FOR ADULT BLACK MALES AND FEMALES, NORMAL WBC IS 2.9-7.7 K/ML  * FOR 
ADULT BLACK MALES AND FEMALES, NORMAL RBC,HGB, AND HCT IS 5% LESS  SOURCE FOR 
DATA: [employer] 1800 OPERATION MANUAL( AUTOMATED BLOOD COUNTS AND DIFF.)  APPENDIX
  B-3                      CHRONIC KIDNEY DISEASE STAGING PER NKF:   MALE GFR 
INTERPRETATION:  20-49 YRS:     >60 mL/min  Normal 50-59 YRS:     >56 mL/min  
Normal 60-69 YRS:     >49 mL/min  Normal 70-79 YRS:     >42 mL/min  Normal 80 
and above  >35 mL/min  Normal   FEMALE GRF INTERPRETATION:  20-39 YRS:    >60 
mL/min  Normal 40-49 YRS:    >58 mL/min  Normal 50-59 YRS:    >51 mL/min  Normal
 60-69 YRS:    >45 mL/min  Normal 70-79 YRS:    >39 mL/min  Normal 80 and above 
>32 mL/min  Normal 

 

          HCT       39.5 %    37.0-51.0                     [facility] ([facility]  

 

                                        NORMAL RANGES 

****************************************************************************** 
Age         WBC      RBC        HGB           HCT         MCV        PLT 
------------------------------------------------------------------------------ 
Adult M   4.1-10.9    4.20-6.30   12.0-18.0   37.0-51.0   80-97      140-440  
Adult F   4.1-10.9    4.04-5.48   12.0-18.0   37.0-51.0   80-97      140-440  0
-1 Yr    5.0-20.0    3.9-5.9     15-18       MV: 44      MV: 91     MV: 277  2-9
 Yr.   6.0-17.0    3.8-5.4     11-13       MV: 37      MV: 78     MV: 300  10 
Yrs.   5.0-13.0    3.8-5.4     12-15       MV: 39      MV: 80     MV: 250    
NOTE:  * FOR ADULT BLACK MALES AND FEMALES, NORMAL WBC IS 2.9-7.7 K/ML  * FOR 
ADULT BLACK MALES AND FEMALES, NORMAL RBC,HGB, AND HCT IS 5% LESS  SOURCE FOR 
DATA: [employer] 1800 OPERATION MANUAL( AUTOMATED BLOOD COUNTS AND DIFF.)  APPENDIX
  B-3                      CHRONIC KIDNEY DISEASE STAGING PER NKF:   MALE GFR 
INTERPRETATION:  20-49 YRS:     >60 mL/min  Normal 50-59 YRS:     >56 mL/min  
Normal 60-69 YRS:     >49 mL/min  Normal 70-79 YRS:     >42 mL/min  Normal 80 
and above  >35 mL/min  Normal   FEMALE GRF INTERPRETATION:  20-39 YRS:    >60 
mL/min  Normal 40-49 YRS:    >58 mL/min  Normal 50-59 YRS:    >51 mL/min  Normal
 60-69 YRS:    >45 mL/min  Normal 70-79 YRS:    >39 mL/min  Normal 80 and above 
>32 mL/min  Normal 

 

           MCV        97.8 fL    80.0-97.0  Above high normal            [facility] 

([facility]                                    

 

                                        NORMAL RANGES 

****************************************************************************** 
Age         WBC      RBC        HGB           HCT         MCV        PLT 
------------------------------------------------------------------------------ 
Adult M   4.1-10.9    4.20-6.30   12.0-18.0   37.0-51.0   80-97      140-440  
Adult F   4.1-10.9    4.04-5.48   12.0-18.0   37.0-51.0   80-97      140-440  0
-1 Yr    5.0-20.0    3.9-5.9     15-18       MV: 44      MV: 91     MV: 277  2-9
 Yr.   6.0-17.0    3.8-5.4     11-13       MV: 37      MV: 78     MV: 300  10 
Yrs.   5.0-13.0    3.8-5.4     12-15       MV: 39      MV: 80     MV: 250    
NOTE:  * FOR ADULT BLACK MALES AND FEMALES, NORMAL WBC IS 2.9-7.7 K/ML  * FOR 
ADULT BLACK MALES AND FEMALES, NORMAL RBC,HGB, AND HCT IS 5% LESS  SOURCE FOR 
DATA: [employer] 1800 OPERATION MANUAL( AUTOMATED BLOOD COUNTS AND DIFF.)  APPENDIX
  B-3                      CHRONIC KIDNEY DISEASE STAGING PER NKF:   MALE GFR 
INTERPRETATION:  20-49 YRS:     >60 mL/min  Normal 50-59 YRS:     >56 mL/min  
Normal 60-69 YRS:     >49 mL/min  Normal 70-79 YRS:     >42 mL/min  Normal 80 
and above  >35 mL/min  Normal   FEMALE GRF INTERPRETATION:  20-39 YRS:    >60 
mL/min  Normal 40-49 YRS:    >58 mL/min  Normal 50-59 YRS:    >51 mL/min  Normal
 60-69 YRS:    >45 mL/min  Normal 70-79 YRS:    >39 mL/min  Normal 80 and above 
>32 mL/min  Normal 

 

          PLT       153 10E3/uL 140-440                       [facility] ([facility]  

 

                                        NORMAL RANGES 

****************************************************************************** 
Age         WBC      RBC        HGB           HCT         MCV        PLT 
------------------------------------------------------------------------------ 
Adult M   4.1-10.9    4.20-6.30   12.0-18.0   37.0-51.0   80-97      140-440  
Adult F   4.1-10.9    4.04-5.48   12.0-18.0   37.0-51.0   80-97      140-440  0
-1 Yr    5.0-20.0    3.9-5.9     15-18       MV: 44      MV: 91     MV: 277  2-9
 Yr.   6.0-17.0    3.8-5.4     11-13       MV: 37      MV: 78     MV: 300  10 
Yrs.   5.0-13.0    3.8-5.4     12-15       MV: 39      MV: 80     MV: 250    
NOTE:  * FOR ADULT BLACK MALES AND FEMALES, NORMAL WBC IS 2.9-7.7 K/ML  * FOR 
ADULT BLACK MALES AND FEMALES, NORMAL RBC,HGB, AND HCT IS 5% LESS  SOURCE FOR 
DATA: [employer] 1800 OPERATION MANUAL( AUTOMATED BLOOD COUNTS AND DIFF.)  APPENDIX
  B-3                      CHRONIC KIDNEY DISEASE STAGING PER NKF:   MALE GFR 
INTERPRETATION:  20-49 YRS:     >60 mL/min  Normal 50-59 YRS:     >56 mL/min  
Normal 60-69 YRS:     >49 mL/min  Normal 70-79 YRS:     >42 mL/min  Normal 80 
and above  >35 mL/min  Normal   FEMALE GRF INTERPRETATION:  20-39 YRS:    >60 
mL/min  Normal 40-49 YRS:    >58 mL/min  Normal 50-59 YRS:    >51 mL/min  Normal
 60-69 YRS:    >45 mL/min  Normal 70-79 YRS:    >39 mL/min  Normal 80 and above 
>32 mL/min  Normal 

 

           MCH        32.7 pg    26.0-32.0  Above high normal            [facility] 

([facility]                                    

 

                                        NORMAL RANGES 

****************************************************************************** 
Age         WBC      RBC        HGB           HCT         MCV        PLT 
------------------------------------------------------------------------------ 
Adult M   4.1-10.9    4.20-6.30   12.0-18.0   37.0-51.0   80-97      140-440  
Adult F   4.1-10.9    4.04-5.48   12.0-18.0   37.0-51.0   80-97      140-440  0
-1 Yr    5.0-20.0    3.9-5.9     15-18       MV: 44      MV: 91     MV: 277  2-9
 Yr.   6.0-17.0    3.8-5.4     11-13       MV: 37      MV: 78     MV: 300  10 
Yrs.   5.0-13.0    3.8-5.4     12-15       MV: 39      MV: 80     MV: 250    
NOTE:  * FOR ADULT BLACK MALES AND FEMALES, NORMAL WBC IS 2.9-7.7 K/ML  * FOR 
ADULT BLACK MALES AND FEMALES, NORMAL RBC,HGB, AND HCT IS 5% LESS  SOURCE FOR 
DATA: [employer] OPERATION MANUAL( AUTOMATED BLOOD COUNTS AND DIFF.)  APPENDIX
  B-3                      CHRONIC KIDNEY DISEASE STAGING PER NKF:   MALE GFR 
INTERPRETATION:  20-49 YRS:     >60 mL/min  Normal 50-59 YRS:     >56 mL/min  
Normal 60-69 YRS:     >49 mL/min  Normal 70-79 YRS:     >42 mL/min  Normal 80 
and above  >35 mL/min  Normal   FEMALE GRF INTERPRETATION:  20-39 YRS:    >60 
mL/min  Normal 40-49 YRS:    >58 mL/min  Normal 50-59 YRS:    >51 mL/min  Normal
 60-69 YRS:    >45 mL/min  Normal 70-79 YRS:    >39 mL/min  Normal 80 and above 
>32 mL/min  Normal 

 

          MCHC      33.4 g/dL 31.0-36.0                     [facility] ([facility]

[facility]  

 

                                        NORMAL RANGES 

****************************************************************************** 
Age         WBC      RBC        HGB           HCT         MCV        PLT 
------------------------------------------------------------------------------ 
Adult M   4.1-10.9    4.20-6.30   12.0-18.0   37.0-51.0   80-97      140-440  
Adult F   4.1-10.9    4.04-5.48   12.0-18.0   37.0-51.0   80-97      140-440  0
-1 Yr    5.0-20.0    3.9-5.9     15-18       MV: 44      MV: 91     MV: 277  2-9
 Yr.   6.0-17.0    3.8-5.4     11-13       MV: 37      MV: 78     MV: 300  10 
Yrs.   5.0-13.0    3.8-5.4     12-15       MV: 39      MV: 80     MV: 250    
NOTE:  * FOR ADULT BLACK MALES AND FEMALES, NORMAL WBC IS 2.9-7.7 K/ML  * FOR 
ADULT BLACK MALES AND FEMALES, NORMAL RBC,HGB, AND HCT IS 5% LESS  SOURCE FOR 
DATA: [employer] 1800 OPERATION MANUAL( AUTOMATED BLOOD COUNTS AND DIFF.)  APPENDIX
  B-3                      CHRONIC KIDNEY DISEASE STAGING PER NKF:   MALE GFR 
INTERPRETATION:  20-49 YRS:     >60 mL/min  Normal 50-59 YRS:     >56 mL/min  
Normal 60-69 YRS:     >49 mL/min  Normal 70-79 YRS:     >42 mL/min  Normal 80 
and above  >35 mL/min  Normal   FEMALE GRF INTERPRETATION:  20-39 YRS:    >60 
mL/min  Normal 40-49 YRS:    >58 mL/min  Normal 50-59 YRS:    >51 mL/min  Normal
 60-69 YRS:    >45 mL/min  Normal 70-79 YRS:    >39 mL/min  Normal 80 and above 
>32 mL/min  Normal 

 

          Lym%      11.3 %    10.0-58.5                     [facility] ([facility]  

 

                                        NORMAL RANGES 

****************************************************************************** 
Age         WBC      RBC        HGB           HCT         MCV        PLT 
------------------------------------------------------------------------------ 
Adult M   4.1-10.9    4.20-6.30   12.0-18.0   37.0-51.0   80-97      140-440  
Adult F   4.1-10.9    4.04-5.48   12.0-18.0   37.0-51.0   80-97      140-440  0
-1 Yr    5.0-20.0    3.9-5.9     15-18       MV: 44      MV: 91     MV: 277  2-9
 Yr.   6.0-17.0    3.8-5.4     11-13       MV: 37      MV: 78     MV: 300  10 
Yrs.   5.0-13.0    3.8-5.4     12-15       MV: 39      MV: 80     MV: 250    
NOTE:  * FOR ADULT BLACK MALES AND FEMALES, NORMAL WBC IS 2.9-7.7 K/ML  * FOR 
ADULT BLACK MALES AND FEMALES, NORMAL RBC,HGB, AND HCT IS 5% LESS  SOURCE FOR 
DATA: [employer] 1800 OPERATION MANUAL( AUTOMATED BLOOD COUNTS AND DIFF.)  APPENDIX
  B-3                      CHRONIC KIDNEY DISEASE STAGING PER NKF:   MALE GFR 
INTERPRETATION:  20-49 YRS:     >60 mL/min  Normal 50-59 YRS:     >56 mL/min  
Normal 60-69 YRS:     >49 mL/min  Normal 70-79 YRS:     >42 mL/min  Normal 80 
and above  >35 mL/min  Normal   FEMALE GRF INTERPRETATION:  20-39 YRS:    >60 
mL/min  Normal 40-49 YRS:    >58 mL/min  Normal 50-59 YRS:    >51 mL/min  Normal
 60-69 YRS:    >45 mL/min  Normal 70-79 YRS:    >39 mL/min  Normal 80 and above 
>32 mL/min  Normal 

 

          RDW-CV    13.6 %    11.5-14.5                     [facility] ([facility]

[facility]  

 

                                        NORMAL RANGES 

****************************************************************************** 
Age         WBC      RBC        HGB           HCT         MCV        PLT 
------------------------------------------------------------------------------ 
Adult M   4.1-10.9    4.20-6.30   12.0-18.0   37.0-51.0   80-97      140-440  
Adult F   4.1-10.9    4.04-5.48   12.0-18.0   37.0-51.0   80-97      140-440  0
-1 Yr    5.0-20.0    3.9-5.9     15-18       MV: 44      MV: 91     MV: 277  2-9
 Yr.   6.0-17.0    3.8-5.4     11-13       MV: 37      MV: 78     MV: 300  10 
Yrs.   5.0-13.0    3.8-5.4     12-15       MV: 39      MV: 80     MV: 250    
NOTE:  * FOR ADULT BLACK MALES AND FEMALES, NORMAL WBC IS 2.9-7.7 K/ML  * FOR 
ADULT BLACK MALES AND FEMALES, NORMAL RBC,HGB, AND HCT IS 5% LESS  SOURCE FOR 
DATA: [employer] 1800 OPERATION MANUAL( AUTOMATED BLOOD COUNTS AND DIFF.)  APPENDIX
  B-3                      CHRONIC KIDNEY DISEASE STAGING PER NKF:   MALE GFR 
INTERPRETATION:  20-49 YRS:     >60 mL/min  Normal 50-59 YRS:     >56 mL/min  
Normal 60-69 YRS:     >49 mL/min  Normal 70-79 YRS:     >42 mL/min  Normal 80 
and above  >35 mL/min  Normal   FEMALE GRF INTERPRETATION:  20-39 YRS:    >60 
mL/min  Normal 40-49 YRS:    >58 mL/min  Normal 50-59 YRS:    >51 mL/min  Normal
 60-69 YRS:    >45 mL/min  Normal 70-79 YRS:    >39 mL/min  Normal 80 and above 
>32 mL/min  Normal 

 

          Lym#      1.2 10E3/uL 0.6-4.1                       [facility] ([facility]  

 

                                        NORMAL RANGES 

****************************************************************************** 
Age         WBC      RBC        HGB           HCT         MCV        PLT 
------------------------------------------------------------------------------ 
Adult M   4.1-10.9    4.20-6.30   12.0-18.0   37.0-51.0   80-97      140-440  
Adult F   4.1-10.9    4.04-5.48   12.0-18.0   37.0-51.0   80-97      140-440  0
-1 Yr    5.0-20.0    3.9-5.9     15-18       MV: 44      MV: 91     MV: 277  2-9
 Yr.   6.0-17.0    3.8-5.4     11-13       MV: 37      MV: 78     MV: 300  10 
Yrs.   5.0-13.0    3.8-5.4     12-15       MV: 39      MV: 80     MV: 250    
NOTE:  * FOR ADULT BLACK MALES AND FEMALES, NORMAL WBC IS 2.9-7.7 K/ML  * FOR 
ADULT BLACK MALES AND FEMALES, NORMAL RBC,HGB, AND HCT IS 5% LESS  SOURCE FOR 
DATA: [employer] 1800 OPERATION MANUAL( AUTOMATED BLOOD COUNTS AND DIFF.)  APPENDIX
  B-3                      CHRONIC KIDNEY DISEASE STAGING PER NKF:   MALE GFR 
INTERPRETATION:  20-49 YRS:     >60 mL/min  Normal 50-59 YRS:     >56 mL/min  
Normal 60-69 YRS:     >49 mL/min  Normal 70-79 YRS:     >42 mL/min  Normal 80 
and above  >35 mL/min  Normal   FEMALE GRF INTERPRETATION:  20-39 YRS:    >60 
mL/min  Normal 40-49 YRS:    >58 mL/min  Normal 50-59 YRS:    >51 mL/min  Normal
 60-69 YRS:    >45 mL/min  Normal 70-79 YRS:    >39 mL/min  Normal 80 and above 
>32 mL/min  Normal 

 

          Neut%     81.8 %    37.0-92.0                     [facility] ([facility]  

 

                                        NORMAL RANGES 

****************************************************************************** 
Age         WBC      RBC        HGB           HCT         MCV        PLT 
------------------------------------------------------------------------------ 
Adult M   4.1-10.9    4.20-6.30   12.0-18.0   37.0-51.0   80-97      140-440  
Adult F   4.1-10.9    4.04-5.48   12.0-18.0   37.0-51.0   80-97      140-440  0
-1 Yr    5.0-20.0    3.9-5.9     15-18       MV: 44      MV: 91     MV: 277  2-9
 Yr.   6.0-17.0    3.8-5.4     11-13       MV: 37      MV: 78     MV: 300  10 
Yrs.   5.0-13.0    3.8-5.4     12-15       MV: 39      MV: 80     MV: 250    
NOTE:  * FOR ADULT BLACK MALES AND FEMALES, NORMAL WBC IS 2.9-7.7 K/ML  * FOR 
ADULT BLACK MALES AND FEMALES, NORMAL RBC,HGB, AND HCT IS 5% LESS  SOURCE FOR 
DATA: [employer] OPERATION MANUAL( AUTOMATED BLOOD COUNTS AND DIFF.)  APPENDIX
  B-3                      CHRONIC KIDNEY DISEASE STAGING PER NKF:   MALE GFR 
INTERPRETATION:  20-49 YRS:     >60 mL/min  Normal 50-59 YRS:     >56 mL/min  
Normal 60-69 YRS:     >49 mL/min  Normal 70-79 YRS:     >42 mL/min  Normal 80 
and above  >35 mL/min  Normal   FEMALE GRF INTERPRETATION:  20-39 YRS:    >60 
mL/min  Normal 40-49 YRS:    >58 mL/min  Normal 50-59 YRS:    >51 mL/min  Normal
 60-69 YRS:    >45 mL/min  Normal 70-79 YRS:    >39 mL/min  Normal 80 and above 
>32 mL/min  Normal 

 

          MXD%      6.9 %     0.1-24.0                      [facility] ([facility]  

 

                                        NORMAL RANGES 

****************************************************************************** 
Age         WBC      RBC        HGB           HCT         MCV        PLT 
------------------------------------------------------------------------------ 
Adult M   4.1-10.9    4.20-6.30   12.0-18.0   37.0-51.0   80-97      140-440  
Adult F   4.1-10.9    4.04-5.48   12.0-18.0   37.0-51.0   80-97      140-440  0
-1 Yr    5.0-20.0    3.9-5.9     15-18       MV: 44      MV: 91     MV: 277  2-9
 Yr.   6.0-17.0    3.8-5.4     11-13       MV: 37      MV: 78     MV: 300  10 
Yrs.   5.0-13.0    3.8-5.4     12-15       MV: 39      MV: 80     MV: 250    
NOTE:  * FOR ADULT BLACK MALES AND FEMALES, NORMAL WBC IS 2.9-7.7 K/ML  * FOR 
ADULT BLACK MALES AND FEMALES, NORMAL RBC,HGB, AND HCT IS 5% LESS  SOURCE FOR 
DATA: [employer] 1800 OPERATION MANUAL( AUTOMATED BLOOD COUNTS AND DIFF.)  APPENDIX
  B-3                      CHRONIC KIDNEY DISEASE STAGING PER NKF:   MALE GFR 
INTERPRETATION:  20-49 YRS:     >60 mL/min  Normal 50-59 YRS:     >56 mL/min  
Normal 60-69 YRS:     >49 mL/min  Normal 70-79 YRS:     >42 mL/min  Normal 80 
and above  >35 mL/min  Normal   FEMALE GRF INTERPRETATION:  20-39 YRS:    >60 
mL/min  Normal 40-49 YRS:    >58 mL/min  Normal 50-59 YRS:    >51 mL/min  Normal
 60-69 YRS:    >45 mL/min  Normal 70-79 YRS:    >39 mL/min  Normal 80 and above 
>32 mL/min  Normal 

 

          MXD#      0.7 10E3/uL 0.0-1.8                       JULIO ([facility]  

 

                                        NORMAL RANGES 

****************************************************************************** 
Age         WBC      RBC        HGB           HCT         MCV        PLT 
------------------------------------------------------------------------------ 
Adult M   4.1-10.9    4.20-6.30   12.0-18.0   37.0-51.0   80-97      140-440  
Adult F   4.1-10.9    4.04-5.48   12.0-18.0   37.0-51.0   80-97      140-440  0
-1 Yr    5.0-20.0    3.9-5.9     15-18       MV: 44      MV: 91     MV: 277  2-9
 Yr.   6.0-17.0    3.8-5.4     11-13       MV: 37      MV: 78     MV: 300  10 
Yrs.   5.0-13.0    3.8-5.4     12-15       MV: 39      MV: 80     MV: 250    
NOTE:  * FOR ADULT BLACK MALES AND FEMALES, NORMAL WBC IS 2.9-7.7 K/ML  * FOR 
ADULT BLACK MALES AND FEMALES, NORMAL RBC,HGB, AND HCT IS 5% LESS  SOURCE FOR 
DATA: [employer] 1800 OPERATION MANUAL( AUTOMATED BLOOD COUNTS AND DIFF.)  APPENDIX
  B-3                      CHRONIC KIDNEY DISEASE STAGING PER NKF:   MALE GFR 
INTERPRETATION:  20-49 YRS:     >60 mL/min  Normal 50-59 YRS:     >56 mL/min  
Normal 60-69 YRS:     >49 mL/min  Normal 70-79 YRS:     >42 mL/min  Normal 80 
and above  >35 mL/min  Normal   FEMALE GRF INTERPRETATION:  20-39 YRS:    >60 
mL/min  Normal 40-49 YRS:    >58 mL/min  Normal 50-59 YRS:    >51 mL/min  Normal
 60-69 YRS:    >45 mL/min  Normal 70-79 YRS:    >39 mL/min  Normal 80 and above 
>32 mL/min  Normal 

 

           Neut#      8.7 %      2.0-7.8    Above high normal            [facility] 

([facility]                                    

 

                                        NORMAL RANGES 

****************************************************************************** 
Age         WBC      RBC        HGB           HCT         MCV        PLT 
------------------------------------------------------------------------------ 
Adult M   4.1-10.9    4.20-6.30   12.0-18.0   37.0-51.0   80-97      140-440  
Adult F   4.1-10.9    4.04-5.48   12.0-18.0   37.0-51.0   80-97      140-440  0
-1 Yr    5.0-20.0    3.9-5.9     15-18       MV: 44      MV: 91     MV: 277  2-9
 Yr.   6.0-17.0    3.8-5.4     11-13       MV: 37      MV: 78     MV: 300  10 
Yrs.   5.0-13.0    3.8-5.4     12-15       MV: 39      MV: 80     MV: 250    
NOTE:  * FOR ADULT BLACK MALES AND FEMALES, NORMAL WBC IS 2.9-7.7 K/ML  * FOR 
ADULT BLACK MALES AND FEMALES, NORMAL RBC,HGB, AND HCT IS 5% LESS  SOURCE FOR 
DATA: [employer] 1800 OPERATION MANUAL( AUTOMATED BLOOD COUNTS AND DIFF.)  APPENDIX
  B-3                      CHRONIC KIDNEY DISEASE STAGING PER NKF:   MALE GFR 
INTERPRETATION:  20-49 YRS:     >60 mL/min  Normal 50-59 YRS:     >56 mL/min  
Normal 60-69 YRS:     >49 mL/min  Normal 70-79 YRS:     >42 mL/min  Normal 80 
and above  >35 mL/min  Normal   FEMALE GRF INTERPRETATION:  20-39 YRS:    >60 
mL/min  Normal 40-49 YRS:    >58 mL/min  Normal 50-59 YRS:    >51 mL/min  Normal
 60-69 YRS:    >45 mL/min  Normal 70-79 YRS:    >39 mL/min  Normal 80 and above 
>32 mL/min  Normal 

 

          MPV       11.2 fL   9.0-13.0                      [facility] ([facility]

[facility]  

 

                                        NORMAL RANGES 

****************************************************************************** 
Age         WBC      RBC        HGB           HCT         MCV        PLT 
------------------------------------------------------------------------------ 
Adult M   4.1-10.9    4.20-6.30   12.0-18.0   37.0-51.0   80-97      140-440  
Adult F   4.1-10.9    4.04-5.48   12.0-18.0   37.0-51.0   80-97      140-440  0
-1 Yr    5.0-20.0    3.9-5.9     15-18       MV: 44      MV: 91     MV: 277  2-9
 Yr.   6.0-17.0    3.8-5.4     11-13       MV: 37      MV: 78     MV: 300  10 
Yrs.   5.0-13.0    3.8-5.4     12-15       MV: 39      MV: 80     MV: 250    
NOTE:  * FOR ADULT BLACK MALES AND FEMALES, NORMAL WBC IS 2.9-7.7 K/ML  * FOR 
ADULT BLACK MALES AND FEMALES, NORMAL RBC,HGB, AND HCT IS 5% LESS  SOURCE FOR 
DATA: [employer] 1800 OPERATION MANUAL( AUTOMATED BLOOD COUNTS AND DIFF.)  APPENDIX
  B-3                      CHRONIC KIDNEY DISEASE STAGING PER NKF:   MALE GFR 
INTERPRETATION:  20-49 YRS:     >60 mL/min  Normal 50-59 YRS:     >56 mL/min  
Normal 60-69 YRS:     >49 mL/min  Normal 70-79 YRS:     >42 mL/min  Normal 80 
and above  >35 mL/min  Normal   FEMALE GRF INTERPRETATION:  20-39 YRS:    >60 
mL/min  Normal 40-49 YRS:    >58 mL/min  Normal 50-59 YRS:    >51 mL/min  Normal
 60-69 YRS:    >45 mL/min  Normal 70-79 YRS:    >39 mL/min  Normal 80 and above 
>32 mL/min  Normal 









                    ID                  Date                Data Source

 

                    [id number]         2020 12:46:00 PM EST [facility] ([facility]









          Name      Value     Range     Interpretation Code Description Data Va

rce(s) Supporting 

Document(s)

 

           Blood Urea Nitrogen 31 mg/dL   7-18       Above high normal          

  [facility] ([facility]                        

 

           Creatinine For GFR 1.69 mg/dL 0.70-1.30  Above high normal           

 [facility] ([facility]               

 

           Glucose, Fasting 103 mg/dL       Above high normal            M

[facility] ([facility]                        

 

           Potassium Serum 4.7 meq/L  3.5-5.1    Normal (applies to non-numeric 

results)            

[facility] ([facility]  

 

           Sodium Level 148 meq/L  136-145    Above high normal            [facility]

T ([facility]                        

 

           Glomerular Filtration Rate 43.2                  Below low normal    

        [facility] ([facility]                        

 

                                        <content>Units are mL/min/1.73 

m2</content><br/><content></content><br/><content>Chronic Kidney Disease Staging
 per NKF:</content><br/><content></content><br/><content>Stage I & II   GFR >=60
       Normal to Mildly Decreased</content><br/><content>Stage III      GFR 30-
59      Moderately Decreased</content><br/><content>Stage IV       GFR 15-29    
  Severely Decreased</content><br/><content>Stage V        GFR <15        Very 
Little GFR Left</content><br/><content>ESRD           GFR <15 on 
RRT</content><br/><content></content> 

 

           Carbon Dioxide Level 20 meq/L   21-32      Below low normal          

  [facility] ([facility]                        

 

           Anion Gap  8 meq/L    8-16       Normal (applies to non-numeric resul

ts)            [facility] ([facility]               

 

           Chloride Level 120 meq/L       Above high normal            [facility]

[facility] ([facility]                        

 

           Ast/Sgot   17 U/L     7-37       Normal (applies to non-numeric resul

ts)            [facility] ([facility]               

 

           Calcium Level 8.6 mg/dL  8.8-10.2   Below low normal            [facility]

T ([facility]                        

 

           Alkaline Phosphatase 131 U/L         Above high normal         

   [facility] ([facility]                        

 

           Bilirubin,Total 0.5 mg/dL  0.2-1.0    Normal (applies to non-numeric 

results)            

[facility] ([facility]  

 

           Alt/SGPT   18 U/L     12-78      Normal (applies to non-numeric resul

ts)            [facility] ([facility]               

 

           Total Protein 6.4 GM/DL  6.4-8.2    Normal (applies to non-numeric re

sults)            [facility]

 ([facility]      

 

           Albumin    3.4 GM/DL  3.2-5.2    Normal (applies to non-numeric resul

ts)            [facility] 

([facility]       

 

           Albumin/Globulin Ratio 1.1                   Normal (applies to non-n

umeric results)            [facility] 

([facility]       









                    ID                  Date                Data Source

 

                    [id number]         2020 12:46:00 PM EST [facility] ([facility]









          Name      Value     Range     Interpretation Code Description Data Va

rce(s) Supporting 

Document(s)

 

                Carcinoembryonic Ag [Mass/volume] in Serum or Plasma 6.3 ng/mL  

                     Above high 

normal                                  [facility] ([facility]

)  

 

                                        THE CEA ASSAY IS PERFORMED ON THE [facility] 

[facility] BY

CHEMILUMINESCENCE AND SHOULD NOT BE COMPARED INTERCHANGEABLY

WITH OTHER METHODS. IT SHOULD NOT BE USED ALONE AS A

SCREENING TEST OR DIAGNOSIS FOR THE PRESENCE OR ABSENCE OF

MALIGNANT DISEASE.

PREDICTIONS OF DISEASE RECURRENCE SHOULD NOT BE BASED SOLELY

ON VALUES OBTAINED FROM SERIAL PATIENT SERUM VALUES.

 

 

                    Transferrin receptor.soluble [Mass/volume] in Serum or Plasm

a 14.1 nmol/L         

12.2-27.3           Normal (applies to non-numeric results)                     

[facility] ([facility]                        

 

                                        Performed at:  [address]  [id number]

61

: Ubaldo Napoles MD, Phone:  [phone]

 









                    ID                  Date                Data Source

 

                    [id number]         2020 11:14:00 AM EST [facility] ([facility]









          Name      Value     Range     Interpretation Code Description Data Va

rce(s) Supporting 

Document(s)

 

           White Blood Count 9.1 10     4.0-10.0   Normal (applies to non-numeri

c results)            

[facility] ([facility]  

 

           Red Blood Count 4.29 10    4.30-6.10  Below low normal            [facility]

[facility] ([facility]                        

 

           Hematocrit 42.6 %     42.0-52.0  Normal (applies to non-numeric resul

ts)            [facility] 

([facility]       

 

           Hemoglobin 13.5 g/dL  13.5-17.5  Normal (applies to non-numeric resul

ts)            [facility] 

([facility]       

 

                Mean Corpuscular Hemoglobin 31.5 pg         27.0-33.0       Norm

al (applies to non-numeric 

results)                                [facility] ([facility]

)  

 

           Mean Corpuscular HGB Conc 31.7 g/dL  32.0-36.5  Below low normal     

       [facility] ([facility]               

 

           Mean Corpuscular Volume 99.3 fl    80.0-96.0  Above high normal      

      [facility] ([facility]               

 

           Platelet Count, Automated 147 10     150-450    Below low normal     

       [facility] ([facility]               

 

                Red Cell Distribution Width 13.2 %          11.5-14.5       Norm

al (applies to non-numeric 

results)                                [facility] ([facility]

)  

 

           Neutrophils % 70.5 %     36.0-66.0  Above high normal            [facility]

NT ([facility]                        

 

           Lymph %    19.1 %     24.0-44.0  Below low normal            [facility] (

[facility]                                    

 

           Eos %      1.8 %      0.0-3.0    Normal (applies to non-numeric resul

ts)            [facility] ([facility]               

 

           Mono %     7.3 %      0.0-5.0    Above high normal            [facility] 

([facility]                                    

 

           Baso %     1.0 %      0.0-1.0    Normal (applies to non-numeric resul

ts)            [facility] ([facility]               

 

           Immature Granulocyte % 0.3 %      0-3.0      Normal (applies to non-n

umeric results)            

[facility] ([facility]  

 

           Nucleated Red Blood Cell % 0.0 %      0-0        Normal (applies to n

on-numeric results)            

[facility] ([facility]  

 

           Neutrophils # 6.4 10     1.5-8.5    Normal (applies to non-numeric re

sults)            [facility] 

([facility]       

 

           Lymph #    1.7 10     1.5-5.0    Normal (applies to non-numeric resul

ts)            [facility] ([facility]               

 

           Mono #     0.7 10     0.0-0.8    Normal (applies to non-numeric resul

ts)            [facility] ([facility]               

 

           Eos #      0.2 10     0.0-0.5    Normal (applies to non-numeric resul

ts)            [facility] ([facility]               

 

           Baso #     0.1 10     0.0-0.2    Normal (applies to non-numeric resul

ts)            [facility] ([facility]               









                    ID                  Date                Data Source

 

                    [id number]         2020 11:14:00 AM EST [facility] ([facility]









          Name      Value     Range     Interpretation Code Description Data Va

rce(s) Supporting 

Document(s)

 

                Total Iron Binding Capacity 272 ug/dL       250-450         Norm

al (applies to non-numeric 

results)                                [facility] ([facility]

)  

 

           Iron (Fe)  66 ug/dL        Normal (applies to non-numeric resul

ts)            [facility] 

([facility]       

 

           Percent Saturation 24.3 %     19.7-50.0  Normal (applies to non-numer

ic results)            

[facility] ([facility]  









                    ID                  Date                Data Source

 

                    [id number]         2020 11:14:00 AM EST [facility] ([facility]









          Name      Value     Range     Interpretation Code Description Data Va

rce(s) Supporting 

Document(s)

 

                Ferritin [Mass/volume] in Serum or Plasma 88 ng/mL        

          Normal (applies to 

non-numeric results)                     [facility] ([facility]  

 

                                        <content>note:<nlbl:demographic_changed>

</content><br/><content></content> 









                    ID                  Date                Data Source

 

                    [id number]         2020 04:31:00 PM EST [facility] ([facility]









          Name      Value     Range     Interpretation Code Description Data Va

rce(s) Supporting 

Document(s)

 

           Hemoglobin A1c/Hemoglobin.total in Blood 6.3 %      4.50-6.20  Above 

high normal            

[facility] ([facility]  









                    ID                  Date                Data Source

 

                    [id number]         2020 04:00:00 PM EST [facility] ([facility]









          Name      Value     Range     Interpretation Code Description Data Va

rce(s) Supporting 

Document(s)

 

             Creatine kinase [Enzymatic activity/volume] in Serum or Plasma 64 U

/L                            

                          [facility] ([facility]  

 

                                        NORMAL RANGES 

****************************************************************************** 
Age         WBC      RBC        HGB           HCT         MCV        PLT 
------------------------------------------------------------------------------ 
Adult M   4.1-10.9    4.20-6.30   12.0-18.0   37.0-51.0   80-97      140-440  
Adult F   4.1-10.9    4.04-5.48   12.0-18.0   37.0-51.0   80-97      140-440  0
-1 Yr    5.0-20.0    3.9-5.9     15-18       MV: 44      MV: 91     MV: 277  2-9
Yr.   6.0-17.0    3.8-5.4     11-13       MV: 37      MV: 78     MV: 300  10 
Yrs.   5.0-13.0    3.8-5.4     12-15       MV: 39      MV: 80     MV: 250    
NOTE:  * FOR ADULT BLACK MALES AND FEMALES, NORMAL WBC IS 2.9-7.7 K/ML  * FOR 
ADULT BLACK MALES AND FEMALES, NORMAL RBC,HGB, AND HCT IS 5% LESS  SOURCE FOR 
DATA: [employer] 1800 OPERATION MANUAL( AUTOMATED BLOOD COUNTS AND DIFF.)  APPENDIX
 B-3                      CHRONIC KIDNEY DISEASE STAGING PER NKF:   MALE GFR 
INTERPRETATION:  20-49 YRS:     >60 mL/min  Normal 50-59 YRS:     >56 mL/min  
Normal 60-69 YRS:     >49 mL/min  Normal 70-79 YRS:     >42 mL/min  Normal 80 
and above  >35 mL/min  Normal   FEMALE GRF INTERPRETATION:  20-39 YRS:    >60 
mL/min  Normal 40-49 YRS:    >58 mL/min  Normal 50-59 YRS:    >51 mL/min  Normal
60-69 YRS:    >45 mL/min  Normal 70-79 YRS:    >39 mL/min  Normal 80 and above 
>32 mL/min  NormalCLASSIFICATION            CHOLESTEROL FOR ADULTS       
CHILDREN/ADOLESCENTS*   DESIRABLE:                <200 MG/DL                   
<170 MG/DL  BORDER-LINE HIGH RISK:    200-239 MG/DL                170-199 MG/DL
 HIGH RISK:                >240 MG/DL                   >200 MG/DL    CLASS. FOR
PRIMARY LDL CHOL PREVENTION:        LDL CHOL-CHILD/ADOLESCENTS*   DESIRABLE:    
         <130 MG/DL              <110 MG/DL  BORDERLINE-HIGH RISK:   130-159 
MG/DL           110-129 MG/DL  HIGH RISK:              >160 MG/DL              
>130 MG/DL   *CHILDREN AND ADOLESCENTS REPRESENTS INDIVIDUALA AGED 2-19 YEARS 
EXCLUSIVE. 









                    ID                  Date                Data Source

 

                    [id number]         2020 04:00:00 PM EST [facility] ([facility]

[facility]









          Name      Value     Range     Interpretation Code Description Data Va

rce(s) Supporting 

Document(s)

 

          Chol      104 mg/dL 0-200                         [facility] ([facility]  

 

                                        NORMAL RANGES 

****************************************************************************** 
Age         WBC      RBC        HGB           HCT         MCV        PLT 
------------------------------------------------------------------------------ 
Adult M   4.1-10.9    4.20-6.30   12.0-18.0   37.0-51.0   80-97      140-440  
Adult F   4.1-10.9    4.04-5.48   12.0-18.0   37.0-51.0   80-97      140-440  0
-1 Yr    5.0-20.0    3.9-5.9     15-18       MV: 44      MV: 91     MV: 277  2-9
Yr.   6.0-17.0    3.8-5.4     11-13       MV: 37      MV: 78     MV: 300  10 
Yrs.   5.0-13.0    3.8-5.4     12-15       MV: 39      MV: 80     MV: 250    
NOTE:  * FOR ADULT BLACK MALES AND FEMALES, NORMAL WBC IS 2.9-7.7 K/ML  * FOR 
ADULT BLACK MALES AND FEMALES, NORMAL RBC,HGB, AND HCT IS 5% LESS  SOURCE FOR 
DATA: [employer] 1800 OPERATION MANUAL( AUTOMATED BLOOD COUNTS AND DIFF.)  APPENDIX
 B-3                      CHRONIC KIDNEY DISEASE STAGING PER NKF:   MALE GFR 
INTERPRETATION:  20-49 YRS:     >60 mL/min  Normal 50-59 YRS:     >56 mL/min  
Normal 60-69 YRS:     >49 mL/min  Normal 70-79 YRS:     >42 mL/min  Normal 80 
and above  >35 mL/min  Normal   FEMALE GRF INTERPRETATION:  20-39 YRS:    >60 
mL/min  Normal 40-49 YRS:    >58 mL/min  Normal 50-59 YRS:    >51 mL/min  Normal
60-69 YRS:    >45 mL/min  Normal 70-79 YRS:    >39 mL/min  Normal 80 and above 
>32 mL/min  NormalCLASSIFICATION            CHOLESTEROL FOR ADULTS       
CHILDREN/ADOLESCENTS*   DESIRABLE:                <200 MG/DL                   
<170 MG/DL  BORDER-LINE HIGH RISK:    200-239 MG/DL                170-199 MG/DL
 HIGH RISK:                >240 MG/DL                   >200 MG/DL    CLASS. FOR
PRIMARY LDL CHOL PREVENTION:        LDL CHOL-CHILD/ADOLESCENTS*   DESIRABLE:    
         <130 MG/DL              <110 MG/DL  BORDERLINE-HIGH RISK:   130-159 
MG/DL           110-129 MG/DL  HIGH RISK:              >160 MG/DL              
>130 MG/DL   *CHILDREN AND ADOLESCENTS REPRESENTS INDIVIDUALA AGED 2-19 YEARS 
EXCLUSIVE. 

 

           Cholesterol in HDL [Mass/volume] in Serum or Plasma 35 mg/dL   35-55 

                           [facility] 

([facility]       

 

                                        NORMAL RANGES 

****************************************************************************** 
Age         WBC      RBC        HGB           HCT         MCV        PLT 
------------------------------------------------------------------------------ 
Adult M   4.1-10.9    4.20-6.30   12.0-18.0   37.0-51.0   80-97      140-440  
Adult F   4.1-10.9    4.04-5.48   12.0-18.0   37.0-51.0   80-97      140-440  0
-1 Yr    5.0-20.0    3.9-5.9     15-18       MV: 44      MV: 91     MV: 277  2-9
 Yr.   6.0-17.0    3.8-5.4     11-13       MV: 37      MV: 78     MV: 300  10 
Yrs.   5.0-13.0    3.8-5.4     12-15       MV: 39      MV: 80     MV: 250    
NOTE:  * FOR ADULT BLACK MALES AND FEMALES, NORMAL WBC IS 2.9-7.7 K/ML  * FOR 
ADULT BLACK MALES AND FEMALES, NORMAL RBC,HGB, AND HCT IS 5% LESS  SOURCE FOR 
DATA: [employer] 1800 OPERATION MANUAL( AUTOMATED BLOOD COUNTS AND DIFF.)  APPENDIX
  B-3                      CHRONIC KIDNEY DISEASE STAGING PER NKF:   MALE GFR 
INTERPRETATION:  20-49 YRS:     >60 mL/min  Normal 50-59 YRS:     >56 mL/min  
Normal 60-69 YRS:     >49 mL/min  Normal 70-79 YRS:     >42 mL/min  Normal 80 
and above  >35 mL/min  Normal   FEMALE GRF INTERPRETATION:  20-39 YRS:    >60 
mL/min  Normal 40-49 YRS:    >58 mL/min  Normal 50-59 YRS:    >51 mL/min  Normal
 60-69 YRS:    >45 mL/min  Normal 70-79 YRS:    >39 mL/min  Normal 80 and above 
>32 mL/min  NormalCLASSIFICATION            CHOLESTEROL FOR ADULTS       
CHILDREN/ADOLESCENTS*   DESIRABLE:                <200 MG/DL                   
<170 MG/DL  BORDER-LINE HIGH RISK:    200-239 MG/DL                170-199 MG/DL
  HIGH RISK:                >240 MG/DL                   >200 MG/DL    CLASS. 
FOR PRIMARY LDL CHOL PREVENTION:        LDL CHOL-CHILD/ADOLESCENTS*   DESIRABLE:
              <130 MG/DL              <110 MG/DL  BORDERLINE-HIGH RISK:   130-
159 MG/DL           110-129 MG/DL  HIGH RISK:              >160 MG/DL           
   >130 MG/DL   *CHILDREN AND ADOLESCENTS REPRESENTS INDIVIDUALA AGED 2-19 YEARS
 EXCLUSIVE. 

 

          Trig      80 mg/dL                          [facility] ([facility]  

 

                                        NORMAL RANGES 

****************************************************************************** 
Age         WBC      RBC        HGB           HCT         MCV        PLT 
------------------------------------------------------------------------------ 
Adult M   4.1-10.9    4.20-6.30   12.0-18.0   37.0-51.0   80-97      140-440  
Adult F   4.1-10.9    4.04-5.48   12.0-18.0   37.0-51.0   80-97      140-440  0
-1 Yr    5.0-20.0    3.9-5.9     15-18       MV: 44      MV: 91     MV: 277  2-9
 Yr.   6.0-17.0    3.8-5.4     11-13       MV: 37      MV: 78     MV: 300  10 
Yrs.   5.0-13.0    3.8-5.4     12-15       MV: 39      MV: 80     MV: 250    
NOTE:  * FOR ADULT BLACK MALES AND FEMALES, NORMAL WBC IS 2.9-7.7 K/ML  * FOR 
ADULT BLACK MALES AND FEMALES, NORMAL RBC,HGB, AND HCT IS 5% LESS  SOURCE FOR 
DATA: [employer] 1800 OPERATION MANUAL( AUTOMATED BLOOD COUNTS AND DIFF.)  APPENDIX
  B-3                      CHRONIC KIDNEY DISEASE STAGING PER NKF:   MALE GFR 
INTERPRETATION:  20-49 YRS:     >60 mL/min  Normal 50-59 YRS:     >56 mL/min  
Normal 60-69 YRS:     >49 mL/min  Normal 70-79 YRS:     >42 mL/min  Normal 80 
and above  >35 mL/min  Normal   FEMALE GRF INTERPRETATION:  20-39 YRS:    >60 
mL/min  Normal 40-49 YRS:    >58 mL/min  Normal 50-59 YRS:    >51 mL/min  Normal
 60-69 YRS:    >45 mL/min  Normal 70-79 YRS:    >39 mL/min  Normal 80 and above 
>32 mL/min  NormalCLASSIFICATION            CHOLESTEROL FOR ADULTS       
CHILDREN/ADOLESCENTS*   DESIRABLE:                <200 MG/DL                   
<170 MG/DL  BORDER-LINE HIGH RISK:    200-239 MG/DL                170-199 MG/DL
  HIGH RISK:                >240 MG/DL                   >200 MG/DL    CLASS. 
FOR PRIMARY LDL CHOL PREVENTION:        LDL CHOL-CHILD/ADOLESCENTS*   DESIRABLE:
              <130 MG/DL              <110 MG/DL  BORDERLINE-HIGH RISK:   130-
159 MG/DL           110-129 MG/DL  HIGH RISK:              >160 MG/DL           
   >130 MG/DL   *CHILDREN AND ADOLESCENTS REPRESENTS INDIVIDUALA AGED 2-19 YEARS
 EXCLUSIVE. 

 

           LDL_C      53 Calc         Below low normal            [facility] (

[facility]                                    

 

                                        NORMAL RANGES 

****************************************************************************** 
Age         WBC      RBC        HGB           HCT         MCV        PLT 
------------------------------------------------------------------------------ 
Adult M   4.1-10.9    4.20-6.30   12.0-18.0   37.0-51.0   80-97      140-440  
Adult F   4.1-10.9    4.04-5.48   12.0-18.0   37.0-51.0   80-97      140-440  0
-1 Yr    5.0-20.0    3.9-5.9     15-18       MV: 44      MV: 91     MV: 277  2-9
 Yr.   6.0-17.0    3.8-5.4     11-13       MV: 37      MV: 78     MV: 300  10 
Yrs.   5.0-13.0    3.8-5.4     12-15       MV: 39      MV: 80     MV: 250    
NOTE:  * FOR ADULT BLACK MALES AND FEMALES, NORMAL WBC IS 2.9-7.7 K/ML  * FOR 
ADULT BLACK MALES AND FEMALES, NORMAL RBC,HGB, AND HCT IS 5% LESS  SOURCE FOR 
DATA: [employer] 1800 OPERATION MANUAL( AUTOMATED BLOOD COUNTS AND DIFF.)  APPENDIX
  B-3                      CHRONIC KIDNEY DISEASE STAGING PER NKF:   MALE GFR 
INTERPRETATION:  20-49 YRS:     >60 mL/min  Normal 50-59 YRS:     >56 mL/min  
Normal 60-69 YRS:     >49 mL/min  Normal 70-79 YRS:     >42 mL/min  Normal 80 
and above  >35 mL/min  Normal   FEMALE GRF INTERPRETATION:  20-39 YRS:    >60 
mL/min  Normal 40-49 YRS:    >58 mL/min  Normal 50-59 YRS:    >51 mL/min  Normal
 60-69 YRS:    >45 mL/min  Normal 70-79 YRS:    >39 mL/min  Normal 80 and above 
>32 mL/min  NormalCLASSIFICATION            CHOLESTEROL FOR ADULTS       
CHILDREN/ADOLESCENTS*   DESIRABLE:                <200 MG/DL                   
<170 MG/DL  BORDER-LINE HIGH RISK:    200-239 MG/DL                170-199 MG/DL
  HIGH RISK:                >240 MG/DL                   >200 MG/DL    CLASS. 
FOR PRIMARY LDL CHOL PREVENTION:        LDL CHOL-CHILD/ADOLESCENTS*   DESIRABLE:
              <130 MG/DL              <110 MG/DL  BORDERLINE-HIGH RISK:   130-
159 MG/DL           110-129 MG/DL  HIGH RISK:              >160 MG/DL           
   >130 MG/DL   *CHILDREN AND ADOLESCENTS REPRESENTS INDIVIDUALA AGED 2-19 YEARS
 EXCLUSIVE. 

 

          Cho/HDL Ratio 3.0 CALC                                [employer] ([facility]  

 

                                        NORMAL RANGES 

****************************************************************************** 
Age         WBC      RBC        HGB           HCT         MCV        PLT 
------------------------------------------------------------------------------ 
Adult M   4.1-10.9    4.20-6.30   12.0-18.0   37.0-51.0   80-97      140-440  
Adult F   4.1-10.9    4.04-5.48   12.0-18.0   37.0-51.0   80-97      140-440  0
-1 Yr    5.0-20.0    3.9-5.9     15-18       MV: 44      MV: 91     MV: 277  2-9
 Yr.   6.0-17.0    3.8-5.4     11-13       MV: 37      MV: 78     MV: 300  10 
Yrs.   5.0-13.0    3.8-5.4     12-15       MV: 39      MV: 80     MV: 250    
NOTE:  * FOR ADULT BLACK MALES AND FEMALES, NORMAL WBC IS 2.9-7.7 K/ML  * FOR 
ADULT BLACK MALES AND FEMALES, NORMAL RBC,HGB, AND HCT IS 5% LESS  SOURCE FOR 
DATA: [employer] 1800 OPERATION MANUAL( AUTOMATED BLOOD COUNTS AND DIFF.)  APPENDIX
  B-3                      CHRONIC KIDNEY DISEASE STAGING PER NKF:   MALE GFR 
INTERPRETATION:  20-49 YRS:     >60 mL/min  Normal 50-59 YRS:     >56 mL/min  
Normal 60-69 YRS:     >49 mL/min  Normal 70-79 YRS:     >42 mL/min  Normal 80 
and above  >35 mL/min  Normal   FEMALE GRF INTERPRETATION:  20-39 YRS:    >60 
mL/min  Normal 40-49 YRS:    >58 mL/min  Normal 50-59 YRS:    >51 mL/min  Normal
 60-69 YRS:    >45 mL/min  Normal 70-79 YRS:    >39 mL/min  Normal 80 and above 
>32 mL/min  NormalCLASSIFICATION            CHOLESTEROL FOR ADULTS       
CHILDREN/ADOLESCENTS*   DESIRABLE:                <200 MG/DL                   
<170 MG/DL  BORDER-LINE HIGH RISK:    200-239 MG/DL                170-199 MG/DL
  HIGH RISK:                >240 MG/DL                   >200 MG/DL    CLASS. 
FOR PRIMARY LDL CHOL PREVENTION:        LDL CHOL-CHILD/ADOLESCENTS*   DESIRABLE:
              <130 MG/DL              <110 MG/DL  BORDERLINE-HIGH RISK:   130-
159 MG/DL           110-129 MG/DL  HIGH RISK:              >160 MG/DL           
   >130 MG/DL   *CHILDREN AND ADOLESCENTS REPRESENTS INDIVIDUALA AGED 2-19 YEARS
 EXCLUSIVE. 









                    ID                  Date                Data Source

 

                    [id number]         2020 04:00:00 PM EST [facility] ([facility]









          Name      Value     Range     Interpretation Code Description Data Va

rce(s) Supporting 

Document(s)

 

           Glu        166 mg/dL       Above high normal            [facility] 

([facility]                                    

 

                                        NORMAL RANGES 

****************************************************************************** 
Age         WBC      RBC        HGB           HCT         MCV        PLT 
------------------------------------------------------------------------------ 
Adult M   4.1-10.9    4.20-6.30   12.0-18.0   37.0-51.0   80-97      140-440  
Adult F   4.1-10.9    4.04-5.48   12.0-18.0   37.0-51.0   80-97      140-440  0
-1 Yr    5.0-20.0    3.9-5.9     15-18       MV: 44      MV: 91     MV: 277  2-9
 Yr.   6.0-17.0    3.8-5.4     11-13       MV: 37      MV: 78     MV: 300  10 
Yrs.   5.0-13.0    3.8-5.4     12-15       MV: 39      MV: 80     MV: 250    
NOTE:  * FOR ADULT BLACK MALES AND FEMALES, NORMAL WBC IS 2.9-7.7 K/ML  * FOR 
ADULT BLACK MALES AND FEMALES, NORMAL RBC,HGB, AND HCT IS 5% LESS  SOURCE FOR 
DATA: [employer] 1800 OPERATION MANUAL( AUTOMATED BLOOD COUNTS AND DIFF.)  APPENDIX
  B-3                      CHRONIC KIDNEY DISEASE STAGING PER NKF:   MALE GFR 
INTERPRETATION:  20-49 YRS:     >60 mL/min  Normal 50-59 YRS:     >56 mL/min  
Normal 60-69 YRS:     >49 mL/min  Normal 70-79 YRS:     >42 mL/min  Normal 80 
and above  >35 mL/min  Normal   FEMALE GRF INTERPRETATION:  20-39 YRS:    >60 
mL/min  Normal 40-49 YRS:    >58 mL/min  Normal 50-59 YRS:    >51 mL/min  Normal
 60-69 YRS:    >45 mL/min  Normal 70-79 YRS:    >39 mL/min  Normal 80 and above 
>32 mL/min  NormalCLASSIFICATION            CHOLESTEROL FOR ADULTS       
CHILDREN/ADOLESCENTS*   DESIRABLE:                <200 MG/DL                   
<170 MG/DL  BORDER-LINE HIGH RISK:    200-239 MG/DL                170-199 MG/DL
  HIGH RISK:                >240 MG/DL                   >200 MG/DL    CLASS. 
FOR PRIMARY LDL CHOL PREVENTION:        LDL CHOL-CHILD/ADOLESCENTS*   DESIRABLE:
              <130 MG/DL              <110 MG/DL  BORDERLINE-HIGH RISK:   130-
159 MG/DL           110-129 MG/DL  HIGH RISK:              >160 MG/DL           
   >130 MG/DL   *CHILDREN AND ADOLESCENTS REPRESENTS INDIVIDUALA AGED 2-19 YEARS
 EXCLUSIVE. 

 

          BUN       38 mg/dL  8-23      Above high normal           [facility] ([facility] 

 

 

                                        NORMAL RANGES 

****************************************************************************** 
Age         WBC      RBC        HGB           HCT         MCV        PLT 
------------------------------------------------------------------------------ 
Adult M   4.1-10.9    4.20-6.30   12.0-18.0   37.0-51.0   80-97      140-440  
Adult F   4.1-10.9    4.04-5.48   12.0-18.0   37.0-51.0   80-97      140-440  0
-1 Yr    5.0-20.0    3.9-5.9     15-18       MV: 44      MV: 91     MV: 277  2-9
 Yr.   6.0-17.0    3.8-5.4     11-13       MV: 37      MV: 78     MV: 300  10 
Yrs.   5.0-13.0    3.8-5.4     12-15       MV: 39      MV: 80     MV: 250    
NOTE:  * FOR ADULT BLACK MALES AND FEMALES, NORMAL WBC IS 2.9-7.7 K/ML  * FOR 
ADULT BLACK MALES AND FEMALES, NORMAL RBC,HGB, AND HCT IS 5% LESS  SOURCE FOR 
DATA: [employer] OPERATION MANUAL( AUTOMATED BLOOD COUNTS AND DIFF.)  APPENDIX
  B-3                      CHRONIC KIDNEY DISEASE STAGING PER NKF:   MALE GFR 
INTERPRETATION:  20-49 YRS:     >60 mL/min  Normal 50-59 YRS:     >56 mL/min  
Normal 60-69 YRS:     >49 mL/min  Normal 70-79 YRS:     >42 mL/min  Normal 80 
and above  >35 mL/min  Normal   FEMALE GRF INTERPRETATION:  20-39 YRS:    >60 
mL/min  Normal 40-49 YRS:    >58 mL/min  Normal 50-59 YRS:    >51 mL/min  Normal
 60-69 YRS:    >45 mL/min  Normal 70-79 YRS:    >39 mL/min  Normal 80 and above 
>32 mL/min  NormalCLASSIFICATION            CHOLESTEROL FOR ADULTS       
CHILDREN/ADOLESCENTS*   DESIRABLE:                <200 MG/DL                   
<170 MG/DL  BORDER-LINE HIGH RISK:    200-239 MG/DL                170-199 MG/DL
  HIGH RISK:                >240 MG/DL                   >200 MG/DL    CLASS. 
FOR PRIMARY LDL CHOL PREVENTION:        LDL CHOL-CHILD/ADOLESCENTS*   DESIRABLE:
              <130 MG/DL              <110 MG/DL  BORDERLINE-HIGH RISK:   130-
159 MG/DL           110-129 MG/DL  HIGH RISK:              >160 MG/DL           
   >130 MG/DL   *CHILDREN AND ADOLESCENTS REPRESENTS INDIVIDUALA AGED 2-19 YEARS
 EXCLUSIVE. 

 

           Creat      1.9 mg/dL  0.7-1.2    Above high normal            [facility] 

([facility]                                    

 

                                        NORMAL RANGES 

****************************************************************************** 
Age         WBC      RBC        HGB           HCT         MCV        PLT 
------------------------------------------------------------------------------ 
Adult M   4.1-10.9    4.20-6.30   12.0-18.0   37.0-51.0   80-97      140-440  
Adult F   4.1-10.9    4.04-5.48   12.0-18.0   37.0-51.0   80-97      140-440  0
-1 Yr    5.0-20.0    3.9-5.9     15-18       MV: 44      MV: 91     MV: 277  2-9
 Yr.   6.0-17.0    3.8-5.4     11-13       MV: 37      MV: 78     MV: 300  10 
Yrs.   5.0-13.0    3.8-5.4     12-15       MV: 39      MV: 80     MV: 250    
NOTE:  * FOR ADULT BLACK MALES AND FEMALES, NORMAL WBC IS 2.9-7.7 K/ML  * FOR 
ADULT BLACK MALES AND FEMALES, NORMAL RBC,HGB, AND HCT IS 5% LESS  SOURCE FOR 
DATA: [employer] 1800 OPERATION MANUAL( AUTOMATED BLOOD COUNTS AND DIFF.)  APPENDIX
  B-3                      CHRONIC KIDNEY DISEASE STAGING PER NKF:   MALE GFR 
INTERPRETATION:  20-49 YRS:     >60 mL/min  Normal 50-59 YRS:     >56 mL/min  
Normal 60-69 YRS:     >49 mL/min  Normal 70-79 YRS:     >42 mL/min  Normal 80 
and above  >35 mL/min  Normal   FEMALE GRF INTERPRETATION:  20-39 YRS:    >60 
mL/min  Normal 40-49 YRS:    >58 mL/min  Normal 50-59 YRS:    >51 mL/min  Normal
 60-69 YRS:    >45 mL/min  Normal 70-79 YRS:    >39 mL/min  Normal 80 and above 
>32 mL/min  NormalCLASSIFICATION            CHOLESTEROL FOR ADULTS       
CHILDREN/ADOLESCENTS*   DESIRABLE:                <200 MG/DL                   
<170 MG/DL  BORDER-LINE HIGH RISK:    200-239 MG/DL                170-199 MG/DL
  HIGH RISK:                >240 MG/DL                   >200 MG/DL    CLASS. 
FOR PRIMARY LDL CHOL PREVENTION:        LDL CHOL-CHILD/ADOLESCENTS*   DESIRABLE:
              <130 MG/DL              <110 MG/DL  BORDERLINE-HIGH RISK:   130-
159 MG/DL           110-129 MG/DL  HIGH RISK:              >160 MG/DL           
   >130 MG/DL   *CHILDREN AND ADOLESCENTS REPRESENTS INDIVIDUALA AGED 2-19 YEARS
 EXCLUSIVE. 

 

          BUN/Creatinine Ratio 19.5 CALC                               [facility] ([facility]  

 

                                        NORMAL RANGES 

****************************************************************************** 
Age         WBC      RBC        HGB           HCT         MCV        PLT 
------------------------------------------------------------------------------ 
Adult M   4.1-10.9    4.20-6.30   12.0-18.0   37.0-51.0   80-97      140-440  
Adult F   4.1-10.9    4.04-5.48   12.0-18.0   37.0-51.0   80-97      140-440  0
-1 Yr    5.0-20.0    3.9-5.9     15-18       MV: 44      MV: 91     MV: 277  2-9
 Yr.   6.0-17.0    3.8-5.4     11-13       MV: 37      MV: 78     MV: 300  10 
Yrs.   5.0-13.0    3.8-5.4     12-15       MV: 39      MV: 80     MV: 250    
NOTE:  * FOR ADULT BLACK MALES AND FEMALES, NORMAL WBC IS 2.9-7.7 K/ML  * FOR 
ADULT BLACK MALES AND FEMALES, NORMAL RBC,HGB, AND HCT IS 5% LESS  SOURCE FOR 
DATA: [employer] OPERATION MANUAL( AUTOMATED BLOOD COUNTS AND DIFF.)  APPENDIX
  B-3                      CHRONIC KIDNEY DISEASE STAGING PER NKF:   MALE GFR 
INTERPRETATION:  20-49 YRS:     >60 mL/min  Normal 50-59 YRS:     >56 mL/min  
Normal 60-69 YRS:     >49 mL/min  Normal 70-79 YRS:     >42 mL/min  Normal 80 
and above  >35 mL/min  Normal   FEMALE GRF INTERPRETATION:  20-39 YRS:    >60 
mL/min  Normal 40-49 YRS:    >58 mL/min  Normal 50-59 YRS:    >51 mL/min  Normal
 60-69 YRS:    >45 mL/min  Normal 70-79 YRS:    >39 mL/min  Normal 80 and above 
>32 mL/min  NormalCLASSIFICATION            CHOLESTEROL FOR ADULTS       
CHILDREN/ADOLESCENTS*   DESIRABLE:                <200 MG/DL                   
<170 MG/DL  BORDER-LINE HIGH RISK:    200-239 MG/DL                170-199 MG/DL
  HIGH RISK:                >240 MG/DL                   >200 MG/DL    CLASS. 
FOR PRIMARY LDL CHOL PREVENTION:        LDL CHOL-CHILD/ADOLESCENTS*   DESIRABLE:
              <130 MG/DL              <110 MG/DL  BORDERLINE-HIGH RISK:   130-
159 MG/DL           110-129 MG/DL  HIGH RISK:              >160 MG/DL           
   >130 MG/DL   *CHILDREN AND ADOLESCENTS REPRESENTS INDIVIDUALA AGED 2-19 YEARS
 EXCLUSIVE. 

 

          K         3.7 mmol/L 3.5-5.1                       [facility] ([facility]  

 

                                        NORMAL RANGES 

****************************************************************************** 
Age         WBC      RBC        HGB           HCT         MCV        PLT 
------------------------------------------------------------------------------ 
Adult M   4.1-10.9    4.20-6.30   12.0-18.0   37.0-51.0   80-97      140-440  
Adult F   4.1-10.9    4.04-5.48   12.0-18.0   37.0-51.0   80-97      140-440  0
-1 Yr    5.0-20.0    3.9-5.9     15-18       MV: 44      MV: 91     MV: 277  2-9
 Yr.   6.0-17.0    3.8-5.4     11-13       MV: 37      MV: 78     MV: 300  10 
Yrs.   5.0-13.0    3.8-5.4     12-15       MV: 39      MV: 80     MV: 250    
NOTE:  * FOR ADULT BLACK MALES AND FEMALES, NORMAL WBC IS 2.9-7.7 K/ML  * FOR 
ADULT BLACK MALES AND FEMALES, NORMAL RBC,HGB, AND HCT IS 5% LESS  SOURCE FOR 
DATA: [employer] OPERATION MANUAL( AUTOMATED BLOOD COUNTS AND DIFF.)  APPENDIX
  B-3                      CHRONIC KIDNEY DISEASE STAGING PER NKF:   MALE GFR 
INTERPRETATION:  20-49 YRS:     >60 mL/min  Normal 50-59 YRS:     >56 mL/min  
Normal 60-69 YRS:     >49 mL/min  Normal 70-79 YRS:     >42 mL/min  Normal 80 
and above  >35 mL/min  Normal   FEMALE GRF INTERPRETATION:  20-39 YRS:    >60 
mL/min  Normal 40-49 YRS:    >58 mL/min  Normal 50-59 YRS:    >51 mL/min  Normal
 60-69 YRS:    >45 mL/min  Normal 70-79 YRS:    >39 mL/min  Normal 80 and above 
>32 mL/min  NormalCLASSIFICATION            CHOLESTEROL FOR ADULTS       
CHILDREN/ADOLESCENTS*   DESIRABLE:                <200 MG/DL                   
<170 MG/DL  BORDER-LINE HIGH RISK:    200-239 MG/DL                170-199 MG/DL
  HIGH RISK:                >240 MG/DL                   >200 MG/DL    CLASS. 
FOR PRIMARY LDL CHOL PREVENTION:        LDL CHOL-CHILD/ADOLESCENTS*   DESIRABLE:
              <130 MG/DL              <110 MG/DL  BORDERLINE-HIGH RISK:   130-
159 MG/DL           110-129 MG/DL  HIGH RISK:              >160 MG/DL           
   >130 MG/DL   *CHILDREN AND ADOLESCENTS REPRESENTS INDIVIDUALA AGED 2-19 YEARS
 EXCLUSIVE. 

 

          Na        143 mmol/L 136-145                       [facility] ([facility]  

 

                                        NORMAL RANGES 

****************************************************************************** 
Age         WBC      RBC        HGB           HCT         MCV        PLT 
------------------------------------------------------------------------------ 
Adult M   4.1-10.9    4.20-6.30   12.0-18.0   37.0-51.0   80-97      140-440  
Adult F   4.1-10.9    4.04-5.48   12.0-18.0   37.0-51.0   80-97      140-440  0
-1 Yr    5.0-20.0    3.9-5.9     15-18       MV: 44      MV: 91     MV: 277  2-9
 Yr.   6.0-17.0    3.8-5.4     11-13       MV: 37      MV: 78     MV: 300  10 
Yrs.   5.0-13.0    3.8-5.4     12-15       MV: 39      MV: 80     MV: 250    
NOTE:  * FOR ADULT BLACK MALES AND FEMALES, NORMAL WBC IS 2.9-7.7 K/ML  * FOR 
ADULT BLACK MALES AND FEMALES, NORMAL RBC,HGB, AND HCT IS 5% LESS  SOURCE FOR 
DATA: [employer] OPERATION MANUAL( AUTOMATED BLOOD COUNTS AND DIFF.)  APPENDIX
  B-3                      CHRONIC KIDNEY DISEASE STAGING PER NKF:   MALE GFR 
INTERPRETATION:  20-49 YRS:     >60 mL/min  Normal 50-59 YRS:     >56 mL/min  
Normal 60-69 YRS:     >49 mL/min  Normal 70-79 YRS:     >42 mL/min  Normal 80 
and above  >35 mL/min  Normal   FEMALE GRF INTERPRETATION:  20-39 YRS:    >60 
mL/min  Normal 40-49 YRS:    >58 mL/min  Normal 50-59 YRS:    >51 mL/min  Normal
 60-69 YRS:    >45 mL/min  Normal 70-79 YRS:    >39 mL/min  Normal 80 and above 
>32 mL/min  NormalCLASSIFICATION            CHOLESTEROL FOR ADULTS       
CHILDREN/ADOLESCENTS*   DESIRABLE:                <200 MG/DL                   
<170 MG/DL  BORDER-LINE HIGH RISK:    200-239 MG/DL                170-199 MG/DL
  HIGH RISK:                >240 MG/DL                   >200 MG/DL    CLASS. 
FOR PRIMARY LDL CHOL PREVENTION:        LDL CHOL-CHILD/ADOLESCENTS*   DESIRABLE:
              <130 MG/DL              <110 MG/DL  BORDERLINE-HIGH RISK:   130-
159 MG/DL           110-129 MG/DL  HIGH RISK:              >160 MG/DL           
   >130 MG/DL   *CHILDREN AND ADOLESCENTS REPRESENTS INDIVIDUALA AGED 2-19 YEARS
 EXCLUSIVE. 

 

           Co2        18.9 mmol/L 22.0-29.0  Below low normal            [facility] 

([facility]                                   

 

                                        NORMAL RANGES 

****************************************************************************** 
Age         WBC      RBC        HGB           HCT         MCV        PLT 
------------------------------------------------------------------------------ 
Adult M   4.1-10.9    4.20-6.30   12.0-18.0   37.0-51.0   80-97      140-440  
Adult F   4.1-10.9    4.04-5.48   12.0-18.0   37.0-51.0   80-97      140-440  0
-1 Yr    5.0-20.0    3.9-5.9     15-18       MV: 44      MV: 91     MV: 277  2-9
 Yr.   6.0-17.0    3.8-5.4     11-13       MV: 37      MV: 78     MV: 300  10 
Yrs.   5.0-13.0    3.8-5.4     12-15       MV: 39      MV: 80     MV: 250    
NOTE:  * FOR ADULT BLACK MALES AND FEMALES, NORMAL WBC IS 2.9-7.7 K/ML  * FOR 
ADULT BLACK MALES AND FEMALES, NORMAL RBC,HGB, AND HCT IS 5% LESS  SOURCE FOR 
DATA: [employer] OPERATION MANUAL( AUTOMATED BLOOD COUNTS AND DIFF.)  APPENDIX
  B-3                      CHRONIC KIDNEY DISEASE STAGING PER NKF:   MALE GFR 
INTERPRETATION:  20-49 YRS:     >60 mL/min  Normal 50-59 YRS:     >56 mL/min  
Normal 60-69 YRS:     >49 mL/min  Normal 70-79 YRS:     >42 mL/min  Normal 80 
and above  >35 mL/min  Normal   FEMALE GRF INTERPRETATION:  20-39 YRS:    >60 
mL/min  Normal 40-49 YRS:    >58 mL/min  Normal 50-59 YRS:    >51 mL/min  Normal
 60-69 YRS:    >45 mL/min  Normal 70-79 YRS:    >39 mL/min  Normal 80 and above 
>32 mL/min  NormalCLASSIFICATION            CHOLESTEROL FOR ADULTS       
CHILDREN/ADOLESCENTS*   DESIRABLE:                <200 MG/DL                   
<170 MG/DL  BORDER-LINE HIGH RISK:    200-239 MG/DL                170-199 MG/DL
  HIGH RISK:                >240 MG/DL                   >200 MG/DL    CLASS. 
FOR PRIMARY LDL CHOL PREVENTION:        LDL CHOL-CHILD/ADOLESCENTS*   DESIRABLE:
              <130 MG/DL              <110 MG/DL  BORDERLINE-HIGH RISK:   130-
159 MG/DL           110-129 MG/DL  HIGH RISK:              >160 MG/DL           
   >130 MG/DL   *CHILDREN AND ADOLESCENTS REPRESENTS INDIVIDUALA AGED 2-19 YEARS
 EXCLUSIVE. 

 

           CL         111.4 mmol/L 98.0-107.0 Above high normal            [facility]

[facility]                        

 

                                        NORMAL RANGES 

****************************************************************************** 
Age         WBC      RBC        HGB           HCT         MCV        PLT 
------------------------------------------------------------------------------ 
Adult M   4.1-10.9    4.20-6.30   12.0-18.0   37.0-51.0   80-97      140-440  
Adult F   4.1-10.9    4.04-5.48   12.0-18.0   37.0-51.0   80-97      140-440  0
-1 Yr    5.0-20.0    3.9-5.9     15-18       MV: 44      MV: 91     MV: 277  2-9
 Yr.   6.0-17.0    3.8-5.4     11-13       MV: 37      MV: 78     MV: 300  10 
Yrs.   5.0-13.0    3.8-5.4     12-15       MV: 39      MV: 80     MV: 250    
NOTE:  * FOR ADULT BLACK MALES AND FEMALES, NORMAL WBC IS 2.9-7.7 K/ML  * FOR 
ADULT BLACK MALES AND FEMALES, NORMAL RBC,HGB, AND HCT IS 5% LESS  SOURCE FOR 
DATA: [employer] 1800 OPERATION MANUAL( AUTOMATED BLOOD COUNTS AND DIFF.)  APPENDIX
  B-3                      CHRONIC KIDNEY DISEASE STAGING PER NKF:   MALE GFR 
INTERPRETATION:  20-49 YRS:     >60 mL/min  Normal 50-59 YRS:     >56 mL/min  
Normal 60-69 YRS:     >49 mL/min  Normal 70-79 YRS:     >42 mL/min  Normal 80 
and above  >35 mL/min  Normal   FEMALE GRF INTERPRETATION:  20-39 YRS:    >60 
mL/min  Normal 40-49 YRS:    >58 mL/min  Normal 50-59 YRS:    >51 mL/min  Normal
 60-69 YRS:    >45 mL/min  Normal 70-79 YRS:    >39 mL/min  Normal 80 and above 
>32 mL/min  NormalCLASSIFICATION            CHOLESTEROL FOR ADULTS       
CHILDREN/ADOLESCENTS*   DESIRABLE:                <200 MG/DL                   
<170 MG/DL  BORDER-LINE HIGH RISK:    200-239 MG/DL                170-199 MG/DL
  HIGH RISK:                >240 MG/DL                   >200 MG/DL    CLASS. 
FOR PRIMARY LDL CHOL PREVENTION:        LDL CHOL-CHILD/ADOLESCENTS*   DESIRABLE:
              <130 MG/DL              <110 MG/DL  BORDERLINE-HIGH RISK:   130-
159 MG/DL           110-129 MG/DL  HIGH RISK:              >160 MG/DL           
   >130 MG/DL   *CHILDREN AND ADOLESCENTS REPRESENTS INDIVIDUALA AGED 2-19 YEARS
 EXCLUSIVE. 

 

           TP         6.0 g/dL   6.6-8.7    Below low normal            [facility] (

[facility]

                                         

 

                                        NORMAL RANGES 

****************************************************************************** 
Age         WBC      RBC        HGB           HCT         MCV        PLT 
------------------------------------------------------------------------------ 
Adult M   4.1-10.9    4.20-6.30   12.0-18.0   37.0-51.0   80-97      140-440  
Adult F   4.1-10.9    4.04-5.48   12.0-18.0   37.0-51.0   80-97      140-440  0
-1 Yr    5.0-20.0    3.9-5.9     15-18       MV: 44      MV: 91     MV: 277  2-9
 Yr.   6.0-17.0    3.8-5.4     11-13       MV: 37      MV: 78     MV: 300  10 
Yrs.   5.0-13.0    3.8-5.4     12-15       MV: 39      MV: 80     MV: 250    
NOTE:  * FOR ADULT BLACK MALES AND FEMALES, NORMAL WBC IS 2.9-7.7 K/ML  * FOR 
ADULT BLACK MALES AND FEMALES, NORMAL RBC,HGB, AND HCT IS 5% LESS  SOURCE FOR 
DATA: [employer] 1800 OPERATION MANUAL( AUTOMATED BLOOD COUNTS AND DIFF.)  APPENDIX
  B-3                      CHRONIC KIDNEY DISEASE STAGING PER NKF:   MALE GFR 
INTERPRETATION:  20-49 YRS:     >60 mL/min  Normal 50-59 YRS:     >56 mL/min  
Normal 60-69 YRS:     >49 mL/min  Normal 70-79 YRS:     >42 mL/min  Normal 80 
and above  >35 mL/min  Normal   FEMALE GRF INTERPRETATION:  20-39 YRS:    >60 
mL/min  Normal 40-49 YRS:    >58 mL/min  Normal 50-59 YRS:    >51 mL/min  Normal
 60-69 YRS:    >45 mL/min  Normal 70-79 YRS:    >39 mL/min  Normal 80 and above 
>32 mL/min  NormalCLASSIFICATION            CHOLESTEROL FOR ADULTS       
CHILDREN/ADOLESCENTS*   DESIRABLE:                <200 MG/DL                   
<170 MG/DL  BORDER-LINE HIGH RISK:    200-239 MG/DL                170-199 MG/DL
  HIGH RISK:                >240 MG/DL                   >200 MG/DL    CLASS. 
FOR PRIMARY LDL CHOL PREVENTION:        LDL CHOL-CHILD/ADOLESCENTS*   DESIRABLE:
              <130 MG/DL              <110 MG/DL  BORDERLINE-HIGH RISK:   130-
159 MG/DL           110-129 MG/DL  HIGH RISK:              >160 MG/DL           
   >130 MG/DL   *CHILDREN AND ADOLESCENTS REPRESENTS INDIVIDUALA AGED 2-19 YEARS
 EXCLUSIVE. 

 

          CA        8.9 mg/dL 8.6-10.2                      [facility] ([facility]  

 

                                        NORMAL RANGES 

****************************************************************************** 
Age         WBC      RBC        HGB           HCT         MCV        PLT 
------------------------------------------------------------------------------ 
Adult M   4.1-10.9    4.20-6.30   12.0-18.0   37.0-51.0   80-97      140-440  
Adult F   4.1-10.9    4.04-5.48   12.0-18.0   37.0-51.0   80-97      140-440  0
-1 Yr    5.0-20.0    3.9-5.9     15-18       MV: 44      MV: 91     MV: 277  2-9
 Yr.   6.0-17.0    3.8-5.4     11-13       MV: 37      MV: 78     MV: 300  10 
Yrs.   5.0-13.0    3.8-5.4     12-15       MV: 39      MV: 80     MV: 250    
NOTE:  * FOR ADULT BLACK MALES AND FEMALES, NORMAL WBC IS 2.9-7.7 K/ML  * FOR 
ADULT BLACK MALES AND FEMALES, NORMAL RBC,HGB, AND HCT IS 5% LESS  SOURCE FOR 
DATA: [employer] OPERATION MANUAL( AUTOMATED BLOOD COUNTS AND DIFF.)  APPENDIX
  B-3                      CHRONIC KIDNEY DISEASE STAGING PER NKF:   MALE GFR 
INTERPRETATION:  20-49 YRS:     >60 mL/min  Normal 50-59 YRS:     >56 mL/min  
Normal 60-69 YRS:     >49 mL/min  Normal 70-79 YRS:     >42 mL/min  Normal 80 
and above  >35 mL/min  Normal   FEMALE GRF INTERPRETATION:  20-39 YRS:    >60 
mL/min  Normal 40-49 YRS:    >58 mL/min  Normal 50-59 YRS:    >51 mL/min  Normal
 60-69 YRS:    >45 mL/min  Normal 70-79 YRS:    >39 mL/min  Normal 80 and above 
>32 mL/min  NormalCLASSIFICATION            CHOLESTEROL FOR ADULTS       
CHILDREN/ADOLESCENTS*   DESIRABLE:                <200 MG/DL                   
<170 MG/DL  BORDER-LINE HIGH RISK:    200-239 MG/DL                170-199 MG/DL
  HIGH RISK:                >240 MG/DL                   >200 MG/DL    CLASS. 
FOR PRIMARY LDL CHOL PREVENTION:        LDL CHOL-CHILD/ADOLESCENTS*   DESIRABLE:
              <130 MG/DL              <110 MG/DL  BORDERLINE-HIGH RISK:   130-
159 MG/DL           110-129 MG/DL  HIGH RISK:              >160 MG/DL           
   >130 MG/DL   *CHILDREN AND ADOLESCENTS REPRESENTS INDIVIDUALA AGED 2-19 YEARS
 EXCLUSIVE. 

 

          Alb       4.0 g/dL  3.5-5.2                       [facility]  

 

                                        NORMAL RANGES 

****************************************************************************** 
Age         WBC      RBC        HGB           HCT         MCV        PLT 
------------------------------------------------------------------------------ 
Adult M   4.1-10.9    4.20-6.30   12.0-18.0   37.0-51.0   80-97      140-440  
Adult F   4.1-10.9    4.04-5.48   12.0-18.0   37.0-51.0   80-97      140-440  0
-1 Yr    5.0-20.0    3.9-5.9     15-18       MV: 44      MV: 91     MV: 277  2-9
 Yr.   6.0-17.0    3.8-5.4     11-13       MV: 37      MV: 78     MV: 300  10 
Yrs.   5.0-13.0    3.8-5.4     12-15       MV: 39      MV: 80     MV: 250    
NOTE:  * FOR ADULT BLACK MALES AND FEMALES, NORMAL WBC IS 2.9-7.7 K/ML  * FOR 
ADULT BLACK MALES AND FEMALES, NORMAL RBC,HGB, AND HCT IS 5% LESS  SOURCE FOR 
DATA: [employer] OPERATION MANUAL( AUTOMATED BLOOD COUNTS AND DIFF.)  APPENDIX
  B-3                      CHRONIC KIDNEY DISEASE STAGING PER NKF:   MALE GFR 
INTERPRETATION:  20-49 YRS:     >60 mL/min  Normal 50-59 YRS:     >56 mL/min  
Normal 60-69 YRS:     >49 mL/min  Normal 70-79 YRS:     >42 mL/min  Normal 80 
and above  >35 mL/min  Normal   FEMALE GRF INTERPRETATION:  20-39 YRS:    >60 
mL/min  Normal 40-49 YRS:    >58 mL/min  Normal 50-59 YRS:    >51 mL/min  Normal
 60-69 YRS:    >45 mL/min  Normal 70-79 YRS:    >39 mL/min  Normal 80 and above 
>32 mL/min  NormalCLASSIFICATION            CHOLESTEROL FOR ADULTS       
CHILDREN/ADOLESCENTS*   DESIRABLE:                <200 MG/DL                   
<170 MG/DL  BORDER-LINE HIGH RISK:    200-239 MG/DL                170-199 MG/DL
  HIGH RISK:                >240 MG/DL                   >200 MG/DL    CLASS. 
FOR PRIMARY LDL CHOL PREVENTION:        LDL CHOL-CHILD/ADOLESCENTS*   DESIRABLE:
              <130 MG/DL              <110 MG/DL  BORDERLINE-HIGH RISK:   130-
159 MG/DL           110-129 MG/DL  HIGH RISK:              >160 MG/DL           
   >130 MG/DL   *CHILDREN AND ADOLESCENTS REPRESENTS INDIVIDUALA AGED 2-19 YEARS
 EXCLUSIVE. 

 

          A/G Ratio 2.0 CALC                                [facility] ([facility]  

 

                                        NORMAL RANGES 

****************************************************************************** 
Age         WBC      RBC        HGB           HCT         MCV        PLT 
------------------------------------------------------------------------------ 
Adult M   4.1-10.9    4.20-6.30   12.0-18.0   37.0-51.0   80-97      140-440  
Adult F   4.1-10.9    4.04-5.48   12.0-18.0   37.0-51.0   80-97      140-440  0
-1 Yr    5.0-20.0    3.9-5.9     15-18       MV: 44      MV: 91     MV: 277  2-9
 Yr.   6.0-17.0    3.8-5.4     11-13       MV: 37      MV: 78     MV: 300  10 
Yrs.   5.0-13.0    3.8-5.4     12-15       MV: 39      MV: 80     MV: 250    
NOTE:  * FOR ADULT BLACK MALES AND FEMALES, NORMAL WBC IS 2.9-7.7 K/ML  * FOR 
ADULT BLACK MALES AND FEMALES, NORMAL RBC,HGB, AND HCT IS 5% LESS  SOURCE FOR 
DATA: [employer] OPERATION MANUAL( AUTOMATED BLOOD COUNTS AND DIFF.)  APPENDIX
  B-3                      CHRONIC KIDNEY DISEASE STAGING PER NKF:   MALE GFR 
INTERPRETATION:  20-49 YRS:     >60 mL/min  Normal 50-59 YRS:     >56 mL/min  
Normal 60-69 YRS:     >49 mL/min  Normal 70-79 YRS:     >42 mL/min  Normal 80 
and above  >35 mL/min  Normal   FEMALE GRF INTERPRETATION:  20-39 YRS:    >60 
mL/min  Normal 40-49 YRS:    >58 mL/min  Normal 50-59 YRS:    >51 mL/min  Normal
 60-69 YRS:    >45 mL/min  Normal 70-79 YRS:    >39 mL/min  Normal 80 and above 
>32 mL/min  NormalCLASSIFICATION            CHOLESTEROL FOR ADULTS       
CHILDREN/ADOLESCENTS*   DESIRABLE:                <200 MG/DL                   
<170 MG/DL  BORDER-LINE HIGH RISK:    200-239 MG/DL                170-199 MG/DL
  HIGH RISK:                >240 MG/DL                   >200 MG/DL    CLASS. 
FOR PRIMARY LDL CHOL PREVENTION:        LDL CHOL-CHILD/ADOLESCENTS*   DESIRABLE:
              <130 MG/DL              <110 MG/DL  BORDERLINE-HIGH RISK:   130-
159 MG/DL           110-129 MG/DL  HIGH RISK:              >160 MG/DL           
   >130 MG/DL   *CHILDREN AND ADOLESCENTS REPRESENTS INDIVIDUALA AGED 2-19 YEARS
 EXCLUSIVE. 

 

           Alp        135.0 U/L       Above high normal            [facility] 

([facility]                                    

 

                                        NORMAL RANGES 

****************************************************************************** 
Age         WBC      RBC        HGB           HCT         MCV        PLT 
------------------------------------------------------------------------------ 
Adult M   4.1-10.9    4.20-6.30   12.0-18.0   37.0-51.0   80-97      140-440  
Adult F   4.1-10.9    4.04-5.48   12.0-18.0   37.0-51.0   80-97      140-440  0
-1 Yr    5.0-20.0    3.9-5.9     15-18       MV: 44      MV: 91     MV: 277  2-9
 Yr.   6.0-17.0    3.8-5.4     11-13       MV: 37      MV: 78     MV: 300  10 
Yrs.   5.0-13.0    3.8-5.4     12-15       MV: 39      MV: 80     MV: 250    
NOTE:  * FOR ADULT BLACK MALES AND FEMALES, NORMAL WBC IS 2.9-7.7 K/ML  * FOR 
ADULT BLACK MALES AND FEMALES, NORMAL RBC,HGB, AND HCT IS 5% LESS  SOURCE FOR 
DATA: [employer] OPERATION MANUAL( AUTOMATED BLOOD COUNTS AND DIFF.)  APPENDIX
  B-3                      CHRONIC KIDNEY DISEASE STAGING PER NKF:   MALE GFR 
INTERPRETATION:  20-49 YRS:     >60 mL/min  Normal 50-59 YRS:     >56 mL/min  
Normal 60-69 YRS:     >49 mL/min  Normal 70-79 YRS:     >42 mL/min  Normal 80 
and above  >35 mL/min  Normal   FEMALE GRF INTERPRETATION:  20-39 YRS:    >60 
mL/min  Normal 40-49 YRS:    >58 mL/min  Normal 50-59 YRS:    >51 mL/min  Normal
 60-69 YRS:    >45 mL/min  Normal 70-79 YRS:    >39 mL/min  Normal 80 and above 
>32 mL/min  NormalCLASSIFICATION            CHOLESTEROL FOR ADULTS       
CHILDREN/ADOLESCENTS*   DESIRABLE:                <200 MG/DL                   
<170 MG/DL  BORDER-LINE HIGH RISK:    200-239 MG/DL                170-199 MG/DL
  HIGH RISK:                >240 MG/DL                   >200 MG/DL    CLASS. 
FOR PRIMARY LDL CHOL PREVENTION:        LDL CHOL-CHILD/ADOLESCENTS*   DESIRABLE:
              <130 MG/DL              <110 MG/DL  BORDERLINE-HIGH RISK:   130-
159 MG/DL           110-129 MG/DL  HIGH RISK:              >160 MG/DL           
   >130 MG/DL   *CHILDREN AND ADOLESCENTS REPRESENTS INDIVIDUALA AGED 2-19 YEARS
 EXCLUSIVE. 

 

          Globulin  2.0 CALC                                [facility] ([facility]  

 

                                        NORMAL RANGES 

****************************************************************************** 
Age         WBC      RBC        HGB           HCT         MCV        PLT 
------------------------------------------------------------------------------ 
Adult M   4.1-10.9    4.20-6.30   12.0-18.0   37.0-51.0   80-97      140-440  
Adult F   4.1-10.9    4.04-5.48   12.0-18.0   37.0-51.0   80-97      140-440  0
-1 Yr    5.0-20.0    3.9-5.9     15-18       MV: 44      MV: 91     MV: 277  2-9
 Yr.   6.0-17.0    3.8-5.4     11-13       MV: 37      MV: 78     MV: 300  10 
Yrs.   5.0-13.0    3.8-5.4     12-15       MV: 39      MV: 80     MV: 250    
NOTE:  * FOR ADULT BLACK MALES AND FEMALES, NORMAL WBC IS 2.9-7.7 K/ML  * FOR 
ADULT BLACK MALES AND FEMALES, NORMAL RBC,HGB, AND HCT IS 5% LESS  SOURCE FOR 
DATA: [employer] 1800 OPERATION MANUAL( AUTOMATED BLOOD COUNTS AND DIFF.)  APPENDIX
  B-3                      CHRONIC KIDNEY DISEASE STAGING PER NKF:   MALE GFR 
INTERPRETATION:  20-49 YRS:     >60 mL/min  Normal 50-59 YRS:     >56 mL/min  
Normal 60-69 YRS:     >49 mL/min  Normal 70-79 YRS:     >42 mL/min  Normal 80 
and above  >35 mL/min  Normal   FEMALE GRF INTERPRETATION:  20-39 YRS:    >60 
mL/min  Normal 40-49 YRS:    >58 mL/min  Normal 50-59 YRS:    >51 mL/min  Normal
 60-69 YRS:    >45 mL/min  Normal 70-79 YRS:    >39 mL/min  Normal 80 and above 
>32 mL/min  NormalCLASSIFICATION            CHOLESTEROL FOR ADULTS       
CHILDREN/ADOLESCENTS*   DESIRABLE:                <200 MG/DL                   
<170 MG/DL  BORDER-LINE HIGH RISK:    200-239 MG/DL                170-199 MG/DL
  HIGH RISK:                >240 MG/DL                   >200 MG/DL    CLASS. 
FOR PRIMARY LDL CHOL PREVENTION:        LDL CHOL-CHILD/ADOLESCENTS*   DESIRABLE:
              <130 MG/DL              <110 MG/DL  BORDERLINE-HIGH RISK:   130-
159 MG/DL           110-129 MG/DL  HIGH RISK:              >160 MG/DL           
   >130 MG/DL   *CHILDREN AND ADOLESCENTS REPRESENTS INDIVIDUALA AGED 2-19 YEARS
 EXCLUSIVE. 

 

          Ast (Sgot) 18 U/L    0-40                          [facility] ([facility]  

 

                                        NORMAL RANGES 

****************************************************************************** 
Age         WBC      RBC        HGB           HCT         MCV        PLT 
------------------------------------------------------------------------------ 
Adult M   4.1-10.9    4.20-6.30   12.0-18.0   37.0-51.0   80-97      140-440  
Adult F   4.1-10.9    4.04-5.48   12.0-18.0   37.0-51.0   80-97      140-440  0
-1 Yr    5.0-20.0    3.9-5.9     15-18       MV: 44      MV: 91     MV: 277  2-9
 Yr.   6.0-17.0    3.8-5.4     11-13       MV: 37      MV: 78     MV: 300  10 
Yrs.   5.0-13.0    3.8-5.4     12-15       MV: 39      MV: 80     MV: 250    
NOTE:  * FOR ADULT BLACK MALES AND FEMALES, NORMAL WBC IS 2.9-7.7 K/ML  * FOR 
ADULT BLACK MALES AND FEMALES, NORMAL RBC,HGB, AND HCT IS 5% LESS  SOURCE FOR 
DATA: [employer] OPERATION MANUAL( AUTOMATED BLOOD COUNTS AND DIFF.)  APPENDIX
  B-3                      CHRONIC KIDNEY DISEASE STAGING PER NKF:   MALE GFR 
INTERPRETATION:  20-49 YRS:     >60 mL/min  Normal 50-59 YRS:     >56 mL/min  
Normal 60-69 YRS:     >49 mL/min  Normal 70-79 YRS:     >42 mL/min  Normal 80 
and above  >35 mL/min  Normal   FEMALE GRF INTERPRETATION:  20-39 YRS:    >60 
mL/min  Normal 40-49 YRS:    >58 mL/min  Normal 50-59 YRS:    >51 mL/min  Normal
 60-69 YRS:    >45 mL/min  Normal 70-79 YRS:    >39 mL/min  Normal 80 and above 
>32 mL/min  NormalCLASSIFICATION            CHOLESTEROL FOR ADULTS       
CHILDREN/ADOLESCENTS*   DESIRABLE:                <200 MG/DL                   
<170 MG/DL  BORDER-LINE HIGH RISK:    200-239 MG/DL                170-199 MG/DL
  HIGH RISK:                >240 MG/DL                   >200 MG/DL    CLASS. 
FOR PRIMARY LDL CHOL PREVENTION:        LDL CHOL-CHILD/ADOLESCENTS*   DESIRABLE:
              <130 MG/DL              <110 MG/DL  BORDERLINE-HIGH RISK:   130-
159 MG/DL           110-129 MG/DL  HIGH RISK:              >160 MG/DL           
   >130 MG/DL   *CHILDREN AND ADOLESCENTS REPRESENTS INDIVIDUALA AGED 2-19 YEARS
 EXCLUSIVE. 

 

          Alt (SGPT) 21 U/L    0-41                          [facility] ([facility]  

 

                                        NORMAL RANGES 

****************************************************************************** 
Age         WBC      RBC        HGB           HCT         MCV        PLT 
------------------------------------------------------------------------------ 
Adult M   4.1-10.9    4.20-6.30   12.0-18.0   37.0-51.0   80-97      140-440  
Adult F   4.1-10.9    4.04-5.48   12.0-18.0   37.0-51.0   80-97      140-440  0
-1 Yr    5.0-20.0    3.9-5.9     15-18       MV: 44      MV: 91     MV: 277  2-9
 Yr.   6.0-17.0    3.8-5.4     11-13       MV: 37      MV: 78     MV: 300  10 
Yrs.   5.0-13.0    3.8-5.4     12-15       MV: 39      MV: 80     MV: 250    
NOTE:  * FOR ADULT BLACK MALES AND FEMALES, NORMAL WBC IS 2.9-7.7 K/ML  * FOR 
ADULT BLACK MALES AND FEMALES, NORMAL RBC,HGB, AND HCT IS 5% LESS  SOURCE FOR 
DATA: [employer] 1800 OPERATION MANUAL( AUTOMATED BLOOD COUNTS AND DIFF.)  APPENDIX
  B-3                      CHRONIC KIDNEY DISEASE STAGING PER NKF:   MALE GFR 
INTERPRETATION:  20-49 YRS:     >60 mL/min  Normal 50-59 YRS:     >56 mL/min  
Normal 60-69 YRS:     >49 mL/min  Normal 70-79 YRS:     >42 mL/min  Normal 80 
and above  >35 mL/min  Normal   FEMALE GRF INTERPRETATION:  20-39 YRS:    >60 
mL/min  Normal 40-49 YRS:    >58 mL/min  Normal 50-59 YRS:    >51 mL/min  Normal
 60-69 YRS:    >45 mL/min  Normal 70-79 YRS:    >39 mL/min  Normal 80 and above 
>32 mL/min  NormalCLASSIFICATION            CHOLESTEROL FOR ADULTS       
CHILDREN/ADOLESCENTS*   DESIRABLE:                <200 MG/DL                   
<170 MG/DL  BORDER-LINE HIGH RISK:    200-239 MG/DL                170-199 MG/DL
  HIGH RISK:                >240 MG/DL                   >200 MG/DL    CLASS. 
FOR PRIMARY LDL CHOL PREVENTION:        LDL CHOL-CHILD/ADOLESCENTS*   DESIRABLE:
              <130 MG/DL              <110 MG/DL  BORDERLINE-HIGH RISK:   130-
159 MG/DL           110-129 MG/DL  HIGH RISK:              >160 MG/DL           
   >130 MG/DL   *CHILDREN AND ADOLESCENTS REPRESENTS INDIVIDUALA AGED 2-19 YEARS
 EXCLUSIVE. 

 

           Osmolality-Calculated 297.5 CALC                                  [facility] ([facility]

                                         

 

                                        NORMAL RANGES 

****************************************************************************** 
Age         WBC      RBC        HGB           HCT         MCV        PLT 
------------------------------------------------------------------------------ 
Adult M   4.1-10.9    4.20-6.30   12.0-18.0   37.0-51.0   80-97      140-440  
Adult F   4.1-10.9    4.04-5.48   12.0-18.0   37.0-51.0   80-97      140-440  0
-1 Yr    5.0-20.0    3.9-5.9     15-18       MV: 44      MV: 91     MV: 277  2-9
 Yr.   6.0-17.0    3.8-5.4     11-13       MV: 37      MV: 78     MV: 300  10 
Yrs.   5.0-13.0    3.8-5.4     12-15       MV: 39      MV: 80     MV: 250    
NOTE:  * FOR ADULT BLACK MALES AND FEMALES, NORMAL WBC IS 2.9-7.7 K/ML  * FOR 
ADULT BLACK MALES AND FEMALES, NORMAL RBC,HGB, AND HCT IS 5% LESS  SOURCE FOR 
DATA: [employer] OPERATION MANUAL( AUTOMATED BLOOD COUNTS AND DIFF.)  APPENDIX
  B-3                      CHRONIC KIDNEY DISEASE STAGING PER NKF:   MALE GFR 
INTERPRETATION:  20-49 YRS:     >60 mL/min  Normal 50-59 YRS:     >56 mL/min  
Normal 60-69 YRS:     >49 mL/min  Normal 70-79 YRS:     >42 mL/min  Normal 80 
and above  >35 mL/min  Normal   FEMALE GRF INTERPRETATION:  20-39 YRS:    >60 
mL/min  Normal 40-49 YRS:    >58 mL/min  Normal 50-59 YRS:    >51 mL/min  Normal
 60-69 YRS:    >45 mL/min  Normal 70-79 YRS:    >39 mL/min  Normal 80 and above 
>32 mL/min  NormalCLASSIFICATION            CHOLESTEROL FOR ADULTS       
CHILDREN/ADOLESCENTS*   DESIRABLE:                <200 MG/DL                   
<170 MG/DL  BORDER-LINE HIGH RISK:    200-239 MG/DL                170-199 MG/DL
  HIGH RISK:                >240 MG/DL                   >200 MG/DL    CLASS. 
FOR PRIMARY LDL CHOL PREVENTION:        LDL CHOL-CHILD/ADOLESCENTS*   DESIRABLE:
              <130 MG/DL              <110 MG/DL  BORDERLINE-HIGH RISK:   130-
159 MG/DL           110-129 MG/DL  HIGH RISK:              >160 MG/DL           
   >130 MG/DL   *CHILDREN AND ADOLESCENTS REPRESENTS INDIVIDUALA AGED 2-19 YEARS
 EXCLUSIVE. 

 

          Anion Gap 16 mmol/L                               [facility]  

 

                                        NORMAL RANGES 

****************************************************************************** 
Age         WBC      RBC        HGB           HCT         MCV        PLT 
------------------------------------------------------------------------------ 
Adult M   4.1-10.9    4.20-6.30   12.0-18.0   37.0-51.0   80-97      140-440  
Adult F   4.1-10.9    4.04-5.48   12.0-18.0   37.0-51.0   80-97      140-440  0
-1 Yr    5.0-20.0    3.9-5.9     15-18       MV: 44      MV: 91     MV: 277  2-9
 Yr.   6.0-17.0    3.8-5.4     11-13       MV: 37      MV: 78     MV: 300  10 
Yrs.   5.0-13.0    3.8-5.4     12-15       MV: 39      MV: 80     MV: 250    
NOTE:  * FOR ADULT BLACK MALES AND FEMALES, NORMAL WBC IS 2.9-7.7 K/ML  * FOR 
ADULT BLACK MALES AND FEMALES, NORMAL RBC,HGB, AND HCT IS 5% LESS  SOURCE FOR 
DATA: [employer] OPERATION MANUAL( AUTOMATED BLOOD COUNTS AND DIFF.)  APPENDIX
  B-3                      CHRONIC KIDNEY DISEASE STAGING PER NKF:   MALE GFR 
INTERPRETATION:  20-49 YRS:     >60 mL/min  Normal 50-59 YRS:     >56 mL/min  
Normal 60-69 YRS:     >49 mL/min  Normal 70-79 YRS:     >42 mL/min  Normal 80 
and above  >35 mL/min  Normal   FEMALE GRF INTERPRETATION:  20-39 YRS:    >60 
mL/min  Normal 40-49 YRS:    >58 mL/min  Normal 50-59 YRS:    >51 mL/min  Normal
 60-69 YRS:    >45 mL/min  Normal 70-79 YRS:    >39 mL/min  Normal 80 and above 
>32 mL/min  NormalCLASSIFICATION            CHOLESTEROL FOR ADULTS       
CHILDREN/ADOLESCENTS*   DESIRABLE:                <200 MG/DL                   
<170 MG/DL  BORDER-LINE HIGH RISK:    200-239 MG/DL                170-199 MG/DL
  HIGH RISK:                >240 MG/DL                   >200 MG/DL    CLASS. 
FOR PRIMARY LDL CHOL PREVENTION:        LDL CHOL-CHILD/ADOLESCENTS*   DESIRABLE:
              <130 MG/DL              <110 MG/DL  BORDERLINE-HIGH RISK:   130-
159 MG/DL           110-129 MG/DL  HIGH RISK:              >160 MG/DL           
   >130 MG/DL   *CHILDREN AND ADOLESCENTS REPRESENTS INDIVIDUALA AGED 2-19 YEARS
 EXCLUSIVE. 

 

          Tbili     0.24 mg/dL 0.0-1.2                       [facility] ([facility]  

 

                                        NORMAL RANGES 

****************************************************************************** 
Age         WBC      RBC        HGB           HCT         MCV        PLT 
------------------------------------------------------------------------------ 
Adult M   4.1-10.9    4.20-6.30   12.0-18.0   37.0-51.0   80-97      140-440  
Adult F   4.1-10.9    4.04-5.48   12.0-18.0   37.0-51.0   80-97      140-440  0
-1 Yr    5.0-20.0    3.9-5.9     15-18       MV: 44      MV: 91     MV: 277  2-9
 Yr.   6.0-17.0    3.8-5.4     11-13       MV: 37      MV: 78     MV: 300  10 
Yrs.   5.0-13.0    3.8-5.4     12-15       MV: 39      MV: 80     MV: 250    
NOTE:  * FOR ADULT BLACK MALES AND FEMALES, NORMAL WBC IS 2.9-7.7 K/ML  * FOR 
ADULT BLACK MALES AND FEMALES, NORMAL RBC,HGB, AND HCT IS 5% LESS  SOURCE FOR 
DATA: [employer] 1800 OPERATION MANUAL( AUTOMATED BLOOD COUNTS AND DIFF.)  APPENDIX
  B-3                      CHRONIC KIDNEY DISEASE STAGING PER NKF:   MALE GFR 
INTERPRETATION:  20-49 YRS:     >60 mL/min  Normal 50-59 YRS:     >56 mL/min  
Normal 60-69 YRS:     >49 mL/min  Normal 70-79 YRS:     >42 mL/min  Normal 80 
and above  >35 mL/min  Normal   FEMALE GRF INTERPRETATION:  20-39 YRS:    >60 
mL/min  Normal 40-49 YRS:    >58 mL/min  Normal 50-59 YRS:    >51 mL/min  Normal
 60-69 YRS:    >45 mL/min  Normal 70-79 YRS:    >39 mL/min  Normal 80 and above 
>32 mL/min  NormalCLASSIFICATION            CHOLESTEROL FOR ADULTS       
CHILDREN/ADOLESCENTS*   DESIRABLE:                <200 MG/DL                   
<170 MG/DL  BORDER-LINE HIGH RISK:    200-239 MG/DL                170-199 MG/DL
  HIGH RISK:                >240 MG/DL                   >200 MG/DL    CLASS. 
FOR PRIMARY LDL CHOL PREVENTION:        LDL CHOL-CHILD/ADOLESCENTS*   DESIRABLE:
              <130 MG/DL              <110 MG/DL  BORDERLINE-HIGH RISK:   130-
159 MG/DL           110-129 MG/DL  HIGH RISK:              >160 MG/DL           
   >130 MG/DL   *CHILDREN AND ADOLESCENTS REPRESENTS INDIVIDUALA AGED 2-19 YEARS
 EXCLUSIVE. 

 

          eGFR Non-Afr. American 35 #                                    [facility] 

([facility]  

 

                                        NORMAL RANGES 

****************************************************************************** 
Age         WBC      RBC        HGB           HCT         MCV        PLT 
------------------------------------------------------------------------------ 
Adult M   4.1-10.9    4.20-6.30   12.0-18.0   37.0-51.0   80-97      140-440  
Adult F   4.1-10.9    4.04-5.48   12.0-18.0   37.0-51.0   80-97      140-440  0
-1 Yr    5.0-20.0    3.9-5.9     15-18       MV: 44      MV: 91     MV: 277  2-9
 Yr.   6.0-17.0    3.8-5.4     11-13       MV: 37      MV: 78     MV: 300  10 
Yrs.   5.0-13.0    3.8-5.4     12-15       MV: 39      MV: 80     MV: 250    
NOTE:  * FOR ADULT BLACK MALES AND FEMALES, NORMAL WBC IS 2.9-7.7 K/ML  * FOR 
ADULT BLACK MALES AND FEMALES, NORMAL RBC,HGB, AND HCT IS 5% LESS  SOURCE FOR 
DATA: [employer] 1800 OPERATION MANUAL( AUTOMATED BLOOD COUNTS AND DIFF.)  APPENDIX
  B-3                      CHRONIC KIDNEY DISEASE STAGING PER NKF:   MALE GFR 
INTERPRETATION:  20-49 YRS:     >60 mL/min  Normal 50-59 YRS:     >56 mL/min  
Normal 60-69 YRS:     >49 mL/min  Normal 70-79 YRS:     >42 mL/min  Normal 80 
and above  >35 mL/min  Normal   FEMALE GRF INTERPRETATION:  20-39 YRS:    >60 
mL/min  Normal 40-49 YRS:    >58 mL/min  Normal 50-59 YRS:    >51 mL/min  Normal
 60-69 YRS:    >45 mL/min  Normal 70-79 YRS:    >39 mL/min  Normal 80 and above 
>32 mL/min  NormalCLASSIFICATION            CHOLESTEROL FOR ADULTS       
CHILDREN/ADOLESCENTS*   DESIRABLE:                <200 MG/DL                   
<170 MG/DL  BORDER-LINE HIGH RISK:    200-239 MG/DL                170-199 MG/DL
  HIGH RISK:                >240 MG/DL                   >200 MG/DL    CLASS. 
FOR PRIMARY LDL CHOL PREVENTION:        LDL CHOL-CHILD/ADOLESCENTS*   DESIRABLE:
              <130 MG/DL              <110 MG/DL  BORDERLINE-HIGH RISK:   130-
159 MG/DL           110-129 MG/DL  HIGH RISK:              >160 MG/DL           
   >130 MG/DL   *CHILDREN AND ADOLESCENTS REPRESENTS INDIVIDUALA AGED 2-19 YEARS
 EXCLUSIVE. 

 

          eGFR  41 #                                    [facility] (

[facility]  

 

                                        NORMAL RANGES 

****************************************************************************** 
Age         WBC      RBC        HGB           HCT         MCV        PLT 
------------------------------------------------------------------------------ 
Adult M   4.1-10.9    4.20-6.30   12.0-18.0   37.0-51.0   80-97      140-440  
Adult F   4.1-10.9    4.04-5.48   12.0-18.0   37.0-51.0   80-97      140-440  0
-1 Yr    5.0-20.0    3.9-5.9     15-18       MV: 44      MV: 91     MV: 277  2-9
 Yr.   6.0-17.0    3.8-5.4     11-13       MV: 37      MV: 78     MV: 300  10 
Yrs.   5.0-13.0    3.8-5.4     12-15       MV: 39      MV: 80     MV: 250    
NOTE:  * FOR ADULT BLACK MALES AND FEMALES, NORMAL WBC IS 2.9-7.7 K/ML  * FOR 
ADULT BLACK MALES AND FEMALES, NORMAL RBC,HGB, AND HCT IS 5% LESS  SOURCE FOR 
DATA: [employer] OPERATION MANUAL( AUTOMATED BLOOD COUNTS AND DIFF.)  APPENDIX
  B-3                      CHRONIC KIDNEY DISEASE STAGING PER NKF:   MALE GFR 
INTERPRETATION:  20-49 YRS:     >60 mL/min  Normal 50-59 YRS:     >56 mL/min  
Normal 60-69 YRS:     >49 mL/min  Normal 70-79 YRS:     >42 mL/min  Normal 80 
and above  >35 mL/min  Normal   FEMALE GRF INTERPRETATION:  20-39 YRS:    >60 
mL/min  Normal 40-49 YRS:    >58 mL/min  Normal 50-59 YRS:    >51 mL/min  Normal
 60-69 YRS:    >45 mL/min  Normal 70-79 YRS:    >39 mL/min  Normal 80 and above 
>32 mL/min  NormalCLASSIFICATION            CHOLESTEROL FOR ADULTS       
CHILDREN/ADOLESCENTS*   DESIRABLE:                <200 MG/DL                   
<170 MG/DL  BORDER-LINE HIGH RISK:    200-239 MG/DL                170-199 MG/DL
  HIGH RISK:                >240 MG/DL                   >200 MG/DL    CLASS. 
FOR PRIMARY LDL CHOL PREVENTION:        LDL CHOL-CHILD/ADOLESCENTS*   DESIRABLE:
              <130 MG/DL              <110 MG/DL  BORDERLINE-HIGH RISK:   130-
159 MG/DL           110-129 MG/DL  HIGH RISK:              >160 MG/DL           
   >130 MG/DL   *CHILDREN AND ADOLESCENTS REPRESENTS INDIVIDUALA AGED 2-19 YEARS
 EXCLUSIVE. 









                    ID                  Date                Data Source

 

                    [id number]         2020 04:00:00 PM EST [facility] ([facility]









          Name      Value     Range     Interpretation Code Description Data Va

rce(s) Supporting 

Document(s)

 

          WBC       7.8 10E3/uL 4.1-10.9                      [facility] ([facility]  

 

                                        NORMAL RANGES 

****************************************************************************** 
Age         WBC      RBC        HGB           HCT         MCV        PLT 
------------------------------------------------------------------------------ 
Adult M   4.1-10.9    4.20-6.30   12.0-18.0   37.0-51.0   80-97      140-440  
Adult F   4.1-10.9    4.04-5.48   12.0-18.0   37.0-51.0   80-97      140-440  0
-1 Yr    5.0-20.0    3.9-5.9     15-18       MV: 44      MV: 91     MV: 277  2-9
 Yr.   6.0-17.0    3.8-5.4     11-13       MV: 37      MV: 78     MV: 300  10 
Yrs.   5.0-13.0    3.8-5.4     12-15       MV: 39      MV: 80     MV: 250    
NOTE:  * FOR ADULT BLACK MALES AND FEMALES, NORMAL WBC IS 2.9-7.7 K/ML  * FOR 
ADULT BLACK MALES AND FEMALES, NORMAL RBC,HGB, AND HCT IS 5% LESS  SOURCE FOR 
DATA: [employer] 1800 OPERATION MANUAL( AUTOMATED BLOOD COUNTS AND DIFF.)  APPENDIX
  B-3                      CHRONIC KIDNEY DISEASE STAGING PER NKF:   MALE GFR 
INTERPRETATION:  20-49 YRS:     >60 mL/min  Normal 50-59 YRS:     >56 mL/min  
Normal 60-69 YRS:     >49 mL/min  Normal 70-79 YRS:     >42 mL/min  Normal 80 
and above  >35 mL/min  Normal   FEMALE GRF INTERPRETATION:  20-39 YRS:    >60 
mL/min  Normal 40-49 YRS:    >58 mL/min  Normal 50-59 YRS:    >51 mL/min  Normal
 60-69 YRS:    >45 mL/min  Normal 70-79 YRS:    >39 mL/min  Normal 80 and above 
>32 mL/min  NormalCLASSIFICATION            CHOLESTEROL FOR ADULTS       
CHILDREN/ADOLESCENTS*   DESIRABLE:                <200 MG/DL                   
<170 MG/DL  BORDER-LINE HIGH RISK:    200-239 MG/DL                170-199 MG/DL
  HIGH RISK:                >240 MG/DL                   >200 MG/DL    CLASS. 
FOR PRIMARY LDL CHOL PREVENTION:        LDL CHOL-CHILD/ADOLESCENTS*   DESIRABLE:
              <130 MG/DL              <110 MG/DL  BORDERLINE-HIGH RISK:   130-
159 MG/DL           110-129 MG/DL  HIGH RISK:              >160 MG/DL           
   >130 MG/DL   *CHILDREN AND ADOLESCENTS REPRESENTS INDIVIDUALA AGED 2-19 YEARS
 EXCLUSIVE. 

 

           RBC        4.19 10E6/uL 4.20-6.30  Below low normal            [facility]

 ([facility]                        

 

                                        NORMAL RANGES 

****************************************************************************** 
Age         WBC      RBC        HGB           HCT         MCV        PLT 
------------------------------------------------------------------------------ 
Adult M   4.1-10.9    4.20-6.30   12.0-18.0   37.0-51.0   80-97      140-440  
Adult F   4.1-10.9    4.04-5.48   12.0-18.0   37.0-51.0   80-97      140-440  0
-1 Yr    5.0-20.0    3.9-5.9     15-18       MV: 44      MV: 91     MV: 277  2-9
 Yr.   6.0-17.0    3.8-5.4     11-13       MV: 37      MV: 78     MV: 300  10 
Yrs.   5.0-13.0    3.8-5.4     12-15       MV: 39      MV: 80     MV: 250    
NOTE:  * FOR ADULT BLACK MALES AND FEMALES, NORMAL WBC IS 2.9-7.7 K/ML  * FOR 
ADULT BLACK MALES AND FEMALES, NORMAL RBC,HGB, AND HCT IS 5% LESS  SOURCE FOR 
DATA: [employer] OPERATION MANUAL( AUTOMATED BLOOD COUNTS AND DIFF.)  APPENDIX
  B-3                      CHRONIC KIDNEY DISEASE STAGING PER NKF:   MALE GFR 
INTERPRETATION:  20-49 YRS:     >60 mL/min  Normal 50-59 YRS:     >56 mL/min  
Normal 60-69 YRS:     >49 mL/min  Normal 70-79 YRS:     >42 mL/min  Normal 80 
and above  >35 mL/min  Normal   FEMALE GRF INTERPRETATION:  20-39 YRS:    >60 
mL/min  Normal 40-49 YRS:    >58 mL/min  Normal 50-59 YRS:    >51 mL/min  Normal
 60-69 YRS:    >45 mL/min  Normal 70-79 YRS:    >39 mL/min  Normal 80 and above 
>32 mL/min  NormalCLASSIFICATION            CHOLESTEROL FOR ADULTS       
CHILDREN/ADOLESCENTS*   DESIRABLE:                <200 MG/DL                   
<170 MG/DL  BORDER-LINE HIGH RISK:    200-239 MG/DL                170-199 MG/DL
  HIGH RISK:                >240 MG/DL                   >200 MG/DL    CLASS. 
FOR PRIMARY LDL CHOL PREVENTION:        LDL CHOL-CHILD/ADOLESCENTS*   DESIRABLE:
              <130 MG/DL              <110 MG/DL  BORDERLINE-HIGH RISK:   130-
159 MG/DL           110-129 MG/DL  HIGH RISK:              >160 MG/DL           
   >130 MG/DL   *CHILDREN AND ADOLESCENTS REPRESENTS INDIVIDUALA AGED 2-19 YEARS
 EXCLUSIVE. 

 

          HGB       13.6 g/dL 12.0-18.0                     [facility] ([facility]  

 

                                        NORMAL RANGES 

****************************************************************************** 
Age         WBC      RBC        HGB           HCT         MCV        PLT 
------------------------------------------------------------------------------ 
Adult M   4.1-10.9    4.20-6.30   12.0-18.0   37.0-51.0   80-97      140-440  
Adult F   4.1-10.9    4.04-5.48   12.0-18.0   37.0-51.0   80-97      140-440  0
-1 Yr    5.0-20.0    3.9-5.9     15-18       MV: 44      MV: 91     MV: 277  2-9
 Yr.   6.0-17.0    3.8-5.4     11-13       MV: 37      MV: 78     MV: 300  10 
Yrs.   5.0-13.0    3.8-5.4     12-15       MV: 39      MV: 80     MV: 250    
NOTE:  * FOR ADULT BLACK MALES AND FEMALES, NORMAL WBC IS 2.9-7.7 K/ML  * FOR 
ADULT BLACK MALES AND FEMALES, NORMAL RBC,HGB, AND HCT IS 5% LESS  SOURCE FOR 
DATA: [employer] OPERATION MANUAL( AUTOMATED BLOOD COUNTS AND DIFF.)  APPENDIX
  B-3                      CHRONIC KIDNEY DISEASE STAGING PER NKF:   MALE GFR 
INTERPRETATION:  20-49 YRS:     >60 mL/min  Normal 50-59 YRS:     >56 mL/min  
Normal 60-69 YRS:     >49 mL/min  Normal 70-79 YRS:     >42 mL/min  Normal 80 
and above  >35 mL/min  Normal   FEMALE GRF INTERPRETATION:  20-39 YRS:    >60 
mL/min  Normal 40-49 YRS:    >58 mL/min  Normal 50-59 YRS:    >51 mL/min  Normal
 60-69 YRS:    >45 mL/min  Normal 70-79 YRS:    >39 mL/min  Normal 80 and above 
>32 mL/min  NormalCLASSIFICATION            CHOLESTEROL FOR ADULTS       
CHILDREN/ADOLESCENTS*   DESIRABLE:                <200 MG/DL                   
<170 MG/DL  BORDER-LINE HIGH RISK:    200-239 MG/DL                170-199 MG/DL
  HIGH RISK:                >240 MG/DL                   >200 MG/DL    CLASS. 
FOR PRIMARY LDL CHOL PREVENTION:        LDL CHOL-CHILD/ADOLESCENTS*   DESIRABLE:
              <130 MG/DL              <110 MG/DL  BORDERLINE-HIGH RISK:   130-
159 MG/DL           110-129 MG/DL  HIGH RISK:              >160 MG/DL           
   >130 MG/DL   *CHILDREN AND ADOLESCENTS REPRESENTS INDIVIDUALA AGED 2-19 YEARS
 EXCLUSIVE. 

 

           MCH        32.5 pg    26.0-32.0  Above high normal            [facility] 

([facility]                                    

 

                                        NORMAL RANGES 

****************************************************************************** 
Age         WBC      RBC        HGB           HCT         MCV        PLT 
------------------------------------------------------------------------------ 
Adult M   4.1-10.9    4.20-6.30   12.0-18.0   37.0-51.0   80-97      140-440  
Adult F   4.1-10.9    4.04-5.48   12.0-18.0   37.0-51.0   80-97      140-440  0
-1 Yr    5.0-20.0    3.9-5.9     15-18       MV: 44      MV: 91     MV: 277  2-9
 Yr.   6.0-17.0    3.8-5.4     11-13       MV: 37      MV: 78     MV: 300  10 
Yrs.   5.0-13.0    3.8-5.4     12-15       MV: 39      MV: 80     MV: 250    
NOTE:  * FOR ADULT BLACK MALES AND FEMALES, NORMAL WBC IS 2.9-7.7 K/ML  * FOR 
ADULT BLACK MALES AND FEMALES, NORMAL RBC,HGB, AND HCT IS 5% LESS  SOURCE FOR 
DATA: [employer] OPERATION MANUAL( AUTOMATED BLOOD COUNTS AND DIFF.)  APPENDIX
  B-3                      CHRONIC KIDNEY DISEASE STAGING PER NKF:   MALE GFR 
INTERPRETATION:  20-49 YRS:     >60 mL/min  Normal 50-59 YRS:     >56 mL/min  
Normal 60-69 YRS:     >49 mL/min  Normal 70-79 YRS:     >42 mL/min  Normal 80 
and above  >35 mL/min  Normal   FEMALE GRF INTERPRETATION:  20-39 YRS:    >60 
mL/min  Normal 40-49 YRS:    >58 mL/min  Normal 50-59 YRS:    >51 mL/min  Normal
 60-69 YRS:    >45 mL/min  Normal 70-79 YRS:    >39 mL/min  Normal 80 and above 
>32 mL/min  NormalCLASSIFICATION            CHOLESTEROL FOR ADULTS       
CHILDREN/ADOLESCENTS*   DESIRABLE:                <200 MG/DL                   
<170 MG/DL  BORDER-LINE HIGH RISK:    200-239 MG/DL                170-199 MG/DL
  HIGH RISK:                >240 MG/DL                   >200 MG/DL    CLASS. 
FOR PRIMARY LDL CHOL PREVENTION:        LDL CHOL-CHILD/ADOLESCENTS*   DESIRABLE:
              <130 MG/DL              <110 MG/DL  BORDERLINE-HIGH RISK:   130-
159 MG/DL           110-129 MG/DL  HIGH RISK:              >160 MG/DL           
   >130 MG/DL   *CHILDREN AND ADOLESCENTS REPRESENTS INDIVIDUALA AGED 2-19 YEARS
 EXCLUSIVE. 

 

           MCV        98.1 fL    80.0-97.0  Above high normal            [facility] 

([facility]                                    

 

                                        NORMAL RANGES 

****************************************************************************** 
Age         WBC      RBC        HGB           HCT         MCV        PLT 
------------------------------------------------------------------------------ 
Adult M   4.1-10.9    4.20-6.30   12.0-18.0   37.0-51.0   80-97      140-440  
Adult F   4.1-10.9    4.04-5.48   12.0-18.0   37.0-51.0   80-97      140-440  0
-1 Yr    5.0-20.0    3.9-5.9     15-18       MV: 44      MV: 91     MV: 277  2-9
 Yr.   6.0-17.0    3.8-5.4     11-13       MV: 37      MV: 78     MV: 300  10 
Yrs.   5.0-13.0    3.8-5.4     12-15       MV: 39      MV: 80     MV: 250    
NOTE:  * FOR ADULT BLACK MALES AND FEMALES, NORMAL WBC IS 2.9-7.7 K/ML  * FOR 
ADULT BLACK MALES AND FEMALES, NORMAL RBC,HGB, AND HCT IS 5% LESS  SOURCE FOR 
DATA: [employer] 1800 OPERATION MANUAL( AUTOMATED BLOOD COUNTS AND DIFF.)  APPENDIX
  B-3                      CHRONIC KIDNEY DISEASE STAGING PER NKF:   MALE GFR 
INTERPRETATION:  20-49 YRS:     >60 mL/min  Normal 50-59 YRS:     >56 mL/min  
Normal 60-69 YRS:     >49 mL/min  Normal 70-79 YRS:     >42 mL/min  Normal 80 
and above  >35 mL/min  Normal   FEMALE GRF INTERPRETATION:  20-39 YRS:    >60 
mL/min  Normal 40-49 YRS:    >58 mL/min  Normal 50-59 YRS:    >51 mL/min  Normal
 60-69 YRS:    >45 mL/min  Normal 70-79 YRS:    >39 mL/min  Normal 80 and above 
>32 mL/min  NormalCLASSIFICATION            CHOLESTEROL FOR ADULTS       
CHILDREN/ADOLESCENTS*   DESIRABLE:                <200 MG/DL                   
<170 MG/DL  BORDER-LINE HIGH RISK:    200-239 MG/DL                170-199 MG/DL
  HIGH RISK:                >240 MG/DL                   >200 MG/DL    CLASS. 
FOR PRIMARY LDL CHOL PREVENTION:        LDL CHOL-CHILD/ADOLESCENTS*   DESIRABLE:
              <130 MG/DL              <110 MG/DL  BORDERLINE-HIGH RISK:   130-
159 MG/DL           110-129 MG/DL  HIGH RISK:              >160 MG/DL           
   >130 MG/DL   *CHILDREN AND ADOLESCENTS REPRESENTS INDIVIDUALA AGED 2-19 YEARS
 EXCLUSIVE. 

 

          HCT       41.1 %    37.0-51.0                     [facility] ([facility]  

 

                                        NORMAL RANGES 

****************************************************************************** 
Age         WBC      RBC        HGB           HCT         MCV        PLT 
------------------------------------------------------------------------------ 
Adult M   4.1-10.9    4.20-6.30   12.0-18.0   37.0-51.0   80-97      140-440  
Adult F   4.1-10.9    4.04-5.48   12.0-18.0   37.0-51.0   80-97      140-440  0
-1 Yr    5.0-20.0    3.9-5.9     15-18       MV: 44      MV: 91     MV: 277  2-9
 Yr.   6.0-17.0    3.8-5.4     11-13       MV: 37      MV: 78     MV: 300  10 
Yrs.   5.0-13.0    3.8-5.4     12-15       MV: 39      MV: 80     MV: 250    
NOTE:  * FOR ADULT BLACK MALES AND FEMALES, NORMAL WBC IS 2.9-7.7 K/ML  * FOR 
ADULT BLACK MALES AND FEMALES, NORMAL RBC,HGB, AND HCT IS 5% LESS  SOURCE FOR 
DATA: [employer] 1800 OPERATION MANUAL( AUTOMATED BLOOD COUNTS AND DIFF.)  APPENDIX
  B-3                      CHRONIC KIDNEY DISEASE STAGING PER NKF:   MALE GFR 
INTERPRETATION:  20-49 YRS:     >60 mL/min  Normal 50-59 YRS:     >56 mL/min  
Normal 60-69 YRS:     >49 mL/min  Normal 70-79 YRS:     >42 mL/min  Normal 80 
and above  >35 mL/min  Normal   FEMALE GRF INTERPRETATION:  20-39 YRS:    >60 
mL/min  Normal 40-49 YRS:    >58 mL/min  Normal 50-59 YRS:    >51 mL/min  Normal
 60-69 YRS:    >45 mL/min  Normal 70-79 YRS:    >39 mL/min  Normal 80 and above 
>32 mL/min  NormalCLASSIFICATION            CHOLESTEROL FOR ADULTS       
CHILDREN/ADOLESCENTS*   DESIRABLE:                <200 MG/DL                   
<170 MG/DL  BORDER-LINE HIGH RISK:    200-239 MG/DL                170-199 MG/DL
  HIGH RISK:                >240 MG/DL                   >200 MG/DL    CLASS. 
FOR PRIMARY LDL CHOL PREVENTION:        LDL CHOL-CHILD/ADOLESCENTS*   DESIRABLE:
              <130 MG/DL              <110 MG/DL  BORDERLINE-HIGH RISK:   130-
159 MG/DL           110-129 MG/DL  HIGH RISK:              >160 MG/DL           
   >130 MG/DL   *CHILDREN AND ADOLESCENTS REPRESENTS INDIVIDUALA AGED 2-19 YEARS
 EXCLUSIVE. 

 

          MCHC      33.1 g/dL 31.0-36.0                     [facility] ([facility]  

 

                                        NORMAL RANGES 

****************************************************************************** 
Age         WBC      RBC        HGB           HCT         MCV        PLT 
------------------------------------------------------------------------------ 
Adult M   4.1-10.9    4.20-6.30   12.0-18.0   37.0-51.0   80-97      140-440  
Adult F   4.1-10.9    4.04-5.48   12.0-18.0   37.0-51.0   80-97      140-440  0
-1 Yr    5.0-20.0    3.9-5.9     15-18       MV: 44      MV: 91     MV: 277  2-9
 Yr.   6.0-17.0    3.8-5.4     11-13       MV: 37      MV: 78     MV: 300  10 
Yrs.   5.0-13.0    3.8-5.4     12-15       MV: 39      MV: 80     MV: 250    
NOTE:  * FOR ADULT BLACK MALES AND FEMALES, NORMAL WBC IS 2.9-7.7 K/ML  * FOR 
ADULT BLACK MALES AND FEMALES, NORMAL RBC,HGB, AND HCT IS 5% LESS  SOURCE FOR 
DATA: [employer] OPERATION MANUAL( AUTOMATED BLOOD COUNTS AND DIFF.)  APPENDIX
  B-3                      CHRONIC KIDNEY DISEASE STAGING PER NKF:   MALE GFR 
INTERPRETATION:  20-49 YRS:     >60 mL/min  Normal 50-59 YRS:     >56 mL/min  
Normal 60-69 YRS:     >49 mL/min  Normal 70-79 YRS:     >42 mL/min  Normal 80 
and above  >35 mL/min  Normal   FEMALE GRF INTERPRETATION:  20-39 YRS:    >60 
mL/min  Normal 40-49 YRS:    >58 mL/min  Normal 50-59 YRS:    >51 mL/min  Normal
 60-69 YRS:    >45 mL/min  Normal 70-79 YRS:    >39 mL/min  Normal 80 and above 
>32 mL/min  NormalCLASSIFICATION            CHOLESTEROL FOR ADULTS       
CHILDREN/ADOLESCENTS*   DESIRABLE:                <200 MG/DL                   
<170 MG/DL  BORDER-LINE HIGH RISK:    200-239 MG/DL                170-199 MG/DL
  HIGH RISK:                >240 MG/DL                   >200 MG/DL    CLASS. 
FOR PRIMARY LDL CHOL PREVENTION:        LDL CHOL-CHILD/ADOLESCENTS*   DESIRABLE:
              <130 MG/DL              <110 MG/DL  BORDERLINE-HIGH RISK:   130-
159 MG/DL           110-129 MG/DL  HIGH RISK:              >160 MG/DL           
   >130 MG/DL   *CHILDREN AND ADOLESCENTS REPRESENTS INDIVIDUALA AGED 2-19 YEARS
 EXCLUSIVE. 

 

          RDW-CV    12.9 %    11.5-14.5                     [facility] ([facility]  

 

                                        NORMAL RANGES 

****************************************************************************** 
Age         WBC      RBC        HGB           HCT         MCV        PLT 
------------------------------------------------------------------------------ 
Adult M   4.1-10.9    4.20-6.30   12.0-18.0   37.0-51.0   80-97      140-440  
Adult F   4.1-10.9    4.04-5.48   12.0-18.0   37.0-51.0   80-97      140-440  0
-1 Yr    5.0-20.0    3.9-5.9     15-18       MV: 44      MV: 91     MV: 277  2-9
 Yr.   6.0-17.0    3.8-5.4     11-13       MV: 37      MV: 78     MV: 300  10 
Yrs.   5.0-13.0    3.8-5.4     12-15       MV: 39      MV: 80     MV: 250    
NOTE:  * FOR ADULT BLACK MALES AND FEMALES, NORMAL WBC IS 2.9-7.7 K/ML  * FOR 
ADULT BLACK MALES AND FEMALES, NORMAL RBC,HGB, AND HCT IS 5% LESS  SOURCE FOR 
DATA: [employer] 1800 OPERATION MANUAL( AUTOMATED BLOOD COUNTS AND DIFF.)  APPENDIX
  B-3                      CHRONIC KIDNEY DISEASE STAGING PER NKF:   MALE GFR 
INTERPRETATION:  20-49 YRS:     >60 mL/min  Normal 50-59 YRS:     >56 mL/min  
Normal 60-69 YRS:     >49 mL/min  Normal 70-79 YRS:     >42 mL/min  Normal 80 
and above  >35 mL/min  Normal   FEMALE GRF INTERPRETATION:  20-39 YRS:    >60 
mL/min  Normal 40-49 YRS:    >58 mL/min  Normal 50-59 YRS:    >51 mL/min  Normal
 60-69 YRS:    >45 mL/min  Normal 70-79 YRS:    >39 mL/min  Normal 80 and above 
>32 mL/min  NormalCLASSIFICATION            CHOLESTEROL FOR ADULTS       
CHILDREN/ADOLESCENTS*   DESIRABLE:                <200 MG/DL                   
<170 MG/DL  BORDER-LINE HIGH RISK:    200-239 MG/DL                170-199 MG/DL
  HIGH RISK:                >240 MG/DL                   >200 MG/DL    CLASS. 
FOR PRIMARY LDL CHOL PREVENTION:        LDL CHOL-CHILD/ADOLESCENTS*   DESIRABLE:
              <130 MG/DL              <110 MG/DL  BORDERLINE-HIGH RISK:   130-
159 MG/DL           110-129 MG/DL  HIGH RISK:              >160 MG/DL           
   >130 MG/DL   *CHILDREN AND ADOLESCENTS REPRESENTS INDIVIDUALA AGED 2-19 YEARS
 EXCLUSIVE. 

 

          PLT       160 10E3/uL 140-440                       [facility] ([facility]  

 

                                        NORMAL RANGES 

****************************************************************************** 
Age         WBC      RBC        HGB           HCT         MCV        PLT 
------------------------------------------------------------------------------ 
Adult M   4.1-10.9    4.20-6.30   12.0-18.0   37.0-51.0   80-97      140-440  
Adult F   4.1-10.9    4.04-5.48   12.0-18.0   37.0-51.0   80-97      140-440  0
-1 Yr    5.0-20.0    3.9-5.9     15-18       MV: 44      MV: 91     MV: 277  2-9
 Yr.   6.0-17.0    3.8-5.4     11-13       MV: 37      MV: 78     MV: 300  10 
Yrs.   5.0-13.0    3.8-5.4     12-15       MV: 39      MV: 80     MV: 250    
NOTE:  * FOR ADULT BLACK MALES AND FEMALES, NORMAL WBC IS 2.9-7.7 K/ML  * FOR 
ADULT BLACK MALES AND FEMALES, NORMAL RBC,HGB, AND HCT IS 5% LESS  SOURCE FOR 
DATA: [employer] 1800 OPERATION MANUAL( AUTOMATED BLOOD COUNTS AND DIFF.)  APPENDIX
  B-3                      CHRONIC KIDNEY DISEASE STAGING PER NKF:   MALE GFR 
INTERPRETATION:  20-49 YRS:     >60 mL/min  Normal 50-59 YRS:     >56 mL/min  
Normal 60-69 YRS:     >49 mL/min  Normal 70-79 YRS:     >42 mL/min  Normal 80 
and above  >35 mL/min  Normal   FEMALE GRF INTERPRETATION:  20-39 YRS:    >60 
mL/min  Normal 40-49 YRS:    >58 mL/min  Normal 50-59 YRS:    >51 mL/min  Normal
 60-69 YRS:    >45 mL/min  Normal 70-79 YRS:    >39 mL/min  Normal 80 and above 
>32 mL/min  NormalCLASSIFICATION            CHOLESTEROL FOR ADULTS       
CHILDREN/ADOLESCENTS*   DESIRABLE:                <200 MG/DL                   
<170 MG/DL  BORDER-LINE HIGH RISK:    200-239 MG/DL                170-199 MG/DL
  HIGH RISK:                >240 MG/DL                   >200 MG/DL    CLASS. 
FOR PRIMARY LDL CHOL PREVENTION:        LDL CHOL-CHILD/ADOLESCENTS*   DESIRABLE:
              <130 MG/DL              <110 MG/DL  BORDERLINE-HIGH RISK:   130-
159 MG/DL           110-129 MG/DL  HIGH RISK:              >160 MG/DL           
   >130 MG/DL   *CHILDREN AND ADOLESCENTS REPRESENTS INDIVIDUALA AGED 2-19 YEARS
 EXCLUSIVE. 

 

          Lym%      24.4 %    10.0-58.5                     [facility]  

 

                                        NORMAL RANGES 

****************************************************************************** 
Age         WBC      RBC        HGB           HCT         MCV        PLT 
------------------------------------------------------------------------------ 
Adult M   4.1-10.9    4.20-6.30   12.0-18.0   37.0-51.0   80-97      140-440  
Adult F   4.1-10.9    4.04-5.48   12.0-18.0   37.0-51.0   80-97      140-440  0
-1 Yr    5.0-20.0    3.9-5.9     15-18       MV: 44      MV: 91     MV: 277  2-9
 Yr.   6.0-17.0    3.8-5.4     11-13       MV: 37      MV: 78     MV: 300  10 
Yrs.   5.0-13.0    3.8-5.4     12-15       MV: 39      MV: 80     MV: 250    
NOTE:  * FOR ADULT BLACK MALES AND FEMALES, NORMAL WBC IS 2.9-7.7 K/ML  * FOR 
ADULT BLACK MALES AND FEMALES, NORMAL RBC,HGB, AND HCT IS 5% LESS  SOURCE FOR 
DATA: [employer] 1800 OPERATION MANUAL( AUTOMATED BLOOD COUNTS AND DIFF.)  APPENDIX
  B-3                      CHRONIC KIDNEY DISEASE STAGING PER NKF:   MALE GFR 
INTERPRETATION:  20-49 YRS:     >60 mL/min  Normal 50-59 YRS:     >56 mL/min  
Normal 60-69 YRS:     >49 mL/min  Normal 70-79 YRS:     >42 mL/min  Normal 80 
and above  >35 mL/min  Normal   FEMALE GRF INTERPRETATION:  20-39 YRS:    >60 
mL/min  Normal 40-49 YRS:    >58 mL/min  Normal 50-59 YRS:    >51 mL/min  Normal
 60-69 YRS:    >45 mL/min  Normal 70-79 YRS:    >39 mL/min  Normal 80 and above 
>32 mL/min  NormalCLASSIFICATION            CHOLESTEROL FOR ADULTS       
CHILDREN/ADOLESCENTS*   DESIRABLE:                <200 MG/DL                   
<170 MG/DL  BORDER-LINE HIGH RISK:    200-239 MG/DL                170-199 MG/DL
  HIGH RISK:                >240 MG/DL                   >200 MG/DL    CLASS. 
FOR PRIMARY LDL CHOL PREVENTION:        LDL CHOL-CHILD/ADOLESCENTS*   DESIRABLE:
              <130 MG/DL              <110 MG/DL  BORDERLINE-HIGH RISK:   130-
159 MG/DL           110-129 MG/DL  HIGH RISK:              >160 MG/DL           
   >130 MG/DL   *CHILDREN AND ADOLESCENTS REPRESENTS INDIVIDUALA AGED 2-19 YEARS
 EXCLUSIVE. 

 

          Neut%     70.0 %    37.0-92.0                     [facility] ([facility]  

 

                                        NORMAL RANGES 

****************************************************************************** 
Age         WBC      RBC        HGB           HCT         MCV        PLT 
------------------------------------------------------------------------------ 
Adult M   4.1-10.9    4.20-6.30   12.0-18.0   37.0-51.0   80-97      140-440  
Adult F   4.1-10.9    4.04-5.48   12.0-18.0   37.0-51.0   80-97      140-440  0
-1 Yr    5.0-20.0    3.9-5.9     15-18       MV: 44      MV: 91     MV: 277  2-9
 Yr.   6.0-17.0    3.8-5.4     11-13       MV: 37      MV: 78     MV: 300  10 
Yrs.   5.0-13.0    3.8-5.4     12-15       MV: 39      MV: 80     MV: 250    
NOTE:  * FOR ADULT BLACK MALES AND FEMALES, NORMAL WBC IS 2.9-7.7 K/ML  * FOR 
ADULT BLACK MALES AND FEMALES, NORMAL RBC,HGB, AND HCT IS 5% LESS  SOURCE FOR 
DATA: [employer] 1800 OPERATION MANUAL( AUTOMATED BLOOD COUNTS AND DIFF.)  APPENDIX
  B-3                      CHRONIC KIDNEY DISEASE STAGING PER NKF:   MALE GFR 
INTERPRETATION:  20-49 YRS:     >60 mL/min  Normal 50-59 YRS:     >56 mL/min  
Normal 60-69 YRS:     >49 mL/min  Normal 70-79 YRS:     >42 mL/min  Normal 80 
and above  >35 mL/min  Normal   FEMALE GRF INTERPRETATION:  20-39 YRS:    >60 
mL/min  Normal 40-49 YRS:    >58 mL/min  Normal 50-59 YRS:    >51 mL/min  Normal
 60-69 YRS:    >45 mL/min  Normal 70-79 YRS:    >39 mL/min  Normal 80 and above 
>32 mL/min  NormalCLASSIFICATION            CHOLESTEROL FOR ADULTS       
CHILDREN/ADOLESCENTS*   DESIRABLE:                <200 MG/DL                   
<170 MG/DL  BORDER-LINE HIGH RISK:    200-239 MG/DL                170-199 MG/DL
  HIGH RISK:                >240 MG/DL                   >200 MG/DL    CLASS. 
FOR PRIMARY LDL CHOL PREVENTION:        LDL CHOL-CHILD/ADOLESCENTS*   DESIRABLE:
              <130 MG/DL              <110 MG/DL  BORDERLINE-HIGH RISK:   130-
159 MG/DL           110-129 MG/DL  HIGH RISK:              >160 MG/DL           
   >130 MG/DL   *CHILDREN AND ADOLESCENTS REPRESENTS INDIVIDUALA AGED 2-19 YEARS
 EXCLUSIVE. 

 

          MXD%      5.6 %     0.1-24.0                      [facility] ([facility]  

 

                                        NORMAL RANGES 

****************************************************************************** 
Age         WBC      RBC        HGB           HCT         MCV        PLT 
------------------------------------------------------------------------------ 
Adult M   4.1-10.9    4.20-6.30   12.0-18.0   37.0-51.0   80-97      140-440  
Adult F   4.1-10.9    4.04-5.48   12.0-18.0   37.0-51.0   80-97      140-440  0
-1 Yr    5.0-20.0    3.9-5.9     15-18       MV: 44      MV: 91     MV: 277  2-9
 Yr.   6.0-17.0    3.8-5.4     11-13       MV: 37      MV: 78     MV: 300  10 
Yrs.   5.0-13.0    3.8-5.4     12-15       MV: 39      MV: 80     MV: 250    
NOTE:  * FOR ADULT BLACK MALES AND FEMALES, NORMAL WBC IS 2.9-7.7 K/ML  * FOR 
ADULT BLACK MALES AND FEMALES, NORMAL RBC,HGB, AND HCT IS 5% LESS  SOURCE FOR 
DATA: [employer] 1800 OPERATION MANUAL( AUTOMATED BLOOD COUNTS AND DIFF.)  APPENDIX
  B-3                      CHRONIC KIDNEY DISEASE STAGING PER NKF:   MALE GFR 
INTERPRETATION:  20-49 YRS:     >60 mL/min  Normal 50-59 YRS:     >56 mL/min  
Normal 60-69 YRS:     >49 mL/min  Normal 70-79 YRS:     >42 mL/min  Normal 80 
and above  >35 mL/min  Normal   FEMALE GRF INTERPRETATION:  20-39 YRS:    >60 
mL/min  Normal 40-49 YRS:    >58 mL/min  Normal 50-59 YRS:    >51 mL/min  Normal
 60-69 YRS:    >45 mL/min  Normal 70-79 YRS:    >39 mL/min  Normal 80 and above 
>32 mL/min  NormalCLASSIFICATION            CHOLESTEROL FOR ADULTS       
CHILDREN/ADOLESCENTS*   DESIRABLE:                <200 MG/DL                   
<170 MG/DL  BORDER-LINE HIGH RISK:    200-239 MG/DL                170-199 MG/DL
  HIGH RISK:                >240 MG/DL                   >200 MG/DL    CLASS. 
FOR PRIMARY LDL CHOL PREVENTION:        LDL CHOL-CHILD/ADOLESCENTS*   DESIRABLE:
              <130 MG/DL              <110 MG/DL  BORDERLINE-HIGH RISK:   130-
159 MG/DL           110-129 MG/DL  HIGH RISK:              >160 MG/DL           
   >130 MG/DL   *CHILDREN AND ADOLESCENTS REPRESENTS INDIVIDUALA AGED 2-19 YEARS
 EXCLUSIVE. 

 

          Lym#      1.9 10E3/uL 0.6-4.1                       [facility] ([facility]  

 

                                        NORMAL RANGES 

****************************************************************************** 
Age         WBC      RBC        HGB           HCT         MCV        PLT 
------------------------------------------------------------------------------ 
Adult M   4.1-10.9    4.20-6.30   12.0-18.0   37.0-51.0   80-97      140-440  
Adult F   4.1-10.9    4.04-5.48   12.0-18.0   37.0-51.0   80-97      140-440  0
-1 Yr    5.0-20.0    3.9-5.9     15-18       MV: 44      MV: 91     MV: 277  2-9
 Yr.   6.0-17.0    3.8-5.4     11-13       MV: 37      MV: 78     MV: 300  10 
Yrs.   5.0-13.0    3.8-5.4     12-15       MV: 39      MV: 80     MV: 250    
NOTE:  * FOR ADULT BLACK MALES AND FEMALES, NORMAL WBC IS 2.9-7.7 K/ML  * FOR 
ADULT BLACK MALES AND FEMALES, NORMAL RBC,HGB, AND HCT IS 5% LESS  SOURCE FOR 
DATA: [employer] 1800 OPERATION MANUAL( AUTOMATED BLOOD COUNTS AND DIFF.)  APPENDIX
  B-3                      CHRONIC KIDNEY DISEASE STAGING PER NKF:   MALE GFR 
INTERPRETATION:  20-49 YRS:     >60 mL/min  Normal 50-59 YRS:     >56 mL/min  
Normal 60-69 YRS:     >49 mL/min  Normal 70-79 YRS:     >42 mL/min  Normal 80 
and above  >35 mL/min  Normal   FEMALE GRF INTERPRETATION:  20-39 YRS:    >60 
mL/min  Normal 40-49 YRS:    >58 mL/min  Normal 50-59 YRS:    >51 mL/min  Normal
 60-69 YRS:    >45 mL/min  Normal 70-79 YRS:    >39 mL/min  Normal 80 and above 
>32 mL/min  NormalCLASSIFICATION            CHOLESTEROL FOR ADULTS       
CHILDREN/ADOLESCENTS*   DESIRABLE:                <200 MG/DL                   
<170 MG/DL  BORDER-LINE HIGH RISK:    200-239 MG/DL                170-199 MG/DL
  HIGH RISK:                >240 MG/DL                   >200 MG/DL    CLASS. 
FOR PRIMARY LDL CHOL PREVENTION:        LDL CHOL-CHILD/ADOLESCENTS*   DESIRABLE:
              <130 MG/DL              <110 MG/DL  BORDERLINE-HIGH RISK:   130-
159 MG/DL           110-129 MG/DL  HIGH RISK:              >160 MG/DL           
   >130 MG/DL   *CHILDREN AND ADOLESCENTS REPRESENTS INDIVIDUALA AGED 2-19 YEARS
 EXCLUSIVE. 

 

          Neut#     5.5 %     2.0-7.8                       [facility] ([facility]  

 

                                        NORMAL RANGES 

****************************************************************************** 
Age         WBC      RBC        HGB           HCT         MCV        PLT 
------------------------------------------------------------------------------ 
Adult M   4.1-10.9    4.20-6.30   12.0-18.0   37.0-51.0   80-97      140-440  
Adult F   4.1-10.9    4.04-5.48   12.0-18.0   37.0-51.0   80-97      140-440  0
-1 Yr    5.0-20.0    3.9-5.9     15-18       MV: 44      MV: 91     MV: 277  2-9
 Yr.   6.0-17.0    3.8-5.4     11-13       MV: 37      MV: 78     MV: 300  10 
Yrs.   5.0-13.0    3.8-5.4     12-15       MV: 39      MV: 80     MV: 250    
NOTE:  * FOR ADULT BLACK MALES AND FEMALES, NORMAL WBC IS 2.9-7.7 K/ML  * FOR 
ADULT BLACK MALES AND FEMALES, NORMAL RBC,HGB, AND HCT IS 5% LESS  SOURCE FOR 
DATA: [employer] 1800 OPERATION MANUAL( AUTOMATED BLOOD COUNTS AND DIFF.)  APPENDIX
  B-3                      CHRONIC KIDNEY DISEASE STAGING PER NKF:   MALE GFR 
INTERPRETATION:  20-49 YRS:     >60 mL/min  Normal 50-59 YRS:     >56 mL/min  
Normal 60-69 YRS:     >49 mL/min  Normal 70-79 YRS:     >42 mL/min  Normal 80 
and above  >35 mL/min  Normal   FEMALE GRF INTERPRETATION:  20-39 YRS:    >60 
mL/min  Normal 40-49 YRS:    >58 mL/min  Normal 50-59 YRS:    >51 mL/min  Normal
 60-69 YRS:    >45 mL/min  Normal 70-79 YRS:    >39 mL/min  Normal 80 and above 
>32 mL/min  NormalCLASSIFICATION            CHOLESTEROL FOR ADULTS       
CHILDREN/ADOLESCENTS*   DESIRABLE:                <200 MG/DL                   
<170 MG/DL  BORDER-LINE HIGH RISK:    200-239 MG/DL                170-199 MG/DL
  HIGH RISK:                >240 MG/DL                   >200 MG/DL    CLASS. 
FOR PRIMARY LDL CHOL PREVENTION:        LDL CHOL-CHILD/ADOLESCENTS*   DESIRABLE:
              <130 MG/DL              <110 MG/DL  BORDERLINE-HIGH RISK:   130-
159 MG/DL           110-129 MG/DL  HIGH RISK:              >160 MG/DL           
   >130 MG/DL   *CHILDREN AND ADOLESCENTS REPRESENTS INDIVIDUALA AGED 2-19 YEARS
 EXCLUSIVE. 

 

          MXD#      0.4 10E3/uL 0.0-1.8                       [facility] ([facility]  

 

                                        NORMAL RANGES 

****************************************************************************** 
Age         WBC      RBC        HGB           HCT         MCV        PLT 
------------------------------------------------------------------------------ 
Adult M   4.1-10.9    4.20-6.30   12.0-18.0   37.0-51.0   80-97      140-440  
Adult F   4.1-10.9    4.04-5.48   12.0-18.0   37.0-51.0   80-97      140-440  0
-1 Yr    5.0-20.0    3.9-5.9     15-18       MV: 44      MV: 91     MV: 277  2-9
 Yr.   6.0-17.0    3.8-5.4     11-13       MV: 37      MV: 78     MV: 300  10 
Yrs.   5.0-13.0    3.8-5.4     12-15       MV: 39      MV: 80     MV: 250    
NOTE:  * FOR ADULT BLACK MALES AND FEMALES, NORMAL WBC IS 2.9-7.7 K/ML  * FOR 
ADULT BLACK MALES AND FEMALES, NORMAL RBC,HGB, AND HCT IS 5% LESS  SOURCE FOR 
DATA: [employer] 1800 OPERATION MANUAL( AUTOMATED BLOOD COUNTS AND DIFF.)  APPENDIX
  B-3                      CHRONIC KIDNEY DISEASE STAGING PER NKF:   MALE GFR 
INTERPRETATION:  20-49 YRS:     >60 mL/min  Normal 50-59 YRS:     >56 mL/min  
Normal 60-69 YRS:     >49 mL/min  Normal 70-79 YRS:     >42 mL/min  Normal 80 
and above  >35 mL/min  Normal   FEMALE GRF INTERPRETATION:  20-39 YRS:    >60 
mL/min  Normal 40-49 YRS:    >58 mL/min  Normal 50-59 YRS:    >51 mL/min  Normal
 60-69 YRS:    >45 mL/min  Normal 70-79 YRS:    >39 mL/min  Normal 80 and above 
>32 mL/min  NormalCLASSIFICATION            CHOLESTEROL FOR ADULTS       
CHILDREN/ADOLESCENTS*   DESIRABLE:                <200 MG/DL                   
<170 MG/DL  BORDER-LINE HIGH RISK:    200-239 MG/DL                170-199 MG/DL
  HIGH RISK:                >240 MG/DL                   >200 MG/DL    CLASS. 
FOR PRIMARY LDL CHOL PREVENTION:        LDL CHOL-CHILD/ADOLESCENTS*   DESIRABLE:
              <130 MG/DL              <110 MG/DL  BORDERLINE-HIGH RISK:   130-
159 MG/DL           110-129 MG/DL  HIGH RISK:              >160 MG/DL           
   >130 MG/DL   *CHILDREN AND ADOLESCENTS REPRESENTS INDIVIDUALA AGED 2-19 YEARS
 EXCLUSIVE. 

 

          MPV       11.0 fL   9.0-13.0                      [facility] ([facility]  

 

                                        NORMAL RANGES 

****************************************************************************** 
Age         WBC      RBC        HGB           HCT         MCV        PLT 
------------------------------------------------------------------------------ 
Adult M   4.1-10.9    4.20-6.30   12.0-18.0   37.0-51.0   80-97      140-440  
Adult F   4.1-10.9    4.04-5.48   12.0-18.0   37.0-51.0   80-97      140-440  0
-1 Yr    5.0-20.0    3.9-5.9     15-18       MV: 44      MV: 91     MV: 277  2-9
 Yr.   6.0-17.0    3.8-5.4     11-13       MV: 37      MV: 78     MV: 300  10 
Yrs.   5.0-13.0    3.8-5.4     12-15       MV: 39      MV: 80     MV: 250    
NOTE:  * FOR ADULT BLACK MALES AND FEMALES, NORMAL WBC IS 2.9-7.7 K/ML  * FOR 
ADULT BLACK MALES AND FEMALES, NORMAL RBC,HGB, AND HCT IS 5% LESS  SOURCE FOR 
DATA: [employer] 1800 OPERATION MANUAL( AUTOMATED BLOOD COUNTS AND DIFF.)  APPENDIX
  B-3                      CHRONIC KIDNEY DISEASE STAGING PER NKF:   MALE GFR 
INTERPRETATION:  20-49 YRS:     >60 mL/min  Normal 50-59 YRS:     >56 mL/min  
Normal 60-69 YRS:     >49 mL/min  Normal 70-79 YRS:     >42 mL/min  Normal 80 
and above  >35 mL/min  Normal   FEMALE GRF INTERPRETATION:  20-39 YRS:    >60 
mL/min  Normal 40-49 YRS:    >58 mL/min  Normal 50-59 YRS:    >51 mL/min  Normal
 60-69 YRS:    >45 mL/min  Normal 70-79 YRS:    >39 mL/min  Normal 80 and above 
>32 mL/min  NormalCLASSIFICATION            CHOLESTEROL FOR ADULTS       
CHILDREN/ADOLESCENTS*   DESIRABLE:                <200 MG/DL                   
<170 MG/DL  BORDER-LINE HIGH RISK:    200-239 MG/DL                170-199 MG/DL
  HIGH RISK:                >240 MG/DL                   >200 MG/DL    CLASS. 
FOR PRIMARY LDL CHOL PREVENTION:        LDL CHOL-CHILD/ADOLESCENTS*   DESIRABLE:
              <130 MG/DL              <110 MG/DL  BORDERLINE-HIGH RISK:   130-
159 MG/DL           110-129 MG/DL  HIGH RISK:              >160 MG/DL           
   >130 MG/DL   *CHILDREN AND ADOLESCENTS REPRESENTS INDIVIDUALA AGED 2-19 YEARS
 EXCLUSIVE. 









                    ID                  Date                Data Source

 

                    [id number]         2020 03:59:00 PM EST [facility] ([facility]









          Name      Value     Range     Interpretation Code Description Data Va

rce(s) Supporting 

Document(s)

 

           Prostate specific Ag [Mass/volume] in Serum or Plasma 1.25 ng/mL 0.0-

4.0                          

[facility] ([facility]  









                    ID                  Date                Data Source

 

                    [id number]      10/28/2020 11:45:00 [place] [address]            
                     OPERATIVE SUMMARYNAME: PALLADINO LEWIS W                   
                       DATE OF BIRTH: TTENDING PHYS: Radha Linder MD
                                 ACCT#: [id number] DATE: 10/28/20        
                                 MR#: [id number] OF PROCEDURE: 
10/28/2020PREOPERATIVE DIAGNOSIS: Age-related nuclear cataract, right 
eye.POSTOPERATIVE DIAGNOSIS: Age-related nuclear cataract, right eye.PROCEDURE 
PERFORMED: Phacoemulsification of posterior chamber with intraocular 
lensimplantation right eye.ANESTHESIA: Topical sedation.LENS USED: AUOOTO 18.5 
diopters.DETAILS OF PROCEDURE: The eye was prepped and draped in the usual 
fashion. Lidspeculum was placed in the lid. A stab incision was made to the 
anterior chamber with asuperblade. 1% non-preserved lidocaine was instilled and 
viscoelastic instilled. The eye wasrefixated, and a 2.4 mm Keratome made a clear
 corneal and temporal limbal incision. The lens wasthen hydrodissected, and then
 it was grooved in two meridians at the phacoemulsification. The lenswas 
scrapped into four quadrants. Each quadrant was in good phacoemulsification. The
 remainingcortex was removed with I&A unit. The capsular bag was refilled with 
viscoelastic and theposterior chamber and intraocular lens were inserted into 
the capsular bag with no difficulty. Anyremaining viscoelastic was removed with 
the I&A, and the wound was hydrated and balanced saltand ceftriaxone were 
instilled. The patient tolerated the procedure well, and went to the 
recoveryroom in stable condition.DD: Radha Linder MD 10/28/20 11:30DT: JOSEPH 
10/28/20 11:44DS: Radha Linder MD              20 13:08                  
                                                                              1 
 









          Name      Value     Range     Interpretation Code Description Data Va

rce(s) Supporting 

Document(s)

 

                                                                       









                    ID                  Date                Data Source

 

                    [id number]      10/14/2020 09:25:00 [place] [address]            
                     OPERATIVE SUMMARYNAME: PALLADINO LEWIS W                   
                       DATE OF BIRTH: TTENDING PHYS: Radha Linder MD
                                 ACCT#: [id number] DATE: 10/14/20        
                                 MR#: [id number] OF PROCEDURE: 
10/14/2020PREOPERATIVE DIAGNOSIS: Age-related nuclear cataract, left 
eye.POSTOPERATIVE DIAGNOSIS: Age-related nuclear cataract, left eye.PROCEDURE 
PERFORMED: Phacoemulsification of posterior chamber with intraocular 
lensimplantation left eye.ANESTHESIA: Topical sedation.LENS USED: AUOOTO 20.0 
diopters.DETAILS OF PROCEDURE: The eye was prepped and draped in the usual 
fashion. Lidspeculum was placed in the lid. A stab incision was made to the 
anterior chamber with asuperblade. 1% non-preserved lidocaine was instilled and 
viscoelastic instilled. The eye wasrefixated, and a 2.4 mm Keratome made a clear
 corneal and temporal limbal incision. The lens wasthen hydrodissected, and then
 it was grooved in two meridians at the phacoemulsification. The lenswas 
scrapped into four quadrants. Each quadrant was in good phacoemulsification. The
 remainingcortex was removed with I&A unit. The capsular bag was refilled with 
viscoelastic and theposterior chamber and intraocular lens were inserted into 
the capsular bag with no difficulty. Anyremaining viscoelastic was removed with 
the I&A, and the wound was hydrated and balanced saltand ceftriaxone were 
instilled. The patient tolerated the procedure well, and went to the 
recoveryroom in stable condition.DD: Radha Linder MD 10/14/20 09:15DT: JOSEPH 
10/14/20 09:24DS: Radha Linder MD              20 13:08                  
                                                                              1 
 









          Name      Value     Range     Interpretation Code Description Data Va

rce(s) Supporting 

Document(s)

 

                                                                       









                    ID                  Date                Data Source

 

                    [id number]         10/26/2020 10:50:00 AM EDT [facility]









          Name      Value     Range     Interpretation Code Description Data Va

rce(s) Supporting 

Document(s)

 

           Surgical pathology study Laboratory test result                      

            [facility] ([facility]                            

 

                                        FINAL DIAGNOSIS



Nasal polyp, left, excision:

Inflamed edematous sinonasal mucosa with abundant neutrophils,

lymphocytes and eosinophils.

Negative for malignancy.

                                        10/28/2020 - 



CLINICAL DIAGNOSIS



Nasal polyp

                                        10/27/2020 - 1328



GROSS DIAGNOSIS



Received in formalin labeled "nasal polyp left nare" and consists of a

fragment of tissue 1 x 0.5 x 0.2 cm.  All in one.

                                        -OA

                                        10/28/2020 - 



Signed________ JUAN JOSE RAJAN MD 10/28/2020 1520

 









                    ID                  Date                Data Source

 

                    [id number]             10/23/2020 10:47:00 AM EDT [facility]









          Name      Value     Range     Interpretation Code Description Data Va

rce(s) Supporting 

Document(s)

 

          SARS-CoV-2 (COVID19)                                         [facility]   

  

 

                                        This lab was ordered by [facility]

 and reported by [facility]. 







                                        Procedure

 

                                          



                                                                                
                                                                                
                                                                                
                                                                                
                                                                                
                                                



Social History

          



           Code       Duration   Value      Status     Description Data Source(s

)

 

           Smoking    10/28/2021 12:00:00 AM EDT Current Smoker completed  Curre

nt Smoker eCW1 

([facility])

 

           Smoking    10/28/2021 12:00:00 AM EDT Current Smoker completed  Curre

nt Smoker eCW1 

([facility])

 

           Smoking    2021 12:00:00 AM EDT Current Smoker completed  Curre

nt Smoker eCW1 

([facility])

 

           Smoking    2021 12:00:00 AM EDT Current Smoker completed  Curre

nt Smoker eCW1 

([facility])

 

           Smoking    2021 12:00:00 AM EDT Current Smoker completed  Curre

nt Smoker eCW1 

([facility])

 

           Smoking    2021 12:00:00 AM EDT Current Smoker completed  Curre

nt Smoker eCW1 

([facility])

 

           Smoking    2021 12:00:00 AM EDT Current Smoker completed  Curre

nt Smoker eCW1 

([facility])

 

           Smoking    2021 12:00:00 AM EDT Current Smoker completed  Curre

nt Smoker eCW1 

([facility])

 

           Smoking    2021 12:00:00 AM EDT Current Smoker completed  Curre

nt Smoker eCW1 

([facility])

 

           Smoking    2021 12:00:00 AM EDT Current Smoker completed  Curre

nt Smoker eCW1 

([facility])

 

           Smoking    2021 12:00:00 AM EDT Current Smoker completed  Curre

nt Smoker eCW1 

([facility])

 

           Smoking    2021 12:00:00 AM EDT Current Smoker completed  Curre

nt Smoker eCW1 

([facility])

 

           Smoking    2021 12:00:00 AM EDT Current Smoker completed  Curre

nt Smoker eCW1 

([facility])

 

           Smoking    2021 12:00:00 AM EST Current Smoker completed  Curre

nt Smoker eCW1 

([facility])

 

           Smoking    2021 12:00:00 AM EST Current Smoker completed  Curre

nt Smoker eCW1 

([facility])

 

           Smoking    2021 12:00:00 AM EST Current Smoker completed  Curre

nt Smoker eCW1 

([facility])

 

           Smoking    2021 12:00:00 AM EST Current Smoker completed  Curre

nt Smoker eCW1 

([facility])

 

           Smoking    2021 12:00:00 AM EST Current Smoker completed  Curre

nt Smoker eCW1 

([facility])

 

           Smoking    2021 12:00:00 AM EST Current Smoker completed  Curre

nt Smoker eCW1 

([facility])

 

           Smoking    2021 12:00:00 AM EST Current Smoker completed  Curre

nt Smoker eCW1 

([facility])

 

           Smoking    2021 12:00:00 AM EST Current Smoker completed  Curre

nt Smoker eCW1 

([facility])

 

           Smoking    2020 12:00:00 AM EST Current Smoker completed  Curre

nt Smoker eCW1 

([facility])

 

           Smoking    2020 12:00:00 AM EST Current Smoker completed  Curre

nt Smoker eCW1 

([facility])

 

           Smoking    2020 12:00:00 AM EST Current Smoker completed  Curre

nt Smoker eCW1 

([facility])

 

           Smoking    2020 12:00:00 AM EST Current Smoker completed  Curre

nt Smoker eCW1 

([facility])

 

           Smoking    2020 12:00:00 AM EST Current Smoker completed  Curre

nt Smoker eCW1 

([facility])

 

           Smoking    2020 12:00:00 AM EST Current Smoker completed  Curre

nt Smoker eCW1 

([facility])

 

           Smoking    2020 12:00:00 AM EST Current Smoker completed  Curre

nt Smoker eCW1 

([facility])

 

           Smoking    10/30/2020 12:00:00 AM EDT Current Smoker completed  Curre

nt Smoker eCW1 

([facility])



                                                                                
                                                                                
                                                                                
                                                                                
                                                          



Vital Signs

          



                    ID                  Date                Data Source

 

                    UNK                                      









           Name       Value      Range      Interpretation Code Description Data

 Source(s)

 

           Body weight 140 [lb_av]                       140 [lb_av] eCW1 (Formerly [facility])

 

           Body height 67 [in_i]                        67 [in_i]  eCW1 (Replaced by [facility])

 

           Body mass index (BMI) [Ratio] 21.92 kg/m2                       21.92

 kg/m2 eCW1 ([facility])

 

           Heart rate 76 /min                          76 /min    eCW1 ([facility])

 

           Respiratory rate 18 /min                          18 /min    eCW1 (Formerly [facility])

 

           Body temperature 98.6 [degF]                       98.6 [degF] eCW1 (

[facility])

 

           Systolic blood pressure 130 mm[Hg]                       130 mm[Hg] e

CW1 ([facility])

 

           Diastolic blood pressure 72 mm[Hg]                        72 mm[Hg]  

eCW1 ([facility])

 

           Systolic blood pressure 136 mm[Hg]                       136 mm[Hg] M

EDENT ([facility], )

 

           Diastolic blood pressure 74 mm[Hg]                        74 mm[Hg]  

MEDENT ([facility], )

 

           Body height 67 [in_i]                        67 [in_i]  MEDENT (Health system, )

 

                                        5'7" 

 

           Body weight 145.00 [lb_av]                       145.00 [lb_av] MEDEN

T ([facility], )

 

           Body mass index (BMI) [Ratio] 22.7 kg/m2                       22.7 k

g/m2 MEDENT ([facility])

 

           Ideal body weight 148 [lb_av]                       148 [lb_av] MEDEN

T ([facility])

 

           Body weight 65.772 kg                        65.772 kg  MEDENT ([facility])

 

           Body surface area Derived from formula 1.76 m2                       

   1[facility]76 [facility]    [facility]

 

           Systolic blood pressure 152 mm[Hg]                       152 mm[Hg] M

EDENT ([facility]

 

           Body temperature 97.5 [degF]                       97.5 [degF] MEDENT

 ([facility]

 

           Heart rate 60 /min                          60 /min    MEDENT ([facility]

 

           Respiratory rate 18 /min                          18 /min    MEDENT (

[facility]

 

           Body height 67 [in_i]                        67 [in_i]  MEDENT ([facility]

 

                                        5'7" 

 

           Body weight 140.00 [lb_av]                       140.00 [lb_av] MEDEN

T ([facility]

 

           Ideal body weight 148 [lb_av]                       148 [lb_av] MEDEN

T ([facility]

 

           Body mass index (BMI) [Ratio] 21.9 kg/m2                       21.9 k

g/m2 MEDENT ([facility]

 

           Oxygen saturation in Arterial blood by Pulse oximetry 98 %           

                  98 %       MEDENT 

([facility]

 

           Diastolic blood pressure 74 mm[Hg]                        74 mm[Hg]  

MEDENT ([facility]

 

           Systolic blood pressure 118 mm[Hg]                       118 mm[Hg] M

EDENT ([facility]

 

           Diastolic blood pressure 60 mm[Hg]                        60 mm[Hg]  

MEDENT ([facility]

 

           Body temperature 98.2 [degF]                       98.2 [degF] MEDENT

 ([facility]

 

           Heart rate 72 /min                          72 /min    MEDENT ([facility]

 

           Respiratory rate 16 /min                          16 /min    MEDENT (

[facility]

 

           Body height 67 [in_i]                        67 [in_i]  MEDENT ([facility]

 

                                        5'7" 

 

           Body weight 142.00 [lb_av]                       142.00 [lb_av] MEDEN

T ([facility]

 

           Ideal body weight 148 [lb_av]                       148 [lb_av] MEDEN

T ([facility]

 

           Body mass index (BMI) [Ratio] 22.2 kg/m2                       22.2 k

g/m2 MEDENT ([facility]

 

           Oxygen saturation in Arterial blood by Pulse oximetry 98 %           

                  98 %       MEDENT 

([facility]

 

           Body weight 136.6 [lb_av]                       136.6 [lb_av] eCW1 ([facility])

 

           Body height 67 [in_i]                        67 [in_i]  eCW1 (Replaced by [facility])

 

           Body mass index (BMI) [Ratio] 21.39 kg/m2                       21.39

 kg/m2 eCW1 ([facility])

 

           Heart rate 86 /min                          86 /min    eCW1 ([facility])

 

           Respiratory rate 18 /min                          18 /min    eCW1 (Formerly [facility])

 

           Body temperature 98.0 [degF]                       98.0 [degF] eCW1 (

[facility])

 

           Systolic blood pressure 148 mm[Hg]                       148 mm[Hg] e

CW1 ([facility])

 

           Diastolic blood pressure 76 mm[Hg]                        76 mm[Hg]  

eCW1 ([facility])

 

           Body height 67 [in_i]                        67 [facility]_i]  [facility]

 

                                        5'7" 

 

           Body weight 140.00 [lb_av]                       140.00 [lb_av] MEDEN

T ([facility], )

 

           Body mass index (BMI) [Ratio] 21.9 kg/m2                       21.9 k

g/m2 [facility]

 

           Ideal body weight 148 [lb_av]                       148 [lb_av] MEDEN

T ([facility], )

 

           Body weight 63.504 kg                        63.504 kg  [facility] (Health system, )

 

           Body surface area Derived from formula 1.74 m2                       

   1.74 m2    [facility] ([facility])

 

           Body height 67 [in_i]                        67 [in_i]  MEDENT ([facility])

 

                                        5'7" 

 

           Body weight 140.00 [lb_av]                       140.00 [lb_av] MEDEN

T ([facility])

 

           Body mass index (BMI) [Ratio] 21.9 kg/m2                       21.9 k

g/m2 [facility] ([facility])

 

           Ideal body weight 148 [lb_av]                       148 [lb_av] MEDEN

T ([facility])

 

           Body weight 63.504 kg                        63.504 kg  MEDENT ([facility])

 

           Body surface area Derived from formula 1.74 m2                       

   1.74 m2    [facility] ([facility])

 

           Body weight 63.504 kg                        63.504 kg  [facility] ([facility])

 

           Body surface area Derived from formula 1.74 m2                       

   1.74 m2    [facility] ([facility])

 

           Body height 67 [in_i]                        67 [in_i]  [facility]

 

                                        5'7" 

 

           Body weight 140.00 [lb_av]                       140.00 [lb_av] MEDEN

T ([facility])

 

           Body mass index (BMI) [Ratio] 21.9 kg/m2                       21.9 k

g/m2 [facility] ([facility])

 

           Ideal body weight 148 [lb_av]                       148 [lb_av] MEDEN

T ([facility])

 

           Body weight 140.00 [lb_av]                       140.00 [lb_av] MEDEN

T ([facility])

 

           Body mass index (BMI) [Ratio] 21.9 kg/m2                       21.9 k

g/m2 [facility] ([facility])

 

           Body weight 63.504 kg                        63.504 kg  [facility]

 

           Ideal body weight 148 [lb_av]                       148 [lb_av] MEDEN

T ([facility])

 

           Body surface area Derived from formula 1.74 m2                       

   1.74 m2    [facility] ([facility])

 

           Body height 67 [in_i]                        67 [in_i]  [facility]

 

                                        5'7" 

 

           Respiratory rate 16 /min                          16 /min    MEDENT (

[facility]

 

           Diastolic blood pressure 60 mm[Hg]                        60 mm[Hg]  

MEDENT ([facility]

 

           Ideal body weight 148 [lb_av]                       148 [lb_av] MEDEN

T ([facility]

 

           Body mass index (BMI) [Ratio] 21.3 kg/m2                       21.3 k

g/m2 MEDENT ([facility]

 

           Systolic blood pressure 128 mm[Hg]                       128 mm[Hg] M

EDENT ([facility]

 

           Body temperature 97.3 [degF]                       97.3 [degF] MEDENT

 ([facility]

 

           Heart rate 61 /min                          61 /min    MEDENT ([facility]

 

           Body height 67 [in_i]                        67 [in_i]  MEDENT ([facility]

 

                                        5'7" 

 

           Body weight 136.00 [lb_av]                       136.00 [lb_av] MEDEN

T ([facility]

 

           Oxygen saturation in Arterial blood by Pulse oximetry 96 %           

                  96 %       MEDENT 

([facility]

 

           Body height 67 [in_i]                        67 [in_i]  MEDENT ([facility])

 

                                        5'7" 

 

           Body weight 140.00 [lb_av]                       140.00 [lb_av] MEDEN

T ([facility])

 

           Body mass index (BMI) [Ratio] 21.9 kg/m2                       21.9 k

g/m2 MEDENT ([facility])

 

           Ideal body weight 148 [lb_av]                       148 [lb_av] MEDEN

T ([facility])

 

           [facility] 63.504 [facility]                        63.504 [facility]  [facility]

 

           Body surface area Derived from formula 1.74 m2                       

   1.74 m2    [facility] ([facility])

 

           Ideal body weight 148 [lb_av]                       148 [lb_av] MEDEN

T ([facility]

 

           Body mass index (BMI) [Ratio] 21.1 kg/m2                       21.1 k

g/m2 MEDENT ([facility]

 

           Body weight 135.00 [lb_av]                       135.00 [lb_av] MEDEN

T ([facility]

 

           Systolic blood pressure 112 mm[Hg]                       112 mm[Hg] M

EDENT ([facility]

 

           Diastolic blood pressure 78 mm[Hg]                        78 mm[Hg]  

MEDENT ([facility]

 

           Body temperature 97.2 [degF]                       97.2 [degF] MEDENT

 ([facility]

 

           Heart rate 62 /min                          62 /min    MEDENT ([facility]

 

           Respiratory rate 18 /min                          18 /min    MEDENT (

[facility]

 

           Body height 67 [in_i]                        67 [in_i]  MEDENT ([facility]

 

                                        5'7" 

 

           Body weight 138.2 [lb_av]                       138.2 [lb_av] eCW1 ([facility])

 

           Body height 67 [in_i]                        67 [in_i]  eCW1 (Replaced by [facility])

 

           Body mass index (BMI) [Ratio] 21.64 kg/m2                       21.64

 kg/m2 eCW1 ([facility])

 

           Heart rate 89 /min                          89 /min    eCW1 ([facility])

 

           Respiratory rate 18 /min                          18 /min    eCW1 (Formerly [facility])

 

           Body temperature 97.2 [degF]                       97.2 [degF] eCW1 (

[facility])

 

           Systolic blood pressure 138 mm[Hg]                       138 mm[Hg] e

CW1 ([facility])

 

           Diastolic blood pressure 72 mm[Hg]                        72 mm[Hg]  

eCW1 ([facility])

 

           Heart rate 60 /min                          60 /min    MEDENT ([facility]

 

           Respiratory rate 16 /min                          16 /min    MEDENT (

[facility]

 

           Body height 67 [in_i]                        67 [in_i]  MEDENT ([facility]

 

                                        5'7" 

 

           Body weight 135.00 [lb_av]                       135.00 [lb_av] MEDEN

T ([facility]

 

           Ideal body weight 148 [lb_av]                       148 [lb_av] MEDEN

T ([facility]

 

           Body mass index (BMI) [Ratio] 21.1 kg/m2                       21.1 k

g/m2 MEDENT ([facility]

 

           Oxygen saturation in Arterial blood by Pulse oximetry 99 %           

                  99 %       MEDENT 

([facility]

 

           Body temperature 98.3 [degF]                       98.3 [degF] MEDENT

 ([facility]

 

           Systolic blood pressure 138 mm[Hg]                       138 mm[Hg] M

EDENT ([facility]

 

           Diastolic blood pressure 80 mm[Hg]                        80 mm[Hg]  

MEDENT ([facility]

 

           Body height 67 [in_i]                        67 [in_i]  [facility]

 

                                        5'7" 

 

           Body weight 140.00 [lb_av]                       140.00 [lb_av] MEDEN

T ([facility])

 

           Body mass index (BMI) [Ratio] 21.9 kg/m2                       21.9 k

g/m2 [facility]

 

           Ideal body weight 148 [lb_av]                       148 [lb_av] MEDEN

T ([facility])

 

           Body weight 63.504 kg                        63.504 kg  [facility] ([facility])

 

           Body surface area Derived from formula 1.74 m2                       

   1.74 m2    [facility] ([facility])

 

           Ideal body weight 148 [lb_av]                       148 [lb_av] MEDEN

T ([facility]

 

           Oxygen saturation in Arterial blood by Pulse oximetry 99 %           

                  99 %       MEDENT 

([facility]

 

           Body mass index (BMI) [Ratio] 21.5 kg/m2                       21.5 k

g/m2 MEDENT ([facility]

 

           Systolic blood pressure 126 mm[Hg]                       126 mm[Hg] M

EDENT ([facility]

 

           Diastolic blood pressure 88 mm[Hg]                        88 mm[Hg]  

MEDENT ([facility]

 

           Body temperature 98.3 [degF]                       98.3 [degF] MEDENT

 ([facility]

 

           Heart rate 62 /min                          62 /min    MEDENT ([facility]

 

           Respiratory rate 18 /min                          18 /min    MEDENT (

[facility]

 

           Body height 67 [in_i]                        67 [in_i]  MEDENT ([facility]

 

                                        5'7" 

 

           Body weight 137.00 [lb_av]                       137.00 [lb_av] MEDEN

T ([facility]

 

           Body temperature 96.9 [degF]                       96.9 [degF] MEDENT

 ([facility])

 

           Body weight 139.6 [lb_av]                       139.6 [lb_av] eCW1 ([facility])

 

           Body height 67 [in_i]                        67 [in_i]  eCW1 (Replaced by [facility])

 

           Body mass index (BMI) [Ratio] 21.86 kg/m2                       21.86

 kg/m2 eCW1 ([facility])

 

           Heart rate 66 /min                          66 /min    eCW1 ([facility])

 

           Respiratory rate 18 /min                          18 /min    eCW1 ([facility]ly [facility])

 

           Body temperature 97.7 [degF]                       97.7 [degF] eCW1 (

[facility])

 

           Systolic blood pressure 120 mm[Hg]                       120 mm[Hg] e

CW1 ([facility])

 

           Diastolic blood pressure 78 mm[Hg]                        78 mm[Hg]  

eCW1 ([facility])

 

           Body height 67 [in_i]                        67 [in_i]  MEDENT (Health system, )

 

                                        5'7" 

 

           Body weight 140.00 [lb_av]                       140.00 [lb_av] MEDEN

T ([facility], )

 

           Body mass index (BMI) [Ratio] 21.9 kg/m2                       21.9 k

g/m2 [facility] ([facility], )

 

           Ideal body weight 148 [lb_av]                       148 [lb_av] MEDEN

T ([facility], )

 

           Body weight 63.504 kg                        63.504 kg  [facility] (Health system, )

 

           Body surface area Derived from formula 1.74 m2                       

   1.74 m2    [facility] ([facility], )

 

           Oxygen saturation in Arterial blood by Pulse oximetry 99 %           

                  99 %       MEDENT 

([facility]

 

           Systolic blood pressure 148 mm[Hg]                       148 mm[Hg] M

EDENT ([facility]

 

           Diastolic blood pressure 84 mm[Hg]                        84 mm[Hg]  

MEDENT ([facility]

 

           Systolic blood pressure 142 mm[Hg]                       142 mm[Hg] M

EDENT ([facility]

 

           Diastolic blood pressure 72 mm[Hg]                        72 mm[Hg]  

MEDENT ([facility]

 

           Body temperature 97.6 [degF]                       97.6 [degF] MEDENT

 ([facility]

 

           Heart rate 52 /min                          52 /min    MEDENT ([facility]

 

           Respiratory rate 18 /min                          18 /min    MEDENT (

[facility]

 

           Body height 67 [in_i]                        67 [in_i]  MEDENT ([facility]

 

                                        5'7" 

 

           Body weight 139.00 [lb_av]                       139.00 [lb_av] MEDEN

T ([facility]

 

           Ideal body weight 148 [lb_av]                       148 [lb_av] MEDEN

T ([facility]

 

           Body mass index (BMI) [Ratio] 21.8 kg/m2                       21.8 k

g/m2 MEDENT ([facility]

 

           Body weight 139.00 [lb_av]                       139.00 [lb_av] MEDEN

T ([facility]

 

           Systolic blood pressure 148 mm[Hg]                       148 mm[Hg] M

EDENT ([facility]

 

           Ideal body weight 148 [lb_av]                       148 [lb_av] MEDEN

T ([facility]

 

           Diastolic blood pressure 84 mm[Hg]                        84 mm[Hg]  

MEDENT ([facility]

 

           Body mass index (BMI) [Ratio] 21.8 kg/m2                       21.8 k

g/m2 MEDENT ([facility]

 

           Oxygen saturation in Arterial blood by Pulse oximetry 99 %           

                  99 %       MEDENT 

([facility]

 

           Systolic blood pressure 142 mm[Hg]                       142 mm[Hg] M

EDENT ([facility]

 

           Diastolic blood pressure 72 mm[Hg]                        72 mm[Hg]  

MEDENT ([facility]

 

           Body temperature 97.6 [degF]                       97.6 [degF] MEDENT

 ([facility]

 

           Heart rate 52 /min                          52 /min    MEDENT ([facility]

 

           Respiratory rate 18 /min                          18 /min    MEDENT (

[facility]

 

           Body height 67 [in_i]                        67 [in_i]  MEDENT ([facility]

 

                                        5'7" 

 

           Ideal body weight 148 [lb_av]                       148 [lb_av] MEDEN

T ([facility])

 

           Body height 67 [in_i]                        67 [in_i]  MEDENT ([facility])

 

                                        5'7" 

 

           Body weight 140.00 [lb_av]                       140.00 [lb_av] MEDEN

T ([facility])

 

           Body mass index (BMI) [Ratio] 21.9 kg/m2                       21.9 k

g/m2 MEDENT ([facility])

 

           Body weight 63.504 kg                        63.504 kg  MEDENT ([facility])

 

           Body height 67 [in_i]                        67 [in_i]  MEDENT ([facility])

 

                                        5'7" 

 

           Ideal body weight 148 [lb_av]                       148 [lb_av] MEDEN

T ([facility])

 

           Body mass index (BMI) [Ratio] 21.9 kg/m2                       21.9 k

g/m2 MEDENT ([facility])

 

           Body weight 140.00 [lb_av]                       140.00 [lb_av] MEDEN

T ([facility])

 

           Body weight 63.504 kg                        63.504 kg  MEDENT ([facility])

 

           Body temperature 96.8 [degF]                       96.8 [degF] MEDENT

 ([facility])

 

           Body height 67 [in_i]                        67 [in_i]  MEDENT ([facility])

 

                                        5'7" 

 

           Body weight 179.00 [lb_av]                       179.00 [lb_av] MEDEN

T ([facility])

 

           Body mass index (BMI) [Ratio] 28.0 kg/m2                       28.0 k

g/m2 MEDENT ([facility]









                    ID                  Date                Data Source

 

                    [id number]            2020 02:31:41 PM EST [facility]

 Hospital









           Name       Value      Range      Interpretation Code Description Data

 Source(s)

 

           WEIGHT RECORDED 140.00 pounds                       140.00 pounds [facility]

 

           Height     67 Inches                        067 Inches [facility]









                    ID                  Date                Data Source

 

                    [id number]            2020 01:08:33 PM EST [facility]

 Hospital









           Name       Value      Range      Interpretation Code Description Data

 Source(s)

 

           WEIGHT RECORDED 140.00 pounds                       140.00 pounds [facility]

 

           Height     67 Inches                        067 Inches [facility]



                                                                                
                                      



Patient Treatment Plan of Care

          



             Planned Activity Planned Date Details      Description  Data Source

(s)

 

             oxyCODONE HCl ER 20 MG 2021 12:00:00 AM EST                  

         eCW1 ([facility])

 

                          Acetaminophen 325 MG / Hydrocodone Bitartrate 5 MG Ora

l Tablet 2021 

12:00:00 AM EST                                             eCW1 ([facility])

 

             oxyCODONE HCl ER 20 MG 10/28/2021 12:00:00 AM EDT                  

         eCW1 ([facility])

 

                          Acetaminophen 325 MG / Hydrocodone Bitartrate 5 MG Ora

l Tablet 10/28/2021 

12:00:00 AM EDT                                             eCW1 ([facility])

 

             oxyCODONE HCl ER 20 MG 10/08/2021 12:00:00 AM EDT                  

         eCW1 ([facility])

 

                          Acetaminophen 325 MG / Hydrocodone Bitartrate 5 MG Ora

l Tablet 10/08/2021 

12:00:00 AM EDT                                             eCW1 ([facility])

 

             oxyCODONE HCl ER 20 MG 09/10/2021 12:00:00 AM EDT                  

         eCW1 ([facility])

 

                          Acetaminophen 325 MG / Hydrocodone Bitartrate 5 MG Ora

l Tablet 09/10/2021 

12:00:00 AM EDT                                             eCW1 ([facility])

 

             oxyCODONE HCl ER 20 MG 2021 12:00:00 AM EDT                  

         eCW1 ([facility])

 

                          Acetaminophen 325 MG / Hydrocodone Bitartrate 5 MG Ora

l Tablet 2021 

12:00:00 AM EDT                                             eCW1 ([facility])

 

                          Acetaminophen 325 MG / Hydrocodone Bitartrate 5 MG Ora

l Tablet 2021 

12:00:00 AM EDT                                             eCW1 ([facility])

 

             oxyCODONE HCl ER 20 MG 2021 12:00:00 AM EDT                  

         eCW1 ([facility])

 

                          Acetaminophen 325 MG / Hydrocodone Bitartrate 5 MG Ora

l Tablet 2021 

12:00:00 AM EDT                                             eCW1 ([facility])

 

                          Acetaminophen 325 MG / Hydrocodone Bitartrate 5 MG Ora

l Tablet 2021 

12:00:00 AM EDT                                             eCW1 ([facility])

 

                          Acetaminophen 325 MG / Hydrocodone Bitartrate 5 MG Ora

l Tablet 2021 

12:00:00 AM EDT                                             eCW1 ([facility])

 

                          Acetaminophen 325 MG / Hydrocodone Bitartrate 5 MG Ora

l Tablet 2021 

12:00:00 AM EDT                                             eCW1 ([facility])

 

                          Acetaminophen 325 MG / Hydrocodone Bitartrate 5 MG Ora

l Tablet 2021 

12:00:00 AM EDT                                             eCW1 ([facility])

 

             oxyCODONE HCl ER 20 MG 2021 12:00:00 AM EDT                  

         eCW1 ([facility])

 

                          Acetaminophen 325 MG / Hydrocodone Bitartrate 5 MG Ora

l Tablet 2021 

12:00:00 AM EDT                                             eCW1 ([facility])

 

             OxyCODONE HCl ER 20 MG 2021 12:00:00 AM EDT                  

         eCW1 ([facility])

 

                          Acetaminophen 325 MG / Hydrocodone Bitartrate 5 MG Ora

l Tablet 2021 

12:00:00 AM EDT                                             eCW1 ([facility])

 

             OxyCODONE HCl ER 20 MG 2021 12:00:00 AM EDT                  

         eCW1 ([facility])

 

                          Acetaminophen 325 MG / Hydrocodone Bitartrate 5 MG Ora

l Tablet 2021 

12:00:00 AM EDT                                             eCW1 ([facility])

 

                          Acetaminophen 325 MG / Hydrocodone Bitartrate 5 MG Ora

l Tablet 2021 

12:00:00 AM EDT                                             eCW1 ([facility])

 

             OxyCODONE HCl ER 20 MG 2021 12:00:00 AM EDT                  

         eCW1 ([facility])

 

                          Acetaminophen 325 MG / Hydrocodone Bitartrate 5 MG Ora

l Tablet 2021 

12:00:00 AM EDT                                             eCW1 ([facility])

 

                          Acetaminophen 325 MG / Hydrocodone Bitartrate 5 MG Ora

l Tablet 2021 

12:00:00 AM EDT                                             eCW1 ([facility])

 

                          Acetaminophen 325 MG / Hydrocodone Bitartrate 5 MG Ora

l Tablet 2021 

12:00:00 AM EDT                                             eCW1 ([facility])

 

                          Acetaminophen 325 MG / Hydrocodone Bitartrate 5 MG Ora

l Tablet 2021 

12:00:00 AM EDT                                             eCW1 ([facility])

 

                          Acetaminophen 325 MG / Hydrocodone Bitartrate 5 MG Ora

l Tablet 2021 

12:00:00 AM EDT                                             eCW1 ([facility])

 

             OxyCODONE HCl ER 20 MG 2021 12:00:00 AM EDT                  

         eCW1 ([facility])

 

                          Acetaminophen 325 MG / Hydrocodone Bitartrate 5 MG Ora

l Tablet 2021 

12:00:00 AM EDT                                             eCW1 ([facility])

 

             OxyCODONE HCl ER 20 MG 2021 12:00:00 AM EST                  

         eCW1 ([facility])

 

             OxyCODONE HCl ER 20 MG 2021 12:00:00 AM EST                  

         eCW1 ([facility])

 

             Norco 5-325 MG 2021 12:00:00 AM EST                          

 eCW1 ([facility])

 

             Norco 5-325 MG 2021 12:00:00 AM EST                          

 eCW1 ([facility])

 

                          Acetaminophen 325 MG / Hydrocodone Bitartrate 5 MG Ora

l Tablet 2021 

12:00:00 AM EST                                             eCW1 ([facility])

 

             Norco 5-325 MG 2021 12:00:00 AM EST                          

 eCW1 ([facility])

 

                          Acetaminophen 325 MG / Hydrocodone Bitartrate 5 MG Ora

l Tablet 2021 

12:00:00 AM EST                                             eCW1 ([facility])

 

             Norco 5-325 MG 2021 12:00:00 AM EST                          

 eCW1 ([facility])

 

                          Acetaminophen 325 MG / Hydrocodone Bitartrate 5 MG Ora

l Tablet 2021 

12:00:00 AM EST                                             eCW1 ([facility])

 

             Norco 5-325 MG 2021 12:00:00 AM EST                          

 eCW1 ([facility])

 

             Norco 5-325 MG 2021 12:00:00 AM EST                          

 eCW1 ([facility])

 

                          Acetaminophen 325 MG / Hydrocodone Bitartrate 5 MG Ora

l Tablet 2021 

12:00:00 AM EST                                             eCW1 ([facility])

 

             OxyCODONE HCl ER 20 MG 2021 12:00:00 AM EST                  

         eCW1 ([facility])

 

             OxyCODONE HCl ER 20 MG 2021 12:00:00 AM EST                  

         eCW1 ([facility])

 

             OxyCODONE HCl ER 20 MG 2021 12:00:00 AM EST                  

         eCW1 ([facility])

 

                          Acetaminophen 325 MG / Hydrocodone Bitartrate 5 MG Ora

l Tablet [Norco] 

2021 12:00:00 AM EST                                         eCW1 (Replaced by [facility])

 

             OxyCODONE HCl ER 20 MG 01/15/2021 12:00:00 AM EST                  

         eCW1 ([facility])

 

             OxyCODONE HCl ER 20 MG 01/15/2021 12:00:00 AM EST                  

         eCW1 ([facility])

 

             OxyCODONE HCl ER 20 MG 2020 12:00:00 AM EST                  

         eCW1 ([facility])

 

             OxyCODONE HCl ER 20 MG 2020 12:00:00 AM EST                  

         eCW1 ([facility])

 

                          Acetaminophen 325 MG / Hydrocodone Bitartrate 5 MG Ora

l Tablet [Norco] 

2020 12:00:00 AM EST                                         eCW1 (Replaced by [facility])

 

                          Acetaminophen 325 MG / Hydrocodone Bitartrate 5 MG Ora

l Tablet [Norco] 

2020 12:00:00 AM EST                                         eCW1 (Replaced by [facility])

 

                          Acetaminophen 325 MG / Hydrocodone Bitartrate 5 MG Ora

l Tablet [Norco] 

2020 12:00:00 AM EST                                         eCW1 (Replaced by [facility])

 

                          Acetaminophen 325 MG / Hydrocodone Bitartrate 5 MG Ora

l Tablet [Norco] 

2020 12:00:00 AM EST                                         eCW1 (Replaced by [facility])

 

             OxyCODONE HCl ER 20 MG 2020 12:00:00 AM EST                  

         eCW1 ([facility])

 

             OxyCODONE HCl ER 20 MG 2020 12:00:00 AM EST                  

         eCW1 ([facility])

 

                          Acetaminophen 325 MG / Hydrocodone Bitartrate 5 MG Ora

l Tablet [Norco] 

2020 12:00:00 AM EST                                         eCW1 (Replaced by [facility])

 

                          Acetaminophen 325 MG / Hydrocodone Bitartrate 5 MG Ora

l Tablet [Norco] 

2020 12:00:00 AM EST                                         eCW1 (Replaced by [facility])

 

             OxyCODONE HCl ER 20 MG 10/30/2020 12:00:00 AM EDT                  

         eCW1 ([facility])

 

                          Acetaminophen 325 MG / Hydrocodone Bitartrate 5 MG Ora

l Tablet [Norco] 

10/13/2020 12:00:00 AM EDT                                         eCW1 (Replaced by [facility])

 

                          Acetaminophen 325 MG / Hydrocodone Bitartrate 5 MG Ora

l Tablet [Norco] 

10/13/2020 12:00:00 AM EDT                                         eCW1 (Replaced by [facility])

## 2021-12-10 NOTE — CCD
Continuity of Care Document (CCD)

                             Created on: 2021



Palladino, Lewis W

External Reference #: [id number]

: 1950

Sex: Male



Demographics





                          Address                   [address] [place]  [address]

 

                          Home Phone                [phone]

 

                          Preferred Language        Unknown

 

                          Marital Status            Unknown

 

                          Confucianism Affiliation     Unknown

 

                          Race                      White

 

                          Ethnic Group              Not  or 





Author





                          Author                    Zack HALL DPM

 

                          Organization              Unknown

 

                          Address                   [address] [place] [address], [facility]

 [address]

[place]  [address]



 

                          Phone                     [phone]







Care Team Providers





                    Care Team Member Name Role                Phone

 

                    J Carlos Kiser M.D.                [phone]







Problems





                    Active Problems     Provider            Date

 

                    Hammer toe          Westley Hall DPM Onset: 10/15/2020

 

                    Type 2 diabetes mellitus with diabetic polyneuropathy Westley Hall DPM 

Onset: 10/15/2020

 

                    Onychomycosis       Westley Hall DPM Onset: 10/15/2020

 

                    Corns and callosities Westley Hall DPM Onset: 10/15/2020

 

                    Plantar fascial fibromatosis Westley Hall DPM Onset: 







Social History





                Type            Date            Description     Comments

 

                Birth Sex                       Unknown          

 

                ETOH Use                        Denies alcohol use  

 

                Tobacco Use     Start: Unknown  Patient is a current smoker, smo

kes every day smokes 

1ppd for 42 years 







Allergies and adverse reactions





                                        Description

 

                                        No Known Drug Allergies







Medications





           Active Medications SIG        Qnty       Indications Ordering Provide

r Date

 

                          Ammonium Lactate                     12% Cream        

           apply to feet 

daily           140gm                           Westley Hall DPM 03/10/2021

 

             Oxycontin                     20mg Tab ER 12H Abuse-Det            

                                              

Unknown                                 

 

           Acetazolamide                     250mg Tablets                      

                              Unknown    



 

             Omeprazole                     40mg Capsules DR Rashel QUINTERO, J Carlos                           

 

                          Hydrocodone-Acetaminophen                     5-325mg 

Tablets                   

                                                Unknown         

 

             Ropinirole HCL                     2mg Tablets                     

                                     Rashel QUINTERO, J Carlos                           

 

           Alphagan P                     0.1% Solution                         

                           Unknown    



 

             Clopidogrel Bisulfate                     75mg Tablets             

                                             

Rashel QUINTERO, J Carlos                      

 

                                        Onetouch Delica Plus Lancets Extra Fine 

33G                     Plus 33G Misc   

               U Utd bid                              Unknown      

 

             Duloxetine HCL                     30mg Caps DR Price Kiser M.D., Manti                      

 

             Atorvastatin Calcium                     20mg Tablets              

                                            

Rashel QUINTERO, Manti                      

 

             Brimonidine Tartrate                     0.2% Solution             

                                             

Unknown                                 

 

             Ranitidine HCL                     150mg Tablets                   

                                       Rashel QUINTERO, Manti                           

 

             Mirtazapine                     15mg Tablets                       

                                   Jana Sandoval                              







Immunizations





                                        Description

 

                                        No Information Available







Vital Signs





                Date            Vital           Result          Comment

 

                10/05/2020 10:21am Height          67 inches       5'7"

 

                    Weight              140.00 lb            

 

                    BP Systolic         180 mmHg             

 

                    BP Diastolic        82 mmHg              

 

                    Heart Rate          51 /min              

 

                    BMI (Body Mass Index) 21.9 kg/m2           







Results





                                        Description

 

                                        No Information Available







Procedures





                Date            Code            Description     Status

 

                10/29/2021      66215           Debridement 6-10 [employer] 

Completed

 

                2021      39375           Debridement 6-10 [employer] 

Completed

 

                2021      79303           Debridement 6-10 [employer] 

Completed







Medical Devices





                                        Description

 

                                        No Information Available







Encounters





                                        Description

 

                                        No Information Available







Assessments





                Date            Code            Description     Provider

 

                10/29/2021      B35.1           Tinea unguium   Westley Hall,

 SANTINO

 

                10/29/2021      E11.42          Type 2 diabetes mellitus with di

abetic polyneuropathy Westley Hall, ERIKA

 

                2021      E11.42          Type 2 diabetes mellitus with di

abetic polyneuropathy Westley Hall, SANTINO

 

                2021      B35.1           Tinea unguium   Westley Hall,

 SANTINO

 

                2021      E11.42          Type 2 diabetes mellitus with di

abetic polyneuropathy Westley Hall, ERIKA

 

                2021      B35.1           Tinea unguium   Westley Hall,

 SANTINO

 

                2021      E11.42          Type 2 diabetes mellitus with di

abetic polyneuropathy Westley Hall DPM







Plan of Treatment

Future Appointment(s):* 2022  2:45 pm - Westley Hall DPM at [facility] 
  Office





Functional Status





                                        Description

 

                                        No Information Available







Mental Status





                                        Description

 

                                        No Information Available







Referrals





                                        Description

 

                                        No Information Available

## 2021-12-10 NOTE — CCD
Summarization of Episode Note

                             Created on: 2021



PALLADINO, LEWIS WAYNE

External Reference #: [id number]

: 1950

Sex: Male



Demographics





                          Address                   [address] [place]  [address]

 

                          Home Phone                [phone]

 

                          Preferred Language        Unknown

 

                          Marital Status            Unknown

 

                          Anabaptism Affiliation     Unknown

 

                          Race                      White

 

                          Ethnic Group              Not  or 





Author





                          Author                    [facility] [employer] [facility]

ems

 

                          Organization              Located within [facility]

ems

 

                          Address                   Unknown

 

                          Phone                     Unavailable







Support





                Name            Relationship    Address         Phone

 

                    PALLADINO, LEWIS    GUAR                [address] [place]  [address]                    [phone]

 

                    Palladino, Karen    ECON                [address], [place]  [address] [phone]







Care Team Providers





                    Care Team Member Name Role                Phone

 

                    Jana Sharma   Unavailable         [phone]







PROBLEMS





          Type      Condition ICD9-CM Code CUB63-EL Code Onset Dates Condition S

tatus W/U 

Status              Risk                SNOMED Code         Notes

 

       Problem Left peroneal nerve palsy        G57.32        Active confirmed  

      [id number] Stable

in his brace, using a cane

 

       Problem Intervertebral disc disease        M51.9         Active confirmed

        [id number] We 

will continue his pain medications without change (oxycodone and hydrocodone, 
Lyrica, tizanidine)

 

        Problem Hemispheric carotid artery syndrome         G45.1           Acti

ve  confirmed         

[id number]                                

 

       Problem Multiple sclerosis        G35           Active confirmed        2

[id number]  

 

                          Problem                   Controlled type 2 diabetes m

ellitus without complication, without long-

term current use of insulin         E11.9           Active  confirmed         31

[id number]  

 

       Problem Mixed hyperlipidemia        E78.2         Active confirmed       

 [id number]  

 

        Problem Bilateral carotid artery stenosis         I65.23          Active

  confirmed         [id number]

                                         

 

        Problem Current use of long term anticoagulation         Z79.01         

 Active  confirmed         

[id number]                                

 

       Problem Essential hypertension        I10           Active confirmed     

   10016703  

 

       Problem Smoker        F17.200        Active confirmed        [id number]  







ALLERGIES

No Known Allergies



ENCOUNTERS from 1950 to 2021-09-10





             Encounter    Location     Date         Provider     Diagnosis

 

                    [facility]          [phone] [place] [address] 09 Sep, 2021

                          Jana Sharam           Left peroneal nerve palsy G5

7.32







IMMUNIZATIONS

No Information



SOCIAL HISTORY

Tobacco Use:



                    Social History Observation Description         Date

 

                    Details (start date - stop date) Current Smoker       



Sex Assigned At Birth:



                          Social History Observation Description

 

                          Sex Assigned At Birth     Unknown



Yazdanism:



                    Question            Answer              Notes

 

                    Yazdanism                                No Sikhism beliefs

 that would impact health care.



Alcohol Screening:



                    Question            Answer              Notes

 

                    Did you have a drink containing alcohol in the past year? No

                   

 

                    Points              0                    

 

                    Interpretation      Negative             



Tobacco Use:



                    Question            Answer              Notes

 

                    Are you a:          current smoker      started smoking at 3

2 years old

 

                    Patient counseled on the dangers of tobacco use and urged to

 quit: 2021           

 

                    How many cigarettes a day do you smoke? 11-20               

 

 

                    Are you interested in quitting? Not ready to quit    

 

                    Counseled the patient on smoking effects, education provided

 2021           







REASON FOR REFERRAL

No Information



VITAL SIGNS

No information



MEDICATIONS





           Medication SIG (Take, Route, Frequency, Duration) Notes      Start Da

te End Date   

Status

 

                          HYDROcodone-Acetaminophen 5-325 MG 1 tablet as needed 

Orally Every 8 hours, 

mdd=3 for 30 Days Please do not fill until 2021 10 Sep, 2021              

      Active

 

                          Omeprazole 40 MG          1 capsule 30 minutes before 

morning meal Orally Once a day for 

30 day(s)                                                       Active

 

                Brace           as directed for left lower leg, dx=G57.32 daily 

for 99 months                                                                 Active

 

                          Mirtazapine 15 MG         1 tablet Orally for Worker's

 Comp Once a day at bedtime for 30

days                                                Active

 

                    oxyCODONE HCl ER 20 MG 1 tablet Orally every 12 hrs, mdd=2 f

or 30 Days Please do

not fill until due  10 Sep, 2021                            Active

 

           rOPINIRole HCl 2 MG 1 tablet before bedtime Orally twice a day       

                           Active

 

           Toujeo SoloStar 300 UNIT/ML 40 units Subcutaneous once a day         

                         Active

 

             acetaZOLAMIDE 250 MG 1/2 tablet Orally twice a day one in AM and on

e in PM                            

                                        Active

 

           Alphagan P 0.1 % 1 drop into affected eye Ophthalmic every 8 hrs     

                             Active

 

           Clopidogrel Bisulfate 75 MG 1 tablet Orally Once a day               

                   Active

 

           tiZANidine HCl 4 1 tablet as needed Orally Three times a day         

                         Active

 

           iron                                                   Active

 

           DULoxetine HCl 30 MG 1 capsule Orally Once a day                     

             Active

 

                          HYDROcodone-Acetaminophen 5-325 MG 1 tablet as needed,

 Worker's Compensation 

Orally 3 times a day, mdd=3 for 30 Days                             

        Active

 

           Atorvastatin Calcium 20 MG 1 tablet Orally Once a day                

                  Active







PROCEDURES

No Information



RESULTS

No Results



REASON FOR VISIT

med refills 



MEDICAL (GENERAL) HISTORY





                    Type                Description         Date

 

                    Medical History     Multiple sclerosis   

 

                    Medical History     Hypertension         

 

                    Medical History     Hyperlipidemia       

 

                    Medical History     Diabetes             

 

                    Medical History     posterior carotid stenosis  

 

                    Medical History     multiple TIAs        

 

                    Medical History     long term anticoagulation  

 

                    Medical History     smoker               

 

                    Medical History     lumbar intervertebral disc disease (Work

er's Compensation)  

 

                    Surgical History    2 lipomas removed   1972

 

                    Surgical History    Dr. Rodriguez, Lumbar spinal surgery - 

?discs 2003

 

                    Surgical History    Left carpal tunnel release, ulnar releas

e 2017

 

                    Surgical History    colectomy of ascending colon due to canc

er 2018

 

                    Surgical History    cataracts both eyes 10/14/202 & 10/28/20

20

 

                    Hospitalization History surgeries as above   







Goals Section

No Information



Health Concerns

No Information



MEDICAL EQUIPMENT

No Information



MENTAL STATUS

No Information



FUNCTIONAL STATUS

No Information



ASSESSMENTS





             Encounter Date Diagnosis    Assessment Notes Treatment Notes Treatm

ent Clinical 

Notes

 

                09 Sep, 2021    Left peroneal nerve palsy (ICD-10 - G57.32)     

            



                                        











PLAN OF TREATMENT

Medication



                Medication Name Sig             Start Date      Stop Date

 

                          HYDROcodone-Acetaminophen 5-325 MG 1 tablet as needed,

 Worker's Compensation 

Orally 3 times a day, mdd=3 for 30 Days                

 

                    oxyCODONE HCl ER 20 MG 1 tablet Orally every 12 hrs, mdd=2 f

or 30 Days 10 Sep, 

2021                                     

 

                          HYDROcodone-Acetaminophen 5-325 MG 1 tablet as needed 

Orally Every 8 hours, 

mdd=3 for 30 Days         10 Sep, 2021               



Next Appt



                                        Details

 

                                        Provider Name:Jana Sharma, -10-2

8 02:00:00 PM, [address], 

[phone], [place], [address], [phone]







Insurance Providers





             Payer Name   Payer Address Payer Phone  Insured Name Patient Relati

onship to 

Insured                   Coverage Start Date       Coverage End Date

 

                [facility] [address]  [place] [address] [phone]    

PALLADINO,LEWIS WAYNE self

## 2021-12-10 NOTE — CCD
Continuity of Care Document (CCD)

                             Created on: 2021



Palladino, Lewis W

External Reference #: [id number]

: 1950

Sex: Male



Demographics





                          Address                   [address] [place]  [address]

 

                          Home Phone                [phone]

 

                          Preferred Language        Unknown

 

                          Marital Status            Unknown

 

                          Mu-ism Affiliation     Unknown

 

                          Race                      White

 

                          Ethnic Group              Not  or 





Author





                          Author                    Zack HALL DPM

 

                          Organization              Unknown

 

                          Address                   [address] [place] [address], [facility]

 [address]

[place]  [address]



 

                          Phone                     [phone]







Care Team Providers





                    Care Team Member Name Role                Phone

 

                    J Carlos Kiser M.D.                [phone]







Problems





                    Active Problems     Provider            Date

 

                    Hammer toe          Westley Hall DPM Onset: 10/15/2020

 

                    Type 2 diabetes mellitus with diabetic polyneuropathy Westley Hall DPM 

Onset: 10/15/2020

 

                    Onychomycosis       Westley Hall DPM Onset: 10/15/2020

 

                    Corns and callosities Westley Hall DPM Onset: 10/15/2020

 

                    Plantar fascial fibromatosis Westley Hall DPM Onset: 







Social History





                Type            Date            Description     Comments

 

                Birth Sex                       Unknown          

 

                ETOH Use                        Denies alcohol use  

 

                Tobacco Use     Start: Unknown  Patient is a current smoker, smo

kes every day smokes 

1ppd for 42 years 







Allergies and adverse reactions





                                        Description

 

                                        No Known Drug Allergies







Medications





           Active Medications SIG        Qnty       Indications Ordering Provide

r Date

 

                          Ammonium Lactate                     12% Cream        

           apply to feet 

daily           140gm                           Westley Hall DPM 03/10/2021

 

             Oxycontin                     20mg Tab ER 12H Abuse-Det            

                                              

Unknown                                 

 

           Acetazolamide                     250mg Tablets                      

                              Unknown    



 

             Omeprazole                     40mg Capsules DR Rashel QUINTERO, J Carlos                           

 

                          Hydrocodone-Acetaminophen                     5-325mg 

Tablets                   

                                                Unknown         

 

             Ropinirole HCL                     2mg Tablets                     

                                     Rashel QUINTERO, J Carlos                           

 

           Alphagan P                     0.1% Solution                         

                           Unknown    



 

             Clopidogrel Bisulfate                     75mg Tablets             

                                             

Rashel QUINTERO, J Carlos                      

 

                                        Onetouch Delica Plus Lancets Extra Fine 

33G                     Plus 33G Misc   

               U Utd bid                              Unknown      

 

             Duloxetine HCL                     30mg Caps DR Price Kiser M.D., [place]                      

 

             Atorvastatin Calcium                     20mg Tablets              

                                            

Rashel QUINTERO, [place]                      

 

             Brimonidine Tartrate                     0.2% Solution             

                                             

Unknown                                 

 

             Ranitidine HCL                     150mg Tablets                   

                                       Rashel QUINTERO, [place]                           

 

             Mirtazapine                     15mg Tablets                       

                                   Jana Sandoval                              







Immunizations





                                        Description

 

                                        No Information Available







Vital Signs





                Date            Vital           Result          Comment

 

                10/05/2020 10:21am Height          67 inches       5'7"

 

                    Weight              140.00 lb            

 

                    BP Systolic         180 mmHg             

 

                    BP Diastolic        82 mmHg              

 

                    Heart Rate          51 /min              

 

                    BMI (Body Mass Index) 21.9 kg/m2           







Results





                                        Description

 

                                        No Information Available







Procedures





                Date            Code            Description     Status

 

                2021      95444           Debridement 6-10 [employer] 

Completed

 

                2021      61027           Debridement 6-10 [employer] 

Completed







Medical Devices





                                        Description

 

                                        No Information Available







Encounters





                                        Description

 

                                        No Information Available







Assessments





                Date            Code            Description     Provider

 

                2021      B35.1           Tinea unguium   Westley Hall,

 ERIKA

 

                2021      E11.42          Type 2 diabetes mellitus with di

abetic polyneuropathy Westley Hall DPM

 

                2021      B35.1           Tinea unguium   Westley Hall,

 ERIKA

 

                2021      E11.42          Type 2 diabetes mellitus with di

abetic polyneuropathy Westley Hall DPM







Plan of Treatment

Future Appointment(s):* 10/29/2021  2:45 pm - Westley Hall DPM at [place] 
  Office





Functional Status





                                        Description

 

                                        No Information Available







Mental Status





                                        Description

 

                                        No Information Available







Referrals





                                        Description

 

                                        No Information Available

## 2021-12-10 NOTE — CCD
Continuity of Care Document (CCD)

                             Created on: 10/04/2021



Palladino, Lewis W

External Reference #: [id number]

: 1950

Sex: Male



Demographics





                          Address                   [address] [place]  [address]

 

                          Home Phone                [phone]

 

                          Preferred Language        Unknown

 

                          Marital Status            Unknown

 

                          Taoist Affiliation     Unknown

 

                          Race                      White

 

                          Ethnic Group              Not  or 





Author





                          Author                    Zack HALL DPM

 

                          Organization              Unknown

 

                          Address                   [address] [place] [address], [facility]

 [address]

[place]  [address]



 

                          Phone                     [phone]







Care Team Providers





                    Care Team Member Name Role                Phone

 

                    J Carlos Kiser M.D.                [phone]







Problems





                    Active Problems     Provider            Date

 

                    Hammer toe          Westley Hall DPM Onset: 10/15/2020

 

                    Type 2 diabetes mellitus with diabetic polyneuropathy Westley Hall DPM 

Onset: 10/15/2020

 

                    Onychomycosis       Westley Hall DPM Onset: 10/15/2020

 

                    Corns and callosities Westley Hall DPM Onset: 10/15/2020

 

                    Plantar fascial fibromatosis Westley Hall DPM Onset: 







Social History





                Type            Date            Description     Comments

 

                Birth Sex                       Unknown          

 

                ETOH Use                        Denies alcohol use  

 

                Tobacco Use     Start: Unknown  Patient is a current smoker, smo

kes every day smokes 

1ppd for 42 years 







Allergies and adverse reactions





                                        Description

 

                                        No Known Drug Allergies







Medications





           Active Medications SIG        Qnty       Indications Ordering Provide

r Date

 

                          Ammonium Lactate                     12% Cream        

           apply to feet 

daily           140gm                           Westley Hall DPM 03/10/2021

 

             Oxycontin                     20mg Tab ER 12H Abuse-Det            

                                              

Unknown                                 

 

           Acetazolamide                     250mg Tablets                      

                              Unknown    



 

             Omeprazole                     40mg Capsules DR Rashel QUINTERO, J Carlos                           

 

                          Hydrocodone-Acetaminophen                     5-325mg 

Tablets                   

                                                Unknown         

 

             Ropinirole HCL                     2mg Tablets                     

                                     Rashel QUINTERO, J Carlos                           

 

           Alphagan P                     0.1% Solution                         

                           Unknown    



 

             Clopidogrel Bisulfate                     75mg Tablets             

                                             

Rashel QUINTERO, J Carlos                      

 

                                        Onetouch Delica Plus Lancets Extra Fine 

33G                     Plus 33G Misc   

               U Utd bid                              Unknown      

 

             Duloxetine HCL                     30mg Caps DR Price Kiser M.D., [place]                      

 

             Atorvastatin Calcium                     20mg Tablets              

                                            

Rashel QUINTERO, J Carlos                      

 

             Brimonidine Tartrate                     0.2% Solution             

                                             

Unknown                                 

 

             Ranitidine HCL                     150mg Tablets                   

                                       Rashel QUINTERO, [place]                           

 

             Mirtazapine                     15mg Tablets                       

                                   Jana Sandoval                              







Immunizations





                                        Description

 

                                        No Information Available







Vital Signs





                Date            Vital           Result          Comment

 

                10/05/2020 10:21am Height          67 inches       5'7"

 

                    Weight              140.00 lb            

 

                    BP Systolic         180 mmHg             

 

                    BP Diastolic        82 mmHg              

 

                    Heart Rate          51 /min              

 

                    BMI (Body Mass Index) 21.9 kg/m2           







Results





                                        Description

 

                                        No Information Available







Procedures





                Date            Code            Description     Status

 

                2021      53639           Debridement 6-10 [employer] 

Completed

 

                2021      90081           Debridement 6-10 [employer] 

Completed







Medical Devices





                                        Description

 

                                        No Information Available







Encounters





                                        Description

 

                                        No Information Available







Assessments





                Date            Code            Description     Provider

 

                2021      E11.42          Type 2 diabetes mellitus with di

abetic polyneuropathy Westley Hall DPM

 

                2021      B35.1           Tinea unguium   Westley Hall DPM

 

                2021      E11.42          Type 2 diabetes mellitus with di

abetic polyneuropathy Westley Hall DPM

 

                2021      B35.1           Tinea unguium   Westley Hall DPM

 

                2021      E11.42          Type 2 diabetes mellitus with di

abetic polyneuropathy Westley Hall DPM







Plan of Treatment

Future Appointment(s):* 10/29/2021  2:45 pm - Westley aHll DPM at [facility] 
  Office





Functional Status





                                        Description

 

                                        No Information Available







Mental Status





                                        Description

 

                                        No Information Available







Referrals





                                        Description

 

                                        No Information Available

## 2021-12-10 NOTE — CCD
Summarization Of Episode

                             Created on: 12/10/2021



PALLADINO, LEWIS WAYNE

External Reference #: [id number]

: 1950

Sex: Undifferentiated



Demographics





                          Address                   [address] [place]  [address]

 

                          Home Phone                [phone]

 

                          Preferred Language        English

 

                          Marital Status            Unknown

 

                          Buddhist Affiliation     Unknown

 

                          Race                      Unknown

 

                          Ethnic Group              Not  or 





Author





                          Author                    [facility]

 

                          Organization              [facility]

 

                          Address                   Unknown

 

                          Phone                     Unavailable







Support





                Name            Relationship    Address         Phone

 

                    [facility]       Next Of Kin         [address]

[address]

[place]  [address]                    [phone]

 

                RE              Next Of Kin     Unknown         Unavailable

 

                UE              Next Of Kin     Unknown         Unavailable

 

                    PALLADINO, J JERAMIE Next Of Kin         [address]

[place] [id number]                     [phone]

 

                DISABLED        Next Of Kin     Unknown         Unavailable

 

                    NONE, PT PER        Next Of Kin         -

                          -, -  -                   -

 

                    PALLADINO, KAREN   Next Of Kin         [address] [place]  [address]                    [phone]

 

                    PALLADINO, JERAMIE J Next Of Kin         [address]

[place]  [address]                     [phone]

 

                    P,  DEONNA           ECON                [address] [place]  [address]                Unavailable

 

                    Palladino, Karen   ECON                [address] [place] [id number]                    [phone]







Care Team Providers





                    Care Team Member Name Role                Phone

 

                    LIU Segal MD Unavailable         Unavailable

 

                    Abriss B Valdo ORTIZ Unavailable         Unavailable

 

                    Abriss B Valdo ROTIZ Unavailable         Unavailable

 

                    AbrissLIU MD Unavailable         Unavailable

 

                    Abriss B Valdo ORTIZ Unavailable         Unavailable

 

                    Abriss B Valdo ORTIZ Unavailable         Unavailable

 

                    AbrissLIU MD Unavailable         Unavailable

 

                    Abriss B Valdo ORTIZ Unavailable         Unavailable

 

                    Abriss B Valdo ORTIZ Unavailable         Unavailable

 

                    Abriss B Valdo ORTIZ Unavailable         Unavailable

 

                    Abriss B Valdo ORTIZ Unavailable         Unavailable

 

                    Abriss B Valdo ORTIZ Unavailable         Unavailable

 

                    Abriss B Valdo ORTIZ Unavailable         Unavailable

 

                    Abriss B Valdo ORTIZ Unavailable         Unavailable

 

                    Abriss B Valdo ORTIZ Unavailable         Unavailable

 

                    Abriss B Valdo ORTIZ Unavailable         Unavailable

 

                    Abrgema B Valdo ORTIZ Unavailable         Unavailable

 

                    AbrLIU ribeiro MD Unavailable         Unavailable

 

                    Abrgema B Valdo ORTIZ Unavailable         Unavailable

 

                    Radha Linder MD   Unavailable         Unavailable

 

                    Radha Linder MD   Unavailable         Unavailable

 

                    Radha Linder MD   Unavailable         Unavailable

 

                    Koloms,  Radha MD   Unavailable         Unavailable

 

                    Koloms,  Radha MD   Unavailable         Unavailable

 

                    Koloms,  Radha MD   Unavailable         Unavailable

 

                    Koloms,  Radha MD   Unavailable         Unavailable

 

                    Koloms,  Radha MD   Unavailable         Unavailable

 

                    Koloms,  Radha MD   Unavailable         Unavailable

 

                    Koloms,  Radha MD   Unavailable         Unavailable

 

                    Koloms,  Radha MD   Unavailable         Unavailable

 

                    Koloms,  Radha MD   Unavailable         Unavailable

 

                    Koloms,  Radha MD   Unavailable         Unavailable

 

                    Koloms,  Radha MD   Unavailable         Unavailable

 

                    Koloms,  Radha MD   Unavailable         Unavailable

 

                    Koloms,  Radha MD   Unavailable         Unavailable

 

                    Koloms,  Radha MD   Unavailable         Unavailable

 

                    Koloms,  Radha MD   Unavailable         Unavailable

 

                    Koloms,  Radha MD   Unavailable         Unavailable

 

                    Koloms,  Radha MD   Unavailable         Unavailable

 

                    Koloms,  Radha MD   Unavailable         Unavailable

 

                    Koloms,  Radha MD   Unavailable         Unavailable

 

                    Koloms,  Radha MD   Unavailable         Unavailable

 

                    Koloms,  Radha MD   Unavailable         Unavailable

 

                    Koloms,  Radha MD   Unavailable         Unavailable

 

                    Koloms,  Radha MD   Unavailable         Unavailable

 

                    Koloms,  Radha MD   Unavailable         Unavailable

 

                    Koloms,  Radha MD   Unavailable         Unavailable

 

                    Koloms,  Radha MD   Unavailable         Unavailable

 

                    Koloms,  Radha MD   Unavailable         Unavailable

 

                    Koloms,  Radha MD   Unavailable         Unavailable

 

                    Koloms,  Radha MD   Unavailable         Unavailable

 

                    Koloms,  Radha MD   Unavailable         Unavailable

 

                    Barraclough, M Dulce PA Unavailable         Unavailable

 

                    Barraclough, M Dulce PA Unavailable         Unavailable

 

                    Barraclough, M Dulce PA Unavailable         Unavailable

 

                    Barraclough, M Dulce PA Unavailable         Unavailable

 

                    Barraclough, M Dulce PA Unavailable         Unavailable

 

                    Barraclough, M Dulce PA Unavailable         Unavailable

 

                    Barraclough, M Dulce PA Unavailable         Unavailable

 

                    Maykel, D Paulo PA Unavailable         Unavailable

 

                    Maykel, D Paulo PA Unavailable         Unavailable

 

                    Maykel, D Paulo PA Unavailable         Unavailable

 

                    Maykel, D Paulo PA Unavailable         Unavailable

 

                    Maykel, D Paulo PA Unavailable         Unavailable

 

                    Maykel, D Paulo PA Unavailable         Unavailable

 

                    Maykel, D Paulo PA Unavailable         Unavailable

 

                    Maykel, D Paulo PA Unavailable         Unavailable

 

                    Maykel, D Paulo PA Unavailable         Unavailable

 

                    Maykel, D Paulo PA Unavailable         Unavailable

 

                    Maykel, D Paulo PA Unavailable         Unavailable

 

                    Maykel, D Paulo PA Unavailable         Unavailable

 

                    Maykel, D Paulo PA Unavailable         Unavailable

 

                    Maykel, D Paulo PA Unavailable         Unavailable

 

                    Maykel, D Paulo PA Unavailable         Unavailable

 

                    Maykel, D Paulo PA Unavailable         Unavailable

 

                    Maykel, D Paulo PA Unavailable         Unavailable

 

                    Maykel, D Paulo PA Unavailable         Unavailable

 

                    Maykel, D Paulo PA Unavailable         Unavailable

 

                    Maykel, D Paulo PA Unavailable         Unavailable

 

                    Maykel, D Paulo PA Unavailable         Unavailable

 

                    Maykel, D Paulo PA Unavailable         Unavailable

 

                    Maykel, D Paulo PA Unavailable         Unavailable

 

                    Maykel, D Paulo PA Unavailable         Unavailable

 

                    Maykel, D Paulo PA Unavailable         Unavailable

 

                    Maykel, D Paulo PA Unavailable         Unavailable

 

                    Maykel, D Paulo PA Unavailable         Unavailable

 

                    Maykel, D Paulo PA Unavailable         Unavailable

 

                    Maykel, D Paulo PA Unavailable         Unavailable

 

                    Maykel, D Paulo PA Unavailable         Unavailable

 

                    Maykel, D Paulo PA Unavailable         Unavailable

 

                    Maykel, D Paulo PA Unavailable         Unavailable

 

                    Maykel, D Paulo PA Unavailable         Unavailable

 

                    Maykel, D Paulo PA Unavailable         Unavailable

 

                    Maykel, D Paulo PA Unavailable         Unavailable

 

                    Maykel, D Paulo PA Unavailable         Unavailable

 

                    Maykel, D Paulo PA Unavailable         Unavailable

 

                    Maykel, D Paulo PA Unavailable         Unavailable

 

                    Maykel, D Paulo PA Unavailable         Unavailable

 

                    Maykel, D Paulo PA Unavailable         Unavailable

 

                    Maykel, D Paluo PA Unavailable         Unavailable

 

                    Maykel, D Paulo PA Unavailable         Unavailable

 

                    Maykel, D Paulo PA Unavailable         Unavailable

 

                    Maykel, D Paulo PA Unavailable         Unavailable

 

                    Maykel, D Paulo PA Unavailable         Unavailable

 

                    Maykel, D Paulo PA Unavailable         Unavailable

 

                    Maykel, D Paulo PA Unavailable         Unavailable

 

                    Maykel, D Paulo PA Unavailable         Unavailable

 

                    Maykel, D Paulo PA Unavailable         Unavailable

 

                    Maykel, D Paulo PA Unavailable         Unavailable

 

                    Maykel, D Paulo PA Unavailable         Unavailable

 

                    Maykel, D Paulo PA Unavailable         Unavailable

 

                    Maykel, D Paulo PA Unavailable         Unavailable

 

                    Maykel, D Paulo PA Unavailable         Unavailable

 

                    Maykel, D Paulo PA Unavailable         Unavailable

 

                    Maykel, D Paulo PA Unavailable         Unavailable

 

                    Maykel, D Paulo PA Unavailable         Unavailable

 

                    Maykel, D Paulo PA Unavailable         Unavailable

 

                    Maykel, D Paulo PA Unavailable         Unavailable

 

                    Maykel, D Paulo PA Unavailable         Unavailable

 

                    Maykel, D Paulo PA Unavailable         Unavailable

 

                    Maykel, D Paulo PA Unavailable         Unavailable

 

                    Maykel, D Paulo PA Unavailable         Unavailable

 

                    Maykel, D Paulo PA Unavailable         Unavailable

 

                    Maykel, D Paulo PA Unavailable         Unavailable

 

                    Maykel, D Paulo PA Unavailable         Unavailable

 

                    Maykel, D Paulo PA Unavailable         Unavailable

 

                    Maykel, D Paulo PA Unavailable         Unavailable

 

                    STEVEN HALL DPM Unavailable         Unavailable

 

                    STEVEN HALL DPM Unavailable         Unavailable

 

                    STEVEN HALL DPM Unavailable         Unavailable

 

                    STEVEN HALL DPM Unavailable         Unavailable

 

                    STEVEN HALL DPM Unavailable         Unavailable

 

                    STEVEN HALL DPM Unavailable         Unavailable

 

                    STEVEN HALL DPM Unavailable         Unavailable

 

                    MAJAK, R WILLIAMS DPM Unavailable         Unavailable

 

                    MAJAK, R WILLIAMS DPM Unavailable         Unavailable

 

                    MAJAK, R WILLIAMS DPM Unavailable         Unavailable

 

                    MAJAK, R WILLIAMS DPM Unavailable         Unavailable

 

                    MAJAK, R WILLIAMS DPM Unavailable         Unavailable

 

                    MAJAK, R WILLIAMS DPM Unavailable         Unavailable

 

                    MAJAK, R WILLIAMS DPM Unavailable         Unavailable

 

                    MAJAK, R WILLIAMS DPM Unavailable         Unavailable

 

                    MAJAK, R WILLIAMS DPM Unavailable         Unavailable

 

                    MAJAK, R WILLIAMS DPM Unavailable         Unavailable

 

                    MAJAK, R WILLIAMS DPM Unavailable         Unavailable

 

                    MAJAK, R WILLIAMS DPM Unavailable         Unavailable

 

                    MAJAK, R WILLIAMS DPM Unavailable         Unavailable

 

                    MAJAK, R WILLIAMS DPM Unavailable         Unavailable

 

                    MAJAK, R WILLIAMS DPM Unavailable         Unavailable

 

                    MAJAK, R WILLIAMS DPM Unavailable         Unavailable

 

                    MAJAK, R WILLIAMS DPM Unavailable         Unavailable

 

                    MAJAK, R WILLIAMS DPM Unavailable         Unavailable

 

                    MAJAK, R WILLIAMS DPM Unavailable         Unavailable

 

                    MAJAK, R WILLIAMS DPM Unavailable         Unavailable

 

                    MAJAK, R WILLIAMS DPM Unavailable         Unavailable

 

                    MAJAK, R WILLIAMS DPM Unavailable         Unavailable

 

                    MAJAK, R WILLIAMS DPM Unavailable         Unavailable

 

                    MAJAK, R WILLIAMS DPM Unavailable         Unavailable

 

                    MAJAK, R WILLIAMS DPM Unavailable         Unavailable

 

                    MAJAK, R WILLIAMS DPM Unavailable         Unavailable

 

                    Fish, J Erwin     Unavailable         Unavailable

 

                    Fish, J Erwin     Unavailable         Unavailable

 

                    Fish, J Erwin     Unavailable         Unavailable

 

                    Fish, J Erwin     Unavailable         Unavailable

 

                    Fish, J Erwin     Unavailable         Unavailable

 

                    Fish, J Erwin     Unavailable         Unavailable

 

                    Fish, J Erwin     Unavailable         Unavailable

 

                    Fish, J Erwin     Unavailable         Unavailable

 

                    Fish, J Erwin     Unavailable         Unavailable

 

                    Fish, J Erwin     Unavailable         Unavailable

 

                    Fish, J Erwin     Unavailable         Unavailable

 

                    Fish, J Erwin     Unavailable         Unavailable

 

                    Fish, J Erwin     Unavailable         Unavailable

 

                    Fish, J Erwin     Unavailable         Unavailable

 

                    Fish, J Erwin     Unavailable         Unavailable

 

                    Fish, J Erwin     Unavailable         Unavailable

 

                    Fish, J Erwin     Unavailable         Unavailable

 

                    Fish, J Erwin     Unavailable         Unavailable

 

                    Fish, J Erwin     Unavailable         Unavailable

 

                    Fish, J Erwin     Unavailable         Unavailable

 

                    Fish, J Erwin     Unavailable         Unavailable

 

                    Fish, J Erwin     Unavailable         Unavailable

 

                    Fish, J Erwin     Unavailable         Unavailable

 

                    Fish, J Erwin     Unavailable         Unavailable

 

                    Fish, J Erwin     Unavailable         Unavailable

 

                    Fish, J Erwin     Unavailable         Unavailable

 

                    Fish, J Erwin     Unavailable         Unavailable

 

                    Fish, J Erwin     Unavailable         Unavailable

 

                    Fish, J Erwin     Unavailable         Unavailable

 

                    Fish, J Erwin     Unavailable         Unavailable

 

                    Fish, J Erwin     Unavailable         Unavailable

 

                    Fish, J Erwin     Unavailable         Unavailable

 

                    Fish, J Erwin     Unavailable         Unavailable

 

                    Fish, J Erwin     Unavailable         Unavailable

 

                    Fish, J Erwin     Unavailable         Unavailable

 

                    Fish, J Erwin     Unavailable         Unavailable

 

                    Fish, J Erwin     Unavailable         Unavailable

 

                    Fish, J Erwin     Unavailable         Unavailable

 

                    Fish, J Erwin     Unavailable         Unavailable

 

                    Fish, J Erwin     Unavailable         Unavailable

 

                    Fish, J Erwin     Unavailable         Unavailable

 

                    Fish, J Erwin     Unavailable         Unavailable

 

                    Fish, J Erwin     Unavailable         Unavailable

 

                    Fish, J Erwin     Unavailable         Unavailable

 

                    Fish, J Erwin     Unavailable         Unavailable

 

                    Fish, J Erwin     Unavailable         Unavailable

 

                    Fish, J Erwin     Unavailable         Unavailable

 

                    Fish, J Erwin     Unavailable         Unavailable

 

                    Fish, J Erwin     Unavailable         Unavailable

 

                    Fish, J Erwin     Unavailable         Unavailable

 

                    Fish, J Erwin     Unavailable         Unavailable

 

                    Fish, J Erwin     Unavailable         Unavailable

 

                    Fish, J Erwin     Unavailable         Unavailable

 

                    Fish, J Erwin     Unavailable         Unavailable

 

                    Fish, J Erwin     Unavailable         Unavailable

 

                    Fish, J Erwin     Unavailable         Unavailable

 

                    Fish, J Erwin     Unavailable         Unavailable

 

                    Fish, J Erwin     Unavailable         Unavailable

 

                    Fish, J Erwin     Unavailable         Unavailable

 

                    Fish, J Erwin     Unavailable         Unavailable

 

                    Fish, J Erwin     Unavailable         Unavailable

 

                    Fish, J Erwin     Unavailable         Unavailable

 

                    Fish, J Erwin     Unavailable         Unavailable

 

                    Fish, J Erwin     Unavailable         Unavailable

 

                    Fish, J Erwin     Unavailable         Unavailable

 

                    Fish, J Erwin     Unavailable         Unavailable

 

                    Fish, J Erwin     Unavailable         Unavailable

 

                    Fish, J Erwin     Unavailable         Unavailable

 

                    Fish, J Erwin     Unavailable         Unavailable

 

                    Fish, J Erwin     Unavailable         Unavailable

 

                    Fish, J Erwin     Unavailable         Unavailable

 

                    Fish, J Erwin     Unavailable         Unavailable

 

                    Fish, J Erwin     Unavailable         Unavailable

 

                    Fish, J Erwin     Unavailable         Unavailable

 

                    Fish, J Erwin     Unavailable         Unavailable

 

                    Fish, J Erwin     Unavailable         Unavailable

 

                    Fish, J Erwin     Unavailable         Unavailable

 

                    Fish, J Erwin     Unavailable         Unavailable

 

                    Fish, J Erwin     Unavailable         Unavailable

 

                    Fish, J Erwin     Unavailable         Unavailable

 

                    Fish, J Erwin     Unavailable         Unavailable

 

                    Fish, J Erwin     Unavailable         Unavailable

 

                    Fish, J Erwin     Unavailable         Unavailable

 

                    Fish, J Erwin     Unavailable         Unavailable

 

                    Fish, J Erwin     Unavailable         Unavailable

 

                    Fish, J Erwin     Unavailable         Unavailable

 

                    Maykel, D Paulo PA Unavailable         Unavailable

 

                    Maykel, D Paulo PA Unavailable         Unavailable

 

                    Maykel, D Paulo PA Unavailable         Unavailable

 

                    Maykel, D Paulo PA Unavailable         Unavailable

 

                    Maykel, D Paulo PA Unavailable         Unavailable

 

                    Maykel, D Paulo PA Unavailable         Unavailable

 

                    Maykel, D Paulo PA Unavailable         Unavailable

 

                    Maykel, D Paulo PA Unavailable         Unavailable

 

                    Maykel, D Paulo PA Unavailable         Unavailable

 

                    Maykel, D Paulo PA Unavailable         Unavailable

 

                    Maykel, D Paulo PA Unavailable         Unavailable

 

                    Maykel, D Paulo PA Unavailable         Unavailable

 

                    Maykel, D Paulo PA Unavailable         Unavailable

 

                    Maykel, D Paulo PA Unavailable         Unavailable

 

                    Maykel, D Paulo PA Unavailable         Unavailable

 

                    Maykel, D Paulo PA Unavailable         Unavailable

 

                    Maykel, D Paulo PA Unavailable         Unavailable

 

                    Maykel, D Paulo PA Unavailable         Unavailable

 

                    Maykel, D Paulo PA Unavailable         Unavailable

 

                    Maykel, D Paulo PA Unavailable         Unavailable

 

                    Maykel, D Paulo PA Unavailable         Unavailable

 

                    Maykel, D Paulo PA Unavailable         Unavailable

 

                    Maykel, D Paulo PA Unavailable         Unavailable

 

                    Maykel, D Paulo PA Unavailable         Unavailable

 

                    Maykel, D Paulo PA Unavailable         Unavailable

 

                    Maykel, D Paulo PA Unavailable         Unavailable

 

                    Maykel, D Paulo PA Unavailable         Unavailable

 

                    Maykel, D Paulo PA Unavailable         Unavailable

 

                    Maykel, D Paulo PA Unavailable         Unavailable

 

                    Maykel, D Paulo PA Unavailable         Unavailable

 

                    Maykel, D Paulo PA Unavailable         Unavailable

 

                    Maykel, D Paulo PA Unavailable         Unavailable

 

                    Maykel, D Paulo PA Unavailable         Unavailable

 

                    Maykel, D Paulo PA Unavailable         Unavailable

 

                    Maykel, D Paulo PA Unavailable         Unavailable

 

                    Maykel, D Paulo PA Unavailable         Unavailable

 

                    Maykel, D Paulo PA Unavailable         Unavailable

 

                    Maykel, D Paulo PA Unavailable         Unavailable

 

                    Maykel, D Paulo PA Unavailable         Unavailable

 

                    Maykel, D Paulo PA Unavailable         Unavailable

 

                    Maykel, D Paulo PA Unavailable         Unavailable

 

                    Maykel, D Paulo PA Unavailable         Unavailable

 

                    Maykel, D Paulo PA Unavailable         Unavailable

 

                    Maykel, D Paulo PA Unavailable         Unavailable

 

                    Maykel, D Paulo PA Unavailable         Unavailable

 

                    Maykel, D Paulo PA Unavailable         Unavailable

 

                    Maykel, D Paulo PA Unavailable         Unavailable

 

                    Maykel, D Paulo PA Unavailable         Unavailable

 

                    Maykel, D Paulo PA Unavailable         Unavailable

 

                    Maykel, D Paulo PA Unavailable         Unavailable

 

                    Maykel, D Paulo PA Unavailable         Unavailable

 

                    Maykel, D Paulo PA Unavailable         Unavailable

 

                    Maykel, D Paulo PA Unavailable         Unavailable

 

                    Maykel, D Paulo PA Unavailable         Unavailable

 

                    Maykel, D Paulo PA Unavailable         Unavailable

 

                    Maykel, D Paulo PA Unavailable         Unavailable

 

                    Maykel, D Paulo PA Unavailable         Unavailable

 

                    Maykel, D Paulo PA Unavailable         Unavailable

 

                    Maykel, D Paulo PA Unavailable         Unavailable

 

                    Maykel, D Paulo PA Unavailable         Unavailable

 

                    Maykel, D Paulo PA Unavailable         Unavailable

 

                    Maykel, D Paulo PA Unavailable         Unavailable

 

                    Maykel, D Paulo PA Unavailable         Unavailable

 

                    Maykel, D Paulo PA Unavailable         Unavailable

 

                    Maykel, D Paulo PA Unavailable         Unavailable

 

                    Maykel, D Paulo PA Unavailable         Unavailable

 

                    Maykel, D Paulo PA Unavailable         Unavailable

 

                    Maykel, D Paulo PA Unavailable         Unavailable

 

                    DRAZEK, I ALDO PA   Unavailable         Unavailable

 

                    DRAZEK, I ALDO PA   Unavailable         Unavailable

 

                    DRAZEK, I ALDO PA   Unavailable         Unavailable

 

                    DRAZEK, I ALDO PA   Unavailable         Unavailable

 

                    DRAZEK, I ALDO PA   Unavailable         Unavailable

 

                    DRAZEK, I ALDO PA   Unavailable         Unavailable

 

                    DRAZEK, I ALDO PA   Unavailable         Unavailable

 

                    DRAZEK, I ALDO PA   Unavailable         Unavailable

 

                    DRAZEK, I ALDO PA   Unavailable         Unavailable

 

                    DRAZEK, I ALDO PA   Unavailable         Unavailable

 

                    DRAZEK, I ALDO PA   Unavailable         Unavailable

 

                    DRAZEK, I ALDO PA   Unavailable         Unavailable

 

                    DRAZEK, I ALDO PA   Unavailable         Unavailable

 

                    DRAZEK, I ALDO PA   Unavailable         Unavailable

 

                    DRAZEK, I ALDO PA   Unavailable         Unavailable

 

                    DRAZEK, I ALDO PA   Unavailable         Unavailable

 

                    DRAZEK, I ALDO PA   Unavailable         Unavailable

 

                    DRAZEK, I ALDO PA   Unavailable         Unavailable

 

                    DRAZEK, I ALDO PA   Unavailable         Unavailable

 

                    DRAZEK, I ALDO PA   Unavailable         Unavailable

 

                    DRAZEK, I ALDO PA   Unavailable         Unavailable

 

                    DRAZEK, I ALDO PA   Unavailable         Unavailable

 

                    DRAZEK, I ALDO PA   Unavailable         Unavailable

 

                    DRAZEK, I ALDO PA   Unavailable         Unavailable

 

                    DRAZEK, I ALDO PA   Unavailable         Unavailable

 

                    DRAZEK, I ALDO PA   Unavailable         Unavailable

 

                    DRAZEK, I ALDO PA   Unavailable         Unavailable

 

                    DRAZEK, I ALDO PA   Unavailable         Unavailable

 

                    DRAZEK, I ALDO PA   Unavailable         Unavailable

 

                    DRAZEK, I ALDO PA   Unavailable         Unavailable

 

                    KARIE CLEMENTS MD Unavailable         Unavailable

 

                    KARIE CLEMENTS MD Unavailable         Unavailable

 

                    KARIE CLEMENTS MD Unavailable         Unavailable

 

                    KARIE CLEMENTS MD Unavailable         Unavailable

 

                    KARIE CLEMENTS MD Unavailable         Unavailable

 

                    KARIE CLEMENTS MD Unavailable         Unavailable

 

                    KARIE CLEMENTS MD Unavailable         Unavailable

 

                    KARIE CLEMENTS MD Unavailable         Unavailable

 

                    KARIE CLEMENTS MD Unavailable         Unavailable

 

                    KARIE CLEMENTS MD Unavailable         Unavailable

 

                    KARIE CLEMENTS MD Unavailable         Unavailable

 

                    KARIE CLEMENTS MD Unavailable         Unavailable

 

                    KARIE CLEMENTS MD Unavailable         Unavailable

 

                    KARIE CLEMENTS MD Unavailable         Unavailable

 

                    KARIE CLEMENTS MD Unavailable         Unavailable

 

                    KARIE CLEMENTS MD Unavailable         Unavailable

 

                    KARIE CLEMENTS MD Unavailable         Unavailable

 

                    KARIE CLEMENTS MD Unavailable         Unavailable

 

                    KARIE CLEMENTS MD Unavailable         Unavailable

 

                    KARIE CLEMENTS MD Unavailable         Unavailable

 

                    KARIE CLEMENTS MD Unavailable         Unavailable

 

                    KARIE CLEMENTS MD Unavailable         Unavailable

 

                    KARIE CLEMENTS MD Unavailable         Unavailable

 

                    KARIE CLEMENTS MD Unavailable         Unavailable

 

                    KARIE CLEMENTS MD Unavailable         Unavailable

 

                    KARIE CLEMENTS MD Unavailable         Unavailable

 

                    KARIE CLEMENTS MD Unavailable         Unavailable

 

                    KARIE CLEMENTS MD Unavailable         Unavailable

 

                    KARIE CLEMENTS MD Unavailable         Unavailable

 

                    KARIE CLEMENTS MD Unavailable         Unavailable

 

                    KARIE CLEMENTS MD Unavailable         Unavailable

 

                    KARIE CLEMENTS MD Unavailable         Unavailable

 

                    KARIE CLEMENTS MD Unavailable         Unavailable

 

                    KARIE CLEMENTS MD Unavailable         Unavailable

 

                    Fish, J Erwin     Unavailable         Unavailable

 

                    Fish, J Erwin     Unavailable         Unavailable

 

                    Fish, J Erwin     Unavailable         Unavailable

 

                    Fish, J Erwin     Unavailable         Unavailable

 

                    Fish, J Erwin     Unavailable         Unavailable

 

                    Fish, J Erwin     Unavailable         Unavailable

 

                    Fish, J Erwin     Unavailable         Unavailable

 

                    Fish, J Erwin     Unavailable         Unavailable

 

                    Fish, J Erwin     Unavailable         Unavailable

 

                    Fish, J Erwin     Unavailable         Unavailable

 

                    Fish, J Erwin     Unavailable         Unavailable

 

                    Fish, J Erwin     Unavailable         Unavailable

 

                    Fish, J Erwin     Unavailable         Unavailable

 

                    Fish, J Erwin     Unavailable         Unavailable

 

                    Fish, J Erwin     Unavailable         Unavailable

 

                    Fish, J Erwin     Unavailable         Unavailable

 

                    Fish, J Erwin     Unavailable         Unavailable

 

                    Fish, J Erwin     Unavailable         Unavailable

 

                    Fish, J Erwin     Unavailable         Unavailable

 

                    Fish, J Erwin     Unavailable         Unavailable

 

                    Fish, J Erwin     Unavailable         Unavailable

 

                    Fish, J Erwin     Unavailable         Unavailable

 

                    Fish, J Erwin     Unavailable         Unavailable

 

                    Fish, J Erwin     Unavailable         Unavailable

 

                    Fish, J Erwin     Unavailable         Unavailable

 

                    Fish, J Erwin     Unavailable         Unavailable

 

                    Fish, J Erwin     Unavailable         Unavailable

 

                    Fish, J Erwin     Unavailable         Unavailable

 

                    Fish, J Erwin     Unavailable         Unavailable

 

                    Fish, J Erwin     Unavailable         Unavailable

 

                    Fish, J Erwin     Unavailable         Unavailable

 

                    Fish, J Erwin     Unavailable         Unavailable

 

                    Fish, J Erwin     Unavailable         Unavailable

 

                    Fish, J Erwin     Unavailable         Unavailable

 

                    Fish, J Erwin     Unavailable         Unavailable

 

                    Fish, J Erwin     Unavailable         Unavailable

 

                    Fish, J Erwin     Unavailable         Unavailable

 

                    Fish, J Erwin     Unavailable         Unavailable

 

                    Fish, J Erwin     Unavailable         Unavailable

 

                    Fish, J Erwin     Unavailable         Unavailable

 

                    Fish, J Erwin     Unavailable         Unavailable

 

                    Fish, J Erwin     Unavailable         Unavailable

 

                    Fish, J Erwin     Unavailable         Unavailable

 

                    Fish, J Erwin     Unavailable         Unavailable

 

                    Fish, J Erwin     Unavailable         Unavailable

 

                    Fish, J Erwin     Unavailable         Unavailable

 

                    Fish, J Erwin     Unavailable         Unavailable

 

                    Fish, J Erwin     Unavailable         Unavailable

 

                    Fish, J Erwin     Unavailable         Unavailable

 

                    Fish, J Erwin     Unavailable         Unavailable

 

                    Fish, J Erwin     Unavailable         Unavailable

 

                    Fish, J Erwin     Unavailable         Unavailable

 

                    Fish, J Erwin     Unavailable         Unavailable

 

                    Fish, J Erwin     Unavailable         Unavailable

 

                    Fish, J Erwin     Unavailable         Unavailable

 

                    Fish, J Erwin     Unavailable         Unavailable

 

                    Fish, J Erwin     Unavailable         Unavailable

 

                    Fish, J Erwin     Unavailable         Unavailable

 

                    Fish, J Erwin     Unavailable         Unavailable

 

                    Fish, J Erwin     Unavailable         Unavailable

 

                    Fish, J Erwin     Unavailable         Unavailable

 

                    Fish, J Erwin     Unavailable         Unavailable

 

                    Fish, J Erwin     Unavailable         Unavailable

 

                    Fish, J Erwin     Unavailable         Unavailable

 

                    Fish, J Erwin     Unavailable         Unavailable

 

                    Fish, J Erwin     Unavailable         Unavailable

 

                    Fish, J Erwin     Unavailable         Unavailable

 

                    Fish, J Erwin     Unavailable         Unavailable

 

                    Fish, J Erwin     Unavailable         Unavailable

 

                    Fish, J Erwin     Unavailable         Unavailable

 

                    Fish, J Erwin     Unavailable         Unavailable

 

                    Fish, J Erwin     Unavailable         Unavailable

 

                    Fish, J Erwin     Unavailable         Unavailable

 

                    Fish, J Erwin     Unavailable         Unavailable

 

                    Fish, J Erwin     Unavailable         Unavailable

 

                    Fish, J Erwin     Unavailable         Unavailable

 

                    Fish, J Erwin     Unavailable         Unavailable

 

                    Fish, J Erwin     Unavailable         Unavailable

 

                    Fish, J Erwin     Unavailable         Unavailable

 

                    Fish, J Erwin     Unavailable         Unavailable

 

                    Fish, J Erwin     Unavailable         Unavailable

 

                    Fish, J Erwin     Unavailable         Unavailable

 

                    Fish, J Erwin     Unavailable         Unavailable

 

                    Fish, J Erwin     Unavailable         Unavailable

 

                    Fish, J Erwin     Unavailable         Unavailable

 

                    Fish, J Erwin     Unavailable         Unavailable



                                  



Re-disclosure Warning

          The records that you are about to access may contain information from 
federally-assisted alcohol or drug abuse programs. If such information is 
present, then the following federally mandated warning applies: This information
has been disclosed to you from records protected by federal confidentiality 
rules (42 CFR part 2). The federal rules prohibit you from making any further 
disclosure of this information unless further disclosure is expressly permitted 
by the written consent of the person to whom it pertains or as otherwise 
permitted by 42 CFR part 2. A general authorization for the release of medical 
or other information is NOT sufficient for this purpose. The Federal rules 
restrict any use of the information to criminally investigate or prosecute any 
alcohol or drug abuse patient.The records that you are about to access may 
contain highly sensitive health information, the redisclosure of which is 
protected by Article 27-F of the [facility] Public [facility] law. If you 
continue you may have access to information: Regarding HIV / AIDS; Provided by 
facilities licensed or operated by the [facility] Office of Mental Health; 
or Provided by the [facility] Office for People With Developmental 
Disabilities. If such information is present, then the following [place] 
mandated warning applies: This information has been disclosed to you from 
confidential records which are protected by state law. State law prohibits you 
from making any further disclosure of this information without the specific 
written consent of the person to whom it pertains, or as otherwise permitted by 
law. Any unauthorized further disclosure in violation of state law may result in
a fine or [facility] sentence or both. A general authorization for the release of 
medical or other information is NOT sufficient authorization for further disc
losure.                                                                         
    



Allergies and Adverse Reactions

          



           Type       Description Substance  Reaction   Status     Data Source(s

)

 

                      No Known Environmental Allergies No Known Environmental Al

lergies                       [facility]

 

                      No Known Food Allergies No Known Food Allergies           

            [facility]

 

           Propensity to adverse reactions NSAID      NSAID                     

       [facility]

 

                      No Known Drug Allergies No Known Drug Allergies           

            [facility]



                                                                                
                                     



Family History

          



             Family Member Name Family Member Gender Family Member Status Date o

f Status 

Description                             Data Source(s)

 

           Unknown    Unknown    Problem                          [facility]

 

                                        grandmother 

 

           Unknown    Male       Problem                          [facility]

 

           Unknown    Unknown    Problem                          [facility] ([facility]

 

           Unknown    Female     Problem                          [facility] ([facility]

 

           Unknown    Female     Problem                          [facility] ([facility])

 

           Unknown    Female     Problem                          [facility] ([facility])



                                                                                
                                     



Encounters

          



           Encounter  Providers  Location   Date       Indications Data Source(s

)

 

                Unknown                         [address] [facility] [place]

[place] [address] 2021 12:00:00 AM 

EST                                                 eCW1 ([facility])

 

                Outpatient                      [address] [facility], 

[place] [address] 10/28/2021 12:00:00 AM

EDT                                                 eCW1 ([facility])

 

                Unknown                         [address] [facility]

[place] [address] 10/08/2021 12:00:00 AM 

EDT                                                 eCW1 ([facility])

 

                Outpatient      Attender: HOMER Ndiaye/Negra/Chaz juan/Robles 2021 

10:30:00 AM EDT                                     [facility] ([facility]

 

                Unknown                         [address] [facility], 

[place] [address] 2021 12:00:00 AM 

EDT                                                 [facility]

 

           Outpatient Attender: [facility] 2021 01:30:0

0 PM EDT            

[facility] ([facility]

 

                Unknown                         [address] [facility]

[place] [address] 2021 12:00:00 AM 

EDT                                                 eCW1 ([facility])

 

                Unknown                         [address] [facility], [place]

[place] [address] 2021 12:00:00 AM 

EDT                                                 eCW1 ([facility])

 

                Unknown                         [address] [facility]

[place] [address] 2021 12:00:00 AM 

EDT                                                 eCW1 ([facility])

 

                Unknown                         [address] [facility]

[place] [address] 2021 12:00:00 AM 

EDT                                                 eCW1 ([facility])

 

                Unknown                         [address] [facility], 

[place] [address] 2021 12:00:00 AM 

EDT                                                 eCW1 ([facility])

 

                Unknown                         [address] [facility]

[place] [address] 2021 12:00:00 AM 

EDT                                                 eCW1 ([facility])

 

           Outpatient Attender: [facility] 2021 01:30:0

0 PM EDT            

[facility] ([facility]

 

                Unknown                         [address] [facility], [place]

[place] [address] 2021 12:00:00 AM 

EDT                                                 [facility]

 

                Outpatient                      [address] [facility], [place]

[place] [address] 2021 12:00:00 AM

EDT                                                 [facility]

[facility])

 

                Unknown                         [address] [facility], 

[place] [address] 2021 12:00:00 AM 

EDT                                                 eCW1 ([facility]

[facility] [place])

 

                Unknown                         [address] [facility] [place]

[place] [address] 2021 12:00:00 AM 

EDT                                                 [facility]

[facility])

 

                Outpatient      Attender: Valdo Ndiaye/Negra/Basilio/Re

indl 2021 

10:00:00 AM EDT                                     [facility]

 

                Unknown                         [address] [facility]

[place] [address] 2021 12:00:00 AM 

EDT                                                 eCW1 [facility]

[facility])

 

                Outpatient      Attender: Valdo Ndiaye/Negra/Basilio/Re

indl 03/15/2021 

03:00:00 PM EDT                                     [facility]

 

                Outpatient      Attender: WILLIAMS HALL [facility]  09:00:00 AM 

EST                                                 [facility] ([facility]

 

                Outpatient      Attender: Paulo Brar PAConsultant: Erwin knott                 2021 

07:19:00 AM EST - 2021 08:19:00 AM EST                           [facility]

 

                                        Patient discharged. 

 

                Unknown                         [address] [facility], 

[place] [address] 2021 12:00:00 AM 

EST                                                 eCW1 [facility]

 [facility])

 

                Outpatient      Attender: Paulo Brar [facility] 2021 10:15:00 AM 

EST                                                 [facility] ([facility]

 

                Outpatient      Attender: Valdo Ndiaye/Negra/Basilio/Re

indl 2021 

12:00:00 PM EST                                     [facility] ([facility]

[facility])

 

                Unknown                         [address] [facility], 

[place] [address] 2021 12:00:00 AM 

EST                                                 eCW1 [facility]

[facility])

 

                Unknown                         [address] [facility]

[place] [address] 2021 12:00:00 AM 

EST                                                 eCW1 [facility]

[facility])

 

           Outpatient Attender: [facility] 2021 12:15:0

0 PM EST            

[facility] ([facility]

 

                Unknown                         [address] [facility]

[place] [address] 2021 12:00:00 AM 

EST                                                 eCW1 ([facility])

 

                Outpatient                      [address] [facility]

[place] [address] 2021 12:00:00 AM

EST                                                 eCW1 ([facility])

 

                Unknown                         [address] [facility]

[place] [address] 2021 12:00:00 AM 

EST                                                 eCW1 [facility]

 [facility])

 

           Outpatient Attender: [facility] 2021 01:00:0

0 PM EST            

[facility] ([facility]

 

                Unknown                         [address] [place] [facility] [facility]

[place] [address] 2021 12:00:00 AM 

EST                                                 [facility]

 

                Unknown                         [address] [facility]

[place] [address] 2021 12:00:00 AM 

EST                                                 eCW1 [facility]

 

                Unknown                         [address] [facility]

[place] [address] 2021 12:00:00 AM 

EST                                                 eCW1 [facility]

 

                Outpatient      Attender: Valdo Ndiaye/Negra/Basilio/Re

indl 2021 

12:00:00 PM EST                                     MED[facility] ([facility]

 

                Unknown                         [address] [facility], 

[place] [address] 2020 12:00:00 AM 

EST                                                 eCW1 ([facility])

 

                Unknown                         [address] [facility], 

[place] [address] 12/10/2020 12:00:00 AM 

EST                                                 eCW1 ([facility])

 

                Unknown                         [address] [facility], 

[place] [address] 2020 12:00:00 AM 

EST                                                 eCW1 ([facility])

 

           Outpatient Attender: Erwin Kiser [place] Office 2020 02:20:0

0 PM EST            

MEDENT ([facility]

 

             Outpatient   Attender: ALDO GALLARDO Physical Therapy 2020 0

3:30:00 PM EST 

                                        MEDENT ([facility])

 

                Outpatient                      [address] [facility]

[place] [address] 2020 12:00:00 AM

EST                                                 eCW1 ([facility])

 

                Office Visit    Attender: Valdo Ndiaye/Negra/Basilio/Re

indl 2020 

10:15:00 AM EST                                     [facility] ([facility]

 

                Unknown                         [address] [facility], 

[place] [address] 10/30/2020 12:00:00 AM 

EDT                                                 eCW1 ([facility])

 

             Outpatient   Attender: ALDO GALLARDO Physical Therapy 10/29/2020 0

5:15:00 PM EDT 

                                        MEDENT ([facility])

 

                Outpatient      Attender: Radha Linder MDConsultant: Erwin [facility]                 10/28/2020 

08:00:00 AM EDT - 10/28/2020 11:58:00 AM EDT                           [facility]

 

                                        Patient discharged. 

 

                Outpatient      Attender: Dulce GALLARDO [place] Offi

ce 10/27/2020 

11:15:00 AM EDT                                     [facility]

 

                Outpatient      Attender: Valdo Ndiaye/Negra/Basilio/Re

indl 10/23/2020 

11:15:00 AM EDT                                     MEDENT ([facility]

 

                Outpatient      Attender: Radha Linder MDConsultant: Erwin [facility]

h                 10/14/2020 

06:45:00 AM EDT - 10/14/2020 09:44:00 AM EDT                           [facility]

 

                                        Patient discharged. 

 

                Unknown                         [address] [facility]

[place] [address] 10/13/2020 12:00:00 AM 

EDT                                                 eCW1 ([facility])

 

                OFFICE OUTPATIENT NEW 30 MINUTES Attender: ALDO GALLARDO Physic

al Therapy 

10/12/2020 11:15:00 AM EDT                           MEDENT ([facility]



                                                                                
                                                                                
                                                                                
                                                                                
                                                                                
                                                                                
                                                                                
                     



Immunizations

          



             Vaccine      Date         Status       Description  Data Source(s)

 

             New in .  IIV4 2021 01:32:00 PM EDT completed            

     MED[facility] ([facility]

 

                    COVID-19 VACCINE, MRNA-1273, LNP-S (MODERNA)/PF 2021 1

2:00:00 AM EDT 

completed                                           [facility]

 

             COVID-19 VACCINE Moderna 2021 12:00:00 AM EDT completed      

           [facility]

 

                                        Vaccine Series Complete: YESThis Data wa

s Submitted to [facility] [employer]. 

 

             COVID-19 VACCINE Moderna 2021 12:00:00 AM EST completed      

           [facility]

 

                                        Vaccine Series Complete: NOThis Data was

 Submitted to [facility] [employer]. 

 

                    COVID-19 VACCINE, MRNA-1273, LNP-S (MODERNA)/PF 2021 1

2:00:00 AM EST 

completed                                           [facility]



                                                                                
                                               



Medications

          



          Medication Brand Name Start Date Product Form Dose      Route     Admi

nistrative 

Instructions Pharmacy Instructions Status     Indications Reaction   Description

 Data 

Source(s)

 

             oxyCODONE HCl ER 20 MG oxyCODONE HCl ER 20 MG 2021 12:00:00 A

M EST              1.0 

{tablet}                         active                  oxyCODONE HCl ER 20 MG 

eCW1 ([facility])

 

                                        Acetaminophen 325 MG / Hydrocodone Igor

trate 5 MG Oral Tablet HYDROcodone-

Acetaminophen 5-325 MG    HYDROcodone-Acetaminophen 5-325 MG 2021 12:00:00

 AM

EST             1.0 {tablet_as_needed}                         active           

       HYDROcodone-Acetaminophen 5-325 MG

                                        eCW1 ([facility])

 

                                        Acetaminophen 325 MG / Hydrocodone Igor

trate 5 MG Oral Tablet HYDROcodone-

Acetaminophen 5-325 MG    HYDROcodone-Acetaminophen 5-325 MG 10/28/2021 12:00:00

 AM

EDT             1.0 {tablet_as_needed}                         active           

       HYDROcodone-Acetaminophen 5-325 MG

                                        eCW1 ([facility])

 

             oxyCODONE HCl ER 20 MG oxyCODONE HCl ER 20 MG 10/28/2021 12:00:00 A

M EDT              1.0 

{tablet}                         active                  oxyCODONE HCl ER 20 MG 

eCW1 ([facility])

 

             oxyCODONE HCl ER 20 MG oxyCODONE HCl ER 20 MG 10/08/2021 12:00:00 A

M EDT              1.0 

{tablet}                         active                  oxyCODONE HCl ER 20 MG 

eCW1 ([facility])

 

                                        Acetaminophen 325 MG / Hydrocodone Igor

trate 5 MG Oral Tablet HYDROcodone-

Acetaminophen 5-325 MG    HYDROcodone-Acetaminophen 5-325 MG 10/08/2021 12:00:00

 AM

EDT             1.0 {tablet_as_needed}                         active           

       HYDROcodone-Acetaminophen 5-325 MG

                                        eCW1 ([facility])

 

             oxyCODONE HCl ER 20 MG oxyCODONE HCl ER 20 MG 09/10/2021 12:00:00 A

M EDT              1.0 

{tablet}                         active                  oxyCODONE HCl ER 20 MG 

eCW1 ([facility])

 

                                        Acetaminophen 325 MG / Hydrocodone Igor

trate 5 MG Oral Tablet HYDROcodone-

Acetaminophen 5-325 MG    HYDROcodone-Acetaminophen 5-325 MG 09/10/2021 12:00:00

 AM

EDT             1.0 {tablet_as_needed}                         active           

       HYDROcodone-Acetaminophen 5-325 MG

                                        eCW1 ([facility])

 

             oxyCODONE HCl ER 20 MG oxyCODONE HCl ER 20 MG 2021 12:00:00 A

M EDT              1.0 

{tablet}                         active                  oxyCODONE HCl ER 20 MG 

eCW1 ([facility])

 

                                        Acetaminophen 325 MG / Hydrocodone Igor

trate 5 MG Oral Tablet HYDROcodone-

Acetaminophen 5-325 MG    HYDROcodone-Acetaminophen 5-325 MG 2021 12:00:00

 AM

EDT             1.0 {tablet_as_needed}                         active           

       HYDROcodone-Acetaminophen 5-325 MG

                                        eCW1 ([facility])

 

                          Benazepril hydrochloride 20 MG / Hydrochlorothiazide 1

2.5 MG Oral Tablet 

Benazepril HCL/Hydrochlorothiazide 07/15/2021 12:00:00 AM EDT                   

                      active   

                                                            MEDENT ([facility]

 

             oxyCODONE HCl ER 20 MG oxyCODONE HCl ER 20 MG 2021 12:00:00 A

M EDT              1.0 

{tablet}                         active                  oxyCODONE HCl ER 20 MG 

eCW1 ([facility])

 

                                        Acetaminophen 325 MG / Hydrocodone Igor

trate 5 MG Oral Tablet HYDROcodone-

Acetaminophen 5-325 MG    HYDROcodone-Acetaminophen 5-325 MG 2021 12:00:00

 AM

EDT             1.0 {tablet_as_needed}                         active           

       HYDROcodone-Acetaminophen 5-325 MG

                                        eCW1 ([facility])

 

                                        Acetaminophen 325 MG / Hydrocodone Igor

trate 5 MG Oral Tablet HYDROcodone-

Acetaminophen 5-325 MG    HYDROcodone-Acetaminophen 5-325 MG 2021 12:00:00

 AM

EDT             1.0 {tablet_as_needed}                         active           

       HYDROcodone-Acetaminophen 5-325 MG

                                        eCW1 ([facility])

 

                                        Acetaminophen 325 MG / Hydrocodone Igor

trate 5 MG Oral Tablet Hydrocodone-

Acetaminophen 5-325 MG    Hydrocodone-Acetaminophen 5-325 MG 2021 12:00:00

 AM

EDT                                       active               Hydrocodone-Aceta

minophen 5-325 MG eCW1 ([facility])

 

                                        Acetaminophen 325 MG / Hydrocodone Igor

trate 5 MG Oral Tablet HYDROcodone-

Acetaminophen 5-325 MG    HYDROcodone-Acetaminophen 5-325 MG 2021 12:00:00

 AM

EDT                                       active               HYDROcodone-Aceta

minophen 5-325 MG eCW1 ([facility])

 

                                        Acetaminophen 325 MG / Hydrocodone Igor

trate 5 MG Oral Tablet HYDROcodone-

Acetaminophen 5-325 MG    HYDROcodone-Acetaminophen 5-325 MG 2021 12:00:00

 AM

EDT                                       active               HYDROcodone-Aceta

minophen 5-325 MG eCW1 ([facility])

 

             oxyCODONE HCl ER 20 MG oxyCODONE HCl ER 20 MG 2021 12:00:00 A

M EDT              1.0 

{tablet}                         active                  oxyCODONE HCl ER 20 MG 

eCW1 ([facility])

 

                                        Acetaminophen 325 MG / Hydrocodone Igor

trate 5 MG Oral Tablet HYDROcodone-

Acetaminophen 5-325 MG    HYDROcodone-Acetaminophen 5-325 MG 2021 12:00:00

 AM

EDT                                       active               HYDROcodone-Aceta

minophen 5-325 MG eCW1 ([facility])

 

             OxyCODONE HCl ER 20 MG OxyCODONE HCl ER 20 MG 2021 12:00:00 A

M EDT              1.0 

{tablet}                         active                  OxyCODONE HCl ER 20 MG 

eCW1 ([facility])

 

                                        Acetaminophen 325 MG / Hydrocodone Igor

trate 5 MG Oral Tablet HYDROcodone-

Acetaminophen 5-325 MG    HYDROcodone-Acetaminophen 5-325 MG 2021 12:00:00

 AM

EDT                                       active               HYDROcodone-Aceta

minophen 5-325 MG eCW1 ([facility])

 

                                        Acetaminophen 325 MG / Hydrocodone Igor

trate 5 MG Oral Tablet Hydrocodone-

Acetaminophen 5-325 MG    Hydrocodone-Acetaminophen 5-325 MG 2021 12:00:00

 AM

EDT                                       active               Hydrocodone-Aceta

minophen 5-325 MG eCW1 ([facility])

 

             OxyCODONE HCl ER 20 MG OxyCODONE HCl ER 20 MG 2021 12:00:00 A

M EDT              1.0 

{tablet}                         active                  OxyCODONE HCl ER 20 MG 

eCW1 ([facility])

 

                                        Acetaminophen 325 MG / Hydrocodone Igor

trate 5 MG Oral Tablet Hydrocodone-

Acetaminophen 5-325 MG    Hydrocodone-Acetaminophen 5-325 MG 2021 12:00:00

 AM

EDT                                       active               Hydrocodone-Aceta

minophen 5-325 MG eCW1 ([facility])

 

                                        Acetaminophen 325 MG / Hydrocodone Igor

trate 5 MG Oral Tablet HYDROcodone-

Acetaminophen 5-325 MG    HYDROcodone-Acetaminophen 5-325 MG 2021 12:00:00

 AM

EDT                                       active               HYDROcodone-Aceta

minophen 5-325 MG eCW1 ([facility])

 

             OxyCODONE HCl ER 20 MG OxyCODONE HCl ER 20 MG 2021 12:00:00 A

M EDT              1.0 

{tablet}                         active                  OxyCODONE HCl ER 20 MG 

eCW1 ([facility])

 

                                        Acetaminophen 325 MG / Hydrocodone Igor

trate 5 MG Oral Tablet Hydrocodone-

Acetaminophen 5-325 MG    Hydrocodone-Acetaminophen 5-325 MG 2021 12:00:00

 AM

EDT             1.0 {tablet_as_needed}                         active           

       Hydrocodone-Acetaminophen 5-325 MG

                                        eCW1 ([facility])

 

                                        Acetaminophen 325 MG / Hydrocodone Igor

trate 5 MG Oral Tablet Hydrocodone-

Acetaminophen 5-325 MG    Hydrocodone-Acetaminophen 5-325 MG 2021 12:00:00

 AM

EDT             1.0 {tablet_as_needed}                         active           

       Hydrocodone-Acetaminophen 5-325 MG

                                        eCW1 ([facility])

 

                                        Acetaminophen 325 MG / Hydrocodone Igor

trate 5 MG Oral Tablet Hydrocodone-

Acetaminophen 5-325 MG    Hydrocodone-Acetaminophen 5-325 MG 2021 12:00:00

 AM

EDT           1.0 {tablet_as_needed}                      active                

      eCW1 ([facility])

 

                                        Acetaminophen 325 MG / Hydrocodone Igor

trate 5 MG Oral Tablet Hydrocodone-

Acetaminophen 5-325 MG    Hydrocodone-Acetaminophen 5-325 MG 2021 12:00:00

 AM

EDT             1.0 {tablet_as_needed}                         active           

       Hydrocodone-Acetaminophen 5-325 MG

                                        eCW1 ([facility])

 

             OxyCODONE HCl ER 20 MG OxyCODONE HCl ER 20 MG 2021 12:00:00 A

M EDT              1.0 

{tablet}                         active                          eCW1 ([facility])

 

             OxyCODONE HCl ER 20 MG OxyCODONE HCl ER 20 MG 2021 12:00:00 A

M EDT              1.0 

{tablet}                         active                  OxyCODONE HCl ER 20 MG 

eCW1 ([facility])

 

             OxyCODONE HCl ER 20 MG OxyCODONE HCl ER 20 MG 2021 12:00:00 A

M EDT              1.0 

{tablet}                         active                  OxyCODONE HCl ER 20 MG 

eCW1 ([facility])

 

                                        Acetaminophen 325 MG / Hydrocodone Igor

trate 5 MG Oral Tablet Hydrocodone-

Acetaminophen 5-325 MG    Hydrocodone-Acetaminophen 5-325 MG 2021 12:00:00

 AM

EDT             1.0 {tablet_as_needed}                         active           

       Hydrocodone-Acetaminophen 5-325 MG

                                        eCW1 ([facility])

 

                                        Acetaminophen 325 MG / Hydrocodone Igor

trate 5 MG Oral Tablet Hydrocodone-

Acetaminophen 5-325 MG    Hydrocodone-Acetaminophen 5-325 MG 2021 12:00:00

 AM

EDT             1.0 {tablet_as_needed}                         active           

       Hydrocodone-Acetaminophen 5-325 MG

                                        eCW1 ([facility])

 

                                        Acetaminophen 325 MG / Hydrocodone Igor

trate 5 MG Oral Tablet Hydrocodone-

Acetaminophen 5-325 MG    Hydrocodone-Acetaminophen 5-325 MG 2021 12:00:00

 AM

EDT             1.0 {tablet_as_needed}                         active           

       Hydrocodone-Acetaminophen 5-325 MG

                                        eCW1 ([facility])

 

             OxyCODONE HCl ER 20 MG OxyCODONE HCl ER 20 MG 2021 12:00:00 A

M EDT              1.0 

{tablet}                         active                  OxyCODONE HCl ER 20 MG 

eCW1 ([facility])

 

                                        Acetaminophen 325 MG / Hydrocodone Igor

trate 5 MG Oral Tablet Hydrocodone-

Acetaminophen 5-325 MG    Hydrocodone-Acetaminophen 5-325 MG 2021 12:00:00

 AM

EDT             1.0 {tablet_as_needed}                         active           

       Hydrocodone-Acetaminophen 5-325 MG

                                        eCW1 ([facility])

 

                    ammonium lactate 120 MG/ML Topical Cream Ammonium Lactate   

 03/10/2021 12:00:00 AM

EST                                       active                      MEDENT ([facility]

 

                    Doxycycline Monohydrate 100 MG Oral Capsule Doxycycline Mono

hydrate 2021 

12:00:00 AM EST               ORAL                 active                      M

EDENT ([facility] 

)

 

        Ibuprofen 800 MG Oral Tablet [Ibu] Ibu     2021 12:00:00 AM EST   

              ORAL                    

active                                                          MEDENT ([facility] )

 

             OxyCODONE HCl ER 20 MG OxyCODONE HCl ER 20 MG 2021 12:00:00 A

M EST              1.0 

{tablet}                         active                  OxyCODONE HCl ER 20 MG 

eCW1 ([facility])

 

             OxyCODONE HCl ER 20 MG OxyCODONE HCl ER 20 MG 2021 12:00:00 A

M EST              1.0 

{tablet}                         active                  OxyCODONE HCl ER 20 MG 

eCW1 ([facility])

 

                                        Acetaminophen 325 MG / Hydrocodone Igor

trate 5 MG Oral Tablet Hydrocodone-

Acetaminophen 5-325 MG    Hydrocodone-Acetaminophen 5-325 MG 2021 12:00:00

 AM

 EST            1.0 {tablet_as_needed}                         active           

       Hydrocodone-Acetaminophen 5-325 

MG                                      eCW1 ([facility])

 

      Norco 5-325 MG UNK   2021 12:00:00 AM EST                           

    active             Norco 5-325 

MG                                      eCW1 ([facility])

 

      Norco 5-325 MG UNK   2021 12:00:00 AM EST                           

    active             Norco 5-325 

MG                                      eCW1 ([facility])

 

      Norco 5-325 MG UNK   2021 12:00:00 AM EST                           

    active             Norco 5-325 

MG                                      eCW1 ([facility])

 

      Norco 5-325 MG UNK   2021 12:00:00 AM EST                           

    active             Norco 5-325 

MG                                      eCW1 ([facility])

 

                                        Acetaminophen 325 MG / Hydrocodone Igor

trate 5 MG Oral Tablet Hydrocodone-

Acetaminophen 5-325 MG    Hydrocodone-Acetaminophen 5-325 MG 2021 12:00:00

 AM

 EST            1.0 {tablet_as_needed}                         active           

       Hydrocodone-Acetaminophen 5-325 

MG                                      eCW1 ([facility])

 

                                        Acetaminophen 325 MG / Hydrocodone Igor

trate 5 MG Oral Tablet Hydrocodone-

Acetaminophen 5-325 MG    Hydrocodone-Acetaminophen 5-325 MG 2021 12:00:00

 AM

 EST            1.0 {tablet_as_needed}                         active           

       Hydrocodone-Acetaminophen 5-325 

MG                                      eCW1 ([facility])

 

      Norco 5-325 MG UNK   2021 12:00:00 AM EST                           

    active             Norco 5-325 

MG                                      eCW1 ([facility])

 

     Norco 5-325 MG UNK  2021 12:00:00 AM EST                          act

rafal                eCW1 

([facility])

 

      Norco 5-325 MG UNK   2021 12:00:00 AM EST                           

    active             Norco 5-325 

MG                                      eCW1 ([facility])

 

                                        Acetaminophen 325 MG / Hydrocodone Igor

trate 5 MG Oral Tablet Hydrocodone-

Acetaminophen 5-325 MG    Hydrocodone-Acetaminophen 5-325 MG 2021 12:00:00

 AM

 EST            1.0 {tablet_as_needed}                         active           

       Hydrocodone-Acetaminophen 5-325 

MG                                      eCW1 ([facility])

 

      Norco 5-325 MG UNK   2021 12:00:00 AM EST                           

    active             Norco 5-325 

MG                                      eCW1 ([facility])

 

      Norco 5-325 MG UNK   2021 12:00:00 AM EST                           

    active             Norco 5-325 

MG                                      eCW1 ([facility])

 

             OxyCODONE HCl ER 20 MG OxyCODONE HCl ER 20 MG 2021 12:00:00 A

M EST              1.0 

{tablet}                         active                  OxyCODONE HCl ER 20 MG 

eCW1 ([facility])

 

             OxyCODONE HCl ER 20 MG OxyCODONE HCl ER 20 MG 2021 12:00:00 A

M EST              1.0 

{tablet}                         active                  OxyCODONE HCl ER 20 MG 

eCW1 ([facility])

 

             OxyCODONE HCl ER 20 MG OxyCODONE HCl ER 20 MG 2021 12:00:00 A

M EST              1.0 

{tablet}                         active                  OxyCODONE HCl ER 20 MG 

eCW1 ([facility])

 

             OxyCODONE HCl ER 20 MG OxyCODONE HCl ER 20 MG 2021 12:00:00 A

M EST              1.0 

{tablet}                         active                  OxyCODONE HCl ER 20 MG 

eCW1 ([facility])

 

             OxyCODONE HCl ER 20 MG OxyCODONE HCl ER 20 MG 2021 12:00:00 A

M EST              1.0 

{tablet}                         active                  OxyCODONE HCl ER 20 MG 

eCW1 ([facility])

 

                                        Acetaminophen 325 MG / Hydrocodone Igor

trate 5 MG Oral Tablet [Norco] Norco 5-

325 MG Norco 5-325 MG 2021 12:00:00 AM EST                               a

ctive             Norco 5-

325 MG                                  eCW1 ([facility])

 

                                        Acetaminophen 325 MG / Hydrocodone Igor

trate 5 MG Oral Tablet [Norco] Norco 5-

325 MG Norco 5-325 MG 2021 12:00:00 AM EST                               a

ctive             Norco 5-

325 MG                                  eCW1 ([facility])

 

                                        Acetaminophen 325 MG / Hydrocodone Igor

trate 5 MG Oral Tablet [Norco] Norco 5-

325 MG Norco 5-325 MG 2021 12:00:00 AM EST                               a

ctive             Norco 5-

325 MG                                  eCW1 ([facility])

 

             OxyCODONE HCl ER 20 MG OxyCODONE HCl ER 20 MG 01/15/2021 12:00:00 A

M EST              1.0 

{tablet}                         active                  OxyCODONE HCl ER 20 MG 

eCW1 ([facility])

 

             OxyCODONE HCl ER 20 MG OxyCODONE HCl ER 20 MG 01/15/2021 12:00:00 A

M EST              1.0 

{tablet}                         active                  OxyCODONE HCl ER 20 MG 

eCW1 ([facility])

 

             OxyCODONE HCl ER 20 MG OxyCODONE HCl ER 20 MG 2020 12:00:00 A

M EST              1.0 

{tablet}                         active                  OxyCODONE HCl ER 20 MG 

eCW1 ([facility])

 

             OxyCODONE HCl ER 20 MG OxyCODONE HCl ER 20 MG 2020 12:00:00 A

M EST              1.0 

{tablet}                         active                  OxyCODONE HCl ER 20 MG 

eCW1 ([facility])

 

                                        Acetaminophen 325 MG / Hydrocodone Igor

trate 5 MG Oral Tablet [Norco] Norco 5-

325 MG Norco 5-325 MG 2020 12:00:00 AM EST                               a

ctive             Norco 5-

325 MG                                  eCW1 ([facility])

 

                                        Acetaminophen 325 MG / Hydrocodone Igor

trate 5 MG Oral Tablet [Norco] Norco 5-

325 MG Norco 5-325 MG 2020 12:00:00 AM EST                               a

ctive             Norco 5-

325 MG                                  eCW1 ([facility])

 

                                        Acetaminophen 325 MG / Hydrocodone Igor

trate 5 MG Oral Tablet [Norco] Norco 5-

325 MG Norco 5-325 MG 2020 12:00:00 AM EST                               a

ctive             Norco 5-

325 MG                                  eCW1 ([facility])

 

                                        Acetaminophen 325 MG / Hydrocodone Igor

trate 5 MG Oral Tablet [Norco] Norco 5-

325 MG Norco 5-325 MG 2020 12:00:00 AM EST                               a

ctive             Norco 5-

325 MG                                  eCW1 ([facility])

 

             OxyCODONE HCl ER 20 MG OxyCODONE HCl ER 20 MG 2020 12:00:00 A

M EST              1.0 

{tablet}                         active                  OxyCODONE HCl ER 20 MG 

eCW1 ([facility])

 

             OxyCODONE HCl ER 20 MG OxyCODONE HCl ER 20 MG 2020 12:00:00 A

M EST              1.0 

{tablet}                         active                  OxyCODONE HCl ER 20 MG 

eCW1 ([facility])

 

                                        Acetaminophen 325 MG / Hydrocodone Igor

trate 5 MG Oral Tablet [Norco] Norco 5-

325 MG Norco 5-325 MG 2020 12:00:00 AM EST                               a

ctive             Norco 5-

325 MG                                  eCW1 ([facility])

 

                                        Acetaminophen 325 MG / Hydrocodone Igor

trate 5 MG Oral Tablet [Norco] Norco 5-

325 MG Norco 5-325 MG 2020 12:00:00 AM EST                               a

ctive             Norco 5-

325 MG                                  eCW1 ([facility])

 

             OxyCODONE HCl ER 20 MG OxyCODONE HCl ER 20 MG 10/30/2020 12:00:00 A

M EDT              1.0 

{tablet}                         active                  OxyCODONE HCl ER 20 MG 

eCW1 ([facility])

 

           Cephalexin 500 MG Oral Capsule [Keflex] Keflex     10/29/2020 12:00:0

0 AM EDT                       

ORAL                          active                                  [facility] ([facility]

[facility] )

 

                          Benazepril hydrochloride 20 MG / Hydrochlorothiazide 1

2.5 MG Oral Tablet 

Benazepril Hydrochloride/Hydrochlorothiazide 10/27/2020 12:00:00 AM EDT         

                        ORAL

                              active                                  [facility] ([facility]

[facility]

 

                                        Acetaminophen 325 MG / Hydrocodone Igor

trate 5 MG Oral Tablet [Norco] Norco 5-

325 MG Norco 5-325 MG 10/13/2020 12:00:00 AM EDT                               a

ctive             Norco 5-

325 MG                                  eCW1 ([facility])

 

                                        Acetaminophen 325 MG / Hydrocodone Igor

trate 5 MG Oral Tablet [Norco] Norco 5-

325 MG Norco 5-325 MG 10/13/2020 12:00:00 AM EDT                               a

ctive             Norco 5-

325 MG                                  eCW1 ([facility])



                                                                                
                                                                                
                                                                                
                                                                                
                                                                                
                                                                                
                                                                                
                                                                                
                                                                                
                                                                                
                                                                    



Insurance Providers

          



             Payer name   Policy type / Coverage type Policy ID    Covered party

 ID Covered 

party's relationship to cisneros Policy [employer]             Plan Information

 

          Esis      Workers Compensation           [id number]     Self               

  

 

             Esis         Workers Compensation [id number].

[id number] 

Self                                                [id number]

 

           Medicare   Medicare Primary [id number]

[id number] Self        

                                        [id number]

 

          MEDICARE COMPLETE           [id number]           SP                  [id number]

[id number]

 

          MEDICARE            [id number]           SP                  [id number]

A

 

          MEDICARE            [id number]           Lesley                 [id number]

A

 

           Esis       Workers Compensation [id number]

[id number] Self       

                                        [id number]

 

                Medicare [employer] Med[employer] Part B  [id number]      

[id number] Self                                    1

[id number]

 

          Medicare [employer] Med[employer]ap Part B           [id number]

[id number] Self                

 

 

                Medicare [employer] Med[employer]ap Part B  [id number]      

[id number] Self                                    1

[id number]

 

             [facility] Commercial   [id number]

[id number] 

Self                                                [id number]

 

             [facility]) Commercial                [id number]

[id number] Self

                                                     

 

                [facility]) Commercial      [id number]       

[id number] Self                                    9

[id number]

 

          [employer]     Commercial [id number]  [id number] Se

lf                [id number]

 

          COMMERCIAL GENERIC U         [id number]           Self                [id number]

[id number]

 

          TODAYS OPTIONS           [id number]           SP                  [id number]

[id number]

 

          WELLCARE MEDICARE HMO G         [id number]           Self              

  [id number]

 

          [employer] MEDICARE HMO G         [id number]           Self              

  [id number]

 

                [employer] Commercial      [id number]       

[id number] Self                                    

[id number]

 

                    ANSI-Not a Secondary Insurance [id number]

[id number]                               

[id number]

 

                    ANSI-Not a Secondary Insurance [id number]

991j40j                               

[id number]

 

                    ANSI-Not a Secondary Insurance [id number]

1679wn5                               

[id number]

 

                [employer] Options/[employer] Commercial      [id number]       

[id number] Self                                    [id number]

[id number]

 

                    ANSI-Not a Secondary Insurance [id number]

[id number]                               

[id number]

 

                [employer] Medicare Commercial      [id number]       

[id number] Self                                    

[id number]

 

                    ANSI-Not a Secondary Insurance [id number]

[id number]                               

[id number]

 

                    ANSI-Not a Secondary Insurance [id number]

[id number]                               

[id number]

 

                    ANSI-Not a Secondary Insurance [id number]

[id number]                               

[id number]

 

                    ANSI-Not a Secondary Insurance [id number]

[id number]                               

[id number]

 

                    ANSI-Not a Secondary Insurance [id number]

[id number]                               

[id number]

 

                Today[employer] Options Medicare Commercial      [id number]       

[id number] Self                                    

[id number]

 

          [facility]            [id number]                             [id number]

 

                    ANSI-Not a Secondary Insurance [id number]

[id number]                               

[id number]

 

                    ANSI-Not a Secondary Insurance [id number]

[id number]                               

[id number]

 

                    ANSI-Not a Secondary Insurance [id number]

g10qkm4                               

[id number]

 

             [employer] Ppo Commercial   [id number]    [id number]

[id number] 

Self                                                [id number]

 

             [employer] Options Ppo Commercial   [id number]    [id number]

[id number] 

Self                                                [id number]

 

                [employer] Medicare Commercial      [id number]       

[id number] Self                                    

[id number]

 

          TODAYS OPTION               -O/P           [id number]           18     

             [id number]

 

                    ANSI-Not a Secondary Insurance [id number]

[id number]                               

[id number]

 

                    ANSI-Not a Secondary Insurance [id number]

8pm5p3w                               

[id number]

 

           [employer] Commercial [id number]  [id number].

477.0 Self        

                                        [id number]

 

                    ANSI-Not a Secondary Insurance [id number]

6ox14s1                               

[id number]

 

          [facility]            [id number]                              [id number]

 

                    ANSI-Not a Secondary Insurance [id number]

[id number]                               

[id number]

 

          [employer] MEDICARE -O/P           [id number]            18                  

[id number]

 

                    ANSI-Not a Secondary Insurance [id number]

[id number]                               

[id number]

 

                    ANSI-Not a Secondary Insurance [id number]

[id number]                               

[id number]

 

                    ANSI-Not a Secondary Insurance [id number]

[id number]                               

[id number]

 

          AETNA                       -O/P           [id number]            18     

             [id number]

 

          MEDICARE PART A-O/P           [id number]           18                 

 [id number]

 

          MEDICARE PART A-O/P           [id number]           18                  

[id number]

 

                [employer] Workers Compensation [id number]   

[id number] Self                                    1

[id number]

 

                [employer] Workers Compensation [id number]   

[id number] Self                                    1

[id number]

 

                [employer] Workers Compensation                 

[id number] Self                                     

 

          MEDICARE COMPLETE-[facility] O         [id number] S                 

  [id number]

 

          MEDICARE COMPLETE-[facility] O         [id number] S                 

  [id number]

 

          MEDICARE  C         [id number] S                   [id number]

A

 

          Medicare  Medicare Primary           [id number]     Self                 

 

          ONE CALL CARE MANAGEMENT O         [id number] S           

        [id number]

 

          MEDICARE                    -O/P           [id number]           18    

              [id number]

 

                    ANSI-Not a Secondary Insurance [id number]

863abfb                               

[id number]

 

          WELLCARE            [id number]           SP                  [id number]

 

          WELLCARE            [id number]            SP                  [id number]

 

          [employer] CLAIM[employer]           [id number]           SP        

          [id number]

 

          [employer] CLAIMS           [id number]           SP        

          [id number]

 

          WELLCARE            [id number]            SP                  [id number]

 

          WELLCARE            -O/P           [id number]            18             

     [id number]

 

          WELLCARE            [id number]           SP                  [id number]

 

          WELLCARE            -O/P           [id number]           18             

     [id number]

 

          MEDICARE            [id number]           SP                  [id number]

[id number]

 

          TODAYS OPTIONS           [id number]           SP                  [id number]

[id number]

 

                    ANSI-Not a Secondary Insurance [id number]

[id number]                               

[id number]

 

                    ANSI-Not a Secondary Insurance [id number]

[id number]                               

[id number]

 

                    ANSI-Not a Secondary Insurance [id number]

[id number]                               

[id number]

 

                    ANSI-Not a Secondary Insurance [id number]

[id number]                               

[id number]

 

                    ANSI-Not a Secondary Insurance [id number]

[id number]                               

[id number]

 

                    ANSI-Not a Secondary Insurance [id number]

1hu992l                               

[id number]

 

                    ANSI-Not a Secondary Insurance [id number]

[id number]                               

[id number]

 

                    ANSI-Not a Secondary Insurance [id number]

[id number]                               

[id number]



                                                                                
                                                                                
                                                                                
                                                                                
                                                                                
                                                                                
                                                                                
                                                                                
                                                                                
                                                                                
                                                                              



Problems, Conditions, and Diagnoses

          



           Code       Display Name Description Problem Type Effective Dates Data

 Source(s)

 

             G86830       Abdominal aortic ectasia Abdominal aortic ectasia Diag

nosis    2021 

07:19:00 AM EST                         [facility]

 

                                   Age-related nuclear cataract, right eye 

Age-related nuclear cataract, 

right eye           Diagnosis           10/28/2020 08:00:00 AM EDT [facility]

 

                                   Age-related nuclear cataract, left eye A

ge-related nuclear cataract, left 

eye                 Diagnosis           10/14/2020 06:45:00 AM EDT [facility]

 

             M72.2        Plantar fascial fibromatosis Plantar fascial fibromato

sis Problem      

2021 12:00:00 AM EDT              [facility] ([facility]

 

             I73.9        Peripheral vascular disease Peripheral vascular diseas

e Problem      2021

 12:00:00 AM EST                        [facility] ([facility]

)

 

             Z79.4        Long-term current use of insulin Long-term current use

 of insulin Problem      

2021 12:00:00 AM EST              [facility] ([facility]

)

 

             [id number]    Chronic kidney disease stage 3 Chronic kidney disease 

stage 3 Problem      

2021 12:00:00 AM EST              [facility] ([facility]

)

 

             L84          Corns and callosities Corns and callosities Problem   

   10/15/2020 12:00:00 AM 

EDT                                     [facility] ([facility]

 

           B35.1      Onychomycosis Onychomycosis Problem    10/15/2020 12:00:00

 AM EDT [facility] 

([facility]

 

                    E11.42              Type 2 diabetes mellitus with diabetic p

olyneuropathy Type 2 diabetes 

mellitus with diabetic polyneuropathy Problem             10/15/2020 12:00:00 AM

 EDT [facility] 

([facility]

 

           M20.40     Hammer toe Hammer toe Problem    10/15/2020 12:00:00 AM ED

T [facility] ([facility]



                                                                                
                                                                                
                                      



Surgeries/Procedures

          



             Procedure    Description  Date         Indications  Data Source(s)

 

             DEBRIDEMENT NAIL ANY METHOD 6/>              10/29/2021 12:00:00 AM

 EDT              [facility] ([facility]

 

             OFFICE OUTPATIENT VISIT 25 MINUTES              2021 12:00:00

 AM EDT              [facility] 

([facility])

 

             OFFICE OUTPATIENT VISIT 25 MINUTES              2021 12:00:00

 AM EDT              [facility] ([facility]

 

             DEBRIDEMENT NAIL ANY METHOD 2021 12:00:00 AM

 EDT              [facility] ([facility]

 

             OFFICE OUTPATIENT VISIT 25 MINUTES              2021 12:00:00

 AM EDT              [facility] ([facility]

 

             DEBRIDEMENT NAIL ANY METHOD 2021 12:00:00 AM

 EDT              [facility] ([facility]

 

             OFFICE OUTPATIENT VISIT 15 MINUTES              2021 12:00:00

 AM EDT              [facility] 

([facility])

 

             OFFICE OUTPATIENT VISIT 10 MINUTES              03/15/2021 12:00:00

 AM EDT              [facility] 

([facility])

 

             PARING/CUTTING BENIGN HYPERKERATOTIC LESION 2-4              03/10/

2021 12:00:00 AM EST              

[facility] ([facility]

 

             DEBRIDEMENT NAIL ANY METHOD 6/>              03/10/2021 12:00:00 AM

 EST              [facility] ([facility]

 

             OFFICE OUTPATIENT VISIT 15 MINUTES              03/10/2021 12:00:00

 AM EST              [facility] ([facility]

 

                    Endoscopy Nasal/Sinus Surgical W/ Ethmoidectomy Total       

              2021 12:00:00 AM 

EST                                                 [facility] ([facility])

 

                    Endoscopy Nasal/Sinus Surgical Remove Tissue From Maxillary 

Sinus                     2021 

12:00:00 AM EST                                     [facility] ([facility])

 

             Electrocardiogram Complete              2021 12:00:00 AM EST 

             [facility]

 

             DEBRIDEMENT NAIL ANY METHOD 6/>              2021 12:00:00 AM

 EST              [facility] ([facility]

 

             EXCISION NASAL POLYP SIMPLE              10/26/2020 12:00:00 AM EDT

              [facility] ([facility] )

 

             MRI Upper Extremity Any Joint              10/16/2020 12:00:00 AM E

DT              [facility] ([facility] )

 

             MRI Upper Extremity Any Joint              10/16/2020 12:00:00 AM E

DT              [facility] ([facility] )

 

                    FX Ulna Proximal End (eg:Olecranon or Coronoid Proc) W/O Man

ipula                     10/12/2020 

12:00:00 AM EDT                                     [facility] ([facility]

[facility] )



                                                                                
                                                                                
                                                                                
                            



Results

          



                    ID                  Date                Data Source

 

                    [id number]            2021 10:14:00 AM EDT [facility] ([facility]

[facility])









          Name      Value     Range     Interpretation Code Description Data Va

rce(s) Supporting 

Document(s)

 

          Uric Acid 5.6       2.6-6                         [facility] ([facility]

[facility] [facility])  









                    ID                  Date                Data Source

 

                    [id number]            2021 10:14:00 AM EDT [facility] ([facility]

[facility])









          Name      Value     Range     Interpretation Code Description Data Va

rce(s) Supporting 

Document(s)

 

          White Blood Count 8.1       5.0-10.0                      [facility] (Card

[facility])  

 

          Platelets 159       172-450                       [facility] (Cardiology A

[facility])  

 

          Red Blood Count 4.09      4.70-6.10                     [facility] (Cardio

logy [facility])  

 

          Hemoglobin 13.3      14.0-18.0                     [facility] ([facility] 

[facility])  

 

          Hematocrit 39.2      42.0-52.0                     [facility] ([facility] 

[facility])  









                    ID                  Date                Data Source

 

                    [id number]            2021 10:14:00 AM EDT [facility] ([facility]

[facility])









          Name      Value     Range     Interpretation Code Description Data Va

rce(s) Supporting 

Document(s)

 

          Glucose   165                               [facility] ([facility]iology [facility]

[facility])  

 

          Blood Urea Nitrogen 30.1      7-18                          [facility] (Ca

rdiology [facility])  

 

          Creatinine 1.6       0.55-1.3                      [facility] ([facility]  

 

          Sodium    139.5     135-145                       [facility] ([facility])  

 

                                        Glomerular filtration rate/1.73 sq M.pre

dicted [Volume Rate/Area] in Serum or 

Plasma by Creatinine-based formula (MDRD) 43                                    

              [facility] ([facility])                       

 

          Chloride  109.3                             [facility] ([facility]  

 

          Potassium 4.04      3.5-5.5                       [facility] ([facility]  

 

          Calcium   8.1       8.5-10.1                      [facility] ([facility]  

 

          Carbon Dioxide 28.0      21-32                         [facility] (Cardiol

[facility])  

 

          Phosphorus 3.3       2.5-4.9                       [facility] ([facility]  

 

          Albumin   3.4       3.2-5.2                       [facility] ([facility]  









                    ID                  Date                Data Source

 

                    [id number]            2021 10:40:00 AM EDT [facility] (Cardi

[facility])









          Name      Value     Range     Interpretation Code Description Data Va

rce(s) Supporting 

Document(s)

 

          Magnesium Level 2.02      1.6-2.6                       [facility] ([facility])  









                    ID                  Date                Data Source

 

                    [id number]            2021 10:40:00 AM EDT [facility] (Cardi

[facility])









          Name      Value     Range     Interpretation Code Description Data Va

rce(s) Supporting 

Document(s)

 

          White Blood Count 7.9       5.0-10.0                      [facility] ([facility]  

 

          Red Blood Count 3.61      4.70-6.10                     [facility] ([facility])  

 

          Hemoglobin 11.7      14.0-18.0                     [facility] ([facility]  

 

          Platelets 168       172-450                       [facility] ([facility]  

 

          Hematocrit 35.6      42.0-52.0                     [facility] ([facility]  









                    ID                  Date                Data Source

 

                    [id number]            2021 10:40:00 AM EDT [facility] ([facility]i

[facility])









          Name      Value     Range     Interpretation Code Description Data Va

rce(s) Supporting 

Document(s)

 

          Glucose   157                               [facility] ([facility]  

 

          Creatinine 1.8       0.55-1.3                      [facility] ([facility])  

 

          Blood Urea Nitrogen 23.5      7-18                          [facility] (Ca

[facility]di[facility])  

 

          Sodium    145.3     135-145                       [facility] ([facility]  

 

                                        Glomerular filtration rate/1.73 sq M.pre

dicted [Volume Rate/Area] in Serum or 

Plasma by Creatinine-based formula (MDRD) 38                                    

              [facility] ([facility])                       

 

          Chloride  110.2                             [facility] ([facility]

[facility])  

 

          Potassium 4.44      3.5-5.3                       [facility] ([facility]

[facility])  

 

          Carbon Dioxide 24.3      20-32                         [facility] (Cardiol

[facility])  

 

          Phosphorus 3.6                                     [facility] ([facility])  

 

          Calcium   8.3       8.5-10.1                      [facility] ([facility]

[facility])  

 

          Albumin   3.4       3.2-5.2                       [facility] ([facility]

[facility])  









                    ID                  Date                Data Source

 

                    [id number]            2021 01:04:00 PM EDT [facility] ([facility]i

[facility])









          Name      Value     Range     Interpretation Code Description Data Va

rce(s) Supporting 

Document(s)

 

          Magnesium Level 2.00                                    [facility] ([facility]io

[facility])  









                    ID                  Date                Data Source

 

                    [id number]            2021 01:04:00 PM EDT [facility] ([facility]i

[facility])









          Name      Value     Range     Interpretation Code Description Data Va

rce(s) Supporting 

Document(s)

 

          White Blood Count 10.1      4.3-10.9                      [facility] (Card

[facility])  

 

          Platelets 215       130-400                       [facility] ([facility]

[facility])  

 

          Red Blood Count 3.95      4.70-6.20                     [facility] ([facility]io

[facility])  

 

          Hemoglobin 12.9      13.0-17.0                     [facility] ([facility]  

 

          Hematocrit 39.0      39.0-50.0                     [facility] ([facility]  









                    ID                  Date                Data Source

 

                    [id number]            2021 01:04:00 PM EDT [facility] ([facility]i

[facility])









          Name      Value     Range     Interpretation Code Description Data Va

rce(s) Supporting 

Document(s)

 

          Blood Urea Nitrogen 27.0      5-21                          [facility] ([facility]

[facility])  

 

          Glucose   129                               [facility] ([facility]  

 

                                        Glomerular filtration rate/1.73 sq M.pre

dicted [Volume Rate/Area] in Serum or 

Plasma by Creatinine-based formula (MDRD) 50                                    

              [facility] ([facility]                       

 

          Creatinine 1.4       0.6-1.5                       [facility] ([facility]  

 

          Sodium    138.6     136-146                       [facility] ([facility]  

 

          Potassium 4.28      3.5-5.3                       [facility] ([facility]  

 

          Carbon Dioxide 29.1      20-32                         [facility] ([facility]

[facility])  

 

          Chloride  106.2                             [facility] ([facility]  

 

          Phosphorus 3.4                                     [facility] ([facility]  

 

          Calcium   8.3       8.4-10.4                      [facility] ([facility]  

 

          Albumin   3.6       3.5-4.7                       [facility] ([facility]  









                    ID                  Date                Data Source

 

                    [id number]     2021 11:33:00 AM [facility] [address] [place] [address] PHONE: [phone]

FAX: [phone]  Name .................. : PALLADINO LEWIS W            Acct 
Number.................. : [id number] ROOM. ................. :                   
           Number ................... : [id number] Stay type ............. : O/P  
                       Discharge Date......... ... : 21 Admit Date .......
.. : 21                        Admit Phys .................... : MAYKEL 
[facility] Date of Birth ....... : 1950                     Family Phys 
................... : ERIKA ROGERS Phone .................. : [phone]        
       Age ................................ : 70 Film# .................. 
.:[id number]                      Sex ................................. : M  
Unsigned transcriptions are preliminary reports and do not represent a medical 
or legal document           US ABD COMPLETE      [id number]   COMPLETE:21 07:59
KNB 5457             (REASON FOR ABDOMEN: Abdominal aortic ectasia, check 
kidneys as well     ABDOMINAL ULTRASOUND, 21:  FINDINGS: Cystic foci are 
noted in bilateral kidneys measuring up to 1.2 cm. Solid renal mass, 
hydronephrosis, or calculus is not identified. The right and left kidneys 
measure 9.5 cm and 10.0 cm, respectively. The proximal most abdominal aorta was 
obscured by bowel gas. The visualized mid and distal abdominal aorta are normal 
in caliber with maximal diameter of 2.2 cm. The spleen is homogeneous and not 
enlarged measuring 10.0 x 5.9 x 10.5 cm. Hepatic mass, intrahepatic biliary 
dilatation, or fatty infiltrate of the liver is not seen. The common bile duct 
has a maximal diameter of 6.7 mm, which is along the upper limits of normal. 
Gallstone, gallbladder, sludge, gallbladder wall thickening, or pericholecystic 
fluid is not seen. There is no ascites. Evaluation of the pancreas is limited by
underlying bowel gas. Pancreatic pathology is not identified.  IMPRESSION: 
Incomplete visualization of the abdominal aorta due to underlying bowel gas. Vi
sualized aorta is normal in caliber. Aortic aneurysm not seen, but not 
definitively excluded based on current examination.  Small cystic foci bilateral
kidneys.  Otherwise, unremarkable examination.   _
__________________________________ Electronically Reviewed and Signed By Castillo Larson MD                , 21 11:33, ABE  Transcribe Initials: SSR, 
Transcribe Date: 21 10:47, Dictation Date:   Copy for: MAYKEL BULLARD     
          via fax                                                               
                                    Page 1 of 2 [facility] [address] [place] [address] PHONE: [phone] FAX: [phone]  Name 
.................. : PALLADINO LEWIS W            Acct Number.................. 
: [id number] ROOM. ................. :                              MR Number 
................... : [id number] Stay type ............. : O/P                      
   Discharge Date......... ... : 21 Admit Date ......... : 21       
                Admit Phys .................... : MAYKEL [facility] Date of Birth 
....... : 1950                     Family Phys ................... : ERIKA DIASNE Phone .................. : [phone]                Age ....
............................ : 70 Film# .................. .:[id number]             
        Sex ................................. : M  Unsigned transcriptions are 
preliminary reports and do not represent a medical or legal document           
US ABD COMPLETE      [id number]   COMPLETE:21 07:59 KNB 5457             
(REASON FOR ABDOMEN: Abdominal aortic ectasia, check kidneys as well   Copy for:
ERIKA ERWIN               via fax Copy for: 710 MED REC                        
                                                                             
Page 2 of 2   









          Name      Value     Range     Interpretation Code Description Data Va

rce(s) Supporting 

Document(s)

 

                                                                       









                    ID                  Date                Data Source

 

                    [id number]         2021 12:15:00 PM EST [facility] ([facility]









          Name      Value     Range     Interpretation Code Description Data Va

rce(s) Supporting 

Document(s)

 

                Glucose [Mass/volume] in Capillary blood by Glucometer 97 mg/dL 

                 Normal 

(applies to non-numeric results)                     [facility]  









                    ID                  Date                Data Source

 

                    [id number]         2021 12:15:00 PM EST [facility] ([facility])









          Name      Value     Range     Interpretation Code Description Data Va

rce(s) Supporting 

Document(s)

 

                Glucose [Mass/volume] in Capillary blood by Glucometer 97 mg/dL 

                 Normal 

(applies to non-numeric results)                     [facility] ([facility])  









                    ID                  Date                Data Source

 

                    [id number]         2021 11:52:00 AM EST [facility] ([facility])









          Name      Value     Range     Interpretation Code Description Data Va

rce(s) Supporting 

Document(s)

 

           Surgical pathology study Laboratory test result                      

            [facility])                            

 

                                        FINAL DIAGNOSIS



Submitted as "ethmoid polyp", excision:

Inflamed edematous sinonasal mucosa with plasma cells, lymphocytes,

neutrophils and fragments of bone.

Negative for malignancy.

                                        2021 - 1017



CLINICAL DIAGNOSIS



Chronic sinusitis

                                        2021 - 1513



GROSS DIAGNOSIS



Received in formalin labeled "ethmoid  polyp" and consists of fragments

of tissue, 4 x 3 x 1 cm in aggregate.  Representative sections submitted

in one.

                                        -OA

                                        2021 - 1017



Signed________ JUAN JOSE RAJAN MD 2021 1018

 









                    ID                  Date                Data Source

 

                    [id number]         2021 09:25:00 AM EST [facility] ([facility]









          Name      Value     Range     Interpretation Code Description Data Va

rce(s) Supporting 

Document(s)

 

                Glucose [Mass/volume] in Capillary blood by Glucometer 94 mg/dL 

                 Normal 

(applies to non-numeric results)                     [facility] ([facility]  









                    ID                  Date                Data Source

 

                    [id number]         2021 09:25:00 AM EST [facility] ([facility]









          Name      Value     Range     Interpretation Code Description Data Va

rce(s) Supporting 

Document(s)

 

                Glucose [Mass/volume] in Capillary blood by Glucometer 94 mg/dL 

                 Normal 

(applies to non-numeric results)                     [facility] ([facility], )  









                    ID                  Date                Data Source

 

                    [id number]         2021 02:00:00 PM EST [facility]









          Name      Value     Range     Interpretation Code Description Data Va

rce(s) Supporting 

Document(s)

 

          SARS coronavirus 2 RNA Not Detected                               [facility]

[place]     

 

                                        This lab was ordered by [facility] and reported by [facility]. 









                    ID                  Date                Data Source

 

                    [id number]         2021 01:43:00 PM EST [facility] ([facility]









          Name      Value     Range     Interpretation Code Description Data Va

rce(s) Supporting 

Document(s)

 

           Hemoglobin A1c/Hemoglobin.total in Blood 6.3 %      4.50-6.20  Above 

high normal            

[facility] ([facility]  









                    ID                  Date                Data Source

 

                    [id number]         2021 01:43:00 PM EST [facility] ([facility]









          Name      Value     Range     Interpretation Code Description Data Va

rce(s) Supporting 

Document(s)

 

          BUN       30 mg/dL  8-23      Above high normal           [facility] ([facility] 

 

 

                                        CHRONIC KIDNEY DISEASE STAGING PER NKF: 

  MALE GFR INTERPRETATION:  20-49 YRS:  

   >60 mL/min  Normal 50-59 YRS:     >56 mL/min  Normal 60-69 YRS:     >49 
mL/min  Normal 70-79 YRS:     >42 mL/min  Normal 80 and above  >35 mL/min  
Normal   FEMALE GRF INTERPRETATION:  20-39 YRS:    >60 mL/min  Normal 40-49 YRS:
    >58 mL/min  Normal 50-59 YRS:    >51 mL/min  Normal 60-69 YRS:    >45 mL/min
  Normal 70-79 YRS:    >39 mL/min  Normal 80 and above >32 mL/min  
NormalCLASSIFICATION            CHOLESTEROL FOR ADULTS       
CHILDREN/ADOLESCENTS*   DESIRABLE:                <200 MG/DL                   
<170 MG/DL  BORDER-LINE HIGH RISK:    200-239 MG/DL                170-199 MG/DL
  HIGH RISK:                >240 MG/DL                   >200 MG/DL    CLASS. 
FOR PRIMARY LDL CHOL PREVENTION:        LDL CHOL-CHILD/ADOLESCENTS*   DESIRABLE:
              <130 MG/DL              <110 MG/DL  BORDERLINE-HIGH RISK:   130-
159 MG/DL           110-129 MG/DL  HIGH RISK:              >160 MG/DL           
   >130 MG/DL   *CHILDREN AND ADOLESCENTS REPRESENTS INDIVIDUALA AGED 2-19 YEARS
 EXCLUSIVE. 

 

           Glu        113 mg/dL       Above high normal            [facility] 

([facility]                                    

 

                                        CHRONIC KIDNEY DISEASE STAGING PER NKF: 

  MALE GFR INTERPRETATION:  20-49 YRS:  

   >60 mL/min  Normal 50-59 YRS:     >56 mL/min  Normal 60-69 YRS:     >49 
mL/min  Normal 70-79 YRS:     >42 mL/min  Normal 80 and above  >35 mL/min  
Normal   FEMALE GRF INTERPRETATION:  20-39 YRS:    >60 mL/min  Normal 40-49 YRS:
    >58 mL/min  Normal 50-59 YRS:    >51 mL/min  Normal 60-69 YRS:    >45 mL/min
  Normal 70-79 YRS:    >39 mL/min  Normal 80 and above >32 mL/min  
NormalCLASSIFICATION            CHOLESTEROL FOR ADULTS       
CHILDREN/ADOLESCENTS*   DESIRABLE:                <200 MG/DL                   
<170 MG/DL  BORDER-LINE HIGH RISK:    200-239 MG/DL                170-199 MG/DL
  HIGH RISK:                >240 MG/DL                   >200 MG/DL    CLASS. 
FOR PRIMARY LDL CHOL PREVENTION:        LDL CHOL-CHILD/ADOLESCENTS*   DESIRABLE:
              <130 MG/DL              <110 MG/DL  BORDERLINE-HIGH RISK:   130-
159 MG/DL           110-129 MG/DL  HIGH RISK:              >160 MG/DL           
   >130 MG/DL   *CHILDREN AND ADOLESCENTS REPRESENTS INDIVIDUALA AGED 2-19 YEARS
 EXCLUSIVE. 

 

           Creat      2.0 mg/dL  0.7-1.2    Above high normal            [facility] 

([facility]                                    

 

                                        CHRONIC KIDNEY DISEASE STAGING PER NKF: 

  MALE GFR INTERPRETATION:  20-49 YRS:  

   >60 mL/min  Normal 50-59 YRS:     >56 mL/min  Normal 60-69 YRS:     >49 
mL/min  Normal 70-79 YRS:     >42 mL/min  Normal 80 and above  >35 mL/min  
Normal   FEMALE GRF INTERPRETATION:  20-39 YRS:    >60 mL/min  Normal 40-49 YRS:
    >58 mL/min  Normal 50-59 YRS:    >51 mL/min  Normal 60-69 YRS:    >45 mL/min
  Normal 70-79 YRS:    >39 mL/min  Normal 80 and above >32 mL/min  
NormalCLASSIFICATION            CHOLESTEROL FOR ADULTS       
CHILDREN/ADOLESCENTS*   DESIRABLE:                <200 MG/DL                   
<170 MG/DL  BORDER-LINE HIGH RISK:    200-239 MG/DL                170-199 MG/DL
  HIGH RISK:                >240 MG/DL                   >200 MG/DL    CLASS. 
FOR PRIMARY LDL CHOL PREVENTION:        LDL CHOL-CHILD/ADOLESCENTS*   DESIRABLE:
              <130 MG/DL              <110 MG/DL  BORDERLINE-HIGH RISK:   130-
159 MG/DL           110-129 MG/DL  HIGH RISK:              >160 MG/DL           
   >130 MG/DL   *CHILDREN AND ADOLESCENTS REPRESENTS INDIVIDUALA AGED 2-19 YEARS
 EXCLUSIVE. 

 

          BUN/Creatinine Ratio 14.7 CALC                               [facility] ([facility]  

 

                                        CHRONIC KIDNEY DISEASE STAGING PER NKF: 

  MALE GFR INTERPRETATION:  20-49 YRS:  

   >60 mL/min  Normal 50-59 YRS:     >56 mL/min  Normal 60-69 YRS:     >49 
mL/min  Normal 70-79 YRS:     >42 mL/min  Normal 80 and above  >35 mL/min  
Normal   FEMALE GRF INTERPRETATION:  20-39 YRS:    >60 mL/min  Normal 40-49 YRS:
    >58 mL/min  Normal 50-59 YRS:    >51 mL/min  Normal 60-69 YRS:    >45 mL/min
  Normal 70-79 YRS:    >39 mL/min  Normal 80 and above >32 mL/min  
NormalCLASSIFICATION            CHOLESTEROL FOR ADULTS       
CHILDREN/ADOLESCENTS*   DESIRABLE:                <200 MG/DL                   
<170 MG/DL  BORDER-LINE HIGH RISK:    200-239 MG/DL                170-199 MG/DL
  HIGH RISK:                >240 MG/DL                   >200 MG/DL    CLASS. 
FOR PRIMARY LDL CHOL PREVENTION:        LDL CHOL-CHILD/ADOLESCENTS*   DESIRABLE:
              <130 MG/DL              <110 MG/DL  BORDERLINE-HIGH RISK:   130-
159 MG/DL           110-129 MG/DL  HIGH RISK:              >160 MG/DL           
   >130 MG/DL   *CHILDREN AND ADOLESCENTS REPRESENTS INDIVIDUALA AGED 2-19 YEARS
 EXCLUSIVE. 

 

           Co2        19.7 mmol/L 22.0-29.0  Below low normal            [facility] 

([facility]                                   

 

                                        CHRONIC KIDNEY DISEASE STAGING PER NKF: 

  MALE GFR INTERPRETATION:  20-49 YRS:  

   >60 mL/min  Normal 50-59 YRS:     >56 mL/min  Normal 60-69 YRS:     >49 
mL/min  Normal 70-79 YRS:     >42 mL/min  Normal 80 and above  >35 mL/min  
Normal   FEMALE GRF INTERPRETATION:  20-39 YRS:    >60 mL/min  Normal 40-49 YRS:
    >58 mL/min  Normal 50-59 YRS:    >51 mL/min  Normal 60-69 YRS:    >45 mL/min
  Normal 70-79 YRS:    >39 mL/min  Normal 80 and above >32 mL/min  
NormalCLASSIFICATION            CHOLESTEROL FOR ADULTS       
CHILDREN/ADOLESCENTS*   DESIRABLE:                <200 MG/DL                   
<170 MG/DL  BORDER-LINE HIGH RISK:    200-239 MG/DL                170-199 MG/DL
  HIGH RISK:                >240 MG/DL                   >200 MG/DL    CLASS. 
FOR PRIMARY LDL CHOL PREVENTION:        LDL CHOL-CHILD/ADOLESCENTS*   DESIRABLE:
              <130 MG/DL              <110 MG/DL  BORDERLINE-HIGH RISK:   130-
159 MG/DL           110-129 MG/DL  HIGH RISK:              >160 MG/DL           
   >130 MG/DL   *CHILDREN AND ADOLESCENTS REPRESENTS INDIVIDUALA AGED 2-19 YEARS
 EXCLUSIVE. 

 

          CA        8.7 mg/dL 8.6-10.2                      [facility] ([facility]  

 

                                        CHRONIC KIDNEY DISEASE STAGING PER NKF: 

  MALE GFR INTERPRETATION:  20-49 YRS:  

   >60 mL/min  Normal 50-59 YRS:     >56 mL/min  Normal 60-69 YRS:     >49 
mL/min  Normal 70-79 YRS:     >42 mL/min  Normal 80 and above  >35 mL/min  
Normal   FEMALE GRF INTERPRETATION:  20-39 YRS:    >60 mL/min  Normal 40-49 YRS:
    >58 mL/min  Normal 50-59 YRS:    >51 mL/min  Normal 60-69 YRS:    >45 mL/min
  Normal 70-79 YRS:    >39 mL/min  Normal 80 and above >32 mL/min  
NormalCLASSIFICATION            CHOLESTEROL FOR ADULTS       
CHILDREN/ADOLESCENTS*   DESIRABLE:                <200 MG/DL                   
<170 MG/DL  BORDER-LINE HIGH RISK:    200-239 MG/DL                170-199 MG/DL
  HIGH RISK:                >240 MG/DL                   >200 MG/DL    CLASS. 
FOR PRIMARY LDL CHOL PREVENTION:        LDL CHOL-CHILD/ADOLESCENTS*   DESIRABLE:
              <130 MG/DL              <110 MG/DL  BORDERLINE-HIGH RISK:   130-
159 MG/DL           110-129 MG/DL  HIGH RISK:              >160 MG/DL           
   >130 MG/DL   *CHILDREN AND ADOLESCENTS REPRESENTS INDIVIDUALA AGED 2-19 YEARS
 EXCLUSIVE. 

 

          Na        142 mmol/L 136-145                       [facility] ([facility]

[facility]  

 

                                        CHRONIC KIDNEY DISEASE STAGING PER NKF: 

  MALE GFR INTERPRETATION:  20-49 YRS:  

   >60 mL/min  Normal 50-59 YRS:     >56 mL/min  Normal 60-69 YRS:     >49 
mL/min  Normal 70-79 YRS:     >42 mL/min  Normal 80 and above  >35 mL/min  
Normal   FEMALE GRF INTERPRETATION:  20-39 YRS:    >60 mL/min  Normal 40-49 YRS:
    >58 mL/min  Normal 50-59 YRS:    >51 mL/min  Normal 60-69 YRS:    >45 mL/min
  Normal 70-79 YRS:    >39 mL/min  Normal 80 and above >32 mL/min  
NormalCLASSIFICATION            CHOLESTEROL FOR ADULTS       
CHILDREN/ADOLESCENTS*   DESIRABLE:                <200 MG/DL                   
<170 MG/DL  BORDER-LINE HIGH RISK:    200-239 MG/DL                170-199 MG/DL
  HIGH RISK:                >240 MG/DL                   >200 MG/DL    CLASS. 
FOR PRIMARY LDL CHOL PREVENTION:        LDL CHOL-CHILD/ADOLESCENTS*   DESIRABLE:
              <130 MG/DL              <110 MG/DL  BORDERLINE-HIGH RISK:   130-
159 MG/DL           110-129 MG/DL  HIGH RISK:              >160 MG/DL           
   >130 MG/DL   *CHILDREN AND ADOLESCENTS REPRESENTS INDIVIDUALA AGED 2-19 YEARS
 EXCLUSIVE. 

 

           CL         110.9 mmol/L 98.0-107.0 Above high normal            [facility]

[facility] ([facility]                        

 

                                        CHRONIC KIDNEY DISEASE STAGING PER NKF: 

  MALE GFR INTERPRETATION:  20-49 YRS:  

   >60 mL/min  Normal 50-59 YRS:     >56 mL/min  Normal 60-69 YRS:     >49 
mL/min  Normal 70-79 YRS:     >42 mL/min  Normal 80 and above  >35 mL/min  
Normal   FEMALE GRF INTERPRETATION:  20-39 YRS:    >60 mL/min  Normal 40-49 YRS:
    >58 mL/min  Normal 50-59 YRS:    >51 mL/min  Normal 60-69 YRS:    >45 mL/min
  Normal 70-79 YRS:    >39 mL/min  Normal 80 and above >32 mL/min  
NormalCLASSIFICATION            CHOLESTEROL FOR ADULTS       
CHILDREN/ADOLESCENTS*   DESIRABLE:                <200 MG/DL                   
<170 MG/DL  BORDER-LINE HIGH RISK:    200-239 MG/DL                170-199 MG/DL
  HIGH RISK:                >240 MG/DL                   >200 MG/DL    CLASS. 
FOR PRIMARY LDL CHOL PREVENTION:        LDL CHOL-CHILD/ADOLESCENTS*   DESIRABLE:
              <130 MG/DL              <110 MG/DL  BORDERLINE-HIGH RISK:   130-
159 MG/DL           110-129 MG/DL  HIGH RISK:              >160 MG/DL           
   >130 MG/DL   *CHILDREN AND ADOLESCENTS REPRESENTS INDIVIDUALA AGED 2-19 YEARS
 EXCLUSIVE. 

 

          Anion Gap 14 mmol/L                               [facility] ([facility]  

 

                                        CHRONIC KIDNEY DISEASE STAGING PER NKF: 

  MALE GFR INTERPRETATION:  20-49 YRS:  

   >60 mL/min  Normal 50-59 YRS:     >56 mL/min  Normal 60-69 YRS:     >49 
mL/min  Normal 70-79 YRS:     >42 mL/min  Normal 80 and above  >35 mL/min  
Normal   FEMALE GRF INTERPRETATION:  20-39 YRS:    >60 mL/min  Normal 40-49 YRS:
    >58 mL/min  Normal 50-59 YRS:    >51 mL/min  Normal 60-69 YRS:    >45 mL/min
  Normal 70-79 YRS:    >39 mL/min  Normal 80 and above >32 mL/min  
NormalCLASSIFICATION            CHOLESTEROL FOR ADULTS       
CHILDREN/ADOLESCENTS*   DESIRABLE:                <200 MG/DL                   
<170 MG/DL  BORDER-LINE HIGH RISK:    200-239 MG/DL                170-199 MG/DL
  HIGH RISK:                >240 MG/DL                   >200 MG/DL    CLASS. 
FOR PRIMARY LDL CHOL PREVENTION:        LDL CHOL-CHILD/ADOLESCENTS*   DESIRABLE:
              <130 MG/DL              <110 MG/DL  BORDERLINE-HIGH RISK:   130-
159 MG/DL           110-129 MG/DL  HIGH RISK:              >160 MG/DL           
   >130 MG/DL   *CHILDREN AND ADOLESCENTS REPRESENTS INDIVIDUALA AGED 2-19 YEARS
 EXCLUSIVE. 

 

          K         3.6 mmol/L 3.5-5.1                       [facility] ([facility]

[facility]  

 

                                        CHRONIC KIDNEY DISEASE STAGING PER NKF: 

  MALE GFR INTERPRETATION:  20-49 YRS:  

   >60 mL/min  Normal 50-59 YRS:     >56 mL/min  Normal 60-69 YRS:     >49 
mL/min  Normal 70-79 YRS:     >42 mL/min  Normal 80 and above  >35 mL/min  
Normal   FEMALE GRF INTERPRETATION:  20-39 YRS:    >60 mL/min  Normal 40-49 YRS:
    >58 mL/min  Normal 50-59 YRS:    >51 mL/min  Normal 60-69 YRS:    >45 mL/min
  Normal 70-79 YRS:    >39 mL/min  Normal 80 and above >32 mL/min  
NormalCLASSIFICATION            CHOLESTEROL FOR ADULTS       
CHILDREN/ADOLESCENTS*   DESIRABLE:                <200 MG/DL                   
<170 MG/DL  BORDER-LINE HIGH RISK:    200-239 MG/DL                170-199 MG/DL
  HIGH RISK:                >240 MG/DL                   >200 MG/DL    CLASS. 
FOR PRIMARY LDL CHOL PREVENTION:        LDL CHOL-CHILD/ADOLESCENTS*   DESIRABLE:
              <130 MG/DL              <110 MG/DL  BORDERLINE-HIGH RISK:   130-
159 MG/DL           110-129 MG/DL  HIGH RISK:              >160 MG/DL           
   >130 MG/DL   *CHILDREN AND ADOLESCENTS REPRESENTS INDIVIDUALA AGED 2-19 YEARS
 EXCLUSIVE. 

 

          eGFR Non-Afr. American 33 #                                    [facility] 

([facility]  

 

                                        CHRONIC KIDNEY DISEASE STAGING PER NKF: 

  MALE GFR INTERPRETATION:  20-49 YRS:  

   >60 mL/min  Normal 50-59 YRS:     >56 mL/min  Normal 60-69 YRS:     >49 
mL/min  Normal 70-79 YRS:     >42 mL/min  Normal 80 and above  >35 mL/min  
Normal   FEMALE GRF INTERPRETATION:  20-39 YRS:    >60 mL/min  Normal 40-49 YRS:
    >58 mL/min  Normal 50-59 YRS:    >51 mL/min  Normal 60-69 YRS:    >45 mL/min
  Normal 70-79 YRS:    >39 mL/min  Normal 80 and above >32 mL/min  
NormalCLASSIFICATION            CHOLESTEROL FOR ADULTS       
CHILDREN/ADOLESCENTS*   DESIRABLE:                <200 MG/DL                   
<170 MG/DL  BORDER-LINE HIGH RISK:    200-239 MG/DL                170-199 MG/DL
  HIGH RISK:                >240 MG/DL                   >200 MG/DL    CLASS. 
FOR PRIMARY LDL CHOL PREVENTION:        LDL CHOL-CHILD/ADOLESCENTS*   DESIRABLE:
              <130 MG/DL              <110 MG/DL  BORDERLINE-HIGH RISK:   130-
159 MG/DL           110-129 MG/DL  HIGH RISK:              >160 MG/DL           
   >130 MG/DL   *CHILDREN AND ADOLESCENTS REPRESENTS INDIVIDUALA AGED 2-19 YEARS
 EXCLUSIVE. 

 

          eGFR  38 #                                    [facility] (

[facility]  

 

                                        CHRONIC KIDNEY DISEASE STAGING PER NKF: 

  MALE GFR INTERPRETATION:  20-49 YRS:  

   >60 mL/min  Normal 50-59 YRS:     >56 mL/min  Normal 60-69 YRS:     >49 
mL/min  Normal 70-79 YRS:     >42 mL/min  Normal 80 and above  >35 mL/min  
Normal   FEMALE GRF INTERPRETATION:  20-39 YRS:    >60 mL/min  Normal 40-49 YRS:
    >58 mL/min  Normal 50-59 YRS:    >51 mL/min  Normal 60-69 YRS:    >45 mL/min
  Normal 70-79 YRS:    >39 mL/min  Normal 80 and above >32 mL/min  
NormalCLASSIFICATION            CHOLESTEROL FOR ADULTS       
CHILDREN/ADOLESCENTS*   DESIRABLE:                <200 MG/DL                   
<170 MG/DL  BORDER-LINE HIGH RISK:    200-239 MG/DL                170-199 MG/DL
  HIGH RISK:                >240 MG/DL                   >200 MG/DL    CLASS. 
FOR PRIMARY LDL CHOL PREVENTION:        LDL CHOL-CHILD/ADOLESCENTS*   DESIRABLE:
              <130 MG/DL              <110 MG/DL  BORDERLINE-HIGH RISK:   130-
159 MG/DL           110-129 MG/DL  HIGH RISK:              >160 MG/DL           
   >130 MG/DL   *CHILDREN AND ADOLESCENTS REPRESENTS INDIVIDUALA AGED 2-19 YEARS
 EXCLUSIVE. 









                    ID                  Date                Data Source

 

                    [id number]         2021 01:43:00 PM EST [facility] ([facility]

[facility]









          Name      Value     Range     Interpretation Code Description Data Va

rce(s) Supporting 

Document(s)

 

          Chol      116 mg/dL 0-200                         [facility] ([facility]  

 

                                        CHRONIC KIDNEY DISEASE STAGING PER NKF: 

  MALE GFR INTERPRETATION:  20-49 YRS:  

   >60 mL/min  Normal 50-59 YRS:     >56 mL/min  Normal 60-69 YRS:     >49 
mL/min  Normal 70-79 YRS:     >42 mL/min  Normal 80 and above  >35 mL/min  
Normal   FEMALE GRF INTERPRETATION:  20-39 YRS:    >60 mL/min  Normal 40-49 YRS:
    >58 mL/min  Normal 50-59 YRS:    >51 mL/min  Normal 60-69 YRS:    >45 mL/min
  Normal 70-79 YRS:    >39 mL/min  Normal 80 and above >32 mL/min  
NormalCLASSIFICATION            CHOLESTEROL FOR ADULTS       
CHILDREN/ADOLESCENTS*   DESIRABLE:                <200 MG/DL                   
<170 MG/DL  BORDER-LINE HIGH RISK:    200-239 MG/DL                170-199 MG/DL
  HIGH RISK:                >240 MG/DL                   >200 MG/DL    CLASS. 
FOR PRIMARY LDL CHOL PREVENTION:        LDL CHOL-CHILD/ADOLESCENTS*   DESIRABLE:
              <130 MG/DL              <110 MG/DL  BORDERLINE-HIGH RISK:   130-
159 MG/DL           110-129 MG/DL  HIGH RISK:              >160 MG/DL           
   >130 MG/DL   *CHILDREN AND ADOLESCENTS REPRESENTS INDIVIDUALA AGED 2-19 YEARS
 EXCLUSIVE. 

 

          Trig      140 mg/dL                         [facility] ([facility]  

 

                                        CHRONIC KIDNEY DISEASE STAGING PER NKF: 

  MALE GFR INTERPRETATION:  20-49 YRS:  

   >60 mL/min  Normal 50-59 YRS:     >56 mL/min  Normal 60-69 YRS:     >49 
mL/min  Normal 70-79 YRS:     >42 mL/min  Normal 80 and above  >35 mL/min  
Normal   FEMALE GRF INTERPRETATION:  20-39 YRS:    >60 mL/min  Normal 40-49 YRS:
    >58 mL/min  Normal 50-59 YRS:    >51 mL/min  Normal 60-69 YRS:    >45 mL/min
  Normal 70-79 YRS:    >39 mL/min  Normal 80 and above >32 mL/min  
NormalCLASSIFICATION            CHOLESTEROL FOR ADULTS       
CHILDREN/ADOLESCENTS*   DESIRABLE:                <200 MG/DL                   
<170 MG/DL  BORDER-LINE HIGH RISK:    200-239 MG/DL                170-199 MG/DL
  HIGH RISK:                >240 MG/DL                   >200 MG/DL    CLASS. 
FOR PRIMARY LDL CHOL PREVENTION:        LDL CHOL-CHILD/ADOLESCENTS*   DESIRABLE:
              <130 MG/DL              <110 MG/DL  BORDERLINE-HIGH RISK:   130-
159 MG/DL           110-129 MG/DL  HIGH RISK:              >160 MG/DL           
   >130 MG/DL   *CHILDREN AND ADOLESCENTS REPRESENTS INDIVIDUALA AGED 2-19 YEARS
 EXCLUSIVE. 

 

          Cho/HDL Ratio 3.0 CALC                                [facility] ([facility]  

 

                                        CHRONIC KIDNEY DISEASE STAGING PER NKF: 

  MALE GFR INTERPRETATION:  20-49 YRS:  

   >60 mL/min  Normal 50-59 YRS:     >56 mL/min  Normal 60-69 YRS:     >49 
mL/min  Normal 70-79 YRS:     >42 mL/min  Normal 80 and above  >35 mL/min  
Normal   FEMALE GRF INTERPRETATION:  20-39 YRS:    >60 mL/min  Normal 40-49 YRS:
    >58 mL/min  Normal 50-59 YRS:    >51 mL/min  Normal 60-69 YRS:    >45 mL/min
  Normal 70-79 YRS:    >39 mL/min  Normal 80 and above >32 mL/min  
NormalCLASSIFICATION            CHOLESTEROL FOR ADULTS       
CHILDREN/ADOLESCENTS*   DESIRABLE:                <200 MG/DL                   
<170 MG/DL  BORDER-LINE HIGH RISK:    200-239 MG/DL                170-199 MG/DL
  HIGH RISK:                >240 MG/DL                   >200 MG/DL    CLASS. 
FOR PRIMARY LDL CHOL PREVENTION:        LDL CHOL-CHILD/ADOLESCENTS*   DESIRABLE:
              <130 MG/DL              <110 MG/DL  BORDERLINE-HIGH RISK:   130-
159 MG/DL           110-129 MG/DL  HIGH RISK:              >160 MG/DL           
   >130 MG/DL   *CHILDREN AND ADOLESCENTS REPRESENTS INDIVIDUALA AGED 2-19 YEARS
 EXCLUSIVE. 

 

           LDL_C      49 Calc         Below low normal            [facility] (

[facility]                                    

 

                                        CHRONIC KIDNEY DISEASE STAGING PER NKF: 

  MALE GFR INTERPRETATION:  20-49 YRS:  

   >60 mL/min  Normal 50-59 YRS:     >56 mL/min  Normal 60-69 YRS:     >49 
mL/min  Normal 70-79 YRS:     >42 mL/min  Normal 80 and above  >35 mL/min  
Normal   FEMALE GRF INTERPRETATION:  20-39 YRS:    >60 mL/min  Normal 40-49 YRS:
    >58 mL/min  Normal 50-59 YRS:    >51 mL/min  Normal 60-69 YRS:    >45 mL/min
  Normal 70-79 YRS:    >39 mL/min  Normal 80 and above >32 mL/min  
NormalCLASSIFICATION            CHOLESTEROL FOR ADULTS       
CHILDREN/ADOLESCENTS*   DESIRABLE:                <200 MG/DL                   
<170 MG/DL  BORDER-LINE HIGH RISK:    200-239 MG/DL                170-199 MG/DL
  HIGH RISK:                >240 MG/DL                   >200 MG/DL    CLASS. 
FOR PRIMARY LDL CHOL PREVENTION:        LDL CHOL-CHILD/ADOLESCENTS*   DESIRABLE:
              <130 MG/DL              <110 MG/DL  BORDERLINE-HIGH RISK:   130-
159 MG/DL           110-129 MG/DL  HIGH RISK:              >160 MG/DL           
   >130 MG/DL   *CHILDREN AND ADOLESCENTS REPRESENTS INDIVIDUALA AGED 2-19 YEARS
 EXCLUSIVE. 

 

           Cholesterol in HDL [Mass/volume] in Serum or Plasma 39 mg/dL   35-55 

                           [facility] 

([facility]       

 

                                        CHRONIC KIDNEY DISEASE STAGING PER NKF: 

  MALE GFR INTERPRETATION:  20-49 YRS:  

   >60 mL/min  Normal 50-59 YRS:     >56 mL/min  Normal 60-69 YRS:     >49 
mL/min  Normal 70-79 YRS:     >42 mL/min  Normal 80 and above  >35 mL/min  
Normal   FEMALE GRF INTERPRETATION:  20-39 YRS:    >60 mL/min  Normal 40-49 YRS:
    >58 mL/min  Normal 50-59 YRS:    >51 mL/min  Normal 60-69 YRS:    >45 mL/min
  Normal 70-79 YRS:    >39 mL/min  Normal 80 and above >32 mL/min  
NormalCLASSIFICATION            CHOLESTEROL FOR ADULTS       
CHILDREN/ADOLESCENTS*   DESIRABLE:                <200 MG/DL                   
<170 MG/DL  BORDER-LINE HIGH RISK:    200-239 MG/DL                170-199 MG/DL
  HIGH RISK:                >240 MG/DL                   >200 MG/DL    CLASS. 
FOR PRIMARY LDL CHOL PREVENTION:        LDL CHOL-CHILD/ADOLESCENTS*   DESIRABLE:
              <130 MG/DL              <110 MG/DL  BORDERLINE-HIGH RISK:   130-
159 MG/DL           110-129 MG/DL  HIGH RISK:              >160 MG/DL           
   >130 MG/DL   *CHILDREN AND ADOLESCENTS REPRESENTS INDIVIDUALA AGED 2-19 YEARS
 EXCLUSIVE. 









                    ID                  Date                Data Source

 

                    [id number]         2021 02:40:00 PM EST [facility] ([facility]









          Name      Value     Range     Interpretation Code Description Data Va

rce(s) Supporting 

Document(s)

 

           Glu        160 mg/dL       Above high normal            [facility] 

([facility]                                    

 

                                        NORMAL RANGES 

****************************************************************************** 
Age         WBC      RBC        HGB           HCT         MCV        PLT 
------------------------------------------------------------------------------ 
Adult M   4.1-10.9    4.20-6.30   12.0-18.0   37.0-51.0   80-97      140-440  
Adult F   4.1-10.9    4.04-5.48   12.0-18.0   37.0-51.0   80-97      140-440  0
-1 Yr    5.0-20.0    3.9-5.9     15-18       MV: 44      MV: 91     MV: 277  2-9
 Yr.   6.0-17.0    3.8-5.4     11-13       MV: 37      MV: 78     MV: 300  10 
Yrs.   5.0-13.0    3.8-5.4     12-15       MV: 39      MV: 80     MV: 250    
NOTE:  * FOR ADULT BLACK MALES AND FEMALES, NORMAL WBC IS 2.9-7.7 K/ML  * FOR 
ADULT BLACK MALES AND FEMALES, NORMAL RBC,HGB, AND HCT IS 5% LESS  SOURCE FOR 
DATA: [employer] 1800 OPERATION MANUAL( AUTOMATED BLOOD COUNTS AND DIFF.)  APPENDIX
  B-3                      CHRONIC KIDNEY DISEASE STAGING PER NKF:   MALE GFR 
INTERPRETATION:  20-49 YRS:     >60 mL/min  Normal 50-59 YRS:     >56 mL/min  
Normal 60-69 YRS:     >49 mL/min  Normal 70-79 YRS:     >42 mL/min  Normal 80 
and above  >35 mL/min  Normal   FEMALE GRF INTERPRETATION:  20-39 YRS:    >60 
mL/min  Normal 40-49 YRS:    >58 mL/min  Normal 50-59 YRS:    >51 mL/min  Normal
 60-69 YRS:    >45 mL/min  Normal 70-79 YRS:    >39 mL/min  Normal 80 and above 
>32 mL/min  Normal 

 

          BUN/Creatinine Ratio 19.6 Calc                               [facility] ([facility]  

 

                                        NORMAL RANGES 

****************************************************************************** 
Age         WBC      RBC        HGB           HCT         MCV        PLT 
------------------------------------------------------------------------------ 
Adult M   4.1-10.9    4.20-6.30   12.0-18.0   37.0-51.0   80-97      140-440  
Adult F   4.1-10.9    4.04-5.48   12.0-18.0   37.0-51.0   80-97      140-440  0
-1 Yr    5.0-20.0    3.9-5.9     15-18       MV: 44      MV: 91     MV: 277  2-9
 Yr.   6.0-17.0    3.8-5.4     11-13       MV: 37      MV: 78     MV: 300  10 
Yrs.   5.0-13.0    3.8-5.4     12-15       MV: 39      MV: 80     MV: 250    
NOTE:  * FOR ADULT BLACK MALES AND FEMALES, NORMAL WBC IS 2.9-7.7 K/ML  * FOR 
ADULT BLACK MALES AND FEMALES, NORMAL RBC,HGB, AND HCT IS 5% LESS  SOURCE FOR 
DATA: [employer] 1800 OPERATION MANUAL( AUTOMATED BLOOD COUNTS AND DIFF.)  APPENDIX
  B-3                      CHRONIC KIDNEY DISEASE STAGING PER NKF:   MALE GFR 
INTERPRETATION:  20-49 YRS:     >60 mL/min  Normal 50-59 YRS:     >56 mL/min  
Normal 60-69 YRS:     >49 mL/min  Normal 70-79 YRS:     >42 mL/min  Normal 80 
and above  >35 mL/min  Normal   FEMALE GRF INTERPRETATION:  20-39 YRS:    >60 
mL/min  Normal 40-49 YRS:    >58 mL/min  Normal 50-59 YRS:    >51 mL/min  Normal
 60-69 YRS:    >45 mL/min  Normal 70-79 YRS:    >39 mL/min  Normal 80 and above 
>32 mL/min  Normal 

 

          BUN       41 mg/dL  8-23      Above high normal           [facility] ([facility] 

 

 

                                        NORMAL RANGES 

****************************************************************************** 
Age         WBC      RBC        HGB           HCT         MCV        PLT 
------------------------------------------------------------------------------ 
Adult M   4.1-10.9    4.20-6.30   12.0-18.0   37.0-51.0   80-97      140-440  
Adult F   4.1-10.9    4.04-5.48   12.0-18.0   37.0-51.0   80-97      140-440  0
-1 Yr    5.0-20.0    3.9-5.9     15-18       MV: 44      MV: 91     MV: 277  2-9
 Yr.   6.0-17.0    3.8-5.4     11-13       MV: 37      MV: 78     MV: 300  10 
Yrs.   5.0-13.0    3.8-5.4     12-15       MV: 39      MV: 80     MV: 250    
NOTE:  * FOR ADULT BLACK MALES AND FEMALES, NORMAL WBC IS 2.9-7.7 K/ML  * FOR 
ADULT BLACK MALES AND FEMALES, NORMAL RBC,HGB, AND HCT IS 5% LESS  SOURCE FOR 
DATA: [employer] 1800 OPERATION MANUAL( AUTOMATED BLOOD COUNTS AND DIFF.)  APPENDIX
  B-3                      CHRONIC KIDNEY DISEASE STAGING PER NKF:   MALE GFR 
INTERPRETATION:  20-49 YRS:     >60 mL/min  Normal 50-59 YRS:     >56 mL/min  
Normal 60-69 YRS:     >49 mL/min  Normal 70-79 YRS:     >42 mL/min  Normal 80 
and above  >35 mL/min  Normal   FEMALE GRF INTERPRETATION:  20-39 YRS:    >60 
mL/min  Normal 40-49 YRS:    >58 mL/min  Normal 50-59 YRS:    >51 mL/min  Normal
 60-69 YRS:    >45 mL/min  Normal 70-79 YRS:    >39 mL/min  Normal 80 and above 
>32 mL/min  Normal 

 

           Creat      2.1 mg/dL  0.7-1.2    Above high normal            [facility] 

([facility]                                    

 

                                        NORMAL RANGES 

****************************************************************************** 
Age         WBC      RBC        HGB           HCT         MCV        PLT 
------------------------------------------------------------------------------ 
Adult M   4.1-10.9    4.20-6.30   12.0-18.0   37.0-51.0   80-97      140-440  
Adult F   4.1-10.9    4.04-5.48   12.0-18.0   37.0-51.0   80-97      140-440  0
-1 Yr    5.0-20.0    3.9-5.9     15-18       MV: 44      MV: 91     MV: 277  2-9
 Yr.   6.0-17.0    3.8-5.4     11-13       MV: 37      MV: 78     MV: 300  10 
Yrs.   5.0-13.0    3.8-5.4     12-15       MV: 39      MV: 80     MV: 250    
NOTE:  * FOR ADULT BLACK MALES AND FEMALES, NORMAL WBC IS 2.9-7.7 K/ML  * FOR 
ADULT BLACK MALES AND FEMALES, NORMAL RBC,HGB, AND HCT IS 5% LESS  SOURCE FOR 
DATA: [employer] 1800 OPERATION MANUAL( AUTOMATED BLOOD COUNTS AND DIFF.)  APPENDIX
  B-3                      CHRONIC KIDNEY DISEASE STAGING PER NKF:   MALE GFR 
INTERPRETATION:  20-49 YRS:     >60 mL/min  Normal 50-59 YRS:     >56 mL/min  
Normal 60-69 YRS:     >49 mL/min  Normal 70-79 YRS:     >42 mL/min  Normal 80 
and above  >35 mL/min  Normal   FEMALE GRF INTERPRETATION:  20-39 YRS:    >60 
mL/min  Normal 40-49 YRS:    >58 mL/min  Normal 50-59 YRS:    >51 mL/min  Normal
 60-69 YRS:    >45 mL/min  Normal 70-79 YRS:    >39 mL/min  Normal 80 and above 
>32 mL/min  Normal 

 

          Na        140 mmol/L 136-145                       [facility] ([facility]  

 

                                        NORMAL RANGES 

****************************************************************************** 
Age         WBC      RBC        HGB           HCT         MCV        PLT 
------------------------------------------------------------------------------ 
Adult M   4.1-10.9    4.20-6.30   12.0-18.0   37.0-51.0   80-97      140-440  
Adult F   4.1-10.9    4.04-5.48   12.0-18.0   37.0-51.0   80-97      140-440  0
-1 Yr    5.0-20.0    3.9-5.9     15-18       MV: 44      MV: 91     MV: 277  2-9
 Yr.   6.0-17.0    3.8-5.4     11-13       MV: 37      MV: 78     MV: 300  10 
Yrs.   5.0-13.0    3.8-5.4     12-15       MV: 39      MV: 80     MV: 250    
NOTE:  * FOR ADULT BLACK MALES AND FEMALES, NORMAL WBC IS 2.9-7.7 K/ML  * FOR 
ADULT BLACK MALES AND FEMALES, NORMAL RBC,HGB, AND HCT IS 5% LESS  SOURCE FOR 
DATA: [employer] 1800 OPERATION MANUAL( AUTOMATED BLOOD COUNTS AND DIFF.)  APPENDIX
  B-3                      CHRONIC KIDNEY DISEASE STAGING PER NKF:   MALE GFR 
INTERPRETATION:  20-49 YRS:     >60 mL/min  Normal 50-59 YRS:     >56 mL/min  
Normal 60-69 YRS:     >49 mL/min  Normal 70-79 YRS:     >42 mL/min  Normal 80 
and above  >35 mL/min  Normal   FEMALE GRF INTERPRETATION:  20-39 YRS:    >60 
mL/min  Normal 40-49 YRS:    >58 mL/min  Normal 50-59 YRS:    >51 mL/min  Normal
 60-69 YRS:    >45 mL/min  Normal 70-79 YRS:    >39 mL/min  Normal 80 and above 
>32 mL/min  Normal 

 

          K         4.0 mmol/L 3.5-5.1                       [facility] ([facility]  

 

                                        NORMAL RANGES 

****************************************************************************** 
Age         WBC      RBC        HGB           HCT         MCV        PLT 
------------------------------------------------------------------------------ 
Adult M   4.1-10.9    4.20-6.30   12.0-18.0   37.0-51.0   80-97      140-440  
Adult F   4.1-10.9    4.04-5.48   12.0-18.0   37.0-51.0   80-97      140-440  0
-1 Yr    5.0-20.0    3.9-5.9     15-18       MV: 44      MV: 91     MV: 277  2-9
 Yr.   6.0-17.0    3.8-5.4     11-13       MV: 37      MV: 78     MV: 300  10 
Yrs.   5.0-13.0    3.8-5.4     12-15       MV: 39      MV: 80     MV: 250    
NOTE:  * FOR ADULT BLACK MALES AND FEMALES, NORMAL WBC IS 2.9-7.7 K/ML  * FOR 
ADULT BLACK MALES AND FEMALES, NORMAL RBC,HGB, AND HCT IS 5% LESS  SOURCE FOR 
DATA: [employer] 1800 OPERATION MANUAL( AUTOMATED BLOOD COUNTS AND DIFF.)  APPENDIX
  B-3                      CHRONIC KIDNEY DISEASE STAGING PER NKF:   MALE GFR 
INTERPRETATION:  20-49 YRS:     >60 mL/min  Normal 50-59 YRS:     >56 mL/min  
Normal 60-69 YRS:     >49 mL/min  Normal 70-79 YRS:     >42 mL/min  Normal 80 
and above  >35 mL/min  Normal   FEMALE GRF INTERPRETATION:  20-39 YRS:    >60 
mL/min  Normal 40-49 YRS:    >58 mL/min  Normal 50-59 YRS:    >51 mL/min  Normal
 60-69 YRS:    >45 mL/min  Normal 70-79 YRS:    >39 mL/min  Normal 80 and above 
>32 mL/min  Normal 

 

           Co2        15.9 mmol/L 22.0-29.0  Below low normal            [facility] 

([facility]                                   

 

                                        NORMAL RANGES 

****************************************************************************** 
Age         WBC      RBC        HGB           HCT         MCV        PLT 
------------------------------------------------------------------------------ 
Adult M   4.1-10.9    4.20-6.30   12.0-18.0   37.0-51.0   80-97      140-440  
Adult F   4.1-10.9    4.04-5.48   12.0-18.0   37.0-51.0   80-97      140-440  0
-1 Yr    5.0-20.0    3.9-5.9     15-18       MV: 44      MV: 91     MV: 277  2-9
 Yr.   6.0-17.0    3.8-5.4     11-13       MV: 37      MV: 78     MV: 300  10 
Yrs.   5.0-13.0    3.8-5.4     12-15       MV: 39      MV: 80     MV: 250    
NOTE:  * FOR ADULT BLACK MALES AND FEMALES, NORMAL WBC IS 2.9-7.7 K/ML  * FOR 
ADULT BLACK MALES AND FEMALES, NORMAL RBC,HGB, AND HCT IS 5% LESS  SOURCE FOR 
DATA: [employer] 1800 OPERATION MANUAL( AUTOMATED BLOOD COUNTS AND DIFF.)  APPENDIX
  B-3                      CHRONIC KIDNEY DISEASE STAGING PER NKF:   MALE GFR 
INTERPRETATION:  20-49 YRS:     >60 mL/min  Normal 50-59 YRS:     >56 mL/min  
Normal 60-69 YRS:     >49 mL/min  Normal 70-79 YRS:     >42 mL/min  Normal 80 
and above  >35 mL/min  Normal   FEMALE GRF INTERPRETATION:  20-39 YRS:    >60 
mL/min  Normal 40-49 YRS:    >58 mL/min  Normal 50-59 YRS:    >51 mL/min  Normal
 60-69 YRS:    >45 mL/min  Normal 70-79 YRS:    >39 mL/min  Normal 80 and above 
>32 mL/min  Normal 

 

           CL         114.0 mmol/L 98.0-107.0 Above high normal            [facility]

T ([facility]                        

 

                                        NORMAL RANGES 

****************************************************************************** 
Age         WBC      RBC        HGB           HCT         MCV        PLT 
------------------------------------------------------------------------------ 
Adult M   4.1-10.9    4.20-6.30   12.0-18.0   37.0-51.0   80-97      140-440  
Adult F   4.1-10.9    4.04-5.48   12.0-18.0   37.0-51.0   80-97      140-440  0
-1 Yr    5.0-20.0    3.9-5.9     15-18       MV: 44      MV: 91     MV: 277  2-9
 Yr.   6.0-17.0    3.8-5.4     11-13       MV: 37      MV: 78     MV: 300  10 
Yrs.   5.0-13.0    3.8-5.4     12-15       MV: 39      MV: 80     MV: 250    
NOTE:  * FOR ADULT BLACK MALES AND FEMALES, NORMAL WBC IS 2.9-7.7 K/ML  * FOR 
ADULT BLACK MALES AND FEMALES, NORMAL RBC,HGB, AND HCT IS 5% LESS  SOURCE FOR 
DATA: [employer] 1800 OPERATION MANUAL( AUTOMATED BLOOD COUNTS AND DIFF.)  APPENDIX
  B-3                      CHRONIC KIDNEY DISEASE STAGING PER NKF:   MALE GFR 
INTERPRETATION:  20-49 YRS:     >60 mL/min  Normal 50-59 YRS:     >56 mL/min  
Normal 60-69 YRS:     >49 mL/min  Normal 70-79 YRS:     >42 mL/min  Normal 80 
and above  >35 mL/min  Normal   FEMALE GRF INTERPRETATION:  20-39 YRS:    >60 
mL/min  Normal 40-49 YRS:    >58 mL/min  Normal 50-59 YRS:    >51 mL/min  Normal
 60-69 YRS:    >45 mL/min  Normal 70-79 YRS:    >39 mL/min  Normal 80 and above 
>32 mL/min  Normal 

 

          CA        8.8 mg/dL 8.6-10.2                      [facility] ([facility]  

 

                                        NORMAL RANGES 

****************************************************************************** 
Age         WBC      RBC        HGB           HCT         MCV        PLT 
------------------------------------------------------------------------------ 
Adult M   4.1-10.9    4.20-6.30   12.0-18.0   37.0-51.0   80-97      140-440  
Adult F   4.1-10.9    4.04-5.48   12.0-18.0   37.0-51.0   80-97      140-440  0
-1 Yr    5.0-20.0    3.9-5.9     15-18       MV: 44      MV: 91     MV: 277  2-9
 Yr.   6.0-17.0    3.8-5.4     11-13       MV: 37      MV: 78     MV: 300  10 
Yrs.   5.0-13.0    3.8-5.4     12-15       MV: 39      MV: 80     MV: 250    
NOTE:  * FOR ADULT BLACK MALES AND FEMALES, NORMAL WBC IS 2.9-7.7 K/ML  * FOR 
ADULT BLACK MALES AND FEMALES, NORMAL RBC,HGB, AND HCT IS 5% LESS  SOURCE FOR 
DATA: [employer] 1800 OPERATION MANUAL( AUTOMATED BLOOD COUNTS AND DIFF.)  APPENDIX
  B-3                      CHRONIC KIDNEY DISEASE STAGING PER NKF:   MALE GFR 
INTERPRETATION:  20-49 YRS:     >60 mL/min  Normal 50-59 YRS:     >56 mL/min  
Normal 60-69 YRS:     >49 mL/min  Normal 70-79 YRS:     >42 mL/min  Normal 80 
and above  >35 mL/min  Normal   FEMALE GRF INTERPRETATION:  20-39 YRS:    >60 
mL/min  Normal 40-49 YRS:    >58 mL/min  Normal 50-59 YRS:    >51 mL/min  Normal
 60-69 YRS:    >45 mL/min  Normal 70-79 YRS:    >39 mL/min  Normal 80 and above 
>32 mL/min  Normal 

 

           TP         6.1 g/dL   6.6-8.7    Below low normal            [facility] [place] (

[facility]

                                         

 

                                        NORMAL RANGES 

****************************************************************************** 
Age         WBC      RBC        HGB           HCT         MCV        PLT 
------------------------------------------------------------------------------ 
Adult M   4.1-10.9    4.20-6.30   12.0-18.0   37.0-51.0   80-97      140-440  
Adult F   4.1-10.9    4.04-5.48   12.0-18.0   37.0-51.0   80-97      140-440  0
-1 Yr    5.0-20.0    3.9-5.9     15-18       MV: 44      MV: 91     MV: 277  2-9
 Yr.   6.0-17.0    3.8-5.4     11-13       MV: 37      MV: 78     MV: 300  10 
Yrs.   5.0-13.0    3.8-5.4     12-15       MV: 39      MV: 80     MV: 250    
NOTE:  * FOR ADULT BLACK MALES AND FEMALES, NORMAL WBC IS 2.9-7.7 K/ML  * FOR 
ADULT BLACK MALES AND FEMALES, NORMAL RBC,HGB, AND HCT IS 5% LESS  SOURCE FOR 
DATA: [employer] 1800 OPERATION MANUAL( AUTOMATED BLOOD COUNTS AND DIFF.)  APPENDIX
  B-3                      CHRONIC KIDNEY DISEASE STAGING PER NKF:   MALE GFR 
INTERPRETATION:  20-49 YRS:     >60 mL/min  Normal 50-59 YRS:     >56 mL/min  
Normal 60-69 YRS:     >49 mL/min  Normal 70-79 YRS:     >42 mL/min  Normal 80 
and above  >35 mL/min  Normal   FEMALE GRF INTERPRETATION:  20-39 YRS:    >60 
mL/min  Normal 40-49 YRS:    >58 mL/min  Normal 50-59 YRS:    >51 mL/min  Normal
 60-69 YRS:    >45 mL/min  Normal 70-79 YRS:    >39 mL/min  Normal 80 and above 
>32 mL/min  Normal 

 

          Alb       4.0 g/dL  3.5-5.2                       [facility] [place] ([facility]  

 

                                        NORMAL RANGES 

****************************************************************************** 
Age         WBC      RBC        HGB           HCT         MCV        PLT 
------------------------------------------------------------------------------ 
Adult M   4.1-10.9    4.20-6.30   12.0-18.0   37.0-51.0   80-97      140-440  
Adult F   4.1-10.9    4.04-5.48   12.0-18.0   37.0-51.0   80-97      140-440  0
-1 Yr    5.0-20.0    3.9-5.9     15-18       MV: 44      MV: 91     MV: 277  2-9
 Yr.   6.0-17.0    3.8-5.4     11-13       MV: 37      MV: 78     MV: 300  10 
Yrs.   5.0-13.0    3.8-5.4     12-15       MV: 39      MV: 80     MV: 250    
NOTE:  * FOR ADULT BLACK MALES AND FEMALES, NORMAL WBC IS 2.9-7.7 K/ML  * FOR 
ADULT BLACK MALES AND FEMALES, NORMAL RBC,HGB, AND HCT IS 5% LESS  SOURCE FOR 
DATA: [employer] 1800 OPERATION MANUAL( AUTOMATED BLOOD COUNTS AND DIFF.)  APPENDIX
  B-3                      CHRONIC KIDNEY DISEASE STAGING PER NKF:   MALE GFR 
INTERPRETATION:  20-49 YRS:     >60 mL/min  Normal 50-59 YRS:     >56 mL/min  
Normal 60-69 YRS:     >49 mL/min  Normal 70-79 YRS:     >42 mL/min  Normal 80 
and above  >35 mL/min  Normal   FEMALE GRF INTERPRETATION:  20-39 YRS:    >60 
mL/min  Normal 40-49 YRS:    >58 mL/min  Normal 50-59 YRS:    >51 mL/min  Normal
 60-69 YRS:    >45 mL/min  Normal 70-79 YRS:    >39 mL/min  Normal 80 and above 
>32 mL/min  Normal 

 

          A/G Ratio 1.9 Calc                                [facility] [place] ([facility]  

 

                                        NORMAL RANGES 

****************************************************************************** 
Age         WBC      RBC        HGB           HCT         MCV        PLT 
------------------------------------------------------------------------------ 
Adult M   4.1-10.9    4.20-6.30   12.0-18.0   37.0-51.0   80-97      140-440  
Adult F   4.1-10.9    4.04-5.48   12.0-18.0   37.0-51.0   80-97      140-440  0
-1 Yr    5.0-20.0    3.9-5.9     15-18       MV: 44      MV: 91     MV: 277  2-9
 Yr.   6.0-17.0    3.8-5.4     11-13       MV: 37      MV: 78     MV: 300  10 
Yrs.   5.0-13.0    3.8-5.4     12-15       MV: 39      MV: 80     MV: 250    
NOTE:  * FOR ADULT BLACK MALES AND FEMALES, NORMAL WBC IS 2.9-7.7 K/ML  * FOR 
ADULT BLACK MALES AND FEMALES, NORMAL RBC,HGB, AND HCT IS 5% LESS  SOURCE FOR 
DATA: [employer] 1800 OPERATION MANUAL( AUTOMATED BLOOD COUNTS AND DIFF.)  APPENDIX
  B-3                      CHRONIC KIDNEY DISEASE STAGING PER NKF:   MALE GFR 
INTERPRETATION:  20-49 YRS:     >60 mL/min  Normal 50-59 YRS:     >56 mL/min  
Normal 60-69 YRS:     >49 mL/min  Normal 70-79 YRS:     >42 mL/min  Normal 80 
and above  >35 mL/min  Normal   FEMALE GRF INTERPRETATION:  20-39 YRS:    >60 
mL/min  Normal 40-49 YRS:    >58 mL/min  Normal 50-59 YRS:    >51 mL/min  Normal
 60-69 YRS:    >45 mL/min  Normal 70-79 YRS:    >39 mL/min  Normal 80 and above 
>32 mL/min  Normal 

 

          Alp       112.6 U/L                         [facility] [place] [facility]  

 

                                        NORMAL RANGES 

****************************************************************************** 
Age         WBC      RBC        HGB           HCT         MCV        PLT 
------------------------------------------------------------------------------ 
Adult M   4.1-10.9    4.20-6.30   12.0-18.0   37.0-51.0   80-97      140-440  
Adult F   4.1-10.9    4.04-5.48   12.0-18.0   37.0-51.0   80-97      140-440  0
-1 Yr    5.0-20.0    3.9-5.9     15-18       MV: 44      MV: 91     MV: 277  2-9
 Yr.   6.0-17.0    3.8-5.4     11-13       MV: 37      MV: 78     MV: 300  10 
Yrs.   5.0-13.0    3.8-5.4     12-15       MV: 39      MV: 80     MV: 250    
NOTE:  * FOR ADULT BLACK MALES AND FEMALES, NORMAL WBC IS 2.9-7.7 K/ML  * FOR 
ADULT BLACK MALES AND FEMALES, NORMAL RBC,HGB, AND HCT IS 5% LESS  SOURCE FOR 
DATA: [employer] 1800 OPERATION MANUAL( AUTOMATED BLOOD COUNTS AND DIFF.)  APPENDIX
  B-3                      CHRONIC KIDNEY DISEASE STAGING PER NKF:   MALE GFR 
INTERPRETATION:  20-49 YRS:     >60 mL/min  Normal 50-59 YRS:     >56 mL/min  
Normal 60-69 YRS:     >49 mL/min  Normal 70-79 YRS:     >42 mL/min  Normal 80 
and above  >35 mL/min  Normal   FEMALE GRF INTERPRETATION:  20-39 YRS:    >60 
mL/min  Normal 40-49 YRS:    >58 mL/min  Normal 50-59 YRS:    >51 mL/min  Normal
 60-69 YRS:    >45 mL/min  Normal 70-79 YRS:    >39 mL/min  Normal 80 and above 
>32 mL/min  Normal 

 

          Globulin  2.1 Calc                                [facility] ([facility]  

 

                                        NORMAL RANGES 

****************************************************************************** 
Age         WBC      RBC        HGB           HCT         MCV        PLT 
------------------------------------------------------------------------------ 
Adult M   4.1-10.9    4.20-6.30   12.0-18.0   37.0-51.0   80-97      140-440  
Adult F   4.1-10.9    4.04-5.48   12.0-18.0   37.0-51.0   80-97      140-440  0
-1 Yr    5.0-20.0    3.9-5.9     15-18       MV: 44      MV: 91     MV: 277  2-9
 Yr.   6.0-17.0    3.8-5.4     11-13       MV: 37      MV: 78     MV: 300  10 
Yrs.   5.0-13.0    3.8-5.4     12-15       MV: 39      MV: 80     MV: 250    
NOTE:  * FOR ADULT BLACK MALES AND FEMALES, NORMAL WBC IS 2.9-7.7 K/ML  * FOR 
ADULT BLACK MALES AND FEMALES, NORMAL RBC,HGB, AND HCT IS 5% LESS  SOURCE FOR 
DATA: [employer] 1800 OPERATION MANUAL( AUTOMATED BLOOD COUNTS AND DIFF.)  APPENDIX
  B-3                      CHRONIC KIDNEY DISEASE STAGING PER NKF:   MALE GFR 
INTERPRETATION:  20-49 YRS:     >60 mL/min  Normal 50-59 YRS:     >56 mL/min  
Normal 60-69 YRS:     >49 mL/min  Normal 70-79 YRS:     >42 mL/min  Normal 80 
and above  >35 mL/min  Normal   FEMALE GRF INTERPRETATION:  20-39 YRS:    >60 
mL/min  Normal 40-49 YRS:    >58 mL/min  Normal 50-59 YRS:    >51 mL/min  Normal
 60-69 YRS:    >45 mL/min  Normal 70-79 YRS:    >39 mL/min  Normal 80 and above 
>32 mL/min  Normal 

 

          Alt (SGPT) 10 U/L    0-41                          [facility] ([facility]  

 

                                        NORMAL RANGES 

****************************************************************************** 
Age         WBC      RBC        HGB           HCT         MCV        PLT 
------------------------------------------------------------------------------ 
Adult M   4.1-10.9    4.20-6.30   12.0-18.0   37.0-51.0   80-97      140-440  
Adult F   4.1-10.9    4.04-5.48   12.0-18.0   37.0-51.0   80-97      140-440  0
-1 Yr    5.0-20.0    3.9-5.9     15-18       MV: 44      MV: 91     MV: 277  2-9
 Yr.   6.0-17.0    3.8-5.4     11-13       MV: 37      MV: 78     MV: 300  10 
Yrs.   5.0-13.0    3.8-5.4     12-15       MV: 39      MV: 80     MV: 250    
NOTE:  * FOR ADULT BLACK MALES AND FEMALES, NORMAL WBC IS 2.9-7.7 K/ML  * FOR 
ADULT BLACK MALES AND FEMALES, NORMAL RBC,HGB, AND HCT IS 5% LESS  SOURCE FOR 
DATA: [employer] 1800 OPERATION MANUAL( AUTOMATED BLOOD COUNTS AND DIFF.)  APPENDIX
  B-3                      CHRONIC KIDNEY DISEASE STAGING PER NKF:   MALE GFR 
INTERPRETATION:  20-49 YRS:     >60 mL/min  Normal 50-59 YRS:     >56 mL/min  
Normal 60-69 YRS:     >49 mL/min  Normal 70-79 YRS:     >42 mL/min  Normal 80 
and above  >35 mL/min  Normal   FEMALE GRF INTERPRETATION:  20-39 YRS:    >60 
mL/min  Normal 40-49 YRS:    >58 mL/min  Normal 50-59 YRS:    >51 mL/min  Normal
 60-69 YRS:    >45 mL/min  Normal 70-79 YRS:    >39 mL/min  Normal 80 and above 
>32 mL/min  Normal 

 

          Ast (Sgot) 10 U/L    0-40                          [facility] ([facility]  

 

                                        NORMAL RANGES 

****************************************************************************** 
Age         WBC      RBC        HGB           HCT         MCV        PLT 
------------------------------------------------------------------------------ 
Adult M   4.1-10.9    4.20-6.30   12.0-18.0   37.0-51.0   80-97      140-440  
Adult F   4.1-10.9    4.04-5.48   12.0-18.0   37.0-51.0   80-97      140-440  0
-1 Yr    5.0-20.0    3.9-5.9     15-18       MV: 44      MV: 91     MV: 277  2-9
 Yr.   6.0-17.0    3.8-5.4     11-13       MV: 37      MV: 78     MV: 300  10 
Yrs.   5.0-13.0    3.8-5.4     12-15       MV: 39      MV: 80     MV: 250    
NOTE:  * FOR ADULT BLACK MALES AND FEMALES, NORMAL WBC IS 2.9-7.7 K/ML  * FOR 
ADULT BLACK MALES AND FEMALES, NORMAL RBC,HGB, AND HCT IS 5% LESS  SOURCE FOR 
DATA: [employer] 1800 OPERATION MANUAL( AUTOMATED BLOOD COUNTS AND DIFF.)  APPENDIX
  B-3                      CHRONIC KIDNEY DISEASE STAGING PER NKF:   MALE GFR 
INTERPRETATION:  20-49 YRS:     >60 mL/min  Normal 50-59 YRS:     >56 mL/min  
Normal 60-69 YRS:     >49 mL/min  Normal 70-79 YRS:     >42 mL/min  Normal 80 
and above  >35 mL/min  Normal   FEMALE GRF INTERPRETATION:  20-39 YRS:    >60 
mL/min  Normal 40-49 YRS:    >58 mL/min  Normal 50-59 YRS:    >51 mL/min  Normal
 60-69 YRS:    >45 mL/min  Normal 70-79 YRS:    >39 mL/min  Normal 80 and above 
>32 mL/min  Normal 

 

          Anion Gap 14 mmol/L                               [facility] [place] ([facility]  

 

                                        NORMAL RANGES 

****************************************************************************** 
Age         WBC      RBC        HGB           HCT         MCV        PLT 
------------------------------------------------------------------------------ 
Adult M   4.1-10.9    4.20-6.30   12.0-18.0   37.0-51.0   80-97      140-440  
Adult F   4.1-10.9    4.04-5.48   12.0-18.0   37.0-51.0   80-97      140-440  0
-1 Yr    5.0-20.0    3.9-5.9     15-18       MV: 44      MV: 91     MV: 277  2-9
 Yr.   6.0-17.0    3.8-5.4     11-13       MV: 37      MV: 78     MV: 300  10 
Yrs.   5.0-13.0    3.8-5.4     12-15       MV: 39      MV: 80     MV: 250    
NOTE:  * FOR ADULT BLACK MALES AND FEMALES, NORMAL WBC IS 2.9-7.7 K/ML  * FOR 
ADULT BLACK MALES AND FEMALES, NORMAL RBC,HGB, AND HCT IS 5% LESS  SOURCE FOR 
DATA: [employer] 1800 OPERATION MANUAL( AUTOMATED BLOOD COUNTS AND DIFF.)  APPENDIX
  B-3                      CHRONIC KIDNEY DISEASE STAGING PER NKF:   MALE GFR 
INTERPRETATION:  20-49 YRS:     >60 mL/min  Normal 50-59 YRS:     >56 mL/min  
Normal 60-69 YRS:     >49 mL/min  Normal 70-79 YRS:     >42 mL/min  Normal 80 
and above  >35 mL/min  Normal   FEMALE GRF INTERPRETATION:  20-39 YRS:    >60 
mL/min  Normal 40-49 YRS:    >58 mL/min  Normal 50-59 YRS:    >51 mL/min  Normal
 60-69 YRS:    >45 mL/min  Normal 70-79 YRS:    >39 mL/min  Normal 80 and above 
>32 mL/min  Normal 

 

           Osmolality-Calculated 292.3 Calc                                  [facility]

[facility] ([facility]

                                         

 

                                        NORMAL RANGES 

****************************************************************************** 
Age         WBC      RBC        HGB           HCT         MCV        PLT 
------------------------------------------------------------------------------ 
Adult M   4.1-10.9    4.20-6.30   12.0-18.0   37.0-51.0   80-97      140-440  
Adult F   4.1-10.9    4.04-5.48   12.0-18.0   37.0-51.0   80-97      140-440  0
-1 Yr    5.0-20.0    3.9-5.9     15-18       MV: 44      MV: 91     MV: 277  2-9
 Yr.   6.0-17.0    3.8-5.4     11-13       MV: 37      MV: 78     MV: 300  10 
Yrs.   5.0-13.0    3.8-5.4     12-15       MV: 39      MV: 80     MV: 250    
NOTE:  * FOR ADULT BLACK MALES AND FEMALES, NORMAL WBC IS 2.9-7.7 K/ML  * FOR 
ADULT BLACK MALES AND FEMALES, NORMAL RBC,HGB, AND HCT IS 5% LESS  SOURCE FOR 
DATA: [employer] 1800 OPERATION MANUAL( AUTOMATED BLOOD COUNTS AND DIFF.)  APPENDIX
  B-3                      CHRONIC KIDNEY DISEASE STAGING PER NKF:   MALE GFR 
INTERPRETATION:  20-49 YRS:     >60 mL/min  Normal 50-59 YRS:     >56 mL/min  
Normal 60-69 YRS:     >49 mL/min  Normal 70-79 YRS:     >42 mL/min  Normal 80 
and above  >35 mL/min  Normal   FEMALE GRF INTERPRETATION:  20-39 YRS:    >60 
mL/min  Normal 40-49 YRS:    >58 mL/min  Normal 50-59 YRS:    >51 mL/min  Normal
 60-69 YRS:    >45 mL/min  Normal 70-79 YRS:    >39 mL/min  Normal 80 and above 
>32 mL/min  Normal 

 

          Tbili     0.26 mg/dL 0.0-1.2                       [facility] [place] ([facility]  

 

                                        NORMAL RANGES 

****************************************************************************** 
Age         WBC      RBC        HGB           HCT         MCV        PLT 
------------------------------------------------------------------------------ 
Adult M   4.1-10.9    4.20-6.30   12.0-18.0   37.0-51.0   80-97      140-440  
Adult F   4.1-10.9    4.04-5.48   12.0-18.0   37.0-51.0   80-97      140-440  0
-1 Yr    5.0-20.0    3.9-5.9     15-18       MV: 44      MV: 91     MV: 277  2-9
 Yr.   6.0-17.0    3.8-5.4     11-13       MV: 37      MV: 78     MV: 300  10 
Yrs.   5.0-13.0    3.8-5.4     12-15       MV: 39      MV: 80     MV: 250    
NOTE:  * FOR ADULT BLACK MALES AND FEMALES, NORMAL WBC IS 2.9-7.7 K/ML  * FOR 
ADULT BLACK MALES AND FEMALES, NORMAL RBC,HGB, AND HCT IS 5% LESS  SOURCE FOR 
DATA: [facility] 1800 OPERATION MANUAL( AUTOMATED BLOOD COUNTS AND DIFF.)  APPENDIX
  B-3                      CHRONIC KIDNEY DISEASE STAGING PER NKF:   MALE GFR 
INTERPRETATION:  20-49 YRS:     >60 mL/min  Normal 50-59 YRS:     >56 mL/min  
Normal 60-69 YRS:     >49 mL/min  Normal 70-79 YRS:     >42 mL/min  Normal 80 
and above  >35 mL/min  Normal   FEMALE GRF INTERPRETATION:  20-39 YRS:    >60 
mL/min  Normal 40-49 YRS:    >58 mL/min  Normal 50-59 YRS:    >51 mL/min  Normal
 60-69 YRS:    >45 mL/min  Normal 70-79 YRS:    >39 mL/min  Normal 80 and above 
>32 mL/min  Normal 

 

          eGFR  36 #                                    [facility] (

[facility]  

 

                                        NORMAL RANGES 

****************************************************************************** 
Age         WBC      RBC        HGB           HCT         MCV        PLT 
------------------------------------------------------------------------------ 
Adult M   4.1-10.9    4.20-6.30   12.0-18.0   37.0-51.0   80-97      140-440  
Adult F   4.1-10.9    4.04-5.48   12.0-18.0   37.0-51.0   80-97      140-440  0
-1 Yr    5.0-20.0    3.9-5.9     15-18       MV: 44      MV: 91     MV: 277  2-9
 Yr.   6.0-17.0    3.8-5.4     11-13       MV: 37      MV: 78     MV: 300  10 
Yrs.   5.0-13.0    3.8-5.4     12-15       MV: 39      MV: 80     MV: 250    
NOTE:  * FOR ADULT BLACK MALES AND FEMALES, NORMAL WBC IS 2.9-7.7 K/ML  * FOR 
ADULT BLACK MALES AND FEMALES, NORMAL RBC,HGB, AND HCT IS 5% LESS  SOURCE FOR 
DATA: [employer] 1800 OPERATION MANUAL( AUTOMATED BLOOD COUNTS AND DIFF.)  APPENDIX
  B-3                      CHRONIC KIDNEY DISEASE STAGING PER NKF:   MALE GFR 
INTERPRETATION:  20-49 YRS:     >60 mL/min  Normal 50-59 YRS:     >56 mL/min  
Normal 60-69 YRS:     >49 mL/min  Normal 70-79 YRS:     >42 mL/min  Normal 80 
and above  >35 mL/min  Normal   FEMALE GRF INTERPRETATION:  20-39 YRS:    >60 
mL/min  Normal 40-49 YRS:    >58 mL/min  Normal 50-59 YRS:    >51 mL/min  Normal
 60-69 YRS:    >45 mL/min  Normal 70-79 YRS:    >39 mL/min  Normal 80 and above 
>32 mL/min  Normal 

 

          eGFR Non-Afr. American 31 #                                    [facility] 

([facility]  

 

                                        NORMAL RANGES 

****************************************************************************** 
Age         WBC      RBC        HGB           HCT         MCV        PLT 
------------------------------------------------------------------------------ 
Adult M   4.1-10.9    4.20-6.30   12.0-18.0   37.0-51.0   80-97      140-440  
Adult F   4.1-10.9    4.04-5.48   12.0-18.0   37.0-51.0   80-97      140-440  0
-1 Yr    5.0-20.0    3.9-5.9     15-18       MV: 44      MV: 91     MV: 277  2-9
 Yr.   6.0-17.0    3.8-5.4     11-13       MV: 37      MV: 78     MV: 300  10 
Yrs.   5.0-13.0    3.8-5.4     12-15       MV: 39      MV: 80     MV: 250    
NOTE:  * FOR ADULT BLACK MALES AND FEMALES, NORMAL WBC IS 2.9-7.7 K/ML  * FOR 
ADULT BLACK MALES AND FEMALES, NORMAL RBC,HGB, AND HCT IS 5% LESS  SOURCE FOR 
DATA: [employer] 1800 OPERATION MANUAL( AUTOMATED BLOOD COUNTS AND DIFF.)  APPENDIX
  B-3                      CHRONIC KIDNEY DISEASE STAGING PER NKF:   MALE GFR 
INTERPRETATION:  20-49 YRS:     >60 mL/min  Normal 50-59 YRS:     >56 mL/min  
Normal 60-69 YRS:     >49 mL/min  Normal 70-79 YRS:     >42 mL/min  Normal 80 
and above  >35 mL/min  Normal   FEMALE GRF INTERPRETATION:  20-39 YRS:    >60 
mL/min  Normal 40-49 YRS:    >58 mL/min  Normal 50-59 YRS:    >51 mL/min  Normal
 60-69 YRS:    >45 mL/min  Normal 70-79 YRS:    >39 mL/min  Normal 80 and above 
>32 mL/min  Normal 









                    ID                  Date                Data Source

 

                    [id number]         2021 02:40:00 PM EST [facility] ([facility]









          Name      Value     Range     Interpretation Code Description Data Va

rce(s) Supporting 

Document(s)

 

          WBC       10.6 10E3/uL 4.1-10.9                      [facility] ([facility]  

 

                                        NORMAL RANGES 

****************************************************************************** 
Age         WBC      RBC        HGB           HCT         MCV        PLT 
------------------------------------------------------------------------------ 
Adult M   4.1-10.9    4.20-6.30   12.0-18.0   37.0-51.0   80-97      140-440  
Adult F   4.1-10.9    4.04-5.48   12.0-18.0   37.0-51.0   80-97      140-440  0
-1 Yr    5.0-20.0    3.9-5.9     15-18       MV: 44      MV: 91     MV: 277  2-9
 Yr.   6.0-17.0    3.8-5.4     11-13       MV: 37      MV: 78     MV: 300  10 
Yrs.   5.0-13.0    3.8-5.4     12-15       MV: 39      MV: 80     MV: 250    
NOTE:  * FOR ADULT BLACK MALES AND FEMALES, NORMAL WBC IS 2.9-7.7 K/ML  * FOR 
ADULT BLACK MALES AND FEMALES, NORMAL RBC,HGB, AND HCT IS 5% LESS  SOURCE FOR 
DATA: [employer] 1800 OPERATION MANUAL( AUTOMATED BLOOD COUNTS AND DIFF.)  APPENDIX
  B-3                      CHRONIC KIDNEY DISEASE STAGING PER NKF:   MALE GFR 
INTERPRETATION:  20-49 YRS:     >60 mL/min  Normal 50-59 YRS:     >56 mL/min  
Normal 60-69 YRS:     >49 mL/min  Normal 70-79 YRS:     >42 mL/min  Normal 80 
and above  >35 mL/min  Normal   FEMALE GRF INTERPRETATION:  20-39 YRS:    >60 
mL/min  Normal 40-49 YRS:    >58 mL/min  Normal 50-59 YRS:    >51 mL/min  Normal
 60-69 YRS:    >45 mL/min  Normal 70-79 YRS:    >39 mL/min  Normal 80 and above 
>32 mL/min  Normal 

 

          HGB       13.2 g/dL 12.0-18.0                     [facility] ([facility]  

 

                                        NORMAL RANGES 

****************************************************************************** 
Age         WBC      RBC        HGB           HCT         MCV        PLT 
------------------------------------------------------------------------------ 
Adult M   4.1-10.9    4.20-6.30   12.0-18.0   37.0-51.0   80-97      140-440  
Adult F   4.1-10.9    4.04-5.48   12.0-18.0   37.0-51.0   80-97      140-440  0
-1 Yr    5.0-20.0    3.9-5.9     15-18       MV: 44      MV: 91     MV: 277  2-9
 Yr.   6.0-17.0    3.8-5.4     11-13       MV: 37      MV: 78     MV: 300  10 
Yrs.   5.0-13.0    3.8-5.4     12-15       MV: 39      MV: 80     MV: 250    
NOTE:  * FOR ADULT BLACK MALES AND FEMALES, NORMAL WBC IS 2.9-7.7 K/ML  * FOR 
ADULT BLACK MALES AND FEMALES, NORMAL RBC,HGB, AND HCT IS 5% LESS  SOURCE FOR 
DATA: [employer] 1800 OPERATION MANUAL( AUTOMATED BLOOD COUNTS AND DIFF.)  APPENDIX
  B-3                      CHRONIC KIDNEY DISEASE STAGING PER NKF:   MALE GFR 
INTERPRETATION:  20-49 YRS:     >60 mL/min  Normal 50-59 YRS:     >56 mL/min  
Normal 60-69 YRS:     >49 mL/min  Normal 70-79 YRS:     >42 mL/min  Normal 80 
and above  >35 mL/min  Normal   FEMALE GRF INTERPRETATION:  20-39 YRS:    >60 
mL/min  Normal 40-49 YRS:    >58 mL/min  Normal 50-59 YRS:    >51 mL/min  Normal
 60-69 YRS:    >45 mL/min  Normal 70-79 YRS:    >39 mL/min  Normal 80 and above 
>32 mL/min  Normal 

 

           RBC        4.04 10E6/uL 4.20-6.30  Below low normal            [facility]

 ([facility]                        

 

                                        NORMAL RANGES 

****************************************************************************** 
Age         WBC      RBC        HGB           HCT         MCV        PLT 
------------------------------------------------------------------------------ 
Adult M   4.1-10.9    4.20-6.30   12.0-18.0   37.0-51.0   80-97      140-440  
Adult F   4.1-10.9    4.04-5.48   12.0-18.0   37.0-51.0   80-97      140-440  0
-1 Yr    5.0-20.0    3.9-5.9     15-18       MV: 44      MV: 91     MV: 277  2-9
 Yr.   6.0-17.0    3.8-5.4     11-13       MV: 37      MV: 78     MV: 300  10 
Yrs.   5.0-13.0    3.8-5.4     12-15       MV: 39      MV: 80     MV: 250    
NOTE:  * FOR ADULT BLACK MALES AND FEMALES, NORMAL WBC IS 2.9-7.7 K/ML  * FOR 
ADULT BLACK MALES AND FEMALES, NORMAL RBC,HGB, AND HCT IS 5% LESS  SOURCE FOR 
DATA: [employer] 1800 OPERATION MANUAL( AUTOMATED BLOOD COUNTS AND DIFF.)  APPENDIX
  B-3                      CHRONIC KIDNEY DISEASE STAGING PER NKF:   MALE GFR 
INTERPRETATION:  20-49 YRS:     >60 mL/min  Normal 50-59 YRS:     >56 mL/min  
Normal 60-69 YRS:     >49 mL/min  Normal 70-79 YRS:     >42 mL/min  Normal 80 
and above  >35 mL/min  Normal   FEMALE GRF INTERPRETATION:  20-39 YRS:    >60 
mL/min  Normal 40-49 YRS:    >58 mL/min  Normal 50-59 YRS:    >51 mL/min  Normal
 60-69 YRS:    >45 mL/min  Normal 70-79 YRS:    >39 mL/min  Normal 80 and above 
>32 mL/min  Normal 

 

          HCT       39.5 %    37.0-51.0                     [facility] ([facility]  

 

                                        NORMAL RANGES 

****************************************************************************** 
Age         WBC      RBC        HGB           HCT         MCV        PLT 
------------------------------------------------------------------------------ 
Adult M   4.1-10.9    4.20-6.30   12.0-18.0   37.0-51.0   80-97      140-440  
Adult F   4.1-10.9    4.04-5.48   12.0-18.0   37.0-51.0   80-97      140-440  0
-1 Yr    5.0-20.0    3.9-5.9     15-18       MV: 44      MV: 91     MV: 277  2-9
 Yr.   6.0-17.0    3.8-5.4     11-13       MV: 37      MV: 78     MV: 300  10 
Yrs.   5.0-13.0    3.8-5.4     12-15       MV: 39      MV: 80     MV: 250    
NOTE:  * FOR ADULT BLACK MALES AND FEMALES, NORMAL WBC IS 2.9-7.7 K/ML  * FOR 
ADULT BLACK MALES AND FEMALES, NORMAL RBC,HGB, AND HCT IS 5% LESS  SOURCE FOR 
DATA: [employer] 1800 OPERATION MANUAL( AUTOMATED BLOOD COUNTS AND DIFF.)  APPENDIX
  B-3                      CHRONIC KIDNEY DISEASE STAGING PER NKF:   MALE GFR 
INTERPRETATION:  20-49 YRS:     >60 mL/min  Normal 50-59 YRS:     >56 mL/min  
Normal 60-69 YRS:     >49 mL/min  Normal 70-79 YRS:     >42 mL/min  Normal 80 
and above  >35 mL/min  Normal   FEMALE GRF INTERPRETATION:  20-39 YRS:    >60 
mL/min  Normal 40-49 YRS:    >58 mL/min  Normal 50-59 YRS:    >51 mL/min  Normal
 60-69 YRS:    >45 mL/min  Normal 70-79 YRS:    >39 mL/min  Normal 80 and above 
>32 mL/min  Normal 

 

           MCV        97.8 fL    80.0-97.0  Above high normal            [facility] 

([facility]                                    

 

                                        NORMAL RANGES 

****************************************************************************** 
Age         WBC      RBC        HGB           HCT         MCV        PLT 
------------------------------------------------------------------------------ 
Adult M   4.1-10.9    4.20-6.30   12.0-18.0   37.0-51.0   80-97      140-440  
Adult F   4.1-10.9    4.04-5.48   12.0-18.0   37.0-51.0   80-97      140-440  0
-1 Yr    5.0-20.0    3.9-5.9     15-18       MV: 44      MV: 91     MV: 277  2-9
 Yr.   6.0-17.0    3.8-5.4     11-13       MV: 37      MV: 78     MV: 300  10 
Yrs.   5.0-13.0    3.8-5.4     12-15       MV: 39      MV: 80     MV: 250    
NOTE:  * FOR ADULT BLACK MALES AND FEMALES, NORMAL WBC IS 2.9-7.7 K/ML  * FOR 
ADULT BLACK MALES AND FEMALES, NORMAL RBC,HGB, AND HCT IS 5% LESS  SOURCE FOR 
DATA: [employer] 1800 OPERATION MANUAL( AUTOMATED BLOOD COUNTS AND DIFF.)  APPENDIX
  B-3                      CHRONIC KIDNEY DISEASE STAGING PER NKF:   MALE GFR 
INTERPRETATION:  20-49 YRS:     >60 mL/min  Normal 50-59 YRS:     >56 mL/min  
Normal 60-69 YRS:     >49 mL/min  Normal 70-79 YRS:     >42 mL/min  Normal 80 
and above  >35 mL/min  Normal   FEMALE GRF INTERPRETATION:  20-39 YRS:    >60 
mL/min  Normal 40-49 YRS:    >58 mL/min  Normal 50-59 YRS:    >51 mL/min  Normal
 60-69 YRS:    >45 mL/min  Normal 70-79 YRS:    >39 mL/min  Normal 80 and above 
>32 mL/min  Normal 

 

          PLT       153 10E3/uL 140-440                       [facility] ([facility]  

 

                                        NORMAL RANGES 

****************************************************************************** 
Age         WBC      RBC        HGB           HCT         MCV        PLT 
------------------------------------------------------------------------------ 
Adult M   4.1-10.9    4.20-6.30   12.0-18.0   37.0-51.0   80-97      140-440  
Adult F   4.1-10.9    4.04-5.48   12.0-18.0   37.0-51.0   80-97      140-440  0
-1 Yr    5.0-20.0    3.9-5.9     15-18       MV: 44      MV: 91     MV: 277  2-9
 Yr.   6.0-17.0    3.8-5.4     11-13       MV: 37      MV: 78     MV: 300  10 
Yrs.   5.0-13.0    3.8-5.4     12-15       MV: 39      MV: 80     MV: 250    
NOTE:  * FOR ADULT BLACK MALES AND FEMALES, NORMAL WBC IS 2.9-7.7 K/ML  * FOR 
ADULT BLACK MALES AND FEMALES, NORMAL RBC,HGB, AND HCT IS 5% LESS  SOURCE FOR 
DATA: [employer] 1800 OPERATION MANUAL( AUTOMATED BLOOD COUNTS AND DIFF.)  APPENDIX
  B-3                      CHRONIC KIDNEY DISEASE STAGING PER NKF:   MALE GFR 
INTERPRETATION:  20-49 YRS:     >60 mL/min  Normal 50-59 YRS:     >56 mL/min  
Normal 60-69 YRS:     >49 mL/min  Normal 70-79 YRS:     >42 mL/min  Normal 80 
and above  >35 mL/min  Normal   FEMALE GRF INTERPRETATION:  20-39 YRS:    >60 
mL/min  Normal 40-49 YRS:    >58 mL/min  Normal 50-59 YRS:    >51 mL/min  Normal
 60-69 YRS:    >45 mL/min  Normal 70-79 YRS:    >39 mL/min  Normal 80 and above 
>32 mL/min  Normal 

 

           MCH        32.7 pg    26.0-32.0  Above high normal            [facility] 

([facility]                                    

 

                                        NORMAL RANGES 

****************************************************************************** 
Age         WBC      RBC        HGB           HCT         MCV        PLT 
------------------------------------------------------------------------------ 
Adult M   4.1-10.9    4.20-6.30   12.0-18.0   37.0-51.0   80-97      140-440  
Adult F   4.1-10.9    4.04-5.48   12.0-18.0   37.0-51.0   80-97      140-440  0
-1 Yr    5.0-20.0    3.9-5.9     15-18       MV: 44      MV: 91     MV: 277  2-9
 Yr.   6.0-17.0    3.8-5.4     11-13       MV: 37      MV: 78     MV: 300  10 
Yrs.   5.0-13.0    3.8-5.4     12-15       MV: 39      MV: 80     MV: 250    
NOTE:  * FOR ADULT BLACK MALES AND FEMALES, NORMAL WBC IS 2.9-7.7 K/ML  * FOR 
ADULT BLACK MALES AND FEMALES, NORMAL RBC,HGB, AND HCT IS 5% LESS  SOURCE FOR 
DATA: [employer] 1800 OPERATION MANUAL( AUTOMATED BLOOD COUNTS AND DIFF.)  APPENDIX
  B-3                      CHRONIC KIDNEY DISEASE STAGING PER NKF:   MALE GFR 
INTERPRETATION:  20-49 YRS:     >60 mL/min  Normal 50-59 YRS:     >56 mL/min  
Normal 60-69 YRS:     >49 mL/min  Normal 70-79 YRS:     >42 mL/min  Normal 80 
and above  >35 mL/min  Normal   FEMALE GRF INTERPRETATION:  20-39 YRS:    >60 
mL/min  Normal 40-49 YRS:    >58 mL/min  Normal 50-59 YRS:    >51 mL/min  Normal
 60-69 YRS:    >45 mL/min  Normal 70-79 YRS:    >39 mL/min  Normal 80 and above 
>32 mL/min  Normal 

 

          MCHC      33.4 g/dL 31.0-36.0                     [facility] ([facility]

[facility]  

 

                                        NORMAL RANGES 

****************************************************************************** 
Age         WBC      RBC        HGB           HCT         MCV        PLT 
------------------------------------------------------------------------------ 
Adult M   4.1-10.9    4.20-6.30   12.0-18.0   37.0-51.0   80-97      140-440  
Adult F   4.1-10.9    4.04-5.48   12.0-18.0   37.0-51.0   80-97      140-440  0
-1 Yr    5.0-20.0    3.9-5.9     15-18       MV: 44      MV: 91     MV: 277  2-9
 Yr.   6.0-17.0    3.8-5.4     11-13       MV: 37      MV: 78     MV: 300  10 
Yrs.   5.0-13.0    3.8-5.4     12-15       MV: 39      MV: 80     MV: 250    
NOTE:  * FOR ADULT BLACK MALES AND FEMALES, NORMAL WBC IS 2.9-7.7 K/ML  * FOR 
ADULT BLACK MALES AND FEMALES, NORMAL RBC,HGB, AND HCT IS 5% LESS  SOURCE FOR 
DATA: [employer] 1800 OPERATION MANUAL( AUTOMATED BLOOD COUNTS AND DIFF.)  APPENDIX
  B-3                      CHRONIC KIDNEY DISEASE STAGING PER NKF:   MALE GFR 
INTERPRETATION:  20-49 YRS:     >60 mL/min  Normal 50-59 YRS:     >56 mL/min  
Normal 60-69 YRS:     >49 mL/min  Normal 70-79 YRS:     >42 mL/min  Normal 80 
and above  >35 mL/min  Normal   FEMALE GRF INTERPRETATION:  20-39 YRS:    >60 
mL/min  Normal 40-49 YRS:    >58 mL/min  Normal 50-59 YRS:    >51 mL/min  Normal
 60-69 YRS:    >45 mL/min  Normal 70-79 YRS:    >39 mL/min  Normal 80 and above 
>32 mL/min  Normal 

 

          Lym%      11.3 %    10.0-58.5                     [facility] [place] ([facility]  

 

                                        NORMAL RANGES 

****************************************************************************** 
Age         WBC      RBC        HGB           HCT         MCV        PLT 
------------------------------------------------------------------------------ 
Adult M   4.1-10.9    4.20-6.30   12.0-18.0   37.0-51.0   80-97      140-440  
Adult F   4.1-10.9    4.04-5.48   12.0-18.0   37.0-51.0   80-97      140-440  0
-1 Yr    5.0-20.0    3.9-5.9     15-18       MV: 44      MV: 91     MV: 277  2-9
 Yr.   6.0-17.0    3.8-5.4     11-13       MV: 37      MV: 78     MV: 300  10 
Yrs.   5.0-13.0    3.8-5.4     12-15       MV: 39      MV: 80     MV: 250    
NOTE:  * FOR ADULT BLACK MALES AND FEMALES, NORMAL WBC IS 2.9-7.7 K/ML  * FOR 
ADULT BLACK MALES AND FEMALES, NORMAL RBC,HGB, AND HCT IS 5% LESS  SOURCE FOR 
DATA: [employer] 1800 OPERATION MANUAL( AUTOMATED BLOOD COUNTS AND DIFF.)  APPENDIX
  B-3                      CHRONIC KIDNEY DISEASE STAGING PER NKF:   MALE GFR 
INTERPRETATION:  20-49 YRS:     >60 mL/min  Normal 50-59 YRS:     >56 mL/min  
Normal 60-69 YRS:     >49 mL/min  Normal 70-79 YRS:     >42 mL/min  Normal 80 
and above  >35 mL/min  Normal   FEMALE GRF INTERPRETATION:  20-39 YRS:    >60 
mL/min  Normal 40-49 YRS:    >58 mL/min  Normal 50-59 YRS:    >51 mL/min  Normal
 60-69 YRS:    >45 mL/min  Normal 70-79 YRS:    >39 mL/min  Normal 80 and above 
>32 mL/min  Normal 

 

          RDW-CV    13.6 %    11.5-14.5                     [facility] ([facility]  

 

                                        NORMAL RANGES 

****************************************************************************** 
Age         WBC      RBC        HGB           HCT         MCV        PLT 
------------------------------------------------------------------------------ 
Adult M   4.1-10.9    4.20-6.30   12.0-18.0   37.0-51.0   80-97      140-440  
Adult F   4.1-10.9    4.04-5.48   12.0-18.0   37.0-51.0   80-97      140-440  0
-1 Yr    5.0-20.0    3.9-5.9     15-18       MV: 44      MV: 91     MV: 277  2-9
 Yr.   6.0-17.0    3.8-5.4     11-13       MV: 37      MV: 78     MV: 300  10 
Yrs.   5.0-13.0    3.8-5.4     12-15       MV: 39      MV: 80     MV: 250    
NOTE:  * FOR ADULT BLACK MALES AND FEMALES, NORMAL WBC IS 2.9-7.7 K/ML  * FOR 
ADULT BLACK MALES AND FEMALES, NORMAL RBC,HGB, AND HCT IS 5% LESS  SOURCE FOR 
DATA: [employer] 1800 OPERATION MANUAL( AUTOMATED BLOOD COUNTS AND DIFF.)  APPENDIX
  B-3                      CHRONIC KIDNEY DISEASE STAGING PER NKF:   MALE GFR 
INTERPRETATION:  20-49 YRS:     >60 mL/min  Normal 50-59 YRS:     >56 mL/min  
Normal 60-69 YRS:     >49 mL/min  Normal 70-79 YRS:     >42 mL/min  Normal 80 
and above  >35 mL/min  Normal   FEMALE GRF INTERPRETATION:  20-39 YRS:    >60 
mL/min  Normal 40-49 YRS:    >58 mL/min  Normal 50-59 YRS:    >51 mL/min  Normal
 60-69 YRS:    >45 mL/min  Normal 70-79 YRS:    >39 mL/min  Normal 80 and above 
>32 mL/min  Normal 

 

          Lym#      1.2 10E3/uL 0.6-4.1                       [facility] ([facility]  

 

                                        NORMAL RANGES 

****************************************************************************** 
Age         WBC      RBC        HGB           HCT         MCV        PLT 
------------------------------------------------------------------------------ 
Adult M   4.1-10.9    4.20-6.30   12.0-18.0   37.0-51.0   80-97      140-440  
Adult F   4.1-10.9    4.04-5.48   12.0-18.0   37.0-51.0   80-97      140-440  0
-1 Yr    5.0-20.0    3.9-5.9     15-18       MV: 44      MV: 91     MV: 277  2-9
 Yr.   6.0-17.0    3.8-5.4     11-13       MV: 37      MV: 78     MV: 300  10 
Yrs.   5.0-13.0    3.8-5.4     12-15       MV: 39      MV: 80     MV: 250    
NOTE:  * FOR ADULT BLACK MALES AND FEMALES, NORMAL WBC IS 2.9-7.7 K/ML  * FOR 
ADULT BLACK MALES AND FEMALES, NORMAL RBC,HGB, AND HCT IS 5% LESS  SOURCE FOR 
DATA: [employer] 1800 OPERATION MANUAL( AUTOMATED BLOOD COUNTS AND DIFF.)  APPENDIX
  B-3                      CHRONIC KIDNEY DISEASE STAGING PER NKF:   MALE GFR 
INTERPRETATION:  20-49 YRS:     >60 mL/min  Normal 50-59 YRS:     >56 mL/min  
Normal 60-69 YRS:     >49 mL/min  Normal 70-79 YRS:     >42 mL/min  Normal 80 
and above  >35 mL/min  Normal   FEMALE GRF INTERPRETATION:  20-39 YRS:    >60 
mL/min  Normal 40-49 YRS:    >58 mL/min  Normal 50-59 YRS:    >51 mL/min  Normal
 60-69 YRS:    >45 mL/min  Normal 70-79 YRS:    >39 mL/min  Normal 80 and above 
>32 mL/min  Normal 

 

          Neut%     81.8 %    37.0-92.0                     [facility] ([facility]  

 

                                        NORMAL RANGES 

****************************************************************************** 
Age         WBC      RBC        HGB           HCT         MCV        PLT 
------------------------------------------------------------------------------ 
Adult M   4.1-10.9    4.20-6.30   12.0-18.0   37.0-51.0   80-97      140-440  
Adult F   4.1-10.9    4.04-5.48   12.0-18.0   37.0-51.0   80-97      140-440  0
-1 Yr    5.0-20.0    3.9-5.9     15-18       MV: 44      MV: 91     MV: 277  2-9
 Yr.   6.0-17.0    3.8-5.4     11-13       MV: 37      MV: 78     MV: 300  10 
Yrs.   5.0-13.0    3.8-5.4     12-15       MV: 39      MV: 80     MV: 250    
NOTE:  * FOR ADULT BLACK MALES AND FEMALES, NORMAL WBC IS 2.9-7.7 K/ML  * FOR 
ADULT BLACK MALES AND FEMALES, NORMAL RBC,HGB, AND HCT IS 5% LESS  SOURCE FOR 
DATA: [employer] 1800 OPERATION MANUAL( AUTOMATED BLOOD COUNTS AND DIFF.)  APPENDIX
  B-3                      CHRONIC KIDNEY DISEASE STAGING PER NKF:   MALE GFR 
INTERPRETATION:  20-49 YRS:     >60 mL/min  Normal 50-59 YRS:     >56 mL/min  
Normal 60-69 YRS:     >49 mL/min  Normal 70-79 YRS:     >42 mL/min  Normal 80 
and above  >35 mL/min  Normal   FEMALE GRF INTERPRETATION:  20-39 YRS:    >60 
mL/min  Normal 40-49 YRS:    >58 mL/min  Normal 50-59 YRS:    >51 mL/min  Normal
 60-69 YRS:    >45 mL/min  Normal 70-79 YRS:    >39 mL/min  Normal 80 and above 
>32 mL/min  Normal 

 

          MXD%      6.9 %     0.1-24.0                      [facility] [place] ([facility]  

 

                                        NORMAL RANGES 

****************************************************************************** 
Age         WBC      RBC        HGB           HCT         MCV        PLT 
------------------------------------------------------------------------------ 
Adult M   4.1-10.9    4.20-6.30   12.0-18.0   37.0-51.0   80-97      140-440  
Adult F   4.1-10.9    4.04-5.48   12.0-18.0   37.0-51.0   80-97      140-440  0
-1 Yr    5.0-20.0    3.9-5.9     15-18       MV: 44      MV: 91     MV: 277  2-9
 Yr.   6.0-17.0    3.8-5.4     11-13       MV: 37      MV: 78     MV: 300  10 
Yrs.   5.0-13.0    3.8-5.4     12-15       MV: 39      MV: 80     MV: 250    
NOTE:  * FOR ADULT BLACK MALES AND FEMALES, NORMAL WBC IS 2.9-7.7 K/ML  * FOR 
ADULT BLACK MALES AND FEMALES, NORMAL RBC,HGB, AND HCT IS 5% LESS  SOURCE FOR 
DATA: [employer] 1800 OPERATION MANUAL( AUTOMATED BLOOD COUNTS AND DIFF.)  APPENDIX
  B-3                      CHRONIC KIDNEY DISEASE STAGING PER NKF:   MALE GFR 
INTERPRETATION:  20-49 YRS:     >60 mL/min  Normal 50-59 YRS:     >56 mL/min  
Normal 60-69 YRS:     >49 mL/min  Normal 70-79 YRS:     >42 mL/min  Normal 80 
and above  >35 mL/min  Normal   FEMALE GRF INTERPRETATION:  20-39 YRS:    >60 
mL/min  Normal 40-49 YRS:    >58 mL/min  Normal 50-59 YRS:    >51 mL/min  Normal
 60-69 YRS:    >45 mL/min  Normal 70-79 YRS:    >39 mL/min  Normal 80 and above 
>32 mL/min  Normal 

 

          MXD#      0.7 10E3/uL 0.0-1.8                       JULIO ([facility]  

 

                                        NORMAL RANGES 

****************************************************************************** 
Age         WBC      RBC        HGB           HCT         MCV        PLT 
------------------------------------------------------------------------------ 
Adult M   4.1-10.9    4.20-6.30   12.0-18.0   37.0-51.0   80-97      140-440  
Adult F   4.1-10.9    4.04-5.48   12.0-18.0   37.0-51.0   80-97      140-440  0
-1 Yr    5.0-20.0    3.9-5.9     15-18       MV: 44      MV: 91     MV: 277  2-9
 Yr.   6.0-17.0    3.8-5.4     11-13       MV: 37      MV: 78     MV: 300  10 
Yrs.   5.0-13.0    3.8-5.4     12-15       MV: 39      MV: 80     MV: 250    
NOTE:  * FOR ADULT BLACK MALES AND FEMALES, NORMAL WBC IS 2.9-7.7 K/ML  * FOR 
ADULT BLACK MALES AND FEMALES, NORMAL RBC,HGB, AND HCT IS 5% LESS  SOURCE FOR 
DATA: [employer] 1800 OPERATION MANUAL( AUTOMATED BLOOD COUNTS AND DIFF.)  APPENDIX
  B-3                      CHRONIC KIDNEY DISEASE STAGING PER NKF:   MALE GFR 
INTERPRETATION:  20-49 YRS:     >60 mL/min  Normal 50-59 YRS:     >56 mL/min  
Normal 60-69 YRS:     >49 mL/min  Normal 70-79 YRS:     >42 mL/min  Normal 80 
and above  >35 mL/min  Normal   FEMALE GRF INTERPRETATION:  20-39 YRS:    >60 
mL/min  Normal 40-49 YRS:    >58 mL/min  Normal 50-59 YRS:    >51 mL/min  Normal
 60-69 YRS:    >45 mL/min  Normal 70-79 YRS:    >39 mL/min  Normal 80 and above 
>32 mL/min  Normal 

 

           Neut#      8.7 %      2.0-7.8    Above high normal            [facility] 

([facility]                                    

 

                                        NORMAL RANGES 

****************************************************************************** 
Age         WBC      RBC        HGB           HCT         MCV        PLT 
------------------------------------------------------------------------------ 
Adult M   4.1-10.9    4.20-6.30   12.0-18.0   37.0-51.0   80-97      140-440  
Adult F   4.1-10.9    4.04-5.48   12.0-18.0   37.0-51.0   80-97      140-440  0
-1 Yr    5.0-20.0    3.9-5.9     15-18       MV: 44      MV: 91     MV: 277  2-9
 Yr.   6.0-17.0    3.8-5.4     11-13       MV: 37      MV: 78     MV: 300  10 
Yrs.   5.0-13.0    3.8-5.4     12-15       MV: 39      MV: 80     MV: 250    
NOTE:  * FOR ADULT BLACK MALES AND FEMALES, NORMAL WBC IS 2.9-7.7 K/ML  * FOR 
ADULT BLACK MALES AND FEMALES, NORMAL RBC,HGB, AND HCT IS 5% LESS  SOURCE FOR 
DATA: [employer] 1800 OPERATION MANUAL( AUTOMATED BLOOD COUNTS AND DIFF.)  APPENDIX
  B-3                      CHRONIC KIDNEY DISEASE STAGING PER NKF:   MALE GFR 
INTERPRETATION:  20-49 YRS:     >60 mL/min  Normal 50-59 YRS:     >56 mL/min  
Normal 60-69 YRS:     >49 mL/min  Normal 70-79 YRS:     >42 mL/min  Normal 80 
and above  >35 mL/min  Normal   FEMALE GRF INTERPRETATION:  20-39 YRS:    >60 
mL/min  Normal 40-49 YRS:    >58 mL/min  Normal 50-59 YRS:    >51 mL/min  Normal
 60-69 YRS:    >45 mL/min  Normal 70-79 YRS:    >39 mL/min  Normal 80 and above 
>32 mL/min  Normal 

 

          MPV       11.2 fL   9.0-13.0                      [facility] ([facility]

[facility]  

 

                                        NORMAL RANGES 

****************************************************************************** 
Age         WBC      RBC        HGB           HCT         MCV        PLT 
------------------------------------------------------------------------------ 
Adult M   4.1-10.9    4.20-6.30   12.0-18.0   37.0-51.0   80-97      140-440  
Adult F   4.1-10.9    4.04-5.48   12.0-18.0   37.0-51.0   80-97      140-440  0
-1 Yr    5.0-20.0    3.9-5.9     15-18       MV: 44      MV: 91     MV: 277  2-9
 Yr.   6.0-17.0    3.8-5.4     11-13       MV: 37      MV: 78     MV: 300  10 
Yrs.   5.0-13.0    3.8-5.4     12-15       MV: 39      MV: 80     MV: 250    
NOTE:  * FOR ADULT BLACK MALES AND FEMALES, NORMAL WBC IS 2.9-7.7 K/ML  * FOR 
ADULT BLACK MALES AND FEMALES, NORMAL RBC,HGB, AND HCT IS 5% LESS  SOURCE FOR 
DATA: [employer] 1800 OPERATION MANUAL( AUTOMATED BLOOD COUNTS AND DIFF.)  APPENDIX
  B-3                      CHRONIC KIDNEY DISEASE STAGING PER NKF:   MALE GFR 
INTERPRETATION:  20-49 YRS:     >60 mL/min  Normal 50-59 YRS:     >56 mL/min  
Normal 60-69 YRS:     >49 mL/min  Normal 70-79 YRS:     >42 mL/min  Normal 80 
and above  >35 mL/min  Normal   FEMALE GRF INTERPRETATION:  20-39 YRS:    >60 
mL/min  Normal 40-49 YRS:    >58 mL/min  Normal 50-59 YRS:    >51 mL/min  Normal
 60-69 YRS:    >45 mL/min  Normal 70-79 YRS:    >39 mL/min  Normal 80 and above 
>32 mL/min  Normal 









                    ID                  Date                Data Source

 

                    [id number]         2020 12:46:00 PM EST [facility] ([facility]









          Name      Value     Range     Interpretation Code Description Data Va

rce(s) Supporting 

Document(s)

 

           Blood Urea Nitrogen 31 mg/dL   7-18       Above high normal          

  [facility] ([facility]                        

 

           Creatinine For GFR 1.69 mg/dL 0.70-1.30  Above high normal           

 [facility] ([facility]               

 

           Glucose, Fasting 103 mg/dL       Above high normal            M

[facility] ([facility]                        

 

           Potassium Serum 4.7 meq/L  3.5-5.1    Normal (applies to non-numeric 

results)            

[facility] ([facility]  

 

           Sodium Level 148 meq/L  136-145    Above high normal            [facility]

T ([facility]                        

 

           Glomerular Filtration Rate 43.2                  Below low normal    

        [facility] ([facility]                        

 

                                        <content>Units are mL/min/1.73 

m2</content><br/><content></content><br/><content>Chronic Kidney Disease Staging
 per NKF:</content><br/><content></content><br/><content>Stage I & II   GFR >=60
       Normal to Mildly Decreased</content><br/><content>Stage III      GFR 30-
59      Moderately Decreased</content><br/><content>Stage IV       GFR 15-29    
  Severely Decreased</content><br/><content>Stage V        GFR <15        Very 
Little GFR Left</content><br/><content>ESRD           GFR <15 on 
RRT</content><br/><content></content> 

 

           Carbon Dioxide Level 20 meq/L   21-32      Below low normal          

  [facility] ([facility]                        

 

           Anion Gap  8 meq/L    8-16       Normal (applies to non-numeric resul

ts)            [facility] ([facility]               

 

           Chloride Level 120 meq/L       Above high normal            [facility]

[facility] ([facility]                        

 

           Ast/Sgot   17 U/L     7-37       Normal (applies to non-numeric resul

ts)            [facility] ([facility]               

 

           Calcium Level 8.6 mg/dL  8.8-10.2   Below low normal            [facility]

T ([facility]                        

 

           Alkaline Phosphatase 131 U/L         Above high normal         

   [facility] ([facility]                        

 

           Bilirubin,Total 0.5 mg/dL  0.2-1.0    Normal (applies to non-numeric 

results)            

[facility] ([facility]  

 

           Alt/SGPT   18 U/L     12-78      Normal (applies to non-numeric resul

ts)            [facility] ([facility]               

 

           Total Protein 6.4 GM/DL  6.4-8.2    Normal (applies to non-numeric re

sults)            [facility]

 ([facility]      

 

           Albumin    3.4 GM/DL  3.2-5.2    Normal (applies to non-numeric resul

ts)            [facility] 

([facility]       

 

           Albumin/Globulin Ratio 1.1                   Normal (applies to non-n

umeric results)            [facility] 

([facility]       









                    ID                  Date                Data Source

 

                    [id number]         2020 12:46:00 PM EST [facility] ([facility]









          Name      Value     Range     Interpretation Code Description Data Va

rce(s) Supporting 

Document(s)

 

                Carcinoembryonic Ag [Mass/volume] in Serum or Plasma 6.3 ng/mL  

                     Above high 

normal                                  [facility] ([facility]

)  

 

                                        THE CEA ASSAY IS PERFORMED ON THE [facility] 

[facility] BY

CHEMILUMINESCENCE AND SHOULD NOT BE COMPARED INTERCHANGEABLY

WITH OTHER METHODS. IT SHOULD NOT BE USED ALONE AS A

SCREENING TEST OR DIAGNOSIS FOR THE PRESENCE OR ABSENCE OF

MALIGNANT DISEASE.

PREDICTIONS OF DISEASE RECURRENCE SHOULD NOT BE BASED SOLELY

ON VALUES OBTAINED FROM SERIAL PATIENT SERUM VALUES.

 

 

                    Transferrin receptor.soluble [Mass/volume] in Serum or Plasm

a 14.1 nmol/L         

12.2-27.3           Normal (applies to non-numeric results)                     

[facility] ([facility]                        

 

                                        Performed at:  [address]  [id number]

[id number]

: Ubaldo Napoles MD, Phone:  [phone]

 









                    ID                  Date                Data Source

 

                    [id number]         2020 11:14:00 AM EST [facility] ([facility]









          Name      Value     Range     Interpretation Code Description Data Va

rce(s) Supporting 

Document(s)

 

           White Blood Count 9.1 10     4.0-10.0   Normal (applies to non-numeri

c results)            

[facility] ([facility]  

 

           Red Blood Count 4.29 10    4.30-6.10  Below low normal            [facility]

[facility] ([facility]                        

 

           Hematocrit 42.6 %     42.0-52.0  Normal (applies to non-numeric resul

ts)            [facility] 

([facility]       

 

           Hemoglobin 13.5 g/dL  13.5-17.5  Normal (applies to non-numeric resul

ts)            [facility] 

([facility]       

 

                Mean Corpuscular Hemoglobin 31.5 pg         27.0-33.0       Norm

al (applies to non-numeric 

results)                                [facility] ([facility]

)  

 

           Mean Corpuscular HGB Conc 31.7 g/dL  32.0-36.5  Below low normal     

       [facility] ([facility]               

 

           Mean Corpuscular Volume 99.3 fl    80.0-96.0  Above high normal      

      [facility] ([facility]               

 

           Platelet Count, Automated 147 10     150-450    Below low normal     

       [facility] ([facility]               

 

                Red Cell Distribution Width 13.2 %          11.5-14.5       Norm

al (applies to non-numeric 

results)                                [facility] ([facility]

)  

 

           Neutrophils % 70.5 %     36.0-66.0  Above high normal            [facility]

NT ([facility]                        

 

           Lymph %    19.1 %     24.0-44.0  Below low normal            [facility] (

[facility]                                    

 

           Eos %      1.8 %      0.0-3.0    Normal (applies to non-numeric resul

ts)            [facility] ([facility]               

 

           Mono %     7.3 %      0.0-5.0    Above high normal            [facility] 

([facility]                                    

 

           Baso %     1.0 %      0.0-1.0    Normal (applies to non-numeric resul

ts)            [facility] ([facility]               

 

           Immature Granulocyte % 0.3 %      0-3.0      Normal (applies to non-n

umeric results)            

[facility] ([facility]  

 

           Nucleated Red Blood Cell % 0.0 %      0-0        Normal (applies to n

on-numeric results)            

[facility] ([facility]  

 

           Neutrophils # 6.4 10     1.5-8.5    Normal (applies to non-numeric re

sults)            [facility] 

([facility]       

 

           Lymph #    1.7 10     1.5-5.0    Normal (applies to non-numeric resul

ts)            [facility] ([facility]               

 

           Mono #     0.7 10     0.0-0.8    Normal (applies to non-numeric resul

ts)            [facility] ([facility]               

 

           Eos #      0.2 10     0.0-0.5    Normal (applies to non-numeric resul

ts)            [facility] ([facility]               

 

           Baso #     0.1 10     0.0-0.2    Normal (applies to non-numeric resul

ts)            [facility] ([facility]               









                    ID                  Date                Data Source

 

                    [id number]         2020 11:14:00 AM EST [facility] ([facility]









          Name      Value     Range     Interpretation Code Description Data Va

rce(s) Supporting 

Document(s)

 

                Total Iron Binding Capacity 272 ug/dL       250-450         Norm

al (applies to non-numeric 

results)                                [facility] ([facility]

)  

 

           Iron (Fe)  66 ug/dL        Normal (applies to non-numeric resul

ts)            [facility] 

([facility]       

 

           Percent Saturation 24.3 %     19.7-50.0  Normal (applies to non-numer

ic results)            

[facility] ([facility]  









                    ID                  Date                Data Source

 

                    [id number]         2020 11:14:00 AM EST [facility] ([facility]









          Name      Value     Range     Interpretation Code Description Data Va

rce(s) Supporting 

Document(s)

 

                Ferritin [Mass/volume] in Serum or Plasma 88 ng/mL        

          Normal (applies to 

non-numeric results)                     [facility] ([facility]

.)  

 

                                        <content>note:<nlbl:demographic_changed>

</content><br/><content></content> 









                    ID                  Date                Data Source

 

                    [id number]         2020 04:31:00 PM EST [facility] ([facility]









          Name      Value     Range     Interpretation Code Description Data Va

rce(s) Supporting 

Document(s)

 

           Hemoglobin A1c/Hemoglobin.total in Blood 6.3 %      4.50-6.20  Above 

high normal            

[facility] ([facility]  









                    ID                  Date                Data Source

 

                    [id number]         2020 04:00:00 PM EST [facility] ([facility]









          Name      Value     Range     Interpretation Code Description Data Va

rce(s) Supporting 

Document(s)

 

             Creatine kinase [Enzymatic activity/volume] in Serum or Plasma 64 U

/L                            

                          [facility] ([facility]  

 

                                        NORMAL RANGES 

****************************************************************************** 
Age         WBC      RBC        HGB           HCT         MCV        PLT 
------------------------------------------------------------------------------ 
Adult M   4.1-10.9    4.20-6.30   12.0-18.0   37.0-51.0   80-97      140-440  
Adult F   4.1-10.9    4.04-5.48   12.0-18.0   37.0-51.0   80-97      140-440  0
-1 Yr    5.0-20.0    3.9-5.9     15-18       MV: 44      MV: 91     MV: 277  2-9
Yr.   6.0-17.0    3.8-5.4     11-13       MV: 37      MV: 78     MV: 300  10 
Yrs.   5.0-13.0    3.8-5.4     12-15       MV: 39      MV: 80     MV: 250    
NOTE:  * FOR ADULT BLACK MALES AND FEMALES, NORMAL WBC IS 2.9-7.7 K/ML  * FOR 
ADULT BLACK MALES AND FEMALES, NORMAL RBC,HGB, AND HCT IS 5% LESS  SOURCE FOR 
DATA: [employer] 1800 OPERATION MANUAL( AUTOMATED BLOOD COUNTS AND DIFF.)  APPENDIX
 B-3                      CHRONIC KIDNEY DISEASE STAGING PER NKF:   MALE GFR 
INTERPRETATION:  20-49 YRS:     >60 mL/min  Normal 50-59 YRS:     >56 mL/min  
Normal 60-69 YRS:     >49 mL/min  Normal 70-79 YRS:     >42 mL/min  Normal 80 
and above  >35 mL/min  Normal   FEMALE GRF INTERPRETATION:  20-39 YRS:    >60 
mL/min  Normal 40-49 YRS:    >58 mL/min  Normal 50-59 YRS:    >51 mL/min  Normal
60-69 YRS:    >45 mL/min  Normal 70-79 YRS:    >39 mL/min  Normal 80 and above 
>32 mL/min  NormalCLASSIFICATION            CHOLESTEROL FOR ADULTS       
CHILDREN/ADOLESCENTS*   DESIRABLE:                <200 MG/DL                   
<170 MG/DL  BORDER-LINE HIGH RISK:    200-239 MG/DL                170-199 MG/DL
 HIGH RISK:                >240 MG/DL                   >200 MG/DL    CLASS. FOR
PRIMARY LDL CHOL PREVENTION:        LDL CHOL-CHILD/ADOLESCENTS*   DESIRABLE:    
         <130 MG/DL              <110 MG/DL  BORDERLINE-HIGH RISK:   130-159 
MG/DL           110-129 MG/DL  HIGH RISK:              >160 MG/DL              
>130 MG/DL   *CHILDREN AND ADOLESCENTS REPRESENTS INDIVIDUALA AGED 2-19 YEARS 
EXCLUSIVE. 









                    ID                  Date                Data Source

 

                    [id number]         2020 04:00:00 PM EST [facility] ([facility]

[facility]









          Name      Value     Range     Interpretation Code Description Data Av

rce(s) Supporting 

Document(s)

 

          Chol      104 mg/dL 0-200                         [facility] ([facility]  

 

                                        NORMAL RANGES 

****************************************************************************** 
Age         WBC      RBC        HGB           HCT         MCV        PLT 
------------------------------------------------------------------------------ 
Adult M   4.1-10.9    4.20-6.30   12.0-18.0   37.0-51.0   80-97      140-440  
Adult F   4.1-10.9    4.04-5.48   12.0-18.0   37.0-51.0   80-97      140-440  0
-1 Yr    5.0-20.0    3.9-5.9     15-18       MV: 44      MV: 91     MV: 277  2-9
Yr.   6.0-17.0    3.8-5.4     11-13       MV: 37      MV: 78     MV: 300  10 
Yrs.   5.0-13.0    3.8-5.4     12-15       MV: 39      MV: 80     MV: 250    
NOTE:  * FOR ADULT BLACK MALES AND FEMALES, NORMAL WBC IS 2.9-7.7 K/ML  * FOR 
ADULT BLACK MALES AND FEMALES, NORMAL RBC,HGB, AND HCT IS 5% LESS  SOURCE FOR 
DATA: [employer] 1800 OPERATION MANUAL( AUTOMATED BLOOD COUNTS AND DIFF.)  APPENDIX
 B-3                      CHRONIC KIDNEY DISEASE STAGING PER NKF:   MALE GFR 
INTERPRETATION:  20-49 YRS:     >60 mL/min  Normal 50-59 YRS:     >56 mL/min  
Normal 60-69 YRS:     >49 mL/min  Normal 70-79 YRS:     >42 mL/min  Normal 80 
and above  >35 mL/min  Normal   FEMALE GRF INTERPRETATION:  20-39 YRS:    >60 
mL/min  Normal 40-49 YRS:    >58 mL/min  Normal 50-59 YRS:    >51 mL/min  Normal
60-69 YRS:    >45 mL/min  Normal 70-79 YRS:    >39 mL/min  Normal 80 and above 
>32 mL/min  NormalCLASSIFICATION            CHOLESTEROL FOR ADULTS       
CHILDREN/ADOLESCENTS*   DESIRABLE:                <200 MG/DL                   
<170 MG/DL  BORDER-LINE HIGH RISK:    200-239 MG/DL                170-199 MG/DL
 HIGH RISK:                >240 MG/DL                   >200 MG/DL    CLASS. FOR
PRIMARY LDL CHOL PREVENTION:        LDL CHOL-CHILD/ADOLESCENTS*   DESIRABLE:    
         <130 MG/DL              <110 MG/DL  BORDERLINE-HIGH RISK:   130-159 
MG/DL           110-129 MG/DL  HIGH RISK:              >160 MG/DL              
>130 MG/DL   *CHILDREN AND ADOLESCENTS REPRESENTS INDIVIDUALA AGED 2-19 YEARS 
EXCLUSIVE. 

 

           Cholesterol in HDL [Mass/volume] in Serum or Plasma 35 mg/dL   35-55 

                           [facility] 

([facility]       

 

                                        NORMAL RANGES 

****************************************************************************** 
Age         WBC      RBC        HGB           HCT         MCV        PLT 
------------------------------------------------------------------------------ 
Adult M   4.1-10.9    4.20-6.30   12.0-18.0   37.0-51.0   80-97      140-440  
Adult F   4.1-10.9    4.04-5.48   12.0-18.0   37.0-51.0   80-97      140-440  0
-1 Yr    5.0-20.0    3.9-5.9     15-18       MV: 44      MV: 91     MV: 277  2-9
 Yr.   6.0-17.0    3.8-5.4     11-13       MV: 37      MV: 78     MV: 300  10 
Yrs.   5.0-13.0    3.8-5.4     12-15       MV: 39      MV: 80     MV: 250    
NOTE:  * FOR ADULT BLACK MALES AND FEMALES, NORMAL WBC IS 2.9-7.7 K/ML  * FOR 
ADULT BLACK MALES AND FEMALES, NORMAL RBC,HGB, AND HCT IS 5% LESS  SOURCE FOR 
DATA: [employer] 1800 OPERATION MANUAL( AUTOMATED BLOOD COUNTS AND DIFF.)  APPENDIX
  B-3                      CHRONIC KIDNEY DISEASE STAGING PER NKF:   MALE GFR 
INTERPRETATION:  20-49 YRS:     >60 mL/min  Normal 50-59 YRS:     >56 mL/min  
Normal 60-69 YRS:     >49 mL/min  Normal 70-79 YRS:     >42 mL/min  Normal 80 
and above  >35 mL/min  Normal   FEMALE GRF INTERPRETATION:  20-39 YRS:    >60 
mL/min  Normal 40-49 YRS:    >58 mL/min  Normal 50-59 YRS:    >51 mL/min  Normal
 60-69 YRS:    >45 mL/min  Normal 70-79 YRS:    >39 mL/min  Normal 80 and above 
>32 mL/min  NormalCLASSIFICATION            CHOLESTEROL FOR ADULTS       
CHILDREN/ADOLESCENTS*   DESIRABLE:                <200 MG/DL                   
<170 MG/DL  BORDER-LINE HIGH RISK:    200-239 MG/DL                170-199 MG/DL
  HIGH RISK:                >240 MG/DL                   >200 MG/DL    CLASS. 
FOR PRIMARY LDL CHOL PREVENTION:        LDL CHOL-CHILD/ADOLESCENTS*   DESIRABLE:
              <130 MG/DL              <110 MG/DL  BORDERLINE-HIGH RISK:   130-
159 MG/DL           110-129 MG/DL  HIGH RISK:              >160 MG/DL           
   >130 MG/DL   *CHILDREN AND ADOLESCENTS REPRESENTS INDIVIDUALA AGED 2-19 YEARS
 EXCLUSIVE. 

 

          Trig      80 mg/dL                          [facility] ([facility]  

 

                                        NORMAL RANGES 

****************************************************************************** 
Age         WBC      RBC        HGB           HCT         MCV        PLT 
------------------------------------------------------------------------------ 
Adult M   4.1-10.9    4.20-6.30   12.0-18.0   37.0-51.0   80-97      140-440  
Adult F   4.1-10.9    4.04-5.48   12.0-18.0   37.0-51.0   80-97      140-440  0
-1 Yr    5.0-20.0    3.9-5.9     15-18       MV: 44      MV: 91     MV: 277  2-9
 Yr.   6.0-17.0    3.8-5.4     11-13       MV: 37      MV: 78     MV: 300  10 
Yrs.   5.0-13.0    3.8-5.4     12-15       MV: 39      MV: 80     MV: 250    
NOTE:  * FOR ADULT BLACK MALES AND FEMALES, NORMAL WBC IS 2.9-7.7 K/ML  * FOR 
ADULT BLACK MALES AND FEMALES, NORMAL RBC,HGB, AND HCT IS 5% LESS  SOURCE FOR 
DATA: [employer] 1800 OPERATION MANUAL( AUTOMATED BLOOD COUNTS AND DIFF.)  APPENDIX
  B-3                      CHRONIC KIDNEY DISEASE STAGING PER NKF:   MALE GFR 
INTERPRETATION:  20-49 YRS:     >60 mL/min  Normal 50-59 YRS:     >56 mL/min  
Normal 60-69 YRS:     >49 mL/min  Normal 70-79 YRS:     >42 mL/min  Normal 80 
and above  >35 mL/min  Normal   FEMALE GRF INTERPRETATION:  20-39 YRS:    >60 
mL/min  Normal 40-49 YRS:    >58 mL/min  Normal 50-59 YRS:    >51 mL/min  Normal
 60-69 YRS:    >45 mL/min  Normal 70-79 YRS:    >39 mL/min  Normal 80 and above 
>32 mL/min  NormalCLASSIFICATION            CHOLESTEROL FOR ADULTS       
CHILDREN/ADOLESCENTS*   DESIRABLE:                <200 MG/DL                   
<170 MG/DL  BORDER-LINE HIGH RISK:    200-239 MG/DL                170-199 MG/DL
  HIGH RISK:                >240 MG/DL                   >200 MG/DL    CLASS. 
FOR PRIMARY LDL CHOL PREVENTION:        LDL CHOL-CHILD/ADOLESCENTS*   DESIRABLE:
              <130 MG/DL              <110 MG/DL  BORDERLINE-HIGH RISK:   130-
159 MG/DL           110-129 MG/DL  HIGH RISK:              >160 MG/DL           
   >130 MG/DL   *CHILDREN AND ADOLESCENTS REPRESENTS INDIVIDUALA AGED 2-19 YEARS
 EXCLUSIVE. 

 

           LDL_C      53 Calc         Below low normal            [facility] (

[facility]                                    

 

                                        NORMAL RANGES 

****************************************************************************** 
Age         WBC      RBC        HGB           HCT         MCV        PLT 
------------------------------------------------------------------------------ 
Adult M   4.1-10.9    4.20-6.30   12.0-18.0   37.0-51.0   80-97      140-440  
Adult F   4.1-10.9    4.04-5.48   12.0-18.0   37.0-51.0   80-97      140-440  0
-1 Yr    5.0-20.0    3.9-5.9     15-18       MV: 44      MV: 91     MV: 277  2-9
 Yr.   6.0-17.0    3.8-5.4     11-13       MV: 37      MV: 78     MV: 300  10 
Yrs.   5.0-13.0    3.8-5.4     12-15       MV: 39      MV: 80     MV: 250    
NOTE:  * FOR ADULT BLACK MALES AND FEMALES, NORMAL WBC IS 2.9-7.7 K/ML  * FOR 
ADULT BLACK MALES AND FEMALES, NORMAL RBC,HGB, AND HCT IS 5% LESS  SOURCE FOR 
DATA: [employer] 1800 OPERATION MANUAL( AUTOMATED BLOOD COUNTS AND DIFF.)  APPENDIX
  B-3                      CHRONIC KIDNEY DISEASE STAGING PER NKF:   MALE GFR 
INTERPRETATION:  20-49 YRS:     >60 mL/min  Normal 50-59 YRS:     >56 mL/min  
Normal 60-69 YRS:     >49 mL/min  Normal 70-79 YRS:     >42 mL/min  Normal 80 
and above  >35 mL/min  Normal   FEMALE GRF INTERPRETATION:  20-39 YRS:    >60 
mL/min  Normal 40-49 YRS:    >58 mL/min  Normal 50-59 YRS:    >51 mL/min  Normal
 60-69 YRS:    >45 mL/min  Normal 70-79 YRS:    >39 mL/min  Normal 80 and above 
>32 mL/min  NormalCLASSIFICATION            CHOLESTEROL FOR ADULTS       
CHILDREN/ADOLESCENTS*   DESIRABLE:                <200 MG/DL                   
<170 MG/DL  BORDER-LINE HIGH RISK:    200-239 MG/DL                170-199 MG/DL
  HIGH RISK:                >240 MG/DL                   >200 MG/DL    CLASS. 
FOR PRIMARY LDL CHOL PREVENTION:        LDL CHOL-CHILD/ADOLESCENTS*   DESIRABLE:
              <130 MG/DL              <110 MG/DL  BORDERLINE-HIGH RISK:   130-
159 MG/DL           110-129 MG/DL  HIGH RISK:              >160 MG/DL           
   >130 MG/DL   *CHILDREN AND ADOLESCENTS REPRESENTS INDIVIDUALA AGED 2-19 YEARS
 EXCLUSIVE. 

 

          Cho/HDL Ratio 3.0 CALC                                [employer] ([facility]  

 

                                        NORMAL RANGES 

****************************************************************************** 
Age         WBC      RBC        HGB           HCT         MCV        PLT 
------------------------------------------------------------------------------ 
Adult M   4.1-10.9    4.20-6.30   12.0-18.0   37.0-51.0   80-97      140-440  
Adult F   4.1-10.9    4.04-5.48   12.0-18.0   37.0-51.0   80-97      140-440  0
-1 Yr    5.0-20.0    3.9-5.9     15-18       MV: 44      MV: 91     MV: 277  2-9
 Yr.   6.0-17.0    3.8-5.4     11-13       MV: 37      MV: 78     MV: 300  10 
Yrs.   5.0-13.0    3.8-5.4     12-15       MV: 39      MV: 80     MV: 250    
NOTE:  * FOR ADULT BLACK MALES AND FEMALES, NORMAL WBC IS 2.9-7.7 K/ML  * FOR 
ADULT BLACK MALES AND FEMALES, NORMAL RBC,HGB, AND HCT IS 5% LESS  SOURCE FOR 
DATA: [employer] 1800 OPERATION MANUAL( AUTOMATED BLOOD COUNTS AND DIFF.)  APPENDIX
  B-3                      CHRONIC KIDNEY DISEASE STAGING PER NKF:   MALE GFR 
INTERPRETATION:  20-49 YRS:     >60 mL/min  Normal 50-59 YRS:     >56 mL/min  
Normal 60-69 YRS:     >49 mL/min  Normal 70-79 YRS:     >42 mL/min  Normal 80 
and above  >35 mL/min  Normal   FEMALE GRF INTERPRETATION:  20-39 YRS:    >60 
mL/min  Normal 40-49 YRS:    >58 mL/min  Normal 50-59 YRS:    >51 mL/min  Normal
 60-69 YRS:    >45 mL/min  Normal 70-79 YRS:    >39 mL/min  Normal 80 and above 
>32 mL/min  NormalCLASSIFICATION            CHOLESTEROL FOR ADULTS       
CHILDREN/ADOLESCENTS*   DESIRABLE:                <200 MG/DL                   
<170 MG/DL  BORDER-LINE HIGH RISK:    200-239 MG/DL                170-199 MG/DL
  HIGH RISK:                >240 MG/DL                   >200 MG/DL    CLASS. 
FOR PRIMARY LDL CHOL PREVENTION:        LDL CHOL-CHILD/ADOLESCENTS*   DESIRABLE:
              <130 MG/DL              <110 MG/DL  BORDERLINE-HIGH RISK:   130-
159 MG/DL           110-129 MG/DL  HIGH RISK:              >160 MG/DL           
   >130 MG/DL   *CHILDREN AND ADOLESCENTS REPRESENTS INDIVIDUALA AGED 2-19 YEARS
 EXCLUSIVE. 









                    ID                  Date                Data Source

 

                    [id number]         2020 04:00:00 PM EST [facility] ([facility]









          Name      Value     Range     Interpretation Code Description Data Av

rce(s) Supporting 

Document(s)

 

           Glu        166 mg/dL       Above high normal            [facility] 

([facility]                                    

 

                                        NORMAL RANGES 

****************************************************************************** 
Age         WBC      RBC        HGB           HCT         MCV        PLT 
------------------------------------------------------------------------------ 
Adult M   4.1-10.9    4.20-6.30   12.0-18.0   37.0-51.0   80-97      140-440  
Adult F   4.1-10.9    4.04-5.48   12.0-18.0   37.0-51.0   80-97      140-440  0
-1 Yr    5.0-20.0    3.9-5.9     15-18       MV: 44      MV: 91     MV: 277  2-9
 Yr.   6.0-17.0    3.8-5.4     11-13       MV: 37      MV: 78     MV: 300  10 
Yrs.   5.0-13.0    3.8-5.4     12-15       MV: 39      MV: 80     MV: 250    
NOTE:  * FOR ADULT BLACK MALES AND FEMALES, NORMAL WBC IS 2.9-7.7 K/ML  * FOR 
ADULT BLACK MALES AND FEMALES, NORMAL RBC,HGB, AND HCT IS 5% LESS  SOURCE FOR 
DATA: [employer] 1800 OPERATION MANUAL( AUTOMATED BLOOD COUNTS AND DIFF.)  APPENDIX
  B-3                      CHRONIC KIDNEY DISEASE STAGING PER NKF:   MALE GFR 
INTERPRETATION:  20-49 YRS:     >60 mL/min  Normal 50-59 YRS:     >56 mL/min  
Normal 60-69 YRS:     >49 mL/min  Normal 70-79 YRS:     >42 mL/min  Normal 80 
and above  >35 mL/min  Normal   FEMALE GRF INTERPRETATION:  20-39 YRS:    >60 
mL/min  Normal 40-49 YRS:    >58 mL/min  Normal 50-59 YRS:    >51 mL/min  Normal
 60-69 YRS:    >45 mL/min  Normal 70-79 YRS:    >39 mL/min  Normal 80 and above 
>32 mL/min  NormalCLASSIFICATION            CHOLESTEROL FOR ADULTS       
CHILDREN/ADOLESCENTS*   DESIRABLE:                <200 MG/DL                   
<170 MG/DL  BORDER-LINE HIGH RISK:    200-239 MG/DL                170-199 MG/DL
  HIGH RISK:                >240 MG/DL                   >200 MG/DL    CLASS. 
FOR PRIMARY LDL CHOL PREVENTION:        LDL CHOL-CHILD/ADOLESCENTS*   DESIRABLE:
              <130 MG/DL              <110 MG/DL  BORDERLINE-HIGH RISK:   130-
159 MG/DL           110-129 MG/DL  HIGH RISK:              >160 MG/DL           
   >130 MG/DL   *CHILDREN AND ADOLESCENTS REPRESENTS INDIVIDUALA AGED 2-19 YEARS
 EXCLUSIVE. 

 

          BUN       38 mg/dL  8-23      Above high normal           [facility] ([facility] 

 

 

                                        NORMAL RANGES 

****************************************************************************** 
Age         WBC      RBC        HGB           HCT         MCV        PLT 
------------------------------------------------------------------------------ 
Adult M   4.1-10.9    4.20-6.30   12.0-18.0   37.0-51.0   80-97      140-440  
Adult F   4.1-10.9    4.04-5.48   12.0-18.0   37.0-51.0   80-97      140-440  0
-1 Yr    5.0-20.0    3.9-5.9     15-18       MV: 44      MV: 91     MV: 277  2-9
 Yr.   6.0-17.0    3.8-5.4     11-13       MV: 37      MV: 78     MV: 300  10 
Yrs.   5.0-13.0    3.8-5.4     12-15       MV: 39      MV: 80     MV: 250    
NOTE:  * FOR ADULT BLACK MALES AND FEMALES, NORMAL WBC IS 2.9-7.7 K/ML  * FOR 
ADULT BLACK MALES AND FEMALES, NORMAL RBC,HGB, AND HCT IS 5% LESS  SOURCE FOR 
DATA: [employer] 1800 OPERATION MANUAL( AUTOMATED BLOOD COUNTS AND DIFF.)  APPENDIX
  B-3                      CHRONIC KIDNEY DISEASE STAGING PER NKF:   MALE GFR 
INTERPRETATION:  20-49 YRS:     >60 mL/min  Normal 50-59 YRS:     >56 mL/min  
Normal 60-69 YRS:     >49 mL/min  Normal 70-79 YRS:     >42 mL/min  Normal 80 
and above  >35 mL/min  Normal   FEMALE GRF INTERPRETATION:  20-39 YRS:    >60 
mL/min  Normal 40-49 YRS:    >58 mL/min  Normal 50-59 YRS:    >51 mL/min  Normal
 60-69 YRS:    >45 mL/min  Normal 70-79 YRS:    >39 mL/min  Normal 80 and above 
>32 mL/min  NormalCLASSIFICATION            CHOLESTEROL FOR ADULTS       
CHILDREN/ADOLESCENTS*   DESIRABLE:                <200 MG/DL                   
<170 MG/DL  BORDER-LINE HIGH RISK:    200-239 MG/DL                170-199 MG/DL
  HIGH RISK:                >240 MG/DL                   >200 MG/DL    CLASS. 
FOR PRIMARY LDL CHOL PREVENTION:        LDL CHOL-CHILD/ADOLESCENTS*   DESIRABLE:
              <130 MG/DL              <110 MG/DL  BORDERLINE-HIGH RISK:   130-
159 MG/DL           110-129 MG/DL  HIGH RISK:              >160 MG/DL           
   >130 MG/DL   *CHILDREN AND ADOLESCENTS REPRESENTS INDIVIDUALA AGED 2-19 YEARS
 EXCLUSIVE. 

 

           Creat      1.9 mg/dL  0.7-1.2    Above high normal            [facility] 

([facility]                                    

 

                                        NORMAL RANGES 

****************************************************************************** 
Age         WBC      RBC        HGB           HCT         MCV        PLT 
------------------------------------------------------------------------------ 
Adult M   4.1-10.9    4.20-6.30   12.0-18.0   37.0-51.0   80-97      140-440  
Adult F   4.1-10.9    4.04-5.48   12.0-18.0   37.0-51.0   80-97      140-440  0
-1 Yr    5.0-20.0    3.9-5.9     15-18       MV: 44      MV: 91     MV: 277  2-9
 Yr.   6.0-17.0    3.8-5.4     11-13       MV: 37      MV: 78     MV: 300  10 
Yrs.   5.0-13.0    3.8-5.4     12-15       MV: 39      MV: 80     MV: 250    
NOTE:  * FOR ADULT BLACK MALES AND FEMALES, NORMAL WBC IS 2.9-7.7 K/ML  * FOR 
ADULT BLACK MALES AND FEMALES, NORMAL RBC,HGB, AND HCT IS 5% LESS  SOURCE FOR 
DATA: [employer] 1800 OPERATION MANUAL( AUTOMATED BLOOD COUNTS AND DIFF.)  APPENDIX
  B-3                      CHRONIC KIDNEY DISEASE STAGING PER NKF:   MALE GFR 
INTERPRETATION:  20-49 YRS:     >60 mL/min  Normal 50-59 YRS:     >56 mL/min  
Normal 60-69 YRS:     >49 mL/min  Normal 70-79 YRS:     >42 mL/min  Normal 80 
and above  >35 mL/min  Normal   FEMALE GRF INTERPRETATION:  20-39 YRS:    >60 
mL/min  Normal 40-49 YRS:    >58 mL/min  Normal 50-59 YRS:    >51 mL/min  Normal
 60-69 YRS:    >45 mL/min  Normal 70-79 YRS:    >39 mL/min  Normal 80 and above 
>32 mL/min  NormalCLASSIFICATION            CHOLESTEROL FOR ADULTS       
CHILDREN/ADOLESCENTS*   DESIRABLE:                <200 MG/DL                   
<170 MG/DL  BORDER-LINE HIGH RISK:    200-239 MG/DL                170-199 MG/DL
  HIGH RISK:                >240 MG/DL                   >200 MG/DL    CLASS. 
FOR PRIMARY LDL CHOL PREVENTION:        LDL CHOL-CHILD/ADOLESCENTS*   DESIRABLE:
              <130 MG/DL              <110 MG/DL  BORDERLINE-HIGH RISK:   130-
159 MG/DL           110-129 MG/DL  HIGH RISK:              >160 MG/DL           
   >130 MG/DL   *CHILDREN AND ADOLESCENTS REPRESENTS INDIVIDUALA AGED 2-19 YEARS
 EXCLUSIVE. 

 

          BUN/Creatinine Ratio 19.5 CALC                               [facility] ([facility]  

 

                                        NORMAL RANGES 

****************************************************************************** 
Age         WBC      RBC        HGB           HCT         MCV        PLT 
------------------------------------------------------------------------------ 
Adult M   4.1-10.9    4.20-6.30   12.0-18.0   37.0-51.0   80-97      140-440  
Adult F   4.1-10.9    4.04-5.48   12.0-18.0   37.0-51.0   80-97      140-440  0
-1 Yr    5.0-20.0    3.9-5.9     15-18       MV: 44      MV: 91     MV: 277  2-9
 Yr.   6.0-17.0    3.8-5.4     11-13       MV: 37      MV: 78     MV: 300  10 
Yrs.   5.0-13.0    3.8-5.4     12-15       MV: 39      MV: 80     MV: 250    
NOTE:  * FOR ADULT BLACK MALES AND FEMALES, NORMAL WBC IS 2.9-7.7 K/ML  * FOR 
ADULT BLACK MALES AND FEMALES, NORMAL RBC,HGB, AND HCT IS 5% LESS  SOURCE FOR 
DATA: [employer] 1800 OPERATION MANUAL( AUTOMATED BLOOD COUNTS AND DIFF.)  APPENDIX
  B-3                      CHRONIC KIDNEY DISEASE STAGING PER NKF:   MALE GFR 
INTERPRETATION:  20-49 YRS:     >60 mL/min  Normal 50-59 YRS:     >56 mL/min  
Normal 60-69 YRS:     >49 mL/min  Normal 70-79 YRS:     >42 mL/min  Normal 80 
and above  >35 mL/min  Normal   FEMALE GRF INTERPRETATION:  20-39 YRS:    >60 
mL/min  Normal 40-49 YRS:    >58 mL/min  Normal 50-59 YRS:    >51 mL/min  Normal
 60-69 YRS:    >45 mL/min  Normal 70-79 YRS:    >39 mL/min  Normal 80 and above 
>32 mL/min  NormalCLASSIFICATION            CHOLESTEROL FOR ADULTS       
CHILDREN/ADOLESCENTS*   DESIRABLE:                <200 MG/DL                   
<170 MG/DL  BORDER-LINE HIGH RISK:    200-239 MG/DL                170-199 MG/DL
  HIGH RISK:                >240 MG/DL                   >200 MG/DL    CLASS. 
FOR PRIMARY LDL CHOL PREVENTION:        LDL CHOL-CHILD/ADOLESCENTS*   DESIRABLE:
              <130 MG/DL              <110 MG/DL  BORDERLINE-HIGH RISK:   130-
159 MG/DL           110-129 MG/DL  HIGH RISK:              >160 MG/DL           
   >130 MG/DL   *CHILDREN AND ADOLESCENTS REPRESENTS INDIVIDUALA AGED 2-19 YEARS
 EXCLUSIVE. 

 

          K         3.7 mmol/L 3.5-5.1                       [facility] ([facility]  

 

                                        NORMAL RANGES 

****************************************************************************** 
Age         WBC      RBC        HGB           HCT         MCV        PLT 
------------------------------------------------------------------------------ 
Adult M   4.1-10.9    4.20-6.30   12.0-18.0   37.0-51.0   80-97      140-440  
Adult F   4.1-10.9    4.04-5.48   12.0-18.0   37.0-51.0   80-97      140-440  0
-1 Yr    5.0-20.0    3.9-5.9     15-18       MV: 44      MV: 91     MV: 277  2-9
 Yr.   6.0-17.0    3.8-5.4     11-13       MV: 37      MV: 78     MV: 300  10 
Yrs.   5.0-13.0    3.8-5.4     12-15       MV: 39      MV: 80     MV: 250    
NOTE:  * FOR ADULT BLACK MALES AND FEMALES, NORMAL WBC IS 2.9-7.7 K/ML  * FOR 
ADULT BLACK MALES AND FEMALES, NORMAL RBC,HGB, AND HCT IS 5% LESS  SOURCE FOR 
DATA: [employer] 1800 OPERATION MANUAL( AUTOMATED BLOOD COUNTS AND DIFF.)  APPENDIX
  B-3                      CHRONIC KIDNEY DISEASE STAGING PER NKF:   MALE GFR 
INTERPRETATION:  20-49 YRS:     >60 mL/min  Normal 50-59 YRS:     >56 mL/min  
Normal 60-69 YRS:     >49 mL/min  Normal 70-79 YRS:     >42 mL/min  Normal 80 
and above  >35 mL/min  Normal   FEMALE GRF INTERPRETATION:  20-39 YRS:    >60 
mL/min  Normal 40-49 YRS:    >58 mL/min  Normal 50-59 YRS:    >51 mL/min  Normal
 60-69 YRS:    >45 mL/min  Normal 70-79 YRS:    >39 mL/min  Normal 80 and above 
>32 mL/min  NormalCLASSIFICATION            CHOLESTEROL FOR ADULTS       
CHILDREN/ADOLESCENTS*   DESIRABLE:                <200 MG/DL                   
<170 MG/DL  BORDER-LINE HIGH RISK:    200-239 MG/DL                170-199 MG/DL
  HIGH RISK:                >240 MG/DL                   >200 MG/DL    CLASS. 
FOR PRIMARY LDL CHOL PREVENTION:        LDL CHOL-CHILD/ADOLESCENTS*   DESIRABLE:
              <130 MG/DL              <110 MG/DL  BORDERLINE-HIGH RISK:   130-
159 MG/DL           110-129 MG/DL  HIGH RISK:              >160 MG/DL           
   >130 MG/DL   *CHILDREN AND ADOLESCENTS REPRESENTS INDIVIDUALA AGED 2-19 YEARS
 EXCLUSIVE. 

 

          Na        143 mmol/L 136-145                       [facility] ([facility]  

 

                                        NORMAL RANGES 

****************************************************************************** 
Age         WBC      RBC        HGB           HCT         MCV        PLT 
------------------------------------------------------------------------------ 
Adult M   4.1-10.9    4.20-6.30   12.0-18.0   37.0-51.0   80-97      140-440  
Adult F   4.1-10.9    4.04-5.48   12.0-18.0   37.0-51.0   80-97      140-440  0
-1 Yr    5.0-20.0    3.9-5.9     15-18       MV: 44      MV: 91     MV: 277  2-9
 Yr.   6.0-17.0    3.8-5.4     11-13       MV: 37      MV: 78     MV: 300  10 
Yrs.   5.0-13.0    3.8-5.4     12-15       MV: 39      MV: 80     MV: 250    
NOTE:  * FOR ADULT BLACK MALES AND FEMALES, NORMAL WBC IS 2.9-7.7 K/ML  * FOR 
ADULT BLACK MALES AND FEMALES, NORMAL RBC,HGB, AND HCT IS 5% LESS  SOURCE FOR 
DATA: [employer] 1800 OPERATION MANUAL( AUTOMATED BLOOD COUNTS AND DIFF.)  APPENDIX
  B-3                      CHRONIC KIDNEY DISEASE STAGING PER NKF:   MALE GFR 
INTERPRETATION:  20-49 YRS:     >60 mL/min  Normal 50-59 YRS:     >56 mL/min  
Normal 60-69 YRS:     >49 mL/min  Normal 70-79 YRS:     >42 mL/min  Normal 80 
and above  >35 mL/min  Normal   FEMALE GRF INTERPRETATION:  20-39 YRS:    >60 
mL/min  Normal 40-49 YRS:    >58 mL/min  Normal 50-59 YRS:    >51 mL/min  Normal
 60-69 YRS:    >45 mL/min  Normal 70-79 YRS:    >39 mL/min  Normal 80 and above 
>32 mL/min  NormalCLASSIFICATION            CHOLESTEROL FOR ADULTS       
CHILDREN/ADOLESCENTS*   DESIRABLE:                <200 MG/DL                   
<170 MG/DL  BORDER-LINE HIGH RISK:    200-239 MG/DL                170-199 MG/DL
  HIGH RISK:                >240 MG/DL                   >200 MG/DL    CLASS. 
FOR PRIMARY LDL CHOL PREVENTION:        LDL CHOL-CHILD/ADOLESCENTS*   DESIRABLE:
              <130 MG/DL              <110 MG/DL  BORDERLINE-HIGH RISK:   130-
159 MG/DL           110-129 MG/DL  HIGH RISK:              >160 MG/DL           
   >130 MG/DL   *CHILDREN AND ADOLESCENTS REPRESENTS INDIVIDUALA AGED 2-19 YEARS
 EXCLUSIVE. 

 

           Co2        18.9 mmol/L 22.0-29.0  Below low normal            [facility] 

([facility]                                   

 

                                        NORMAL RANGES 

****************************************************************************** 
Age         WBC      RBC        HGB           HCT         MCV        PLT 
------------------------------------------------------------------------------ 
Adult M   4.1-10.9    4.20-6.30   12.0-18.0   37.0-51.0   80-97      140-440  
Adult F   4.1-10.9    4.04-5.48   12.0-18.0   37.0-51.0   80-97      140-440  0
-1 Yr    5.0-20.0    3.9-5.9     15-18       MV: 44      MV: 91     MV: 277  2-9
 Yr.   6.0-17.0    3.8-5.4     11-13       MV: 37      MV: 78     MV: 300  10 
Yrs.   5.0-13.0    3.8-5.4     12-15       MV: 39      MV: 80     MV: 250    
NOTE:  * FOR ADULT BLACK MALES AND FEMALES, NORMAL WBC IS 2.9-7.7 K/ML  * FOR 
ADULT BLACK MALES AND FEMALES, NORMAL RBC,HGB, AND HCT IS 5% LESS  SOURCE FOR 
DATA: [employer] 1800 OPERATION MANUAL( AUTOMATED BLOOD COUNTS AND DIFF.)  APPENDIX
  B-3                      CHRONIC KIDNEY DISEASE STAGING PER NKF:   MALE GFR 
INTERPRETATION:  20-49 YRS:     >60 mL/min  Normal 50-59 YRS:     >56 mL/min  
Normal 60-69 YRS:     >49 mL/min  Normal 70-79 YRS:     >42 mL/min  Normal 80 
and above  >35 mL/min  Normal   FEMALE GRF INTERPRETATION:  20-39 YRS:    >60 
mL/min  Normal 40-49 YRS:    >58 mL/min  Normal 50-59 YRS:    >51 mL/min  Normal
 60-69 YRS:    >45 mL/min  Normal 70-79 YRS:    >39 mL/min  Normal 80 and above 
>32 mL/min  NormalCLASSIFICATION            CHOLESTEROL FOR ADULTS       
CHILDREN/ADOLESCENTS*   DESIRABLE:                <200 MG/DL                   
<170 MG/DL  BORDER-LINE HIGH RISK:    200-239 MG/DL                170-199 MG/DL
  HIGH RISK:                >240 MG/DL                   >200 MG/DL    CLASS. 
FOR PRIMARY LDL CHOL PREVENTION:        LDL CHOL-CHILD/ADOLESCENTS*   DESIRABLE:
              <130 MG/DL              <110 MG/DL  BORDERLINE-HIGH RISK:   130-
159 MG/DL           110-129 MG/DL  HIGH RISK:              >160 MG/DL           
   >130 MG/DL   *CHILDREN AND ADOLESCENTS REPRESENTS INDIVIDUALA AGED 2-19 YEARS
 EXCLUSIVE. 

 

           CL         111.4 mmol/L 98.0-107.0 Above high normal            [facility]

[facility] ([facility]                        

 

                                        NORMAL RANGES 

****************************************************************************** 
Age         WBC      RBC        HGB           HCT         MCV        PLT 
------------------------------------------------------------------------------ 
Adult M   4.1-10.9    4.20-6.30   12.0-18.0   37.0-51.0   80-97      140-440  
Adult F   4.1-10.9    4.04-5.48   12.0-18.0   37.0-51.0   80-97      140-440  0
-1 Yr    5.0-20.0    3.9-5.9     15-18       MV: 44      MV: 91     MV: 277  2-9
 Yr.   6.0-17.0    3.8-5.4     11-13       MV: 37      MV: 78     MV: 300  10 
Yrs.   5.0-13.0    3.8-5.4     12-15       MV: 39      MV: 80     MV: 250    
NOTE:  * FOR ADULT BLACK MALES AND FEMALES, NORMAL WBC IS 2.9-7.7 K/ML  * FOR 
ADULT BLACK MALES AND FEMALES, NORMAL RBC,HGB, AND HCT IS 5% LESS  SOURCE FOR 
DATA: [employer] 1800 OPERATION MANUAL( AUTOMATED BLOOD COUNTS AND DIFF.)  APPENDIX
  B-3                      CHRONIC KIDNEY DISEASE STAGING PER NKF:   MALE GFR 
INTERPRETATION:  20-49 YRS:     >60 mL/min  Normal 50-59 YRS:     >56 mL/min  
Normal 60-69 YRS:     >49 mL/min  Normal 70-79 YRS:     >42 mL/min  Normal 80 
and above  >35 mL/min  Normal   FEMALE GRF INTERPRETATION:  20-39 YRS:    >60 
mL/min  Normal 40-49 YRS:    >58 mL/min  Normal 50-59 YRS:    >51 mL/min  Normal
 60-69 YRS:    >45 mL/min  Normal 70-79 YRS:    >39 mL/min  Normal 80 and above 
>32 mL/min  NormalCLASSIFICATION            CHOLESTEROL FOR ADULTS       
CHILDREN/ADOLESCENTS*   DESIRABLE:                <200 MG/DL                   
<170 MG/DL  BORDER-LINE HIGH RISK:    200-239 MG/DL                170-199 MG/DL
  HIGH RISK:                >240 MG/DL                   >200 MG/DL    CLASS. 
FOR PRIMARY LDL CHOL PREVENTION:        LDL CHOL-CHILD/ADOLESCENTS*   DESIRABLE:
              <130 MG/DL              <110 MG/DL  BORDERLINE-HIGH RISK:   130-
159 MG/DL           110-129 MG/DL  HIGH RISK:              >160 MG/DL           
   >130 MG/DL   *CHILDREN AND ADOLESCENTS REPRESENTS INDIVIDUALA AGED 2-19 YEARS
 EXCLUSIVE. 

 

           TP         6.0 g/dL   6.6-8.7    Below low normal            [facility] (

[facility]

                                         

 

                                        NORMAL RANGES 

****************************************************************************** 
Age         WBC      RBC        HGB           HCT         MCV        PLT 
------------------------------------------------------------------------------ 
Adult M   4.1-10.9    4.20-6.30   12.0-18.0   37.0-51.0   80-97      140-440  
Adult F   4.1-10.9    4.04-5.48   12.0-18.0   37.0-51.0   80-97      140-440  0
-1 Yr    5.0-20.0    3.9-5.9     15-18       MV: 44      MV: 91     MV: 277  2-9
 Yr.   6.0-17.0    3.8-5.4     11-13       MV: 37      MV: 78     MV: 300  10 
Yrs.   5.0-13.0    3.8-5.4     12-15       MV: 39      MV: 80     MV: 250    
NOTE:  * FOR ADULT BLACK MALES AND FEMALES, NORMAL WBC IS 2.9-7.7 K/ML  * FOR 
ADULT BLACK MALES AND FEMALES, NORMAL RBC,HGB, AND HCT IS 5% LESS  SOURCE FOR 
DATA: [employer] DYN 1800 OPERATION MANUAL( AUTOMATED BLOOD COUNTS AND DIFF.)  APPENDIX
  B-3                      CHRONIC KIDNEY DISEASE STAGING PER NKF:   MALE GFR 
INTERPRETATION:  20-49 YRS:     >60 mL/min  Normal 50-59 YRS:     >56 mL/min  
Normal 60-69 YRS:     >49 mL/min  Normal 70-79 YRS:     >42 mL/min  Normal 80 
and above  >35 mL/min  Normal   FEMALE GRF INTERPRETATION:  20-39 YRS:    >60 
mL/min  Normal 40-49 YRS:    >58 mL/min  Normal 50-59 YRS:    >51 mL/min  Normal
 60-69 YRS:    >45 mL/min  Normal 70-79 YRS:    >39 mL/min  Normal 80 and above 
>32 mL/min  NormalCLASSIFICATION            CHOLESTEROL FOR ADULTS       
CHILDREN/ADOLESCENTS*   DESIRABLE:                <200 MG/DL                   
<170 MG/DL  BORDER-LINE HIGH RISK:    200-239 MG/DL                170-199 MG/DL
  HIGH RISK:                >240 MG/DL                   >200 MG/DL    CLASS. 
FOR PRIMARY LDL CHOL PREVENTION:        LDL CHOL-CHILD/ADOLESCENTS*   DESIRABLE:
              <130 MG/DL              <110 MG/DL  BORDERLINE-HIGH RISK:   130-
159 MG/DL           110-129 MG/DL  HIGH RISK:              >160 MG/DL           
   >130 MG/DL   *CHILDREN AND ADOLESCENTS REPRESENTS INDIVIDUALA AGED 2-19 YEARS
 EXCLUSIVE. 

 

          CA        8.9 mg/dL 8.6-10.2                      [facility] [place] ([facility]  

 

                                        NORMAL RANGES 

****************************************************************************** 
Age         WBC      RBC        HGB           HCT         MCV        PLT 
------------------------------------------------------------------------------ 
Adult M   4.1-10.9    4.20-6.30   12.0-18.0   37.0-51.0   80-97      140-440  
Adult F   4.1-10.9    4.04-5.48   12.0-18.0   37.0-51.0   80-97      140-440  0
-1 Yr    5.0-20.0    3.9-5.9     15-18       MV: 44      MV: 91     MV: 277  2-9
 Yr.   6.0-17.0    3.8-5.4     11-13       MV: 37      MV: 78     MV: 300  10 
Yrs.   5.0-13.0    3.8-5.4     12-15       MV: 39      MV: 80     MV: 250    
NOTE:  * FOR ADULT BLACK MALES AND FEMALES, NORMAL WBC IS 2.9-7.7 K/ML  * FOR 
ADULT BLACK MALES AND FEMALES, NORMAL RBC,HGB, AND HCT IS 5% LESS  SOURCE FOR 
DATA: [employer] 1800 OPERATION MANUAL( AUTOMATED BLOOD COUNTS AND DIFF.)  APPENDIX
  B-3                      CHRONIC KIDNEY DISEASE STAGING PER NKF:   MALE GFR 
INTERPRETATION:  20-49 YRS:     >60 mL/min  Normal 50-59 YRS:     >56 mL/min  
Normal 60-69 YRS:     >49 mL/min  Normal 70-79 YRS:     >42 mL/min  Normal 80 
and above  >35 mL/min  Normal   FEMALE GRF INTERPRETATION:  20-39 YRS:    >60 
mL/min  Normal 40-49 YRS:    >58 mL/min  Normal 50-59 YRS:    >51 mL/min  Normal
 60-69 YRS:    >45 mL/min  Normal 70-79 YRS:    >39 mL/min  Normal 80 and above 
>32 mL/min  NormalCLASSIFICATION            CHOLESTEROL FOR ADULTS       
CHILDREN/ADOLESCENTS*   DESIRABLE:                <200 MG/DL                   
<170 MG/DL  BORDER-LINE HIGH RISK:    200-239 MG/DL                170-199 MG/DL
  HIGH RISK:                >240 MG/DL                   >200 MG/DL    CLASS. 
FOR PRIMARY LDL CHOL PREVENTION:        LDL CHOL-CHILD/ADOLESCENTS*   DESIRABLE:
              <130 MG/DL              <110 MG/DL  BORDERLINE-HIGH RISK:   130-
159 MG/DL           110-129 MG/DL  HIGH RISK:              >160 MG/DL           
   >130 MG/DL   *CHILDREN AND ADOLESCENTS REPRESENTS INDIVIDUALA AGED 2-19 YEARS
 EXCLUSIVE. 

 

          Alb       4.0 g/dL  3.5-5.2                       [facility]  

 

                                        NORMAL RANGES 

****************************************************************************** 
Age         WBC      RBC        HGB           HCT         MCV        PLT 
------------------------------------------------------------------------------ 
Adult M   4.1-10.9    4.20-6.30   12.0-18.0   37.0-51.0   80-97      140-440  
Adult F   4.1-10.9    4.04-5.48   12.0-18.0   37.0-51.0   80-97      140-440  0
-1 Yr    5.0-20.0    3.9-5.9     15-18       MV: 44      MV: 91     MV: 277  2-9
 Yr.   6.0-17.0    3.8-5.4     11-13       MV: 37      MV: 78     MV: 300  10 
Yrs.   5.0-13.0    3.8-5.4     12-15       MV: 39      MV: 80     MV: 250    
NOTE:  * FOR ADULT BLACK MALES AND FEMALES, NORMAL WBC IS 2.9-7.7 K/ML  * FOR 
ADULT BLACK MALES AND FEMALES, NORMAL RBC,HGB, AND HCT IS 5% LESS  SOURCE FOR 
DATA: [employer] 1800 OPERATION MANUAL( AUTOMATED BLOOD COUNTS AND DIFF.)  APPENDIX
  B-3                      CHRONIC KIDNEY DISEASE STAGING PER NKF:   MALE GFR 
INTERPRETATION:  20-49 YRS:     >60 mL/min  Normal 50-59 YRS:     >56 mL/min  
Normal 60-69 YRS:     >49 mL/min  Normal 70-79 YRS:     >42 mL/min  Normal 80 
and above  >35 mL/min  Normal   FEMALE GRF INTERPRETATION:  20-39 YRS:    >60 
mL/min  Normal 40-49 YRS:    >58 mL/min  Normal 50-59 YRS:    >51 mL/min  Normal
 60-69 YRS:    >45 mL/min  Normal 70-79 YRS:    >39 mL/min  Normal 80 and above 
>32 mL/min  NormalCLASSIFICATION            CHOLESTEROL FOR ADULTS       
CHILDREN/ADOLESCENTS*   DESIRABLE:                <200 MG/DL                   
<170 MG/DL  BORDER-LINE HIGH RISK:    200-239 MG/DL                170-199 MG/DL
  HIGH RISK:                >240 MG/DL                   >200 MG/DL    CLASS. 
FOR PRIMARY LDL CHOL PREVENTION:        LDL CHOL-CHILD/ADOLESCENTS*   DESIRABLE:
              <130 MG/DL              <110 MG/DL  BORDERLINE-HIGH RISK:   130-
159 MG/DL           110-129 MG/DL  HIGH RISK:              >160 MG/DL           
   >130 MG/DL   *CHILDREN AND ADOLESCENTS REPRESENTS INDIVIDUALA AGED 2-19 YEARS
 EXCLUSIVE. 

 

          A/G Ratio 2.0 CALC                                [facility] ([facility]  

 

                                        NORMAL RANGES 

****************************************************************************** 
Age         WBC      RBC        HGB           HCT         MCV        PLT 
------------------------------------------------------------------------------ 
Adult M   4.1-10.9    4.20-6.30   12.0-18.0   37.0-51.0   80-97      140-440  
Adult F   4.1-10.9    4.04-5.48   12.0-18.0   37.0-51.0   80-97      140-440  0
-1 Yr    5.0-20.0    3.9-5.9     15-18       MV: 44      MV: 91     MV: 277  2-9
 Yr.   6.0-17.0    3.8-5.4     11-13       MV: 37      MV: 78     MV: 300  10 
Yrs.   5.0-13.0    3.8-5.4     12-15       MV: 39      MV: 80     MV: 250    
NOTE:  * FOR ADULT BLACK MALES AND FEMALES, NORMAL WBC IS 2.9-7.7 K/ML  * FOR 
ADULT BLACK MALES AND FEMALES, NORMAL RBC,HGB, AND HCT IS 5% LESS  SOURCE FOR 
DATA: [employer] 1800 OPERATION MANUAL( AUTOMATED BLOOD COUNTS AND DIFF.)  APPENDIX
  B-3                      CHRONIC KIDNEY DISEASE STAGING PER NKF:   MALE GFR 
INTERPRETATION:  20-49 YRS:     >60 mL/min  Normal 50-59 YRS:     >56 mL/min  
Normal 60-69 YRS:     >49 mL/min  Normal 70-79 YRS:     >42 mL/min  Normal 80 
and above  >35 mL/min  Normal   FEMALE GRF INTERPRETATION:  20-39 YRS:    >60 
mL/min  Normal 40-49 YRS:    >58 mL/min  Normal 50-59 YRS:    >51 mL/min  Normal
 60-69 YRS:    >45 mL/min  Normal 70-79 YRS:    >39 mL/min  Normal 80 and above 
>32 mL/min  NormalCLASSIFICATION            CHOLESTEROL FOR ADULTS       
CHILDREN/ADOLESCENTS*   DESIRABLE:                <200 MG/DL                   
<170 MG/DL  BORDER-LINE HIGH RISK:    200-239 MG/DL                170-199 MG/DL
  HIGH RISK:                >240 MG/DL                   >200 MG/DL    CLASS. 
FOR PRIMARY LDL CHOL PREVENTION:        LDL CHOL-CHILD/ADOLESCENTS*   DESIRABLE:
              <130 MG/DL              <110 MG/DL  BORDERLINE-HIGH RISK:   130-
159 MG/DL           110-129 MG/DL  HIGH RISK:              >160 MG/DL           
   >130 MG/DL   *CHILDREN AND ADOLESCENTS REPRESENTS INDIVIDUALA AGED 2-19 YEARS
 EXCLUSIVE. 

 

           Alp        135.0 U/L       Above high normal            [facility] 

([facility]                                    

 

                                        NORMAL RANGES 

****************************************************************************** 
Age         WBC      RBC        HGB           HCT         MCV        PLT 
------------------------------------------------------------------------------ 
Adult M   4.1-10.9    4.20-6.30   12.0-18.0   37.0-51.0   80-97      140-440  
Adult F   4.1-10.9    4.04-5.48   12.0-18.0   37.0-51.0   80-97      140-440  0
-1 Yr    5.0-20.0    3.9-5.9     15-18       MV: 44      MV: 91     MV: 277  2-9
 Yr.   6.0-17.0    3.8-5.4     11-13       MV: 37      MV: 78     MV: 300  10 
Yrs.   5.0-13.0    3.8-5.4     12-15       MV: 39      MV: 80     MV: 250    
NOTE:  * FOR ADULT BLACK MALES AND FEMALES, NORMAL WBC IS 2.9-7.7 K/ML  * FOR 
ADULT BLACK MALES AND FEMALES, NORMAL RBC,HGB, AND HCT IS 5% LESS  SOURCE FOR 
DATA: [employer] 1800 OPERATION MANUAL( AUTOMATED BLOOD COUNTS AND DIFF.)  APPENDIX
  B-3                      CHRONIC KIDNEY DISEASE STAGING PER NKF:   MALE GFR 
INTERPRETATION:  20-49 YRS:     >60 mL/min  Normal 50-59 YRS:     >56 mL/min  
Normal 60-69 YRS:     >49 mL/min  Normal 70-79 YRS:     >42 mL/min  Normal 80 
and above  >35 mL/min  Normal   FEMALE GRF INTERPRETATION:  20-39 YRS:    >60 
mL/min  Normal 40-49 YRS:    >58 mL/min  Normal 50-59 YRS:    >51 mL/min  Normal
 60-69 YRS:    >45 mL/min  Normal 70-79 YRS:    >39 mL/min  Normal 80 and above 
>32 mL/min  NormalCLASSIFICATION            CHOLESTEROL FOR ADULTS       
CHILDREN/ADOLESCENTS*   DESIRABLE:                <200 MG/DL                   
<170 MG/DL  BORDER-LINE HIGH RISK:    200-239 MG/DL                170-199 MG/DL
  HIGH RISK:                >240 MG/DL                   >200 MG/DL    CLASS. 
FOR PRIMARY LDL CHOL PREVENTION:        LDL CHOL-CHILD/ADOLESCENTS*   DESIRABLE:
              <130 MG/DL              <110 MG/DL  BORDERLINE-HIGH RISK:   130-
159 MG/DL           110-129 MG/DL  HIGH RISK:              >160 MG/DL           
   >130 MG/DL   *CHILDREN AND ADOLESCENTS REPRESENTS INDIVIDUALA AGED 2-19 YEARS
 EXCLUSIVE. 

 

          Globulin  2.0 CALC                                [facility] ([facility]  

 

                                        NORMAL RANGES 

****************************************************************************** 
Age         WBC      RBC        HGB           HCT         MCV        PLT 
------------------------------------------------------------------------------ 
Adult M   4.1-10.9    4.20-6.30   12.0-18.0   37.0-51.0   80-97      140-440  
Adult F   4.1-10.9    4.04-5.48   12.0-18.0   37.0-51.0   80-97      140-440  0
-1 Yr    5.0-20.0    3.9-5.9     15-18       MV: 44      MV: 91     MV: 277  2-9
 Yr.   6.0-17.0    3.8-5.4     11-13       MV: 37      MV: 78     MV: 300  10 
Yrs.   5.0-13.0    3.8-5.4     12-15       MV: 39      MV: 80     MV: 250    
NOTE:  * FOR ADULT BLACK MALES AND FEMALES, NORMAL WBC IS 2.9-7.7 K/ML  * FOR 
ADULT BLACK MALES AND FEMALES, NORMAL RBC,HGB, AND HCT IS 5% LESS  SOURCE FOR 
DATA: [employer] 1800 OPERATION MANUAL( AUTOMATED BLOOD COUNTS AND DIFF.)  APPENDIX
  B-3                      CHRONIC KIDNEY DISEASE STAGING PER NKF:   MALE GFR 
INTERPRETATION:  20-49 YRS:     >60 mL/min  Normal 50-59 YRS:     >56 mL/min  
Normal 60-69 YRS:     >49 mL/min  Normal 70-79 YRS:     >42 mL/min  Normal 80 
and above  >35 mL/min  Normal   FEMALE GRF INTERPRETATION:  20-39 YRS:    >60 
mL/min  Normal 40-49 YRS:    >58 mL/min  Normal 50-59 YRS:    >51 mL/min  Normal
 60-69 YRS:    >45 mL/min  Normal 70-79 YRS:    >39 mL/min  Normal 80 and above 
>32 mL/min  NormalCLASSIFICATION            CHOLESTEROL FOR ADULTS       
CHILDREN/ADOLESCENTS*   DESIRABLE:                <200 MG/DL                   
<170 MG/DL  BORDER-LINE HIGH RISK:    200-239 MG/DL                170-199 MG/DL
  HIGH RISK:                >240 MG/DL                   >200 MG/DL    CLASS. 
FOR PRIMARY LDL CHOL PREVENTION:        LDL CHOL-CHILD/ADOLESCENTS*   DESIRABLE:
              <130 MG/DL              <110 MG/DL  BORDERLINE-HIGH RISK:   130-
159 MG/DL           110-129 MG/DL  HIGH RISK:              >160 MG/DL           
   >130 MG/DL   *CHILDREN AND ADOLESCENTS REPRESENTS INDIVIDUALA AGED 2-19 YEARS
 EXCLUSIVE. 

 

          Ast (Sgot) 18 U/L    0-40                          [facility] ([facility]  

 

                                        NORMAL RANGES 

****************************************************************************** 
Age         WBC      RBC        HGB           HCT         MCV        PLT 
------------------------------------------------------------------------------ 
Adult M   4.1-10.9    4.20-6.30   12.0-18.0   37.0-51.0   80-97      140-440  
Adult F   4.1-10.9    4.04-5.48   12.0-18.0   37.0-51.0   80-97      140-440  0
-1 Yr    5.0-20.0    3.9-5.9     15-18       MV: 44      MV: 91     MV: 277  2-9
 Yr.   6.0-17.0    3.8-5.4     11-13       MV: 37      MV: 78     MV: 300  10 
Yrs.   5.0-13.0    3.8-5.4     12-15       MV: 39      MV: 80     MV: 250    
NOTE:  * FOR ADULT BLACK MALES AND FEMALES, NORMAL WBC IS 2.9-7.7 K/ML  * FOR 
ADULT BLACK MALES AND FEMALES, NORMAL RBC,HGB, AND HCT IS 5% LESS  SOURCE FOR 
DATA: [employer] 1800 OPERATION MANUAL( AUTOMATED BLOOD COUNTS AND DIFF.)  APPENDIX
  B-3                      CHRONIC KIDNEY DISEASE STAGING PER NKF:   MALE GFR 
INTERPRETATION:  20-49 YRS:     >60 mL/min  Normal 50-59 YRS:     >56 mL/min  
Normal 60-69 YRS:     >49 mL/min  Normal 70-79 YRS:     >42 mL/min  Normal 80 
and above  >35 mL/min  Normal   FEMALE GRF INTERPRETATION:  20-39 YRS:    >60 
mL/min  Normal 40-49 YRS:    >58 mL/min  Normal 50-59 YRS:    >51 mL/min  Normal
 60-69 YRS:    >45 mL/min  Normal 70-79 YRS:    >39 mL/min  Normal 80 and above 
>32 mL/min  NormalCLASSIFICATION            CHOLESTEROL FOR ADULTS       
CHILDREN/ADOLESCENTS*   DESIRABLE:                <200 MG/DL                   
<170 MG/DL  BORDER-LINE HIGH RISK:    200-239 MG/DL                170-199 MG/DL
  HIGH RISK:                >240 MG/DL                   >200 MG/DL    CLASS. 
FOR PRIMARY LDL CHOL PREVENTION:        LDL CHOL-CHILD/ADOLESCENTS*   DESIRABLE:
              <130 MG/DL              <110 MG/DL  BORDERLINE-HIGH RISK:   130-
159 MG/DL           110-129 MG/DL  HIGH RISK:              >160 MG/DL           
   >130 MG/DL   *CHILDREN AND ADOLESCENTS REPRESENTS INDIVIDUALA AGED 2-19 YEARS
 EXCLUSIVE. 

 

          Alt (SGPT) 21 U/L    0-41                          [facility] [place] ([facility]  

 

                                        NORMAL RANGES 

****************************************************************************** 
Age         WBC      RBC        HGB           HCT         MCV        PLT 
------------------------------------------------------------------------------ 
Adult M   4.1-10.9    4.20-6.30   12.0-18.0   37.0-51.0   80-97      140-440  
Adult F   4.1-10.9    4.04-5.48   12.0-18.0   37.0-51.0   80-97      140-440  0
-1 Yr    5.0-20.0    3.9-5.9     15-18       MV: 44      MV: 91     MV: 277  2-9
 Yr.   6.0-17.0    3.8-5.4     11-13       MV: 37      MV: 78     MV: 300  10 
Yrs.   5.0-13.0    3.8-5.4     12-15       MV: 39      MV: 80     MV: 250    
NOTE:  * FOR ADULT BLACK MALES AND FEMALES, NORMAL WBC IS 2.9-7.7 K/ML  * FOR 
ADULT BLACK MALES AND FEMALES, NORMAL RBC,HGB, AND HCT IS 5% LESS  SOURCE FOR 
DATA: [employer] 1800 OPERATION MANUAL( AUTOMATED BLOOD COUNTS AND DIFF.)  APPENDIX
  B-3                      CHRONIC KIDNEY DISEASE STAGING PER NKF:   MALE GFR 
INTERPRETATION:  20-49 YRS:     >60 mL/min  Normal 50-59 YRS:     >56 mL/min  
Normal 60-69 YRS:     >49 mL/min  Normal 70-79 YRS:     >42 mL/min  Normal 80 
and above  >35 mL/min  Normal   FEMALE GRF INTERPRETATION:  20-39 YRS:    >60 
mL/min  Normal 40-49 YRS:    >58 mL/min  Normal 50-59 YRS:    >51 mL/min  Normal
 60-69 YRS:    >45 mL/min  Normal 70-79 YRS:    >39 mL/min  Normal 80 and above 
>32 mL/min  NormalCLASSIFICATION            CHOLESTEROL FOR ADULTS       
CHILDREN/ADOLESCENTS*   DESIRABLE:                <200 MG/DL                   
<170 MG/DL  BORDER-LINE HIGH RISK:    200-239 MG/DL                170-199 MG/DL
  HIGH RISK:                >240 MG/DL                   >200 MG/DL    CLASS. 
FOR PRIMARY LDL CHOL PREVENTION:        LDL CHOL-CHILD/ADOLESCENTS*   DESIRABLE:
              <130 MG/DL              <110 MG/DL  BORDERLINE-HIGH RISK:   130-
159 MG/DL           110-129 MG/DL  HIGH RISK:              >160 MG/DL           
   >130 MG/DL   *CHILDREN AND ADOLESCENTS REPRESENTS INDIVIDUALA AGED 2-19 YEARS
 EXCLUSIVE. 

 

           Osmolality-Calculated 297.5 CALC                                  [facility] ([facility]

                                         

 

                                        NORMAL RANGES 

****************************************************************************** 
Age         WBC      RBC        HGB           HCT         MCV        PLT 
------------------------------------------------------------------------------ 
Adult M   4.1-10.9    4.20-6.30   12.0-18.0   37.0-51.0   80-97      140-440  
Adult F   4.1-10.9    4.04-5.48   12.0-18.0   37.0-51.0   80-97      140-440  0
-1 Yr    5.0-20.0    3.9-5.9     15-18       MV: 44      MV: 91     MV: 277  2-9
 Yr.   6.0-17.0    3.8-5.4     11-13       MV: 37      MV: 78     MV: 300  10 
Yrs.   5.0-13.0    3.8-5.4     12-15       MV: 39      MV: 80     MV: 250    
NOTE:  * FOR ADULT BLACK MALES AND FEMALES, NORMAL WBC IS 2.9-7.7 K/ML  * FOR 
ADULT BLACK MALES AND FEMALES, NORMAL RBC,HGB, AND HCT IS 5% LESS  SOURCE FOR 
DATA: [employer] 1800 OPERATION MANUAL( AUTOMATED BLOOD COUNTS AND DIFF.)  APPENDIX
  B-3                      CHRONIC KIDNEY DISEASE STAGING PER NKF:   MALE GFR 
INTERPRETATION:  20-49 YRS:     >60 mL/min  Normal 50-59 YRS:     >56 mL/min  
Normal 60-69 YRS:     >49 mL/min  Normal 70-79 YRS:     >42 mL/min  Normal 80 
and above  >35 mL/min  Normal   FEMALE GRF INTERPRETATION:  20-39 YRS:    >60 
mL/min  Normal 40-49 YRS:    >58 mL/min  Normal 50-59 YRS:    >51 mL/min  Normal
 60-69 YRS:    >45 mL/min  Normal 70-79 YRS:    >39 mL/min  Normal 80 and above 
>32 mL/min  NormalCLASSIFICATION            CHOLESTEROL FOR ADULTS       
CHILDREN/ADOLESCENTS*   DESIRABLE:                <200 MG/DL                   
<170 MG/DL  BORDER-LINE HIGH RISK:    200-239 MG/DL                170-199 MG/DL
  HIGH RISK:                >240 MG/DL                   >200 MG/DL    CLASS. 
FOR PRIMARY LDL CHOL PREVENTION:        LDL CHOL-CHILD/ADOLESCENTS*   DESIRABLE:
              <130 MG/DL              <110 MG/DL  BORDERLINE-HIGH RISK:   130-
159 MG/DL           110-129 MG/DL  HIGH RISK:              >160 MG/DL           
   >130 MG/DL   *CHILDREN AND ADOLESCENTS REPRESENTS INDIVIDUALA AGED 2-19 YEARS
 EXCLUSIVE. 

 

          Anion Gap 16 mmol/L                               [facility] ([facility]  

 

                                        NORMAL RANGES 

****************************************************************************** 
Age         WBC      RBC        HGB           HCT         MCV        PLT 
------------------------------------------------------------------------------ 
Adult M   4.1-10.9    4.20-6.30   12.0-18.0   37.0-51.0   80-97      140-440  
Adult F   4.1-10.9    4.04-5.48   12.0-18.0   37.0-51.0   80-97      140-440  0
-1 Yr    5.0-20.0    3.9-5.9     15-18       MV: 44      MV: 91     MV: 277  2-9
 Yr.   6.0-17.0    3.8-5.4     11-13       MV: 37      MV: 78     MV: 300  10 
Yrs.   5.0-13.0    3.8-5.4     12-15       MV: 39      MV: 80     MV: 250    
NOTE:  * FOR ADULT BLACK MALES AND FEMALES, NORMAL WBC IS 2.9-7.7 K/ML  * FOR 
ADULT BLACK MALES AND FEMALES, NORMAL RBC,HGB, AND HCT IS 5% LESS  SOURCE FOR 
DATA: [employer] 1800 OPERATION MANUAL( AUTOMATED BLOOD COUNTS AND DIFF.)  APPENDIX
  B-3                      CHRONIC KIDNEY DISEASE STAGING PER NKF:   MALE GFR 
INTERPRETATION:  20-49 YRS:     >60 mL/min  Normal 50-59 YRS:     >56 mL/min  
Normal 60-69 YRS:     >49 mL/min  Normal 70-79 YRS:     >42 mL/min  Normal 80 
and above  >35 mL/min  Normal   FEMALE GRF INTERPRETATION:  20-39 YRS:    >60 
mL/min  Normal 40-49 YRS:    >58 mL/min  Normal 50-59 YRS:    >51 mL/min  Normal
 60-69 YRS:    >45 mL/min  Normal 70-79 YRS:    >39 mL/min  Normal 80 and above 
>32 mL/min  NormalCLASSIFICATION            CHOLESTEROL FOR ADULTS       
CHILDREN/ADOLESCENTS*   DESIRABLE:                <200 MG/DL                   
<170 MG/DL  BORDER-LINE HIGH RISK:    200-239 MG/DL                170-199 MG/DL
  HIGH RISK:                >240 MG/DL                   >200 MG/DL    CLASS. 
FOR PRIMARY LDL CHOL PREVENTION:        LDL CHOL-CHILD/ADOLESCENTS*   DESIRABLE:
              <130 MG/DL              <110 MG/DL  BORDERLINE-HIGH RISK:   130-
159 MG/DL           110-129 MG/DL  HIGH RISK:              >160 MG/DL           
   >130 MG/DL   *CHILDREN AND ADOLESCENTS REPRESENTS INDIVIDUALA AGED 2-19 YEARS
 EXCLUSIVE. 

 

          Tbili     0.24 mg/dL 0.0-1.2                       [facility] ([facility]  

 

                                        NORMAL RANGES 

****************************************************************************** 
Age         WBC      RBC        HGB           HCT         MCV        PLT 
------------------------------------------------------------------------------ 
Adult M   4.1-10.9    4.20-6.30   12.0-18.0   37.0-51.0   80-97      140-440  
Adult F   4.1-10.9    4.04-5.48   12.0-18.0   37.0-51.0   80-97      140-440  0
-1 Yr    5.0-20.0    3.9-5.9     15-18       MV: 44      MV: 91     MV: 277  2-9
 Yr.   6.0-17.0    3.8-5.4     11-13       MV: 37      MV: 78     MV: 300  10 
Yrs.   5.0-13.0    3.8-5.4     12-15       MV: 39      MV: 80     MV: 250    
NOTE:  * FOR ADULT BLACK MALES AND FEMALES, NORMAL WBC IS 2.9-7.7 K/ML  * FOR 
ADULT BLACK MALES AND FEMALES, NORMAL RBC,HGB, AND HCT IS 5% LESS  SOURCE FOR 
DATA: [employer]PERATION MANUAL( AUTOMATED BLOOD COUNTS AND DIFF.)  APPENDIX
  B-3                      CHRONIC KIDNEY DISEASE STAGING PER NKF:   MALE GFR 
INTERPRETATION:  20-49 YRS:     >60 mL/min  Normal 50-59 YRS:     >56 mL/min  
Normal 60-69 YRS:     >49 mL/min  Normal 70-79 YRS:     >42 mL/min  Normal 80 
and above  >35 mL/min  Normal   FEMALE GRF INTERPRETATION:  20-39 YRS:    >60 
mL/min  Normal 40-49 YRS:    >58 mL/min  Normal 50-59 YRS:    >51 mL/min  Normal
 60-69 YRS:    >45 mL/min  Normal 70-79 YRS:    >39 mL/min  Normal 80 and above 
>32 mL/min  NormalCLASSIFICATION            CHOLESTEROL FOR ADULTS       
CHILDREN/ADOLESCENTS*   DESIRABLE:                <200 MG/DL                   
<170 MG/DL  BORDER-LINE HIGH RISK:    200-239 MG/DL                170-199 MG/DL
  HIGH RISK:                >240 MG/DL                   >200 MG/DL    CLASS. 
FOR PRIMARY LDL CHOL PREVENTION:        LDL CHOL-CHILD/ADOLESCENTS*   DESIRABLE:
              <130 MG/DL              <110 MG/DL  BORDERLINE-HIGH RISK:   130-
159 MG/DL           110-129 MG/DL  HIGH RISK:              >160 MG/DL           
   >130 MG/DL   *CHILDREN AND ADOLESCENTS REPRESENTS INDIVIDUALA AGED 2-19 YEARS
 EXCLUSIVE. 

 

          eGFR Non-Afr. American 35 #                                    [facility] 

([facility]  

 

                                        NORMAL RANGES 

****************************************************************************** 
Age         WBC      RBC        HGB           HCT         MCV        PLT 
------------------------------------------------------------------------------ 
Adult M   4.1-10.9    4.20-6.30   12.0-18.0   37.0-51.0   80-97      140-440  
Adult F   4.1-10.9    4.04-5.48   12.0-18.0   37.0-51.0   80-97      140-440  0
-1 Yr    5.0-20.0    3.9-5.9     15-18       MV: 44      MV: 91     MV: 277  2-9
 Yr.   6.0-17.0    3.8-5.4     11-13       MV: 37      MV: 78     MV: 300  10 
Yrs.   5.0-13.0    3.8-5.4     12-15       MV: 39      MV: 80     MV: 250    
NOTE:  * FOR ADULT BLACK MALES AND FEMALES, NORMAL WBC IS 2.9-7.7 K/ML  * FOR 
ADULT BLACK MALES AND FEMALES, NORMAL RBC,HGB, AND HCT IS 5% LESS  SOURCE FOR 
DATA: [employer] 1800 OPERATION MANUAL( AUTOMATED BLOOD COUNTS AND DIFF.)  APPENDIX
  B-3                      CHRONIC KIDNEY DISEASE STAGING PER NKF:   MALE GFR 
INTERPRETATION:  20-49 YRS:     >60 mL/min  Normal 50-59 YRS:     >56 mL/min  
Normal 60-69 YRS:     >49 mL/min  Normal 70-79 YRS:     >42 mL/min  Normal 80 
and above  >35 mL/min  Normal   FEMALE GRF INTERPRETATION:  20-39 YRS:    >60 
mL/min  Normal 40-49 YRS:    >58 mL/min  Normal 50-59 YRS:    >51 mL/min  Normal
 60-69 YRS:    >45 mL/min  Normal 70-79 YRS:    >39 mL/min  Normal 80 and above 
>32 mL/min  NormalCLASSIFICATION            CHOLESTEROL FOR ADULTS       
CHILDREN/ADOLESCENTS*   DESIRABLE:                <200 MG/DL                   
<170 MG/DL  BORDER-LINE HIGH RISK:    200-239 MG/DL                170-199 MG/DL
  HIGH RISK:                >240 MG/DL                   >200 MG/DL    CLASS. 
FOR PRIMARY LDL CHOL PREVENTION:        LDL CHOL-CHILD/ADOLESCENTS*   DESIRABLE:
              <130 MG/DL              <110 MG/DL  BORDERLINE-HIGH RISK:   130-
159 MG/DL           110-129 MG/DL  HIGH RISK:              >160 MG/DL           
   >130 MG/DL   *CHILDREN AND ADOLESCENTS REPRESENTS INDIVIDUALA AGED 2-19 YEARS
 EXCLUSIVE. 

 

          eGFR  41 #                                    [facility] (

[facility]  

 

                                        NORMAL RANGES 

****************************************************************************** 
Age         WBC      RBC        HGB           HCT         MCV        PLT 
------------------------------------------------------------------------------ 
Adult M   4.1-10.9    4.20-6.30   12.0-18.0   37.0-51.0   80-97      140-440  
Adult F   4.1-10.9    4.04-5.48   12.0-18.0   37.0-51.0   80-97      140-440  0
-1 Yr    5.0-20.0    3.9-5.9     15-18       MV: 44      MV: 91     MV: 277  2-9
 Yr.   6.0-17.0    3.8-5.4     11-13       MV: 37      MV: 78     MV: 300  10 
Yrs.   5.0-13.0    3.8-5.4     12-15       MV: 39      MV: 80     MV: 250    
NOTE:  * FOR ADULT BLACK MALES AND FEMALES, NORMAL WBC IS 2.9-7.7 K/ML  * FOR 
ADULT BLACK MALES AND FEMALES, NORMAL RBC,HGB, AND HCT IS 5% LESS  SOURCE FOR 
DATA: [employer] 1800 OPERATION MANUAL( AUTOMATED BLOOD COUNTS AND DIFF.)  APPENDIX
  B-3                      CHRONIC KIDNEY DISEASE STAGING PER NKF:   MALE GFR 
INTERPRETATION:  20-49 YRS:     >60 mL/min  Normal 50-59 YRS:     >56 mL/min  
Normal 60-69 YRS:     >49 mL/min  Normal 70-79 YRS:     >42 mL/min  Normal 80 
and above  >35 mL/min  Normal   FEMALE GRF INTERPRETATION:  20-39 YRS:    >60 
mL/min  Normal 40-49 YRS:    >58 mL/min  Normal 50-59 YRS:    >51 mL/min  Normal
 60-69 YRS:    >45 mL/min  Normal 70-79 YRS:    >39 mL/min  Normal 80 and above 
>32 mL/min  NormalCLASSIFICATION            CHOLESTEROL FOR ADULTS       
CHILDREN/ADOLESCENTS*   DESIRABLE:                <200 MG/DL                   
<170 MG/DL  BORDER-LINE HIGH RISK:    200-239 MG/DL                170-199 MG/DL
  HIGH RISK:                >240 MG/DL                   >200 MG/DL    CLASS. 
FOR PRIMARY LDL CHOL PREVENTION:        LDL CHOL-CHILD/ADOLESCENTS*   DESIRABLE:
              <130 MG/DL              <110 MG/DL  BORDERLINE-HIGH RISK:   130-
159 MG/DL           110-129 MG/DL  HIGH RISK:              >160 MG/DL           
   >130 MG/DL   *CHILDREN AND ADOLESCENTS REPRESENTS INDIVIDUALA AGED 2-19 YEARS
 EXCLUSIVE. 









                    ID                  Date                Data Source

 

                    [id number]         2020 04:00:00 PM EST [facility] ([facility]









          Name      Value     Range     Interpretation Code Description Data Va

rce(s) Supporting 

Document(s)

 

          WBC       7.8 10E3/uL 4.1-10.9                      [facility] ([facility]  

 

                                        NORMAL RANGES 

****************************************************************************** 
Age         WBC      RBC        HGB           HCT         MCV        PLT 
------------------------------------------------------------------------------ 
Adult M   4.1-10.9    4.20-6.30   12.0-18.0   37.0-51.0   80-97      140-440  
Adult F   4.1-10.9    4.04-5.48   12.0-18.0   37.0-51.0   80-97      140-440  0
-1 Yr    5.0-20.0    3.9-5.9     15-18       MV: 44      MV: 91     MV: 277  2-9
 Yr.   6.0-17.0    3.8-5.4     11-13       MV: 37      MV: 78     MV: 300  10 
Yrs.   5.0-13.0    3.8-5.4     12-15       MV: 39      MV: 80     MV: 250    
NOTE:  * FOR ADULT BLACK MALES AND FEMALES, NORMAL WBC IS 2.9-7.7 K/ML  * FOR 
ADULT BLACK MALES AND FEMALES, NORMAL RBC,HGB, AND HCT IS 5% LESS  SOURCE FOR 
DATA: [employer] 1800 OPERATION MANUAL( AUTOMATED BLOOD COUNTS AND DIFF.)  APPENDIX
  B-3                      CHRONIC KIDNEY DISEASE STAGING PER NKF:   MALE GFR 
INTERPRETATION:  20-49 YRS:     >60 mL/min  Normal 50-59 YRS:     >56 mL/min  
Normal 60-69 YRS:     >49 mL/min  Normal 70-79 YRS:     >42 mL/min  Normal 80 
and above  >35 mL/min  Normal   FEMALE GRF INTERPRETATION:  20-39 YRS:    >60 
mL/min  Normal 40-49 YRS:    >58 mL/min  Normal 50-59 YRS:    >51 mL/min  Normal
 60-69 YRS:    >45 mL/min  Normal 70-79 YRS:    >39 mL/min  Normal 80 and above 
>32 mL/min  NormalCLASSIFICATION            CHOLESTEROL FOR ADULTS       
CHILDREN/ADOLESCENTS*   DESIRABLE:                <200 MG/DL                   
<170 MG/DL  BORDER-LINE HIGH RISK:    200-239 MG/DL                170-199 MG/DL
  HIGH RISK:                >240 MG/DL                   >200 MG/DL    CLASS. 
FOR PRIMARY LDL CHOL PREVENTION:        LDL CHOL-CHILD/ADOLESCENTS*   DESIRABLE:
              <130 MG/DL              <110 MG/DL  BORDERLINE-HIGH RISK:   130-
159 MG/DL           110-129 MG/DL  HIGH RISK:              >160 MG/DL           
   >130 MG/DL   *CHILDREN AND ADOLESCENTS REPRESENTS INDIVIDUALA AGED 2-19 YEARS
 EXCLUSIVE. 

 

           RBC        4.19 10E6/uL 4.20-6.30  Below low normal            [facility]

 ([facility]                        

 

                                        NORMAL RANGES 

****************************************************************************** 
Age         WBC      RBC        HGB           HCT         MCV        PLT 
------------------------------------------------------------------------------ 
Adult M   4.1-10.9    4.20-6.30   12.0-18.0   37.0-51.0   80-97      140-440  
Adult F   4.1-10.9    4.04-5.48   12.0-18.0   37.0-51.0   80-97      140-440  0
-1 Yr    5.0-20.0    3.9-5.9     15-18       MV: 44      MV: 91     MV: 277  2-9
 Yr.   6.0-17.0    3.8-5.4     11-13       MV: 37      MV: 78     MV: 300  10 
Yrs.   5.0-13.0    3.8-5.4     12-15       MV: 39      MV: 80     MV: 250    
NOTE:  * FOR ADULT BLACK MALES AND FEMALES, NORMAL WBC IS 2.9-7.7 K/ML  * FOR 
ADULT BLACK MALES AND FEMALES, NORMAL RBC,HGB, AND HCT IS 5% LESS  SOURCE FOR 
DATA: [employer] 1800 OPERATION MANUAL( AUTOMATED BLOOD COUNTS AND DIFF.)  APPENDIX
  B-3                      CHRONIC KIDNEY DISEASE STAGING PER NKF:   MALE GFR 
INTERPRETATION:  20-49 YRS:     >60 mL/min  Normal 50-59 YRS:     >56 mL/min  
Normal 60-69 YRS:     >49 mL/min  Normal 70-79 YRS:     >42 mL/min  Normal 80 
and above  >35 mL/min  Normal   FEMALE GRF INTERPRETATION:  20-39 YRS:    >60 
mL/min  Normal 40-49 YRS:    >58 mL/min  Normal 50-59 YRS:    >51 mL/min  Normal
 60-69 YRS:    >45 mL/min  Normal 70-79 YRS:    >39 mL/min  Normal 80 and above 
>32 mL/min  NormalCLASSIFICATION            CHOLESTEROL FOR ADULTS       
CHILDREN/ADOLESCENTS*   DESIRABLE:                <200 MG/DL                   
<170 MG/DL  BORDER-LINE HIGH RISK:    200-239 MG/DL                170-199 MG/DL
  HIGH RISK:                >240 MG/DL                   >200 MG/DL    CLASS. 
FOR PRIMARY LDL CHOL PREVENTION:        LDL CHOL-CHILD/ADOLESCENTS*   DESIRABLE:
              <130 MG/DL              <110 MG/DL  BORDERLINE-HIGH RISK:   130-
159 MG/DL           110-129 MG/DL  HIGH RISK:              >160 MG/DL           
   >130 MG/DL   *CHILDREN AND ADOLESCENTS REPRESENTS INDIVIDUALA AGED 2-19 YEARS
 EXCLUSIVE. 

 

          HGB       13.6 g/dL 12.0-18.0                     [facility] ([facility]  

 

                                        NORMAL RANGES 

****************************************************************************** 
Age         WBC      RBC        HGB           HCT         MCV        PLT 
------------------------------------------------------------------------------ 
Adult M   4.1-10.9    4.20-6.30   12.0-18.0   37.0-51.0   80-97      140-440  
Adult F   4.1-10.9    4.04-5.48   12.0-18.0   37.0-51.0   80-97      140-440  0
-1 Yr    5.0-20.0    3.9-5.9     15-18       MV: 44      MV: 91     MV: 277  2-9
 Yr.   6.0-17.0    3.8-5.4     11-13       MV: 37      MV: 78     MV: 300  10 
Yrs.   5.0-13.0    3.8-5.4     12-15       MV: 39      MV: 80     MV: 250    
NOTE:  * FOR ADULT BLACK MALES AND FEMALES, NORMAL WBC IS 2.9-7.7 K/ML  * FOR 
ADULT BLACK MALES AND FEMALES, NORMAL RBC,HGB, AND HCT IS 5% LESS  SOURCE FOR 
DATA: [employer] 1800 OPERATION MANUAL( AUTOMATED BLOOD COUNTS AND DIFF.)  APPENDIX
  B-3                      CHRONIC KIDNEY DISEASE STAGING PER NKF:   MALE GFR 
INTERPRETATION:  20-49 YRS:     >60 mL/min  Normal 50-59 YRS:     >56 mL/min  
Normal 60-69 YRS:     >49 mL/min  Normal 70-79 YRS:     >42 mL/min  Normal 80 
and above  >35 mL/min  Normal   FEMALE GRF INTERPRETATION:  20-39 YRS:    >60 
mL/min  Normal 40-49 YRS:    >58 mL/min  Normal 50-59 YRS:    >51 mL/min  Normal
 60-69 YRS:    >45 mL/min  Normal 70-79 YRS:    >39 mL/min  Normal 80 and above 
>32 mL/min  NormalCLASSIFICATION            CHOLESTEROL FOR ADULTS       
CHILDREN/ADOLESCENTS*   DESIRABLE:                <200 MG/DL                   
<170 MG/DL  BORDER-LINE HIGH RISK:    200-239 MG/DL                170-199 MG/DL
  HIGH RISK:                >240 MG/DL                   >200 MG/DL    CLASS. 
FOR PRIMARY LDL CHOL PREVENTION:        LDL CHOL-CHILD/ADOLESCENTS*   DESIRABLE:
              <130 MG/DL              <110 MG/DL  BORDERLINE-HIGH RISK:   130-
159 MG/DL           110-129 MG/DL  HIGH RISK:              >160 MG/DL           
   >130 MG/DL   *CHILDREN AND ADOLESCENTS REPRESENTS INDIVIDUALA AGED 2-19 YEARS
 EXCLUSIVE. 

 

           MCH        32.5 pg    26.0-32.0  Above high normal            [facility] 

([facility]                                    

 

                                        NORMAL RANGES 

****************************************************************************** 
Age         WBC      RBC        HGB           HCT         MCV        PLT 
------------------------------------------------------------------------------ 
Adult M   4.1-10.9    4.20-6.30   12.0-18.0   37.0-51.0   80-97      140-440  
Adult F   4.1-10.9    4.04-5.48   12.0-18.0   37.0-51.0   80-97      140-440  0
-1 Yr    5.0-20.0    3.9-5.9     15-18       MV: 44      MV: 91     MV: 277  2-9
 Yr.   6.0-17.0    3.8-5.4     11-13       MV: 37      MV: 78     MV: 300  10 
Yrs.   5.0-13.0    3.8-5.4     12-15       MV: 39      MV: 80     MV: 250    
NOTE:  * FOR ADULT BLACK MALES AND FEMALES, NORMAL WBC IS 2.9-7.7 K/ML  * FOR 
ADULT BLACK MALES AND FEMALES, NORMAL RBC,HGB, AND HCT IS 5% LESS  SOURCE FOR 
DATA: [employer] 1800 OPERATION MANUAL( AUTOMATED BLOOD COUNTS AND DIFF.)  APPENDIX
  B-3                      CHRONIC KIDNEY DISEASE STAGING PER NKF:   MALE GFR 
INTERPRETATION:  20-49 YRS:     >60 mL/min  Normal 50-59 YRS:     >56 mL/min  
Normal 60-69 YRS:     >49 mL/min  Normal 70-79 YRS:     >42 mL/min  Normal 80 
and above  >35 mL/min  Normal   FEMALE GRF INTERPRETATION:  20-39 YRS:    >60 
mL/min  Normal 40-49 YRS:    >58 mL/min  Normal 50-59 YRS:    >51 mL/min  Normal
 60-69 YRS:    >45 mL/min  Normal 70-79 YRS:    >39 mL/min  Normal 80 and above 
>32 mL/min  NormalCLASSIFICATION            CHOLESTEROL FOR ADULTS       
CHILDREN/ADOLESCENTS*   DESIRABLE:                <200 MG/DL                   
<170 MG/DL  BORDER-LINE HIGH RISK:    200-239 MG/DL                170-199 MG/DL
  HIGH RISK:                >240 MG/DL                   >200 MG/DL    CLASS. 
FOR PRIMARY LDL CHOL PREVENTION:        LDL CHOL-CHILD/ADOLESCENTS*   DESIRABLE:
              <130 MG/DL              <110 MG/DL  BORDERLINE-HIGH RISK:   130-
159 MG/DL           110-129 MG/DL  HIGH RISK:              >160 MG/DL           
   >130 MG/DL   *CHILDREN AND ADOLESCENTS REPRESENTS INDIVIDUALA AGED 2-19 YEARS
 EXCLUSIVE. 

 

           MCV        98.1 fL    80.0-97.0  Above high normal            [facility] 

([facility]                                    

 

                                        NORMAL RANGES 

****************************************************************************** 
Age         WBC      RBC        HGB           HCT         MCV        PLT 
------------------------------------------------------------------------------ 
Adult M   4.1-10.9    4.20-6.30   12.0-18.0   37.0-51.0   80-97      140-440  
Adult F   4.1-10.9    4.04-5.48   12.0-18.0   37.0-51.0   80-97      140-440  0
-1 Yr    5.0-20.0    3.9-5.9     15-18       MV: 44      MV: 91     MV: 277  2-9
 Yr.   6.0-17.0    3.8-5.4     11-13       MV: 37      MV: 78     MV: 300  10 
Yrs.   5.0-13.0    3.8-5.4     12-15       MV: 39      MV: 80     MV: 250    
NOTE:  * FOR ADULT BLACK MALES AND FEMALES, NORMAL WBC IS 2.9-7.7 K/ML  * FOR 
ADULT BLACK MALES AND FEMALES, NORMAL RBC,HGB, AND HCT IS 5% LESS  SOURCE FOR 
DATA: [employer] 1800 OPERATION MANUAL( AUTOMATED BLOOD COUNTS AND DIFF.)  APPENDIX
  B-3                      CHRONIC KIDNEY DISEASE STAGING PER NKF:   MALE GFR 
INTERPRETATION:  20-49 YRS:     >60 mL/min  Normal 50-59 YRS:     >56 mL/min  
Normal 60-69 YRS:     >49 mL/min  Normal 70-79 YRS:     >42 mL/min  Normal 80 
and above  >35 mL/min  Normal   FEMALE GRF INTERPRETATION:  20-39 YRS:    >60 
mL/min  Normal 40-49 YRS:    >58 mL/min  Normal 50-59 YRS:    >51 mL/min  Normal
 60-69 YRS:    >45 mL/min  Normal 70-79 YRS:    >39 mL/min  Normal 80 and above 
>32 mL/min  NormalCLASSIFICATION            CHOLESTEROL FOR ADULTS       
CHILDREN/ADOLESCENTS*   DESIRABLE:                <200 MG/DL                   
<170 MG/DL  BORDER-LINE HIGH RISK:    200-239 MG/DL                170-199 MG/DL
  HIGH RISK:                >240 MG/DL                   >200 MG/DL    CLASS. 
FOR PRIMARY LDL CHOL PREVENTION:        LDL CHOL-CHILD/ADOLESCENTS*   DESIRABLE:
              <130 MG/DL              <110 MG/DL  BORDERLINE-HIGH RISK:   130-
159 MG/DL           110-129 MG/DL  HIGH RISK:              >160 MG/DL           
   >130 MG/DL   *CHILDREN AND ADOLESCENTS REPRESENTS INDIVIDUALA AGED 2-19 YEARS
 EXCLUSIVE. 

 

          HCT       41.1 %    37.0-51.0                     [facility] ([facility]  

 

                                        NORMAL RANGES 

****************************************************************************** 
Age         WBC      RBC        HGB           HCT         MCV        PLT 
------------------------------------------------------------------------------ 
Adult M   4.1-10.9    4.20-6.30   12.0-18.0   37.0-51.0   80-97      140-440  
Adult F   4.1-10.9    4.04-5.48   12.0-18.0   37.0-51.0   80-97      140-440  0
-1 Yr    5.0-20.0    3.9-5.9     15-18       MV: 44      MV: 91     MV: 277  2-9
 Yr.   6.0-17.0    3.8-5.4     11-13       MV: 37      MV: 78     MV: 300  10 
Yrs.   5.0-13.0    3.8-5.4     12-15       MV: 39      MV: 80     MV: 250    
NOTE:  * FOR ADULT BLACK MALES AND FEMALES, NORMAL WBC IS 2.9-7.7 K/ML  * FOR 
ADULT BLACK MALES AND FEMALES, NORMAL RBC,HGB, AND HCT IS 5% LESS  SOURCE FOR 
DATA: [employer] 1800 OPERATION MANUAL( AUTOMATED BLOOD COUNTS AND DIFF.)  APPENDIX
  B-3                      CHRONIC KIDNEY DISEASE STAGING PER NKF:   MALE GFR 
INTERPRETATION:  20-49 YRS:     >60 mL/min  Normal 50-59 YRS:     >56 mL/min  
Normal 60-69 YRS:     >49 mL/min  Normal 70-79 YRS:     >42 mL/min  Normal 80 
and above  >35 mL/min  Normal   FEMALE GRF INTERPRETATION:  20-39 YRS:    >60 
mL/min  Normal 40-49 YRS:    >58 mL/min  Normal 50-59 YRS:    >51 mL/min  Normal
 60-69 YRS:    >45 mL/min  Normal 70-79 YRS:    >39 mL/min  Normal 80 and above 
>32 mL/min  NormalCLASSIFICATION            CHOLESTEROL FOR ADULTS       
CHILDREN/ADOLESCENTS*   DESIRABLE:                <200 MG/DL                   
<170 MG/DL  BORDER-LINE HIGH RISK:    200-239 MG/DL                170-199 MG/DL
  HIGH RISK:                >240 MG/DL                   >200 MG/DL    CLASS. 
FOR PRIMARY LDL CHOL PREVENTION:        LDL CHOL-CHILD/ADOLESCENTS*   DESIRABLE:
              <130 MG/DL              <110 MG/DL  BORDERLINE-HIGH RISK:   130-
159 MG/DL           110-129 MG/DL  HIGH RISK:              >160 MG/DL           
   >130 MG/DL   *CHILDREN AND ADOLESCENTS REPRESENTS INDIVIDUALA AGED 2-19 YEARS
 EXCLUSIVE. 

 

          MCHC      33.1 g/dL 31.0-36.0                     [facility] ([facility]  

 

                                        NORMAL RANGES 

****************************************************************************** 
Age         WBC      RBC        HGB           HCT         MCV        PLT 
------------------------------------------------------------------------------ 
Adult M   4.1-10.9    4.20-6.30   12.0-18.0   37.0-51.0   80-97      140-440  
Adult F   4.1-10.9    4.04-5.48   12.0-18.0   37.0-51.0   80-97      140-440  0
-1 Yr    5.0-20.0    3.9-5.9     15-18       MV: 44      MV: 91     MV: 277  2-9
 Yr.   6.0-17.0    3.8-5.4     11-13       MV: 37      MV: 78     MV: 300  10 
Yrs.   5.0-13.0    3.8-5.4     12-15       MV: 39      MV: 80     MV: 250    
NOTE:  * FOR ADULT BLACK MALES AND FEMALES, NORMAL WBC IS 2.9-7.7 K/ML  * FOR 
ADULT BLACK MALES AND FEMALES, NORMAL RBC,HGB, AND HCT IS 5% LESS  SOURCE FOR 
DATA: [employer] 1800 OPERATION MANUAL( AUTOMATED BLOOD COUNTS AND DIFF.)  APPENDIX
  B-3                      CHRONIC KIDNEY DISEASE STAGING PER NKF:   MALE GFR 
INTERPRETATION:  20-49 YRS:     >60 mL/min  Normal 50-59 YRS:     >56 mL/min  
Normal 60-69 YRS:     >49 mL/min  Normal 70-79 YRS:     >42 mL/min  Normal 80 
and above  >35 mL/min  Normal   FEMALE GRF INTERPRETATION:  20-39 YRS:    >60 
mL/min  Normal 40-49 YRS:    >58 mL/min  Normal 50-59 YRS:    >51 mL/min  Normal
 60-69 YRS:    >45 mL/min  Normal 70-79 YRS:    >39 mL/min  Normal 80 and above 
>32 mL/min  NormalCLASSIFICATION            CHOLESTEROL FOR ADULTS       
CHILDREN/ADOLESCENTS*   DESIRABLE:                <200 MG/DL                   
<170 MG/DL  BORDER-LINE HIGH RISK:    200-239 MG/DL                170-199 MG/DL
  HIGH RISK:                >240 MG/DL                   >200 MG/DL    CLASS. 
FOR PRIMARY LDL CHOL PREVENTION:        LDL CHOL-CHILD/ADOLESCENTS*   DESIRABLE:
              <130 MG/DL              <110 MG/DL  BORDERLINE-HIGH RISK:   130-
159 MG/DL           110-129 MG/DL  HIGH RISK:              >160 MG/DL           
   >130 MG/DL   *CHILDREN AND ADOLESCENTS REPRESENTS INDIVIDUALA AGED 2-19 YEARS
 EXCLUSIVE. 

 

          RDW-CV    12.9 %    11.5-14.5                     [facility] ([facility]  

 

                                        NORMAL RANGES 

****************************************************************************** 
Age         WBC      RBC        HGB           HCT         MCV        PLT 
------------------------------------------------------------------------------ 
Adult M   4.1-10.9    4.20-6.30   12.0-18.0   37.0-51.0   80-97      140-440  
Adult F   4.1-10.9    4.04-5.48   12.0-18.0   37.0-51.0   80-97      140-440  0
-1 Yr    5.0-20.0    3.9-5.9     15-18       MV: 44      MV: 91     MV: 277  2-9
 Yr.   6.0-17.0    3.8-5.4     11-13       MV: 37      MV: 78     MV: 300  10 
Yrs.   5.0-13.0    3.8-5.4     12-15       MV: 39      MV: 80     MV: 250    
NOTE:  * FOR ADULT BLACK MALES AND FEMALES, NORMAL WBC IS 2.9-7.7 K/ML  * FOR 
ADULT BLACK MALES AND FEMALES, NORMAL RBC,HGB, AND HCT IS 5% LESS  SOURCE FOR 
DATA: [employer] 1800 OPERATION MANUAL( AUTOMATED BLOOD COUNTS AND DIFF.)  APPENDIX
  B-3                      CHRONIC KIDNEY DISEASE STAGING PER NKF:   MALE GFR 
INTERPRETATION:  20-49 YRS:     >60 mL/min  Normal 50-59 YRS:     >56 mL/min  
Normal 60-69 YRS:     >49 mL/min  Normal 70-79 YRS:     >42 mL/min  Normal 80 
and above  >35 mL/min  Normal   FEMALE GRF INTERPRETATION:  20-39 YRS:    >60 
mL/min  Normal 40-49 YRS:    >58 mL/min  Normal 50-59 YRS:    >51 mL/min  Normal
 60-69 YRS:    >45 mL/min  Normal 70-79 YRS:    >39 mL/min  Normal 80 and above 
>32 mL/min  NormalCLASSIFICATION            CHOLESTEROL FOR ADULTS       
CHILDREN/ADOLESCENTS*   DESIRABLE:                <200 MG/DL                   
<170 MG/DL  BORDER-LINE HIGH RISK:    200-239 MG/DL                170-199 MG/DL
  HIGH RISK:                >240 MG/DL                   >200 MG/DL    CLASS. 
FOR PRIMARY LDL CHOL PREVENTION:        LDL CHOL-CHILD/ADOLESCENTS*   DESIRABLE:
              <130 MG/DL              <110 MG/DL  BORDERLINE-HIGH RISK:   130-
159 MG/DL           110-129 MG/DL  HIGH RISK:              >160 MG/DL           
   >130 MG/DL   *CHILDREN AND ADOLESCENTS REPRESENTS INDIVIDUALA AGED 2-19 YEARS
 EXCLUSIVE. 

 

          PLT       160 10E3/uL 140-440                       [facility]  

 

                                        NORMAL RANGES 

****************************************************************************** 
Age         WBC      RBC        HGB           HCT         MCV        PLT 
------------------------------------------------------------------------------ 
Adult M   4.1-10.9    4.20-6.30   12.0-18.0   37.0-51.0   80-97      140-440  
Adult F   4.1-10.9    4.04-5.48   12.0-18.0   37.0-51.0   80-97      140-440  0
-1 Yr    5.0-20.0    3.9-5.9     15-18       MV: 44      MV: 91     MV: 277  2-9
 Yr.   6.0-17.0    3.8-5.4     11-13       MV: 37      MV: 78     MV: 300  10 
Yrs.   5.0-13.0    3.8-5.4     12-15       MV: 39      MV: 80     MV: 250    
NOTE:  * FOR ADULT BLACK MALES AND FEMALES, NORMAL WBC IS 2.9-7.7 K/ML  * FOR 
ADULT BLACK MALES AND FEMALES, NORMAL RBC,HGB, AND HCT IS 5% LESS  SOURCE FOR 
DATA: [employer] 1800 OPERATION MANUAL( AUTOMATED BLOOD COUNTS AND DIFF.)  APPENDIX
  B-3                      CHRONIC KIDNEY DISEASE STAGING PER NKF:   MALE GFR 
INTERPRETATION:  20-49 YRS:     >60 mL/min  Normal 50-59 YRS:     >56 mL/min  
Normal 60-69 YRS:     >49 mL/min  Normal 70-79 YRS:     >42 mL/min  Normal 80 
and above  >35 mL/min  Normal   FEMALE GRF INTERPRETATION:  20-39 YRS:    >60 
mL/min  Normal 40-49 YRS:    >58 mL/min  Normal 50-59 YRS:    >51 mL/min  Normal
 60-69 YRS:    >45 mL/min  Normal 70-79 YRS:    >39 mL/min  Normal 80 and above 
>32 mL/min  NormalCLASSIFICATION            CHOLESTEROL FOR ADULTS       
CHILDREN/ADOLESCENTS*   DESIRABLE:                <200 MG/DL                   
<170 MG/DL  BORDER-LINE HIGH RISK:    200-239 MG/DL                170-199 MG/DL
  HIGH RISK:                >240 MG/DL                   >200 MG/DL    CLASS. 
FOR PRIMARY LDL CHOL PREVENTION:        LDL CHOL-CHILD/ADOLESCENTS*   DESIRABLE:
              <130 MG/DL              <110 MG/DL  BORDERLINE-HIGH RISK:   130-
159 MG/DL           110-129 MG/DL  HIGH RISK:              >160 MG/DL           
   >130 MG/DL   *CHILDREN AND ADOLESCENTS REPRESENTS INDIVIDUALA AGED 2-19 YEARS
 EXCLUSIVE. 

 

          Lym%      24.4 %    10.0-58.5                     [facility]  

 

                                        NORMAL RANGES 

****************************************************************************** 
Age         WBC      RBC        HGB           HCT         MCV        PLT 
------------------------------------------------------------------------------ 
Adult M   4.1-10.9    4.20-6.30   12.0-18.0   37.0-51.0   80-97      140-440  
Adult F   4.1-10.9    4.04-5.48   12.0-18.0   37.0-51.0   80-97      140-440  0
-1 Yr    5.0-20.0    3.9-5.9     15-18       MV: 44      MV: 91     MV: 277  2-9
 Yr.   6.0-17.0    3.8-5.4     11-13       MV: 37      MV: 78     MV: 300  10 
Yrs.   5.0-13.0    3.8-5.4     12-15       MV: 39      MV: 80     MV: 250    
NOTE:  * FOR ADULT BLACK MALES AND FEMALES, NORMAL WBC IS 2.9-7.7 K/ML  * FOR 
ADULT BLACK MALES AND FEMALES, NORMAL RBC,HGB, AND HCT IS 5% LESS  SOURCE FOR 
DATA: [employer] 1800 OPERATION MANUAL( AUTOMATED BLOOD COUNTS AND DIFF.)  APPENDIX
  B-3                      CHRONIC KIDNEY DISEASE STAGING PER NKF:   MALE GFR 
INTERPRETATION:  20-49 YRS:     >60 mL/min  Normal 50-59 YRS:     >56 mL/min  
Normal 60-69 YRS:     >49 mL/min  Normal 70-79 YRS:     >42 mL/min  Normal 80 
and above  >35 mL/min  Normal   FEMALE GRF INTERPRETATION:  20-39 YRS:    >60 
mL/min  Normal 40-49 YRS:    >58 mL/min  Normal 50-59 YRS:    >51 mL/min  Normal
 60-69 YRS:    >45 mL/min  Normal 70-79 YRS:    >39 mL/min  Normal 80 and above 
>32 mL/min  NormalCLASSIFICATION            CHOLESTEROL FOR ADULTS       
CHILDREN/ADOLESCENTS*   DESIRABLE:                <200 MG/DL                   
<170 MG/DL  BORDER-LINE HIGH RISK:    200-239 MG/DL                170-199 MG/DL
  HIGH RISK:                >240 MG/DL                   >200 MG/DL    CLASS. 
FOR PRIMARY LDL CHOL PREVENTION:        LDL CHOL-CHILD/ADOLESCENTS*   DESIRABLE:
              <130 MG/DL              <110 MG/DL  BORDERLINE-HIGH RISK:   130-
159 MG/DL           110-129 MG/DL  HIGH RISK:              >160 MG/DL           
   >130 MG/DL   *CHILDREN AND ADOLESCENTS REPRESENTS INDIVIDUALA AGED 2-19 YEARS
 EXCLUSIVE. 

 

          Neut%     70.0 %    37.0-92.0                     [facility] ([facility]  

 

                                        NORMAL RANGES 

****************************************************************************** 
Age         WBC      RBC        HGB           HCT         MCV        PLT 
------------------------------------------------------------------------------ 
Adult M   4.1-10.9    4.20-6.30   12.0-18.0   37.0-51.0   80-97      140-440  
Adult F   4.1-10.9    4.04-5.48   12.0-18.0   37.0-51.0   80-97      140-440  0
-1 Yr    5.0-20.0    3.9-5.9     15-18       MV: 44      MV: 91     MV: 277  2-9
 Yr.   6.0-17.0    3.8-5.4     11-13       MV: 37      MV: 78     MV: 300  10 
Yrs.   5.0-13.0    3.8-5.4     12-15       MV: 39      MV: 80     MV: 250    
NOTE:  * FOR ADULT BLACK MALES AND FEMALES, NORMAL WBC IS 2.9-7.7 K/ML  * FOR 
ADULT BLACK MALES AND FEMALES, NORMAL RBC,HGB, AND HCT IS 5% LESS  SOURCE FOR 
DATA: [employer] 1800 OPERATION MANUAL( AUTOMATED BLOOD COUNTS AND DIFF.)  APPENDIX
  B-3                      CHRONIC KIDNEY DISEASE STAGING PER NKF:   MALE GFR 
INTERPRETATION:  20-49 YRS:     >60 mL/min  Normal 50-59 YRS:     >56 mL/min  
Normal 60-69 YRS:     >49 mL/min  Normal 70-79 YRS:     >42 mL/min  Normal 80 
and above  >35 mL/min  Normal   FEMALE GRF INTERPRETATION:  20-39 YRS:    >60 
mL/min  Normal 40-49 YRS:    >58 mL/min  Normal 50-59 YRS:    >51 mL/min  Normal
 60-69 YRS:    >45 mL/min  Normal 70-79 YRS:    >39 mL/min  Normal 80 and above 
>32 mL/min  NormalCLASSIFICATION            CHOLESTEROL FOR ADULTS       
CHILDREN/ADOLESCENTS*   DESIRABLE:                <200 MG/DL                   
<170 MG/DL  BORDER-LINE HIGH RISK:    200-239 MG/DL                170-199 MG/DL
  HIGH RISK:                >240 MG/DL                   >200 MG/DL    CLASS. 
FOR PRIMARY LDL CHOL PREVENTION:        LDL CHOL-CHILD/ADOLESCENTS*   DESIRABLE:
              <130 MG/DL              <110 MG/DL  BORDERLINE-HIGH RISK:   130-
159 MG/DL           110-129 MG/DL  HIGH RISK:              >160 MG/DL           
   >130 MG/DL   *CHILDREN AND ADOLESCENTS REPRESENTS INDIVIDUALA AGED 2-19 YEARS
 EXCLUSIVE. 

 

          MXD%      5.6 %     0.1-24.0                      [facility] ([facility]  

 

                                        NORMAL RANGES 

****************************************************************************** 
Age         WBC      RBC        HGB           HCT         MCV        PLT 
------------------------------------------------------------------------------ 
Adult M   4.1-10.9    4.20-6.30   12.0-18.0   37.0-51.0   80-97      140-440  
Adult F   4.1-10.9    4.04-5.48   12.0-18.0   37.0-51.0   80-97      140-440  0
-1 Yr    5.0-20.0    3.9-5.9     15-18       MV: 44      MV: 91     MV: 277  2-9
 Yr.   6.0-17.0    3.8-5.4     11-13       MV: 37      MV: 78     MV: 300  10 
Yrs.   5.0-13.0    3.8-5.4     12-15       MV: 39      MV: 80     MV: 250    
NOTE:  * FOR ADULT BLACK MALES AND FEMALES, NORMAL WBC IS 2.9-7.7 K/ML  * FOR 
ADULT BLACK MALES AND FEMALES, NORMAL RBC,HGB, AND HCT IS 5% LESS  SOURCE FOR 
DATA: [employer] 1800 OPERATION MANUAL( AUTOMATED BLOOD COUNTS AND DIFF.)  APPENDIX
  B-3                      CHRONIC KIDNEY DISEASE STAGING PER NKF:   MALE GFR 
INTERPRETATION:  20-49 YRS:     >60 mL/min  Normal 50-59 YRS:     >56 mL/min  
Normal 60-69 YRS:     >49 mL/min  Normal 70-79 YRS:     >42 mL/min  Normal 80 
and above  >35 mL/min  Normal   FEMALE GRF INTERPRETATION:  20-39 YRS:    >60 
mL/min  Normal 40-49 YRS:    >58 mL/min  Normal 50-59 YRS:    >51 mL/min  Normal
 60-69 YRS:    >45 mL/min  Normal 70-79 YRS:    >39 mL/min  Normal 80 and above 
>32 mL/min  NormalCLASSIFICATION            CHOLESTEROL FOR ADULTS       
CHILDREN/ADOLESCENTS*   DESIRABLE:                <200 MG/DL                   
<170 MG/DL  BORDER-LINE HIGH RISK:    200-239 MG/DL                170-199 MG/DL
  HIGH RISK:                >240 MG/DL                   >200 MG/DL    CLASS. 
FOR PRIMARY LDL CHOL PREVENTION:        LDL CHOL-CHILD/ADOLESCENTS*   DESIRABLE:
              <130 MG/DL              <110 MG/DL  BORDERLINE-HIGH RISK:   130-
159 MG/DL           110-129 MG/DL  HIGH RISK:              >160 MG/DL           
   >130 MG/DL   *CHILDREN AND ADOLESCENTS REPRESENTS INDIVIDUALA AGED 2-19 YEARS
 EXCLUSIVE. 

 

          Lym#      1.9 10E3/uL 0.6-4.1                       [facility] ([facility]  

 

                                        NORMAL RANGES 

****************************************************************************** 
Age         WBC      RBC        HGB           HCT         MCV        PLT 
------------------------------------------------------------------------------ 
Adult M   4.1-10.9    4.20-6.30   12.0-18.0   37.0-51.0   80-97      140-440  
Adult F   4.1-10.9    4.04-5.48   12.0-18.0   37.0-51.0   80-97      140-440  0
-1 Yr    5.0-20.0    3.9-5.9     15-18       MV: 44      MV: 91     MV: 277  2-9
 Yr.   6.0-17.0    3.8-5.4     11-13       MV: 37      MV: 78     MV: 300  10 
Yrs.   5.0-13.0    3.8-5.4     12-15       MV: 39      MV: 80     MV: 250    
NOTE:  * FOR ADULT BLACK MALES AND FEMALES, NORMAL WBC IS 2.9-7.7 K/ML  * FOR 
ADULT BLACK MALES AND FEMALES, NORMAL RBC,HGB, AND HCT IS 5% LESS  SOURCE FOR 
DATA: [employer] 1800 OPERATION MANUAL( AUTOMATED BLOOD COUNTS AND DIFF.)  APPENDIX
  B-3                      CHRONIC KIDNEY DISEASE STAGING PER NKF:   MALE GFR 
INTERPRETATION:  20-49 YRS:     >60 mL/min  Normal 50-59 YRS:     >56 mL/min  
Normal 60-69 YRS:     >49 mL/min  Normal 70-79 YRS:     >42 mL/min  Normal 80 
and above  >35 mL/min  Normal   FEMALE GRF INTERPRETATION:  20-39 YRS:    >60 
mL/min  Normal 40-49 YRS:    >58 mL/min  Normal 50-59 YRS:    >51 mL/min  Normal
 60-69 YRS:    >45 mL/min  Normal 70-79 YRS:    >39 mL/min  Normal 80 and above 
>32 mL/min  NormalCLASSIFICATION            CHOLESTEROL FOR ADULTS       
CHILDREN/ADOLESCENTS*   DESIRABLE:                <200 MG/DL                   
<170 MG/DL  BORDER-LINE HIGH RISK:    200-239 MG/DL                170-199 MG/DL
  HIGH RISK:                >240 MG/DL                   >200 MG/DL    CLASS. 
FOR PRIMARY LDL CHOL PREVENTION:        LDL CHOL-CHILD/ADOLESCENTS*   DESIRABLE:
              <130 MG/DL              <110 MG/DL  BORDERLINE-HIGH RISK:   130-
159 MG/DL           110-129 MG/DL  HIGH RISK:              >160 MG/DL           
   >130 MG/DL   *CHILDREN AND ADOLESCENTS REPRESENTS INDIVIDUALA AGED 2-19 YEARS
 EXCLUSIVE. 

 

          Neut#     5.5 %     2.0-7.8                       [facility] ([facility]  

 

                                        NORMAL RANGES 

****************************************************************************** 
Age         WBC      RBC        HGB           HCT         MCV        PLT 
------------------------------------------------------------------------------ 
Adult M   4.1-10.9    4.20-6.30   12.0-18.0   37.0-51.0   80-97      140-440  
Adult F   4.1-10.9    4.04-5.48   12.0-18.0   37.0-51.0   80-97      140-440  0
-1 Yr    5.0-20.0    3.9-5.9     15-18       MV: 44      MV: 91     MV: 277  2-9
 Yr.   6.0-17.0    3.8-5.4     11-13       MV: 37      MV: 78     MV: 300  10 
Yrs.   5.0-13.0    3.8-5.4     12-15       MV: 39      MV: 80     MV: 250    
NOTE:  * FOR ADULT BLACK MALES AND FEMALES, NORMAL WBC IS 2.9-7.7 K/ML  * FOR 
ADULT BLACK MALES AND FEMALES, NORMAL RBC,HGB, AND HCT IS 5% LESS  SOURCE FOR 
DATA: [employer] 1800 OPERATION MANUAL( AUTOMATED BLOOD COUNTS AND DIFF.)  APPENDIX
  B-3                      CHRONIC KIDNEY DISEASE STAGING PER NKF:   MALE GFR 
INTERPRETATION:  20-49 YRS:     >60 mL/min  Normal 50-59 YRS:     >56 mL/min  
Normal 60-69 YRS:     >49 mL/min  Normal 70-79 YRS:     >42 mL/min  Normal 80 
and above  >35 mL/min  Normal   FEMALE GRF INTERPRETATION:  20-39 YRS:    >60 
mL/min  Normal 40-49 YRS:    >58 mL/min  Normal 50-59 YRS:    >51 mL/min  Normal
 60-69 YRS:    >45 mL/min  Normal 70-79 YRS:    >39 mL/min  Normal 80 and above 
>32 mL/min  NormalCLASSIFICATION            CHOLESTEROL FOR ADULTS       
CHILDREN/ADOLESCENTS*   DESIRABLE:                <200 MG/DL                   
<170 MG/DL  BORDER-LINE HIGH RISK:    200-239 MG/DL                170-199 MG/DL
  HIGH RISK:                >240 MG/DL                   >200 MG/DL    CLASS. 
FOR PRIMARY LDL CHOL PREVENTION:        LDL CHOL-CHILD/ADOLESCENTS*   DESIRABLE:
              <130 MG/DL              <110 MG/DL  BORDERLINE-HIGH RISK:   130-
159 MG/DL           110-129 MG/DL  HIGH RISK:              >160 MG/DL           
   >130 MG/DL   *CHILDREN AND ADOLESCENTS REPRESENTS INDIVIDUALA AGED 2-19 YEARS
 EXCLUSIVE. 

 

          MXD#      0.4 10E3/uL 0.0-1.8                       [facility] ([facility]  

 

                                        NORMAL RANGES 

****************************************************************************** 
Age         WBC      RBC        HGB           HCT         MCV        PLT 
------------------------------------------------------------------------------ 
Adult M   4.1-10.9    4.20-6.30   12.0-18.0   37.0-51.0   80-97      140-440  
Adult F   4.1-10.9    4.04-5.48   12.0-18.0   37.0-51.0   80-97      140-440  0
-1 Yr    5.0-20.0    3.9-5.9     15-18       MV: 44      MV: 91     MV: 277  2-9
 Yr.   6.0-17.0    3.8-5.4     11-13       MV: 37      MV: 78     MV: 300  10 
Yrs.   5.0-13.0    3.8-5.4     12-15       MV: 39      MV: 80     MV: 250    
NOTE:  * FOR ADULT BLACK MALES AND FEMALES, NORMAL WBC IS 2.9-7.7 K/ML  * FOR 
ADULT BLACK MALES AND FEMALES, NORMAL RBC,HGB, AND HCT IS 5% LESS  SOURCE FOR 
DATA: [employer] 1800 OPERATION MANUAL( AUTOMATED BLOOD COUNTS AND DIFF.)  APPENDIX
  B-3                      CHRONIC KIDNEY DISEASE STAGING PER NKF:   MALE GFR 
INTERPRETATION:  20-49 YRS:     >60 mL/min  Normal 50-59 YRS:     >56 mL/min  
Normal 60-69 YRS:     >49 mL/min  Normal 70-79 YRS:     >42 mL/min  Normal 80 
and above  >35 mL/min  Normal   FEMALE GRF INTERPRETATION:  20-39 YRS:    >60 
mL/min  Normal 40-49 YRS:    >58 mL/min  Normal 50-59 YRS:    >51 mL/min  Normal
 60-69 YRS:    >45 mL/min  Normal 70-79 YRS:    >39 mL/min  Normal 80 and above 
>32 mL/min  NormalCLASSIFICATION            CHOLESTEROL FOR ADULTS       
CHILDREN/ADOLESCENTS*   DESIRABLE:                <200 MG/DL                   
<170 MG/DL  BORDER-LINE HIGH RISK:    200-239 MG/DL                170-199 MG/DL
  HIGH RISK:                >240 MG/DL                   >200 MG/DL    CLASS. 
FOR PRIMARY LDL CHOL PREVENTION:        LDL CHOL-CHILD/ADOLESCENTS*   DESIRABLE:
              <130 MG/DL              <110 MG/DL  BORDERLINE-HIGH RISK:   130-
159 MG/DL           110-129 MG/DL  HIGH RISK:              >160 MG/DL           
   >130 MG/DL   *CHILDREN AND ADOLESCENTS REPRESENTS INDIVIDUALA AGED 2-19 YEARS
 EXCLUSIVE. 

 

          MPV       11.0 fL   9.0-13.0                      [facility] ([facility]  

 

                                        NORMAL RANGES 

****************************************************************************** 
Age         WBC      RBC        HGB           HCT         MCV        PLT 
------------------------------------------------------------------------------ 
Adult M   4.1-10.9    4.20-6.30   12.0-18.0   37.0-51.0   80-97      140-440  
Adult F   4.1-10.9    4.04-5.48   12.0-18.0   37.0-51.0   80-97      140-440  0
-1 Yr    5.0-20.0    3.9-5.9     15-18       MV: 44      MV: 91     MV: 277  2-9
 Yr.   6.0-17.0    3.8-5.4     11-13       MV: 37      MV: 78     MV: 300  10 
Yrs.   5.0-13.0    3.8-5.4     12-15       MV: 39      MV: 80     MV: 250    
NOTE:  * FOR ADULT BLACK MALES AND FEMALES, NORMAL WBC IS 2.9-7.7 K/ML  * FOR 
ADULT BLACK MALES AND FEMALES, NORMAL RBC,HGB, AND HCT IS 5% LESS  SOURCE FOR 
DATA: [employer] 1800 OPERATION MANUAL( AUTOMATED BLOOD COUNTS AND DIFF.)  APPENDIX
  B-3                      CHRONIC KIDNEY DISEASE STAGING PER NKF:   MALE GFR 
INTERPRETATION:  20-49 YRS:     >60 mL/min  Normal 50-59 YRS:     >56 mL/min  
Normal 60-69 YRS:     >49 mL/min  Normal 70-79 YRS:     >42 mL/min  Normal 80 
and above  >35 mL/min  Normal   FEMALE GRF INTERPRETATION:  20-39 YRS:    >60 
mL/min  Normal 40-49 YRS:    >58 mL/min  Normal 50-59 YRS:    >51 mL/min  Normal
 60-69 YRS:    >45 mL/min  Normal 70-79 YRS:    >39 mL/min  Normal 80 and above 
>32 mL/min  NormalCLASSIFICATION            CHOLESTEROL FOR ADULTS       
CHILDREN/ADOLESCENTS*   DESIRABLE:                <200 MG/DL                   
<170 MG/DL  BORDER-LINE HIGH RISK:    200-239 MG/DL                170-199 MG/DL
  HIGH RISK:                >240 MG/DL                   >200 MG/DL    CLASS. 
FOR PRIMARY LDL CHOL PREVENTION:        LDL CHOL-CHILD/ADOLESCENTS*   DESIRABLE:
              <130 MG/DL              <110 MG/DL  BORDERLINE-HIGH RISK:   130-
159 MG/DL           110-129 MG/DL  HIGH RISK:              >160 MG/DL           
   >130 MG/DL   *CHILDREN AND ADOLESCENTS REPRESENTS INDIVIDUALA AGED 2-19 YEARS
 EXCLUSIVE. 









                    ID                  Date                Data Source

 

                    [id number]         2020 03:59:00 PM EST [facility] ([facility]









          Name      Value     Range     Interpretation Code Description Data Va

rce(s) Supporting 

Document(s)

 

           Prostate specific Ag [Mass/volume] in Serum or Plasma 1.25 ng/mL 0.0-

4.0                          

[facility] ([facility]  









                    ID                  Date                Data Source

 

                    [id number]      10/28/2020 11:45:00 [place] [address]            
                     OPERATIVE SUMMARYNAME: PALLADINO LEWIS W                   
                       DATE OF BIRTH: TTENDING PHYS: Radha Linder MD
                                 ACCT#: [id number] DATE: 10/28/20        
                                 MR#: [id number] OF PROCEDURE: 
10/28/2020PREOPERATIVE DIAGNOSIS: Age-related nuclear cataract, right 
eye.POSTOPERATIVE DIAGNOSIS: Age-related nuclear cataract, right eye.PROCEDURE 
PERFORMED: Phacoemulsification of posterior chamber with intraocular 
lensimplantation right eye.ANESTHESIA: Topical sedation.LENS USED: AUOOTO 18.5 
diopters.DETAILS OF PROCEDURE: The eye was prepped and draped in the usual 
fashion. Lidspeculum was placed in the lid. A stab incision was made to the 
anterior chamber with asuperblade. 1% non-preserved lidocaine was instilled and 
viscoelastic instilled. The eye wasrefixated, and a 2.4 mm Keratome made a clear
 corneal and temporal limbal incision. The lens wasthen hydrodissected, and then
 it was grooved in two meridians at the phacoemulsification. The lenswas 
scrapped into four quadrants. Each quadrant was in good phacoemulsification. The
 remainingcortex was removed with I&A unit. The capsular bag was refilled with 
viscoelastic and theposterior chamber and intraocular lens were inserted into 
the capsular bag with no difficulty. Anyremaining viscoelastic was removed with 
the I&A, and the wound was hydrated and balanced saltand ceftriaxone were 
instilled. The patient tolerated the procedure well, and went to the 
recoveryroom in stable condition.DD: Radha Linder MD 10/28/20 11:30DT: JOSEPH 
10/28/20 11:44DS: Radha Linder MD              20 13:08                  
                                                                              1 
 









          Name      Value     Range     Interpretation Code Description Data Va

rce(s) Supporting 

Document(s)

 

                                                                       









                    ID                  Date                Data Source

 

                    [id number]      10/14/2020 09:25:00 [place] [address]            
                     OPERATIVE SUMMARYNAME: PALLADINO LEWIS W                   
                       DATE OF BIRTH: TTENDING PHYS: Radha Linder MD
                                 ACCT#: [id number] DATE: 10/14/20        
                                 MR#: [id number] OF PROCEDURE: 
10/14/2020PREOPERATIVE DIAGNOSIS: Age-related nuclear cataract, left 
eye.POSTOPERATIVE DIAGNOSIS: Age-related nuclear cataract, left eye.PROCEDURE 
PERFORMED: Phacoemulsification of posterior chamber with intraocular 
lensimplantation left eye.ANESTHESIA: Topical sedation.LENS USED: AUOOTO 20.0 
diopters.DETAILS OF PROCEDURE: The eye was prepped and draped in the usual 
fashion. Lidspeculum was placed in the lid. A stab incision was made to the 
anterior chamber with asuperblade. 1% non-preserved lidocaine was instilled and 
viscoelastic instilled. The eye wasrefixated, and a 2.4 mm Keratome made a clear
 corneal and temporal limbal incision. The lens wasthen hydrodissected, and then
 it was grooved in two meridians at the phacoemulsification. The lenswas 
scrapped into four quadrants. Each quadrant was in good phacoemulsification. The
 remainingcortex was removed with I&A unit. The capsular bag was refilled with 
viscoelastic and theposterior chamber and intraocular lens were inserted into 
the capsular bag with no difficulty. Anyremaining viscoelastic was removed with 
the I&A, and the wound was hydrated and balanced saltand ceftriaxone were 
instilled. The patient tolerated the procedure well, and went to the 
recoveryroom in stable condition.DD: Radha Linder MD 10/14/20 09:15DT: JOSEPH 
10/14/20 09:24DS: Radha Linder MD              20 13:08                  
                                                                              1 
 









          Name      Value     Range     Interpretation Code Description Data Va

rce(s) Supporting 

Document(s)

 

                                                                       









                    ID                  Date                Data Source

 

                    [id number]         10/26/2020 10:50:00 AM EDT [facility] ([facility])









          Name      Value     Range     Interpretation Code Description Data Va

rce(s) Supporting 

Document(s)

 

           Surgical pathology study Laboratory test result                      

            [facility] ([facility])                            

 

                                        FINAL DIAGNOSIS



Nasal polyp, left, excision:

Inflamed edematous sinonasal mucosa with abundant neutrophils,

lymphocytes and eosinophils.

Negative for malignancy.

                                        10/28/2020 - 



CLINICAL DIAGNOSIS



Nasal polyp

                                        10/27/2020 - 1328



GROSS DIAGNOSIS



Received in formalin labeled "nasal polyp left nare" and consists of a

fragment of tissue 1 x 0.5 x 0.2 cm.  All in one.

                                        -OA

                                        10/28/2020 - 



Signed________ JUAN JOSE RAJAN MD 10/28/2020 1520

 









                    ID                  Date                Data Source

 

                    [id number]             10/23/2020 10:47:00 AM EDT [facility]









          Name      Value     Range     Interpretation Code Description Data Va

rce(s) Supporting 

Document(s)

 

          SARS-CoV-2 (COVID19)                                         [facility]   

  

 

                                        This lab was ordered by [facility]

 and reported by [facility]. 







                                        Procedure

 

                                          



                                                                                
                                                                                
                                                                                
                                                                                
                                                                                
                                                



Social History

          



           Code       Duration   Value      Status     Description Data Source(s

)

 

           Smoking    10/28/2021 12:00:00 AM EDT Current Smoker completed  [facility]

nt Smoker eCW1 

([facility])

 

           Smoking    10/28/2021 12:00:00 AM EDT Current Smoker completed  [facility]

nt Smoker eCW1 

([facility])

 

           Smoking    2021 12:00:00 AM EDT Current Smoker completed  [facility]

nt Smoker eCW1 

([facility])

 

           Smoking    2021 12:00:00 AM EDT Current Smoker completed  [facility]

nt Smoker eCW1 

([facility])

 

           Smoking    2021 12:00:00 AM EDT Current Smoker completed  [facility]

nt Smoker eCW1 

([facility])

 

           Smoking    2021 12:00:00 AM EDT Current Smoker completed  [facility]

nt Smoker eCW1 

([facility])

 

           Smoking    2021 12:00:00 AM EDT Current Smoker completed  [facility]

nt Smoker eCW1 

([facility])

 

           Smoking    2021 12:00:00 AM EDT Current Smoker completed  [facility]re

nt Smoker eCW1 

([facility])

 

           Smoking    2021 12:00:00 AM EDT Current Smoker completed  [facility]re

nt Smoker eCW1 

([facility])

 

           Smoking    2021 12:00:00 AM EDT Current Smoker completed  [facility]re

nt Smoker eCW1 

([facility])

 

           Smoking    2021 12:00:00 AM EDT Current Smoker completed  [facility]re

nt Smoker eCW1 

([facility])

 

           Smoking    2021 12:00:00 AM EDT Current Smoker completed  [facility]re

nt Smoker eCW1 

([facility])

 

           Smoking    2021 12:00:00 AM EDT Current Smoker completed  Curre

nt Smoker eCW1 

([facility])

 

           Smoking    2021 12:00:00 AM EST Current Smoker completed  Curre

nt Smoker eCW1 

([facility])

 

           Smoking    2021 12:00:00 AM EST Current Smoker completed  [facility]re

nt Smoker eCW1 

([facility])

 

           Smoking    2021 12:00:00 AM EST Current Smoker completed  [facility]re

nt Smoker eCW1 

([facility])

 

           Smoking    2021 12:00:00 AM EST Current Smoker completed  Curre

nt Smoker eCW1 

([facility])

 

           Smoking    2021 12:00:00 AM EST Current Smoker completed  [facility]

nt Smoker eCW1 

[facility] [place]

 

           Smoking    2021 12:00:00 AM EST Current Smoker completed  Curre

nt Smoker eCW1 

([facility])

 

           Smoking    2021 12:00:00 AM EST Current Smoker completed  Curre

nt Smoker eCW1 

([facility])

 

           Smoking    2021 12:00:00 AM EST Current Smoker completed  Curre

nt Smoker eCW1 

([facility])

 

           Smoking    2020 12:00:00 AM EST Current Smoker completed  Curre

nt Smoker eCW1 

([facility])

 

           Smoking    2020 12:00:00 AM EST Current Smoker completed  Curre

nt Smoker eCW1 

([facility])

 

           Smoking    2020 12:00:00 AM EST Current Smoker completed  Curre

nt Smoker eCW1 

([facility])

 

           Smoking    2020 12:00:00 AM EST Current Smoker completed  Curre

nt Smoker eCW1 

([facility])

 

           Smoking    2020 12:00:00 AM EST Current Smoker completed  Curre

nt Smoker eCW1 

([facility])

 

           Smoking    2020 12:00:00 AM EST Current Smoker completed  Curre

nt Smoker eCW1 

([facility])

 

           Smoking    2020 12:00:00 AM EST Current Smoker completed  Curre

nt Smoker eCW1 

([facility])

 

           Smoking    10/30/2020 12:00:00 AM EDT Current Smoker completed  Curre

nt Smoker eCW1 

([facility])



                                                                                
                                                                                
                                                                                
                                                                                
                                                          



Vital Signs

          



                    ID                  Date                Data Source

 

                    UNK                                      









           Name       Value      Range      Interpretation Code Description Data

 Source(s)

 

           Body weight 140 [lb_av]                       140 [lb_av] eCW1 ([facility])

 

           Body height 67 [in_i]                        67 [in_i]  eCW1 ([facility])

 

           Body mass index (BMI) [Ratio] 21.92 kg/m2                       21.92

 kg/m2 eCW1 ([facility])

 

           Heart rate 76 /min                          76 /min    eCW1 ([facility])

 

           Respiratory rate 18 /min                          18 /min    eCW1 ([facility])

 

           Body temperature 98.6 [degF]                       98.6 [degF] eCW1 (

[facility])

 

           Systolic blood pressure 130 mm[Hg]                       130 mm[Hg] e

CW1 ([facility])

 

           Diastolic blood pressure 72 mm[Hg]                        72 mm[Hg]  

eCW1 ([facility])

 

           Systolic blood pressure 136 mm[Hg]                       136 mm[Hg] M

EDENT ([facility], )

 

           Diastolic blood pressure 74 mm[Hg]                        74 mm[Hg]  

MEDENT ([facility], )

 

           Body height 67 [in_i]                        67 [in_i]  MEDENT ([facility], )

 

                                        5'7" 

 

           Body weight 145.00 [lb_av]                       145.00 [lb_av] MEDEN

T ([facility], )

 

           Body mass index (BMI) [Ratio] 22.7 kg/m2                       22.7 k

g/m2 MEDENT ([facility])

 

           Ideal body weight 148 [lb_av]                       148 [lb_av] MEDEN

T ([facility])

 

           Body weight 65.772 kg                        65.772 kg  MEDENT ([facility])

 

           Body surface area Derived from formula 1.76 m2                       

   [facility]

 

           Systolic blood pressure 152 mm[Hg]                       152 mm[Hg] M

EDENT ([facility]

 

           Diastolic blood pressure 74 mm[Hg]                        74 mm[Hg]  

MEDENT ([facility]

 

           Body temperature 97.5 [degF]                       97.5 [degF] MEDENT

 ([facility]

 

           Heart rate 60 /min                          60 /min    MEDENT ([facility]

 

           Respiratory rate 18 /min                          18 /min    MEDENT (

[facility]

 

           Body height 67 [in_i]                        67 [in_i]  MEDENT ([facility]

 

                                        5'7" 

 

           Body weight 140.00 [lb_av]                       140.00 [lb_av] MEDEN

T ([facility]

 

           Ideal body weight 148 [lb_av]                       148 [lb_av] MEDEN

T ([facility]

 

           Body mass index (BMI) [Ratio] 21.9 kg/m2                       21.9 k

g/m2 MEDENT ([facility]

 

           Oxygen saturation in Arterial blood by Pulse oximetry 98 %           

                  98 %       MEDENT 

([facility]

 

           Systolic blood pressure 118 mm[Hg]                       118 mm[Hg] M

EDENT ([facility]

 

           Diastolic blood pressure 60 mm[Hg]                        60 mm[Hg]  

MEDENT ([facility]

 

           Body temperature 98.2 [degF]                       98.2 [degF] MEDENT

 ([facility]

 

           Heart rate 72 /min                          72 /min    MEDENT ([facility]

 

           Respiratory rate 16 /min                          16 /min    MEDENT (

[facility]

 

           Body height 67 [in_i]                        67 [in_i]  MEDENT ([facility]

 

                                        5'7" 

 

           Body weight 142.00 [lb_av]                       142.00 [lb_av] MEDEN

T ([facility]

 

           Ideal body weight 148 [lb_av]                       148 [lb_av] MEDEN

T ([facility]

 

           Body mass index (BMI) [Ratio] 22.2 kg/m2                       22.2 k

g/m2 MEDENT ([facility]

 

           Oxygen saturation in Arterial blood by Pulse oximetry 98 %           

                  98 %       MEDENT 

([facility]

 

           Body weight 136.6 [lb_av]                       136.6 [lb_av] eCW1 ([facility])

 

           Body height 67 [in_i]                        67 [in_i]  eCW1 ([facility])

 

           Body mass index (BMI) [Ratio] 21.39 kg/m2                       21.39

 kg/m2 eCW1 ([facility])

 

           Heart rate 86 /min                          86 /min    eCW1 ([facility])

 

           Respiratory rate 18 /min                          18 /min    eCW1 ([facility])

 

           Body temperature 98.0 [degF]                       98.0 [degF] eCW1 (

[facility])

 

           Systolic blood pressure 148 mm[Hg]                       148 mm[Hg] e

CW1 ([facility])

 

           Diastolic blood pressure 76 mm[Hg]                        76 mm[Hg]  

eCW1 ([facility])

 

           Body height 67 [in_i]                        67 [facility]_i]  [facility]

 

                                        5'7" 

 

           Body weight 140.00 [lb_av]                       140.00 [lb_av] MEDEN

T ([facility], )

 

           Body mass index (BMI) [Ratio] 21.9 kg/m2                       21.9 k

g[facility]

 

           Ideal body weight 148 [lb_av]                       148 [lb_av] MEDEN

T ([facility], )

 

           Body weight 63.504 kg                        63.504 kg  [facility] ([facility], )

 

           Body surface area Derived from formula 1.74 m2                       

   1.74 m2    [facility] ([facility])

 

           Body height 67 [in_i]                        67 [in_i]  MEDENT ([facility])

 

                                        5'7" 

 

           Body weight 140.00 [lb_av]                       140.00 [lb_av] MEDEN

T ([facility])

 

           Body mass index (BMI) [Ratio] 21.9 kg/m2                       21.9 k

g/m2 [facility] ([facility])

 

           Ideal body weight 148 [lb_av]                       148 [lb_av] MEDEN

T ([facility])

 

           Body weight 63.504 kg                        63.504 kg  [facility]

 

           Body surface area Derived from formula 1.74 m2                       

   1.74 m2    [facility] ([facility])

 

           Body weight 63.504 kg                        63.504 kg  [facility] ([facility])

 

           Body surface area Derived from formula 1.74 m2                       

   1.74 m2    [facility] ([facility])

 

           Body height 67 [in_i]                        67 [in_i]  [facility] ([facility])

 

                                        5'7" 

 

           Body weight 140.00 [lb_av]                       140.00 [lb_av] MEDEN

T ([facility])

 

           Body mass index (BMI) [Ratio] 21.9 kg/m2                       21.9 k

g/m2 [facility] ([facility])

 

           Ideal body weight 148 [lb_av]                       148 [lb_av] MEDEN

T ([facility])

 

           Body mass index (BMI) [Ratio] 21.9 kg/m2                       21.9 k

g/m2 [facility] ([facility])

 

           Body weight 140.00 [lb_av]                       140.00 [lb_av] MEDEN

T ([facility])

 

           Ideal body weight 148 [lb_av]                       148 [lb_av] MEDEN

T ([facility])

 

           Body weight 63.504 kg                        63.504 kg  MEDENT ([facility])

 

           Body surface area Derived from formula 1.74 m2                       

   1.74 m2    [facility] ([facility])

 

           Body height 67 [in_i]                        67 [in_i]  [facility]

 

                                        5'7" 

 

           Respiratory rate 16 /min                          16 /min    MEDENT (

[facility]

 

           Diastolic blood pressure 60 mm[Hg]                        60 mm[Hg]  

MEDENT ([facility]

 

           Ideal body weight 148 [lb_av]                       148 [lb_av] MEDEN

T ([facility]

 

           Body mass index (BMI) [Ratio] 21.3 kg/m2                       21.3 k

g/m2 MEDENT ([facility]

 

           Systolic blood pressure 128 mm[Hg]                       128 mm[Hg] M

EDENT ([facility]

 

           Body temperature 97.3 [degF]                       97.3 [degF] MEDENT

 ([facility]

 

           Heart rate 61 /min                          61 /min    MEDENT ([facility]

 

           Body height 67 [in_i]                        67 [in_i]  MEDENT ([facility]

 

                                        5'7" 

 

           Body weight 136.00 [lb_av]                       136.00 [lb_av] MEDEN

T ([facility]

 

           Oxygen saturation in Arterial blood by Pulse oximetry 96 %           

                  96 %       MEDENT 

([facility]

 

           Body height 67 [in_i]                        67 [in_i]  MEDENT ([facility])

 

                                        5'7" 

 

           Body weight 140.00 [lb_av]                       140.00 [lb_av] MEDEN

T ([facility])

 

           Body mass index (BMI) [Ratio] 21.9 kg/m2                       21.9 k

g/m[facility]

 

           Ideal body weight 148 [lb_av]                       148 [lb_av] MEDEN

T ([facility])

 

           [facility] 63.[facility]

 

           Body surface area Derived from formula 1.74 m2                       

   1.74 m2    [facility] ([facility])

 

           Body mass index (BMI) [Ratio] 21.1 kg/m2                       21.1 k

g/m2 MEDENT ([facility]

 

           Body weight 135.00 [lb_av]                       135.00 [lb_av] MEDEN

T ([facility]

 

           Ideal body weight 148 [lb_av]                       148 [lb_av] MEDEN

T ([facility]

 

           Systolic blood pressure 112 mm[Hg]                       112 mm[Hg] M

EDENT ([facility]

 

           Diastolic blood pressure 78 mm[Hg]                        78 mm[Hg]  

MEDENT ([facility]

 

           Body temperature 97.2 [degF]                       97.2 [degF] MEDENT

 ([facility]

 

           Heart rate 62 /min                          62 /min    MEDENT ([facility]

 

           Respiratory rate 18 /min                          18 /min    MEDENT (

[facility]

 

           Body height 67 [in_i]                        67 [in_i]  MEDENT ([facility]

 

                                        5'7" 

 

           Body weight 138.2 [lb_av]                       138.2 [lb_av] eCW1 ([facility])

 

           Body height 67 [in_i]                        67 [in_i]  eCW1 ([facility])

 

           Body mass index (BMI) [Ratio] 21.64 kg/m2                       21.64

 kg/m2 eCW1 ([facility])

 

           Heart rate 89 /min                          89 /min    eCW1 ([facility])

 

           Respiratory rate 18 /min                          18 /min    eCW1 ([facility])

 

           Body temperature 97.2 [degF]                       97.2 [degF] eCW1 (

[facility])

 

           Systolic blood pressure 138 mm[Hg]                       138 mm[Hg] e

CW1 ([facility])

 

           Diastolic blood pressure 72 mm[Hg]                        72 mm[Hg]  

eCW1 ([facility])

 

           Heart rate 60 /min                          60 /min    MEDENT ([facility]

 

           Respiratory rate 16 /min                          16 /min    MEDENT (

[facility]

 

           Body height 67 [in_i]                        67 [in_i]  MEDENT ([facility]

 

                                        5'7" 

 

           Body weight 135.00 [lb_av]                       135.00 [lb_av] MEDEN

T ([facility]

 

           Ideal body weight 148 [lb_av]                       148 [lb_av] MEDEN

T ([facility]

 

           Body mass index (BMI) [Ratio] 21.1 kg/m2                       21.1 k

g/m2 MEDENT ([facility]

 

           Oxygen saturation in Arterial blood by Pulse oximetry 99 %           

                  99 %       MEDENT 

([facility]

 

           Body temperature 98.3 [degF]                       98.3 [degF] MEDENT

 ([facility]

 

           Systolic blood pressure 138 mm[Hg]                       138 mm[Hg] M

EDENT ([facility]

 

           Diastolic blood pressure 80 mm[Hg]                        80 mm[Hg]  

MEDENT ([facility]

 

           Body height 67 [in_i]                        67 [facility]i]  [facility]

 

                                        5'7" 

 

           Body weight 140.00 [lb_av]                       140.00 [lb_av] MEDEN

T ([facility])

 

           Body mass index (BMI) [Ratio] 21.9 kg/m2                       21.9 k

g[facility]

 

           Ideal body weight 148 [lb_av]                       148 [lb_av] MEDEN

T ([facility])

 

           Body weight 63.504 kg                        63.504 kg  [facility] ([facility])

 

           Body surface area Derived from formula 1.74 m2                       

   1.74 m2    [facility] ([facility])

 

           Ideal body weight 148 [lb_av]                       148 [lb_av] MEDEN

T ([facility]

 

           Oxygen saturation in Arterial blood by Pulse oximetry 99 %           

                  99 %       MEDENT 

([facility]

 

           Body mass index (BMI) [Ratio] 21.5 kg/m2                       21.5 k

g/m2 MEDENT ([facility]

 

           Systolic blood pressure 126 mm[Hg]                       126 mm[Hg] M

EDENT ([facility]

 

           Diastolic blood pressure 88 mm[Hg]                        88 mm[Hg]  

MEDENT ([facility]

 

           Body temperature 98.3 [degF]                       98.3 [degF] MEDENT

 ([facility]

 

           Heart rate 62 /min                          62 /min    MEDENT ([facility]

 

           Respiratory rate 18 /min                          18 /min    MEDENT (

[facility]

 

           Body height 67 [in_i]                        67 [in_i]  MEDENT ([facility]

 

                                        5'7" 

 

           Body weight 137.00 [lb_av]                       137.00 [lb_av] MEDEN

T ([facility]

 

           Body temperature 96.9 [degF]                       96.9 [degF] MEDENT

 ([facility])

 

           Body weight 139.6 [lb_av]                       139.6 [lb_av] eCW1 ([facility])

 

           Body height 67 [in_i]                        67 [in_i]  eCW1 ([facility])

 

           Body mass index (BMI) [Ratio] 21.86 kg/m2                       21.86

 kg/m2 eCW1 ([facility])

 

           Heart rate 66 /min                          66 /min    eCW1 ([facility])

 

           Respiratory rate 18 /min                          18 /min    eCW1 ([facility])

 

           Body temperature 97.7 [degF]                       97.7 [degF] eCW1 (

[facility])

 

           Systolic blood pressure 120 mm[Hg]                       120 mm[Hg] e

CW1 ([facility])

 

           Diastolic blood pressure 78 mm[Hg]                        78 mm[Hg]  

eCW1 ([facility])

 

           Body height 67 [in_i]                        67 [in_i]  MEDENT ([facility], )

 

                                        5'7" 

 

           Body weight 140.00 [lb_av]                       140.00 [lb_av] MEDEN

T ([facility], )

 

           Body mass index (BMI) [Ratio] 21.9 kg/m2                       21.9 k

g/m2 [facility] ([facility], )

 

           Ideal body weight 148 [lb_av]                       148 [lb_av] MEDEN

T ([facility], )

 

           Body weight 63.504 kg                        63.504 kg  [facility] ([facility], )

 

           Body surface area Derived from formula 1.74 m2                       

   1.74 m2    [facility] ([facility], )

 

           Oxygen saturation in Arterial blood by Pulse oximetry 99 %           

                  99 %       MEDENT 

([facility]

 

           Systolic blood pressure 142 mm[Hg]                       142 mm[Hg] M

EDENT ([facility]

 

           Diastolic blood pressure 72 mm[Hg]                        72 mm[Hg]  

MEDENT ([facility]

 

           Body temperature 97.6 [degF]                       97.6 [degF] MEDENT

 ([facility]

 

           Heart rate 52 /min                          52 /min    MEDENT ([facility]

 

           Respiratory rate 18 /min                          18 /min    MEDENT (

[facility]

 

           Body height 67 [in_i]                        67 [in_i]  MEDENT ([facility]

 

                                        5'7" 

 

           Body weight 139.00 [lb_av]                       139.00 [lb_av] MEDEN

T ([facility]

 

           Ideal body weight 148 [lb_av]                       148 [lb_av] MEDEN

T ([facility]

 

           Body mass index (BMI) [Ratio] 21.8 kg/m2                       21.8 k

g/m2 MEDENT ([facility]

 

           Systolic blood pressure 148 mm[Hg]                       148 mm[Hg] M

EDENT ([facility]

 

           Diastolic blood pressure 84 mm[Hg]                        84 mm[Hg]  

MEDENT ([facility]

 

           Systolic blood pressure 148 mm[Hg]                       148 mm[Hg] M

EDENT ([facility]

 

           Diastolic blood pressure 84 mm[Hg]                        84 mm[Hg]  

MEDENT ([facility]

 

           Systolic blood pressure 142 mm[Hg]                       142 mm[Hg] M

EDENT ([facility]

 

           Diastolic blood pressure 72 mm[Hg]                        72 mm[Hg]  

MEDENT ([facility]

 

           Body temperature 97.6 [degF]                       97.6 [degF] MEDENT

 ([facility]

 

           Heart rate 52 /min                          52 /min    MEDENT ([facility]

 

           Respiratory rate 18 /min                          18 /min    MEDENT (

[facility]

 

           Body height 67 [in_i]                        67 [in_i]  MEDENT ([facility]

 

                                        5'7" 

 

           Body weight 139.00 [lb_av]                       139.00 [lb_av] MEDEN

T ([facility]

 

           Ideal body weight 148 [lb_av]                       148 [lb_av] MEDEN

T ([facility]

 

           Body mass index (BMI) [Ratio] 21.8 kg/m2                       21.8 k

g/m2 MEDENT ([facility]

 

           Oxygen saturation in Arterial blood by Pulse oximetry 99 %           

                  99 %       MEDENT 

([facility]

 

           Ideal body weight 148 [lb_av]                       148 [lb_av] MEDEN

T ([facility], )

 

           Body height 67 [in_i]                        67 [in_i]  MEDENT ([facility])

 

                                        5'7" 

 

           Body weight 140.00 [lb_av]                       140.00 [lb_av] MEDEN

T ([facility])

 

           Body mass index (BMI) [Ratio] 21.9 kg/m2                       21.9 k

g/m2 MEDENT ([facility])

 

           Body weight 63.504 kg                        63.504 kg  MEDENT ([facility])

 

           Body weight 140.00 [lb_av]                       140.00 [lb_av] MEDEN

T ([facility])

 

           Body height 67 [in_i]                        67 [in_i]  MEDENT ([facility])

 

                                        5'7" 

 

           Body mass index (BMI) [Ratio] 21.9 kg/m2                       21.9 k

g/m2 MEDENT ([facility])

 

           Ideal body weight 148 [lb_av]                       148 [lb_av] MEDEN

T ([facility])

 

           Body weight 63.504 kg                        63.504 kg  MEDENT ([facility])

 

           Body temperature 96.8 [degF]                       96.8 [degF] MEDENT

 ([facility])

 

           Body height 67 [in_i]                        67 [in_i]  MEDENT ([facility])

 

                                        5'7" 

 

           Body weight 179.00 [lb_av]                       179.00 [lb_av] MEDEN

T ([facility])

 

           Body mass index (BMI) [Ratio] 28.0 kg/m2                       28.0 k

g/m2 MEDENT ([facility]









                    ID                  Date                Data Source

 

                    [id number]            2020 02:31:41 PM EST [facility]

 Hospital









           Name       Value      Range      Interpretation Code Description Data

 Source(s)

 

           WEIGHT RECORDED 140.00 pounds                       140.00 pounds [facility]

 

           Height     67 Inches                        067 Inches [facility]









                    ID                  Date                Data Source

 

                    [id number]            2020 01:08:33 PM EST [facility]

 Hospital









           Name       Value      Range      Interpretation Code Description Data

 Source(s)

 

           WEIGHT RECORDED 140.00 pounds                       140.00 pounds [facility]

 

           Height     67 Inches                        067 Inches [facility]



                                                                                
                                      



Patient Treatment Plan of Care

          



             Planned Activity Planned Date Details      Description  Data Source

(s)

 

             oxyCODONE HCl ER 20 MG 2021 12:00:00 AM EST                  

         eCW1 ([facility])

 

                          Acetaminophen 325 MG / Hydrocodone Bitartrate 5 MG Ora

l Tablet 2021 

12:00:00 AM EST                                             eCW1 ([facility])

 

             oxyCODONE HCl ER 20 MG 10/28/2021 12:00:00 AM EDT                  

         eCW1 ([facility])

 

                          Acetaminophen 325 MG / Hydrocodone Bitartrate 5 MG Ora

l Tablet 10/28/2021 

12:00:00 AM EDT                                             eCW1 ([facility])

 

             oxyCODONE HCl ER 20 MG 10/08/2021 12:00:00 AM EDT                  

         eCW1 ([facility])

 

                          Acetaminophen 325 MG / Hydrocodone Bitartrate 5 MG Ora

l Tablet 10/08/2021 

12:00:00 AM EDT                                             eCW1 ([facility])

 

             oxyCODONE HCl ER 20 MG 09/10/2021 12:00:00 AM EDT                  

         eCW1 ([facility])

 

                          Acetaminophen 325 MG / Hydrocodone Bitartrate 5 MG Ora

l Tablet 09/10/2021 

12:00:00 AM EDT                                             eCW1 ([facility])

 

             oxyCODONE HCl ER 20 MG 2021 12:00:00 AM EDT                  

         eCW1 ([facility])

 

                          Acetaminophen 325 MG / Hydrocodone Bitartrate 5 MG Ora

l Tablet 2021 

12:00:00 AM EDT                                             eCW1 ([facility])

 

                          Acetaminophen 325 MG / Hydrocodone Bitartrate 5 MG Ora

l Tablet 2021 

12:00:00 AM EDT                                             eCW1 ([facility])

 

             oxyCODONE HCl ER 20 MG 2021 12:00:00 AM EDT                  

         eCW1 ([facility])

 

                          Acetaminophen 325 MG / Hydrocodone Bitartrate 5 MG Ora

l Tablet 2021 

12:00:00 AM EDT                                             eCW1 ([facility])

 

                          Acetaminophen 325 MG / Hydrocodone Bitartrate 5 MG Ora

l Tablet 2021 

12:00:00 AM EDT                                             eCW1 ([facility])

 

                          Acetaminophen 325 MG / Hydrocodone Bitartrate 5 MG Ora

l Tablet 2021 

12:00:00 AM EDT                                             eCW1 ([facility])

 

                          Acetaminophen 325 MG / Hydrocodone Bitartrate 5 MG Ora

l Tablet 2021 

12:00:00 AM EDT                                             eCW1 ([facility])

 

                          Acetaminophen 325 MG / Hydrocodone Bitartrate 5 MG Ora

l Tablet 2021 

12:00:00 AM EDT                                             eCW1 ([facility])

 

             oxyCODONE HCl ER 20 MG 2021 12:00:00 AM EDT                  

         eCW1 ([facility])

 

                          Acetaminophen 325 MG / Hydrocodone Bitartrate 5 MG Ora

l Tablet 2021 

12:00:00 AM EDT                                             eCW1 ([facility])

 

             OxyCODONE HCl ER 20 MG 2021 12:00:00 AM EDT                  

         eCW1 ([facility])

 

                          Acetaminophen 325 MG / Hydrocodone Bitartrate 5 MG Ora

l Tablet 2021 

12:00:00 AM EDT                                             eCW1 ([facility])

 

             OxyCODONE HCl ER 20 MG 2021 12:00:00 AM EDT                  

         eCW1 ([facility])

 

                          Acetaminophen 325 MG / Hydrocodone Bitartrate 5 MG Ora

l Tablet 2021 

12:00:00 AM EDT                                             eCW1 ([facility])

 

                          Acetaminophen 325 MG / Hydrocodone Bitartrate 5 MG Ora

l Tablet 2021 

12:00:00 AM EDT                                             eCW1 ([facility])

 

             OxyCODONE HCl ER 20 MG 2021 12:00:00 AM EDT                  

         eCW1 ([facility])

 

                          Acetaminophen 325 MG / Hydrocodone Bitartrate 5 MG Ora

l Tablet 2021 

12:00:00 AM EDT                                             eCW1 ([facility])

 

                          Acetaminophen 325 MG / Hydrocodone Bitartrate 5 MG Ora

l Tablet 2021 

12:00:00 AM EDT                                             eCW1 ([facility])

 

                          Acetaminophen 325 MG / Hydrocodone Bitartrate 5 MG Ora

l Tablet 2021 

12:00:00 AM EDT                                             eCW1 ([facility])

 

                          Acetaminophen 325 MG / Hydrocodone Bitartrate 5 MG Ora

l Tablet 2021 

12:00:00 AM EDT                                             eCW1 ([facility])

 

                          Acetaminophen 325 MG / Hydrocodone Bitartrate 5 MG Ora

l Tablet 2021 

12:00:00 AM EDT                                             eCW1 ([facility])

 

             OxyCODONE HCl ER 20 MG 2021 12:00:00 AM EDT                  

         eCW1 ([facility])

 

                          Acetaminophen 325 MG / Hydrocodone Bitartrate 5 MG Ora

l Tablet 2021 

12:00:00 AM EDT                                             eCW1 ([facility])

 

             OxyCODONE HCl ER 20 MG 2021 12:00:00 AM EST                  

         eCW1 ([facility])

 

             OxyCODONE HCl ER 20 MG 2021 12:00:00 AM EST                  

         eCW1 ([facility])

 

             Norco 5-325 MG 2021 12:00:00 AM EST                          

 eCW1 ([facility])

 

             Norco 5-325 MG 2021 12:00:00 AM EST                          

 eCW1 ([facility])

 

                          Acetaminophen 325 MG / Hydrocodone Bitartrate 5 MG Ora

l Tablet 2021 

12:00:00 AM EST                                             eCW1 ([facility])

 

             Norco 5-325 MG 2021 12:00:00 AM EST                          

 eCW1 ([facility])

 

                          Acetaminophen 325 MG / Hydrocodone Bitartrate 5 MG Ora

l Tablet 2021 

12:00:00 AM EST                                             eCW1 ([facility])

 

             Norco 5-325 MG 2021 12:00:00 AM EST                          

 eCW1 ([facility])

 

                          Acetaminophen 325 MG / Hydrocodone Bitartrate 5 MG Ora

l Tablet 2021 

12:00:00 AM EST                                             eCW1 ([facility])

 

             Norco 5-325 MG 2021 12:00:00 AM EST                          

 eCW1 ([facility])

 

             Norco 5-325 MG 2021 12:00:00 AM EST                          

 eCW1 ([facility])

 

                          Acetaminophen 325 MG / Hydrocodone Bitartrate 5 MG Ora

l Tablet 2021 

12:00:00 AM EST                                             eCW1 ([facility])

 

             OxyCODONE HCl ER 20 MG 2021 12:00:00 AM EST                  

         eCW1 ([facility])

 

             OxyCODONE HCl ER 20 MG 2021 12:00:00 AM EST                  

         eCW1 ([facility])

 

             OxyCODONE HCl ER 20 MG 2021 12:00:00 AM EST                  

         eCW1 ([facility])

 

                          Acetaminophen 325 MG / Hydrocodone Bitartrate 5 MG Ora

l Tablet [Norco] 

2021 12:00:00 AM EST                                         eCW1 ([facility])

 

             OxyCODONE HCl ER 20 MG 01/15/2021 12:00:00 AM EST                  

         eCW1 ([facility])

 

             OxyCODONE HCl ER 20 MG 01/15/2021 12:00:00 AM EST                  

         eCW1 ([facility])

 

             OxyCODONE HCl ER 20 MG 2020 12:00:00 AM EST                  

         eCW1 ([facility])

 

             OxyCODONE HCl ER 20 MG 2020 12:00:00 AM EST                  

         eCW1 ([facility])

 

                          Acetaminophen 325 MG / Hydrocodone Bitartrate 5 MG Ora

l Tablet [Norco] 

2020 12:00:00 AM EST                                         eCW1 ([facility])

 

                          Acetaminophen 325 MG / Hydrocodone Bitartrate 5 MG Ora

l Tablet [Norco] 

2020 12:00:00 AM EST                                         eCW1 ([facility])

 

                          Acetaminophen 325 MG / Hydrocodone Bitartrate 5 MG Ora

l Tablet [Norco] 

2020 12:00:00 AM EST                                         eCW1 ([facility])

 

                          Acetaminophen 325 MG / Hydrocodone Bitartrate 5 MG Ora

l Tablet [Norco] 

2020 12:00:00 AM EST                                         eCW1 ([facility])

 

             OxyCODONE HCl ER 20 MG 2020 12:00:00 AM EST                  

         eCW1 ([facility])

 

             OxyCODONE HCl ER 20 MG 2020 12:00:00 AM EST                  

         eCW1 ([facility])

 

                          Acetaminophen 325 MG / Hydrocodone Bitartrate 5 MG Ora

l Tablet [Norco] 

2020 12:00:00 AM EST                                         eCW1 ([facility])

 

                          Acetaminophen 325 MG / Hydrocodone Bitartrate 5 MG Ora

l Tablet [Norco] 

2020 12:00:00 AM EST                                         eCW1 ([facility])

 

             OxyCODONE HCl ER 20 MG 10/30/2020 12:00:00 AM EDT                  

         eCW1 ([facility])

 

                          Acetaminophen 325 MG / Hydrocodone Bitartrate 5 MG Ora

l Tablet [Norco] 

10/13/2020 12:00:00 AM EDT                                         eCW1 ([facility])

 

                          Acetaminophen 325 MG / Hydrocodone Bitartrate 5 MG Ora

l Tablet [Norco] 

10/13/2020 12:00:00 AM EDT                                         eCW1 ([facility])

## 2022-01-27 ENCOUNTER — HOSPITAL ENCOUNTER (OUTPATIENT)
Dept: HOSPITAL 53 - M IRPRO | Age: 72
End: 2022-01-27
Attending: RADIOLOGY
Payer: MEDICARE

## 2022-01-27 VITALS — SYSTOLIC BLOOD PRESSURE: 171 MMHG | DIASTOLIC BLOOD PRESSURE: 78 MMHG

## 2022-01-27 DIAGNOSIS — N18.9: Primary | ICD-10-CM

## 2022-01-27 DIAGNOSIS — Z79.899: ICD-10-CM

## 2022-01-27 PROCEDURE — 99153 MOD SED SAME PHYS/QHP EA: CPT

## 2022-01-27 PROCEDURE — 88300 SURGICAL PATH GROSS: CPT

## 2022-01-27 PROCEDURE — 77012 CT SCAN FOR NEEDLE BIOPSY: CPT

## 2022-01-27 PROCEDURE — 50200 RENAL BIOPSY PERQ: CPT

## 2022-01-27 PROCEDURE — 99152 MOD SED SAME PHYS/QHP 5/>YRS: CPT

## 2022-06-16 ENCOUNTER — HOSPITAL ENCOUNTER (EMERGENCY)
Dept: HOSPITAL 53 - M ED | Age: 72
Discharge: HOME | End: 2022-06-16
Payer: COMMERCIAL

## 2022-06-16 VITALS — DIASTOLIC BLOOD PRESSURE: 79 MMHG | SYSTOLIC BLOOD PRESSURE: 184 MMHG

## 2022-06-16 VITALS — HEIGHT: 66 IN | WEIGHT: 140.3 LBS | BODY MASS INDEX: 22.55 KG/M2

## 2022-06-16 DIAGNOSIS — B02.9: Primary | ICD-10-CM

## 2022-06-16 DIAGNOSIS — Z79.899: ICD-10-CM

## 2022-06-16 DIAGNOSIS — I10: ICD-10-CM

## 2022-06-16 DIAGNOSIS — Z79.891: ICD-10-CM

## 2022-06-16 DIAGNOSIS — J44.9: ICD-10-CM

## 2022-06-16 DIAGNOSIS — Z79.4: ICD-10-CM

## 2022-06-16 DIAGNOSIS — F17.200: ICD-10-CM

## 2022-06-16 DIAGNOSIS — E78.5: ICD-10-CM

## 2022-06-16 DIAGNOSIS — G35: ICD-10-CM

## 2022-06-16 DIAGNOSIS — Z85.038: ICD-10-CM

## 2022-06-16 DIAGNOSIS — E11.9: ICD-10-CM

## 2022-06-16 DIAGNOSIS — Z86.73: ICD-10-CM

## 2022-06-16 LAB
ALBUMIN SERPL BCG-MCNC: 3.2 GM/DL (ref 3.2–5.2)
ALT SERPL W P-5'-P-CCNC: 12 U/L (ref 12–78)
BILIRUB CONJ SERPL-MCNC: 0.1 MG/DL (ref 0–0.2)
BILIRUB SERPL-MCNC: 0.3 MG/DL (ref 0.2–1)
BUN SERPL-MCNC: 34 MG/DL (ref 7–18)
CALCIUM SERPL-MCNC: 8.2 MG/DL (ref 8.8–10.2)
CHLORIDE SERPL-SCNC: 113 MEQ/L (ref 98–107)
CO2 SERPL-SCNC: 26 MEQ/L (ref 21–32)
CREAT SERPL-MCNC: 1.88 MG/DL (ref 0.7–1.3)
GFR SERPL CREATININE-BSD FRML MDRD: 38 ML/MIN/{1.73_M2} (ref 42–?)
GLUCOSE SERPL-MCNC: 145 MG/DL (ref 70–100)
HCT VFR BLD AUTO: 33.8 % (ref 42–52)
HGB BLD-MCNC: 11 G/DL (ref 13.5–17.5)
LIPASE SERPL-CCNC: 255 U/L (ref 73–393)
LYMPHOCYTES NFR BLD MANUAL: 20 % (ref 16–44)
MCH RBC QN AUTO: 32.2 PG (ref 27–33)
MCHC RBC AUTO-ENTMCNC: 32.5 G/DL (ref 32–36.5)
MCV RBC AUTO: 98.8 FL (ref 80–96)
MONOCYTES NFR BLD MANUAL: 8 % (ref 0–5)
NEUTROPHILS NFR BLD MANUAL: 67 % (ref 28–66)
PLATELET # BLD AUTO: 133 10^3/UL (ref 150–450)
PLATELET BLD QL SMEAR: NORMAL
POTASSIUM SERPL-SCNC: 3.9 MEQ/L (ref 3.5–5.1)
PROT SERPL-MCNC: 6.3 GM/DL (ref 6.4–8.2)
RBC # BLD AUTO: 3.42 10^6/UL (ref 4.3–6.1)
RBC MORPH BLD: NORMAL
RSV RNA NPH QL NAA+PROBE: NEGATIVE
SODIUM SERPL-SCNC: 144 MEQ/L (ref 136–145)
VARIANT LYMPHS NFR BLD MANUAL: 5 % (ref 0–5)
WBC # BLD AUTO: 6.5 10^3/UL (ref 4–10)

## 2022-06-16 PROCEDURE — 80048 BASIC METABOLIC PNL TOTAL CA: CPT

## 2022-06-16 PROCEDURE — 74176 CT ABD & PELVIS W/O CONTRAST: CPT

## 2022-06-16 PROCEDURE — 87040 BLOOD CULTURE FOR BACTERIA: CPT

## 2022-06-16 PROCEDURE — 81001 URINALYSIS AUTO W/SCOPE: CPT

## 2022-06-16 PROCEDURE — 93005 ELECTROCARDIOGRAM TRACING: CPT

## 2022-06-16 PROCEDURE — 87631 RESP VIRUS 3-5 TARGETS: CPT

## 2022-06-16 PROCEDURE — 83605 ASSAY OF LACTIC ACID: CPT

## 2022-06-16 PROCEDURE — 85025 COMPLETE CBC W/AUTO DIFF WBC: CPT

## 2022-06-16 PROCEDURE — 93041 RHYTHM ECG TRACING: CPT

## 2022-06-16 PROCEDURE — 80076 HEPATIC FUNCTION PANEL: CPT

## 2022-06-16 PROCEDURE — 96374 THER/PROPH/DIAG INJ IV PUSH: CPT

## 2022-06-16 PROCEDURE — 99285 EMERGENCY DEPT VISIT HI MDM: CPT

## 2022-06-16 PROCEDURE — 87086 URINE CULTURE/COLONY COUNT: CPT

## 2022-06-16 PROCEDURE — 83690 ASSAY OF LIPASE: CPT

## 2022-07-08 ENCOUNTER — HOSPITAL ENCOUNTER (OUTPATIENT)
Dept: HOSPITAL 53 - M SMT | Age: 72
End: 2022-07-08
Attending: NURSE PRACTITIONER
Payer: MEDICARE

## 2022-07-08 DIAGNOSIS — R31.29: Primary | ICD-10-CM

## 2022-07-08 LAB
APPEARANCE UR: (no result)
BACTERIA UR QL AUTO: NEGATIVE
BILIRUB UR QL STRIP.AUTO: NEGATIVE
GLUCOSE UR QL STRIP.AUTO: NEGATIVE MG/DL
HGB UR QL STRIP.AUTO: NEGATIVE
KETONES UR QL STRIP.AUTO: NEGATIVE MG/DL
LEUKOCYTE ESTERASE UR QL STRIP.AUTO: (no result)
MUCOUS THREADS URNS QL MICRO: (no result)
NITRITE UR QL STRIP.AUTO: NEGATIVE
PH UR STRIP.AUTO: 5 UNITS (ref 5–9)
PROT UR QL STRIP.AUTO: (no result) MG/DL
RBC # UR AUTO: 1 /HPF (ref 0–3)
SP GR UR STRIP.AUTO: 1.01 (ref 1–1.03)
SQUAMOUS #/AREA URNS AUTO: 1 /HPF (ref 0–6)
UROBILINOGEN UR QL STRIP.AUTO: 0.2 MG/DL (ref 0–2)
WBC #/AREA URNS AUTO: 1 /HPF (ref 0–3)

## 2022-08-04 ENCOUNTER — HOSPITAL ENCOUNTER (EMERGENCY)
Dept: HOSPITAL 53 - M ED | Age: 72
LOS: 1 days | Discharge: ANOTHER HOSPITAL | End: 2022-08-05
Payer: COMMERCIAL

## 2022-08-04 VITALS — HEIGHT: 67 IN | BODY MASS INDEX: 22.32 KG/M2 | WEIGHT: 142.2 LBS

## 2022-08-04 DIAGNOSIS — F32.9: ICD-10-CM

## 2022-08-04 DIAGNOSIS — E11.9: ICD-10-CM

## 2022-08-04 DIAGNOSIS — M48.061: ICD-10-CM

## 2022-08-04 DIAGNOSIS — F17.200: ICD-10-CM

## 2022-08-04 DIAGNOSIS — R00.1: ICD-10-CM

## 2022-08-04 DIAGNOSIS — Z86.73: ICD-10-CM

## 2022-08-04 DIAGNOSIS — I10: ICD-10-CM

## 2022-08-04 DIAGNOSIS — G83.4: Primary | ICD-10-CM

## 2022-08-04 DIAGNOSIS — J44.9: ICD-10-CM

## 2022-08-04 DIAGNOSIS — F41.9: ICD-10-CM

## 2022-08-04 DIAGNOSIS — M25.78: ICD-10-CM

## 2022-08-04 DIAGNOSIS — Z79.4: ICD-10-CM

## 2022-08-04 DIAGNOSIS — Z79.899: ICD-10-CM

## 2022-08-04 DIAGNOSIS — E78.5: ICD-10-CM

## 2022-08-04 LAB
ALBUMIN SERPL BCG-MCNC: 3.3 GM/DL (ref 3.2–5.2)
ALT SERPL W P-5'-P-CCNC: 25 U/L (ref 12–78)
BASOPHILS # BLD AUTO: 0.1 10^3/UL (ref 0–0.2)
BASOPHILS NFR BLD AUTO: 1 % (ref 0–1)
BILIRUB SERPL-MCNC: 0.2 MG/DL (ref 0.2–1)
BUN SERPL-MCNC: 38 MG/DL (ref 7–18)
CALCIUM SERPL-MCNC: 9 MG/DL (ref 8.8–10.2)
CHLORIDE SERPL-SCNC: 115 MEQ/L (ref 98–107)
CO2 SERPL-SCNC: 27 MEQ/L (ref 21–32)
CREAT SERPL-MCNC: 1.8 MG/DL (ref 0.7–1.3)
EOSINOPHIL # BLD AUTO: 0.2 10^3/UL (ref 0–0.5)
EOSINOPHIL NFR BLD AUTO: 1.9 % (ref 0–3)
GFR SERPL CREATININE-BSD FRML MDRD: 39.9 ML/MIN/{1.73_M2} (ref 42–?)
GLUCOSE SERPL-MCNC: 92 MG/DL (ref 70–100)
HCT VFR BLD AUTO: 34.6 % (ref 42–52)
HGB BLD-MCNC: 11.2 G/DL (ref 13.5–17.5)
LYMPHOCYTES # BLD AUTO: 2 10^3/UL (ref 1.5–5)
LYMPHOCYTES NFR BLD AUTO: 24.5 % (ref 24–44)
MCH RBC QN AUTO: 33.2 PG (ref 27–33)
MCHC RBC AUTO-ENTMCNC: 32.4 G/DL (ref 32–36.5)
MCV RBC AUTO: 102.7 FL (ref 80–96)
MONOCYTES # BLD AUTO: 0.7 10^3/UL (ref 0–0.8)
MONOCYTES NFR BLD AUTO: 8.3 % (ref 2–8)
NEUTROPHILS # BLD AUTO: 5.2 10^3/UL (ref 1.5–8.5)
NEUTROPHILS NFR BLD AUTO: 63.9 % (ref 36–66)
PLATELET # BLD AUTO: 159 10^3/UL (ref 150–450)
POTASSIUM SERPL-SCNC: 4.2 MEQ/L (ref 3.5–5.1)
PROT SERPL-MCNC: 6.4 GM/DL (ref 6.4–8.2)
RBC # BLD AUTO: 3.37 10^6/UL (ref 4.3–6.1)
RSV RNA NPH QL NAA+PROBE: NEGATIVE
SODIUM SERPL-SCNC: 144 MEQ/L (ref 136–145)
WBC # BLD AUTO: 8.1 10^3/UL (ref 4–10)

## 2022-08-04 PROCEDURE — 70450 CT HEAD/BRAIN W/O DYE: CPT

## 2022-08-04 PROCEDURE — 85025 COMPLETE CBC W/AUTO DIFF WBC: CPT

## 2022-08-04 PROCEDURE — 87631 RESP VIRUS 3-5 TARGETS: CPT

## 2022-08-04 PROCEDURE — 99285 EMERGENCY DEPT VISIT HI MDM: CPT

## 2022-08-04 PROCEDURE — 80053 COMPREHEN METABOLIC PANEL: CPT

## 2022-08-04 PROCEDURE — 72132 CT LUMBAR SPINE W/DYE: CPT

## 2022-08-05 VITALS — DIASTOLIC BLOOD PRESSURE: 76 MMHG | SYSTOLIC BLOOD PRESSURE: 153 MMHG

## 2022-08-19 ENCOUNTER — HOSPITAL ENCOUNTER (OUTPATIENT)
Dept: HOSPITAL 53 - M SMT | Age: 72
End: 2022-08-19
Attending: UROLOGY
Payer: MEDICARE

## 2022-08-19 DIAGNOSIS — R31.29: Primary | ICD-10-CM

## 2022-08-19 LAB
AMORPH URATE CRY URNS QL MICRO: (no result) /HPF
APPEARANCE UR: CLEAR
BACTERIA URNS QL MICRO: (no result)
BILIRUB UR QL STRIP: NEGATIVE
COLOR UR: YELLOW
GLUCOSE UR STRIP-MCNC: NEGATIVE MG/DL
HGB UR QL STRIP: NEGATIVE
HYALINE CASTS URNS QL MICRO: (no result) /LPF (ref 0–1)
KETONES UR QL STRIP: NEGATIVE MG/DL
LEUKOCYTE ESTERASE UR QL STRIP: (no result)
MUCOUS THREADS URNS QL MICRO: (no result)
NITRITE UR QL STRIP: NEGATIVE
PH UR STRIP: 5.5 UNITS (ref 5–7)
PROT UR STRIP-MCNC: (no result) MG/DL
RBC #/AREA URNS HPF: (no result) /HPF (ref 0–3)
SP GR UR STRIP: 1.02 (ref 1–1.03)
SQUAMOUS URNS QL MICRO: (no result) /HPF
UROBILINOGEN UR QL STRIP: NORMAL MG/DL
WBC #/AREA URNS HPF: (no result) /HPF (ref 0–3)

## 2022-10-05 ENCOUNTER — HOSPITAL ENCOUNTER (OUTPATIENT)
Dept: HOSPITAL 53 - M LABSMTC | Age: 72
End: 2022-10-05
Attending: ANESTHESIOLOGY
Payer: MEDICARE

## 2022-10-05 DIAGNOSIS — Z20.822: ICD-10-CM

## 2022-10-05 DIAGNOSIS — Z01.812: Primary | ICD-10-CM

## 2022-10-10 ENCOUNTER — HOSPITAL ENCOUNTER (OUTPATIENT)
Dept: HOSPITAL 53 - M OPP | Age: 72
Discharge: HOME | End: 2022-10-10
Attending: INTERNAL MEDICINE
Payer: MEDICARE

## 2022-10-10 VITALS — BODY MASS INDEX: 22.66 KG/M2 | HEIGHT: 67 IN | WEIGHT: 144.4 LBS

## 2022-10-10 VITALS — SYSTOLIC BLOOD PRESSURE: 145 MMHG | DIASTOLIC BLOOD PRESSURE: 70 MMHG

## 2022-10-10 DIAGNOSIS — F41.9: ICD-10-CM

## 2022-10-10 DIAGNOSIS — K57.30: ICD-10-CM

## 2022-10-10 DIAGNOSIS — Z86.73: ICD-10-CM

## 2022-10-10 DIAGNOSIS — Z90.49: ICD-10-CM

## 2022-10-10 DIAGNOSIS — Z79.891: ICD-10-CM

## 2022-10-10 DIAGNOSIS — Z85.038: Primary | ICD-10-CM

## 2022-10-10 DIAGNOSIS — F32.9: ICD-10-CM

## 2022-10-10 DIAGNOSIS — E78.00: ICD-10-CM

## 2022-10-10 DIAGNOSIS — J44.9: ICD-10-CM

## 2022-10-10 DIAGNOSIS — K64.4: ICD-10-CM

## 2022-10-10 DIAGNOSIS — Z98.0: ICD-10-CM

## 2022-10-10 DIAGNOSIS — Z79.4: ICD-10-CM

## 2022-10-10 DIAGNOSIS — F17.200: ICD-10-CM

## 2022-10-10 DIAGNOSIS — N18.30: ICD-10-CM

## 2022-10-10 DIAGNOSIS — Z79.899: ICD-10-CM

## 2022-10-10 DIAGNOSIS — Z86.010: ICD-10-CM

## 2022-10-10 DIAGNOSIS — I12.9: ICD-10-CM

## 2022-10-10 DIAGNOSIS — G35: ICD-10-CM

## 2022-10-10 DIAGNOSIS — K64.8: ICD-10-CM

## 2022-10-10 DIAGNOSIS — Z79.02: ICD-10-CM

## 2022-12-05 ENCOUNTER — HOSPITAL ENCOUNTER (OUTPATIENT)
Dept: HOSPITAL 53 - M RAD | Age: 72
End: 2022-12-05
Attending: PHYSICAL MEDICINE & REHABILITATION
Payer: COMMERCIAL

## 2022-12-05 DIAGNOSIS — M47.896: ICD-10-CM

## 2022-12-05 DIAGNOSIS — M51.16: Primary | ICD-10-CM

## 2022-12-05 PROCEDURE — 78306 BONE IMAGING WHOLE BODY: CPT

## 2023-04-07 ENCOUNTER — HOSPITAL ENCOUNTER (OUTPATIENT)
Dept: HOSPITAL 53 - M PLALAB | Age: 73
End: 2023-04-07
Attending: PHYSICAL MEDICINE & REHABILITATION
Payer: COMMERCIAL

## 2023-04-07 DIAGNOSIS — M51.36: Primary | ICD-10-CM

## 2023-04-07 DIAGNOSIS — M48.062: ICD-10-CM

## 2023-10-11 ENCOUNTER — HOSPITAL ENCOUNTER (OUTPATIENT)
Dept: HOSPITAL 53 - M LAB REF | Age: 73
End: 2023-10-11
Attending: NURSE PRACTITIONER
Payer: COMMERCIAL

## 2023-10-11 DIAGNOSIS — R80.9: Primary | ICD-10-CM

## 2023-10-11 LAB
CREAT UR-MCNC: 138 MG/DL
PROT UR-MCNC: 171.1 MG/DL (ref 0–14)

## 2024-03-14 ENCOUNTER — HOSPITAL ENCOUNTER (OUTPATIENT)
Dept: HOSPITAL 53 - M SMT | Age: 74
End: 2024-03-14
Attending: UROLOGY
Payer: COMMERCIAL

## 2024-03-14 DIAGNOSIS — R31.29: Primary | ICD-10-CM

## 2024-03-14 LAB
APPEARANCE UR: (no result)
BACTERIA UR QL AUTO: NEGATIVE
BILIRUB UR QL STRIP.AUTO: NEGATIVE
GLUCOSE UR QL STRIP.AUTO: NEGATIVE MG/DL
HGB UR QL STRIP.AUTO: NEGATIVE
KETONES UR QL STRIP.AUTO: NEGATIVE MG/DL
LEUKOCYTE ESTERASE UR QL STRIP.AUTO: NEGATIVE
MUCOUS THREADS URNS QL MICRO: (no result)
NITRITE UR QL STRIP.AUTO: NEGATIVE
PH UR STRIP.AUTO: 5 UNITS (ref 5–9)
PROT UR QL STRIP.AUTO: (no result) MG/DL
RBC # UR AUTO: 1 /HPF (ref 0–3)
SP GR UR STRIP.AUTO: 1.02 (ref 1–1.03)
SQUAMOUS #/AREA URNS AUTO: 1 /HPF (ref 0–6)
UROBILINOGEN UR QL STRIP.AUTO: 0.2 MG/DL (ref 0–2)
WBC #/AREA URNS AUTO: 1 /HPF (ref 0–3)

## 2024-03-28 ENCOUNTER — HOSPITAL ENCOUNTER (OUTPATIENT)
Dept: HOSPITAL 53 - M PT | Age: 74
LOS: 3 days | End: 2024-03-31
Attending: PHYSICAL MEDICINE & REHABILITATION
Payer: COMMERCIAL

## 2024-03-28 DIAGNOSIS — M47.897: Primary | ICD-10-CM

## 2024-04-04 ENCOUNTER — HOSPITAL ENCOUNTER (OUTPATIENT)
Dept: HOSPITAL 53 - M PT | Age: 74
LOS: 26 days | End: 2024-04-30
Attending: PHYSICAL MEDICINE & REHABILITATION
Payer: COMMERCIAL

## 2024-04-04 DIAGNOSIS — M47.897: Primary | ICD-10-CM

## 2024-06-06 ENCOUNTER — HOSPITAL ENCOUNTER (INPATIENT)
Dept: HOSPITAL 53 - M ED | Age: 74
LOS: 3 days | Discharge: HOME | DRG: 684 | End: 2024-06-09
Attending: INTERNAL MEDICINE | Admitting: INTERNAL MEDICINE
Payer: MEDICARE

## 2024-06-06 VITALS — BODY MASS INDEX: 22.01 KG/M2 | WEIGHT: 140.21 LBS | HEIGHT: 67 IN

## 2024-06-06 DIAGNOSIS — D50.9: ICD-10-CM

## 2024-06-06 DIAGNOSIS — E78.5: ICD-10-CM

## 2024-06-06 DIAGNOSIS — F41.9: ICD-10-CM

## 2024-06-06 DIAGNOSIS — Z98.41: ICD-10-CM

## 2024-06-06 DIAGNOSIS — G35: ICD-10-CM

## 2024-06-06 DIAGNOSIS — R53.1: ICD-10-CM

## 2024-06-06 DIAGNOSIS — I65.29: ICD-10-CM

## 2024-06-06 DIAGNOSIS — F17.200: ICD-10-CM

## 2024-06-06 DIAGNOSIS — E78.00: ICD-10-CM

## 2024-06-06 DIAGNOSIS — K29.70: ICD-10-CM

## 2024-06-06 DIAGNOSIS — R29.6: ICD-10-CM

## 2024-06-06 DIAGNOSIS — Z86.73: ICD-10-CM

## 2024-06-06 DIAGNOSIS — E11.40: ICD-10-CM

## 2024-06-06 DIAGNOSIS — I12.9: ICD-10-CM

## 2024-06-06 DIAGNOSIS — Z79.899: ICD-10-CM

## 2024-06-06 DIAGNOSIS — I95.1: ICD-10-CM

## 2024-06-06 DIAGNOSIS — N17.9: Primary | ICD-10-CM

## 2024-06-06 DIAGNOSIS — N18.30: ICD-10-CM

## 2024-06-06 DIAGNOSIS — Z85.038: ICD-10-CM

## 2024-06-06 DIAGNOSIS — F32.A: ICD-10-CM

## 2024-06-06 DIAGNOSIS — J44.9: ICD-10-CM

## 2024-06-06 DIAGNOSIS — E11.22: ICD-10-CM

## 2024-06-06 DIAGNOSIS — Z98.42: ICD-10-CM

## 2024-06-06 LAB
ALBUMIN SERPL BCG-MCNC: 2.9 G/DL (ref 3.2–5.2)
ALP SERPL-CCNC: 109 U/L (ref 46–116)
ALT SERPL W P-5'-P-CCNC: 10 U/L (ref 7–40)
AST SERPL-CCNC: < 8 U/L (ref ?–34)
BASOPHILS # BLD AUTO: 0.1 10^3/UL (ref 0–0.2)
BASOPHILS NFR BLD AUTO: 0.9 % (ref 0–1)
BILIRUB SERPL-MCNC: 0.2 MG/DL (ref 0.3–1.2)
BUN SERPL-MCNC: 53 MG/DL (ref 9–23)
CALCIUM SERPL-MCNC: 7.9 MG/DL (ref 8.3–10.6)
CHLORIDE SERPL-SCNC: 122 MMOL/L (ref 98–107)
CO2 SERPL-SCNC: 25 MMOL/L (ref 20–31)
CREAT SERPL-MCNC: 3.63 MG/DL (ref 0.7–1.3)
EOSINOPHIL # BLD AUTO: 0.1 10^3/UL (ref 0–0.5)
EOSINOPHIL NFR BLD AUTO: 1.7 % (ref 0–3)
GFR SERPL CREATININE-BSD FRML MDRD: 17.7 ML/MIN/{1.73_M2} (ref 42–?)
GLUCOSE SERPL-MCNC: 156 MG/DL (ref 74–106)
HCT VFR BLD AUTO: 35.8 % (ref 42–52)
HGB BLD-MCNC: 11.9 G/DL (ref 13.5–17.5)
LYMPHOCYTES # BLD AUTO: 1.8 10^3/UL (ref 1.5–5)
LYMPHOCYTES NFR BLD AUTO: 22.9 % (ref 24–44)
MCH RBC QN AUTO: 33.4 PG (ref 27–33)
MCHC RBC AUTO-ENTMCNC: 33.2 G/DL (ref 32–36.5)
MCV RBC AUTO: 100.6 FL (ref 80–96)
MONOCYTES # BLD AUTO: 0.6 10^3/UL (ref 0–0.8)
MONOCYTES NFR BLD AUTO: 8 % (ref 2–8)
NEUTROPHILS # BLD AUTO: 5.1 10^3/UL (ref 1.5–8.5)
NEUTROPHILS NFR BLD AUTO: 65.6 % (ref 36–66)
PLATELET # BLD AUTO: 127 10^3/UL (ref 150–450)
POTASSIUM SERPL-SCNC: 5.2 MMOL/L (ref 3.5–5.1)
PROT SERPL-MCNC: 5.3 G/DL (ref 5.7–8.2)
RBC # BLD AUTO: 3.56 10^6/UL (ref 4.3–6.1)
SODIUM SERPL-SCNC: 146 MMOL/L (ref 136–145)
WBC # BLD AUTO: 7.7 10^3/UL (ref 4–10)

## 2024-06-06 RX ADMIN — SODIUM CHLORIDE ONE MLS/HR: 9 INJECTION, SOLUTION INTRAVENOUS at 23:57

## 2024-06-07 VITALS — SYSTOLIC BLOOD PRESSURE: 184 MMHG | DIASTOLIC BLOOD PRESSURE: 70 MMHG | OXYGEN SATURATION: 98 % | TEMPERATURE: 97.5 F

## 2024-06-07 VITALS — SYSTOLIC BLOOD PRESSURE: 144 MMHG | DIASTOLIC BLOOD PRESSURE: 58 MMHG | TEMPERATURE: 98.1 F | OXYGEN SATURATION: 97 %

## 2024-06-07 VITALS — SYSTOLIC BLOOD PRESSURE: 181 MMHG | DIASTOLIC BLOOD PRESSURE: 75 MMHG

## 2024-06-07 VITALS — DIASTOLIC BLOOD PRESSURE: 72 MMHG | SYSTOLIC BLOOD PRESSURE: 182 MMHG

## 2024-06-07 VITALS — OXYGEN SATURATION: 95 %

## 2024-06-07 LAB
ALBUMIN SERPL BCG-MCNC: 2.7 G/DL (ref 3.2–5.2)
ALP SERPL-CCNC: 99 U/L (ref 46–116)
ALT SERPL W P-5'-P-CCNC: < 9 U/L (ref 7–40)
APTT BLD: 32.1 SECONDS (ref 24.8–34.2)
AST SERPL-CCNC: 9 U/L (ref ?–34)
BILIRUB SERPL-MCNC: 0.3 MG/DL (ref 0.3–1.2)
BUN SERPL-MCNC: 47 MG/DL (ref 9–23)
CALCIUM SERPL-MCNC: 7.6 MG/DL (ref 8.3–10.6)
CHLORIDE SERPL-SCNC: 123 MMOL/L (ref 98–107)
CO2 SERPL-SCNC: 21 MMOL/L (ref 20–31)
CREAT SERPL-MCNC: 3.19 MG/DL (ref 0.7–1.3)
GFR SERPL CREATININE-BSD FRML MDRD: 20.5 ML/MIN/{1.73_M2} (ref 42–?)
GLUCOSE SERPL-MCNC: 78 MG/DL (ref 74–106)
HCT VFR BLD AUTO: 32.4 % (ref 42–52)
HGB BLD-MCNC: 11 G/DL (ref 13.5–17.5)
INR PPP: 1.08
MAGNESIUM SERPL-MCNC: 2.5 MG/DL (ref 1.8–2.4)
MCH RBC QN AUTO: 33.8 PG (ref 27–33)
MCHC RBC AUTO-ENTMCNC: 34 G/DL (ref 32–36.5)
MCV RBC AUTO: 99.7 FL (ref 80–96)
PLATELET # BLD AUTO: 130 10^3/UL (ref 150–450)
POTASSIUM SERPL-SCNC: 4.2 MMOL/L (ref 3.5–5.1)
PROCALCITONIN SERPL-MCNC: <0.04 NG/ML
PROT SERPL-MCNC: 4.8 G/DL (ref 5.7–8.2)
PROTHROMBIN TIME: 13.7 SECONDS (ref 12.5–14.5)
RBC # BLD AUTO: 3.25 10^6/UL (ref 4.3–6.1)
SODIUM SERPL-SCNC: 145 MMOL/L (ref 136–145)
WBC # BLD AUTO: 7.2 10^3/UL (ref 4–10)

## 2024-06-07 RX ADMIN — TAMSULOSIN HYDROCHLORIDE SCH MG: 0.4 CAPSULE ORAL at 09:42

## 2024-06-07 RX ADMIN — DULOXETINE HYDROCHLORIDE SCH MG: 30 CAPSULE, DELAYED RELEASE ORAL at 09:42

## 2024-06-07 RX ADMIN — SODIUM CHLORIDE SCH UNITS: 4.5 INJECTION, SOLUTION INTRAVENOUS at 10:33

## 2024-06-07 RX ADMIN — ATORVASTATIN CALCIUM SCH MG: 20 TABLET, FILM COATED ORAL at 09:42

## 2024-06-07 RX ADMIN — CLOPIDOGREL BISULFATE SCH MG: 75 TABLET ORAL at 21:11

## 2024-06-07 RX ADMIN — SODIUM CHLORIDE SCH MLS/HR: 9 INJECTION, SOLUTION INTRAVENOUS at 03:49

## 2024-06-07 RX ADMIN — HYDRALAZINE HYDROCHLORIDE SCH MG: 25 TABLET, FILM COATED ORAL at 17:24

## 2024-06-07 RX ADMIN — DOCUSATE SODIUM SCH MG: 100 CAPSULE, LIQUID FILLED ORAL at 09:42

## 2024-06-08 VITALS — SYSTOLIC BLOOD PRESSURE: 164 MMHG | DIASTOLIC BLOOD PRESSURE: 93 MMHG

## 2024-06-08 VITALS — DIASTOLIC BLOOD PRESSURE: 65 MMHG | OXYGEN SATURATION: 98 % | SYSTOLIC BLOOD PRESSURE: 140 MMHG

## 2024-06-08 VITALS — SYSTOLIC BLOOD PRESSURE: 147 MMHG | DIASTOLIC BLOOD PRESSURE: 67 MMHG

## 2024-06-08 VITALS — TEMPERATURE: 97.7 F | SYSTOLIC BLOOD PRESSURE: 165 MMHG | OXYGEN SATURATION: 96 % | DIASTOLIC BLOOD PRESSURE: 69 MMHG

## 2024-06-08 VITALS — OXYGEN SATURATION: 96 % | TEMPERATURE: 97.5 F | DIASTOLIC BLOOD PRESSURE: 67 MMHG | SYSTOLIC BLOOD PRESSURE: 163 MMHG

## 2024-06-08 VITALS — SYSTOLIC BLOOD PRESSURE: 150 MMHG | DIASTOLIC BLOOD PRESSURE: 66 MMHG | OXYGEN SATURATION: 97 % | TEMPERATURE: 98.2 F

## 2024-06-08 LAB
ALBUMIN SERPL BCG-MCNC: 2.4 G/DL (ref 3.2–5.2)
ALP SERPL-CCNC: 99 U/L (ref 46–116)
ALT SERPL W P-5'-P-CCNC: < 9 U/L (ref 7–40)
AST SERPL-CCNC: < 8 U/L (ref ?–34)
BILIRUB SERPL-MCNC: 0.3 MG/DL (ref 0.3–1.2)
BUN SERPL-MCNC: 42 MG/DL (ref 9–23)
CALCIUM SERPL-MCNC: 7.8 MG/DL (ref 8.3–10.6)
CHLORIDE SERPL-SCNC: 122 MMOL/L (ref 98–107)
CO2 SERPL-SCNC: 24 MMOL/L (ref 20–31)
CREAT SERPL-MCNC: 2.87 MG/DL (ref 0.7–1.3)
GFR SERPL CREATININE-BSD FRML MDRD: 23.2 ML/MIN/{1.73_M2} (ref 42–?)
GLUCOSE SERPL-MCNC: 88 MG/DL (ref 74–106)
HCT VFR BLD AUTO: 33.1 % (ref 42–52)
HGB BLD-MCNC: 11.2 G/DL (ref 13.5–17.5)
MAGNESIUM SERPL-MCNC: 2.4 MG/DL (ref 1.8–2.4)
MCH RBC QN AUTO: 33.6 PG (ref 27–33)
MCHC RBC AUTO-ENTMCNC: 33.8 G/DL (ref 32–36.5)
MCV RBC AUTO: 99.4 FL (ref 80–96)
PLATELET # BLD AUTO: 102 10^3/UL (ref 150–450)
POTASSIUM SERPL-SCNC: 4.6 MMOL/L (ref 3.5–5.1)
PROT SERPL-MCNC: 4.5 G/DL (ref 5.7–8.2)
RBC # BLD AUTO: 3.33 10^6/UL (ref 4.3–6.1)
SODIUM SERPL-SCNC: 146 MMOL/L (ref 136–145)
WBC # BLD AUTO: 5.6 10^3/UL (ref 4–10)

## 2024-06-08 RX ADMIN — Medication SCH: at 09:00

## 2024-06-09 VITALS — SYSTOLIC BLOOD PRESSURE: 180 MMHG | DIASTOLIC BLOOD PRESSURE: 74 MMHG

## 2024-06-09 VITALS — DIASTOLIC BLOOD PRESSURE: 67 MMHG | SYSTOLIC BLOOD PRESSURE: 163 MMHG | OXYGEN SATURATION: 98 % | TEMPERATURE: 97.5 F

## 2024-06-09 VITALS — TEMPERATURE: 97.9 F | DIASTOLIC BLOOD PRESSURE: 63 MMHG | OXYGEN SATURATION: 98 % | SYSTOLIC BLOOD PRESSURE: 144 MMHG

## 2024-06-09 VITALS — SYSTOLIC BLOOD PRESSURE: 158 MMHG | DIASTOLIC BLOOD PRESSURE: 62 MMHG

## 2024-06-09 VITALS — DIASTOLIC BLOOD PRESSURE: 60 MMHG | SYSTOLIC BLOOD PRESSURE: 130 MMHG

## 2024-06-09 VITALS — DIASTOLIC BLOOD PRESSURE: 64 MMHG | SYSTOLIC BLOOD PRESSURE: 158 MMHG

## 2024-06-09 LAB
BASOPHILS # BLD AUTO: 0.1 10^3/UL (ref 0–0.2)
BASOPHILS NFR BLD AUTO: 1.1 % (ref 0–1)
BUN SERPL-MCNC: 48 MG/DL (ref 9–23)
CALCIUM SERPL-MCNC: 8 MG/DL (ref 8.3–10.6)
CHLORIDE SERPL-SCNC: 122 MMOL/L (ref 98–107)
CO2 SERPL-SCNC: 24 MMOL/L (ref 20–31)
CREAT SERPL-MCNC: 2.89 MG/DL (ref 0.7–1.3)
EOSINOPHIL # BLD AUTO: 0.1 10^3/UL (ref 0–0.5)
EOSINOPHIL NFR BLD AUTO: 2.2 % (ref 0–3)
GFR SERPL CREATININE-BSD FRML MDRD: 23 ML/MIN/{1.73_M2} (ref 42–?)
GLUCOSE SERPL-MCNC: 127 MG/DL (ref 74–106)
HCT VFR BLD AUTO: 33.8 % (ref 42–52)
HGB BLD-MCNC: 11.3 G/DL (ref 13.5–17.5)
LYMPHOCYTES # BLD AUTO: 1.5 10^3/UL (ref 1.5–5)
LYMPHOCYTES NFR BLD AUTO: 23.6 % (ref 24–44)
MCH RBC QN AUTO: 33.2 PG (ref 27–33)
MCHC RBC AUTO-ENTMCNC: 33.4 G/DL (ref 32–36.5)
MCV RBC AUTO: 99.4 FL (ref 80–96)
MONOCYTES # BLD AUTO: 0.5 10^3/UL (ref 0–0.8)
MONOCYTES NFR BLD AUTO: 8 % (ref 2–8)
NEUTROPHILS # BLD AUTO: 4.2 10^3/UL (ref 1.5–8.5)
NEUTROPHILS NFR BLD AUTO: 64.8 % (ref 36–66)
PLATELET # BLD AUTO: 110 10^3/UL (ref 150–450)
POTASSIUM SERPL-SCNC: 4.6 MMOL/L (ref 3.5–5.1)
RBC # BLD AUTO: 3.4 10^6/UL (ref 4.3–6.1)
SODIUM SERPL-SCNC: 148 MMOL/L (ref 136–145)
WBC # BLD AUTO: 6.4 10^3/UL (ref 4–10)

## 2024-06-09 RX ADMIN — HYDRALAZINE HYDROCHLORIDE ONE MG: 50 TABLET, FILM COATED ORAL at 14:03

## 2024-06-09 RX ADMIN — SODIUM CHLORIDE ONE MLS/HR: 9 INJECTION, SOLUTION INTRAVENOUS at 11:05

## 2024-06-26 ENCOUNTER — HOSPITAL ENCOUNTER (OUTPATIENT)
Dept: HOSPITAL 53 - M WUC | Age: 74
End: 2024-06-26
Attending: NURSE PRACTITIONER
Payer: MEDICARE

## 2024-06-26 DIAGNOSIS — R05.3: Primary | ICD-10-CM

## 2024-08-06 ENCOUNTER — HOSPITAL ENCOUNTER (OUTPATIENT)
Dept: HOSPITAL 53 - M PLAIMG | Age: 74
End: 2024-08-06
Attending: PHYSICAL MEDICINE & REHABILITATION
Payer: COMMERCIAL

## 2024-08-06 DIAGNOSIS — M48.061: ICD-10-CM

## 2024-08-06 DIAGNOSIS — M47.897: Primary | ICD-10-CM

## 2024-08-06 DIAGNOSIS — M51.26: ICD-10-CM

## 2024-08-06 PROCEDURE — 72158 MRI LUMBAR SPINE W/O & W/DYE: CPT

## 2024-08-08 ENCOUNTER — HOSPITAL ENCOUNTER (OUTPATIENT)
Dept: HOSPITAL 53 - M LAB REF | Age: 74
End: 2024-08-08
Attending: NURSE PRACTITIONER
Payer: COMMERCIAL

## 2024-08-08 DIAGNOSIS — R80.9: Primary | ICD-10-CM

## 2024-08-09 LAB — PROT UR-MCNC: 1225.1 MG/DL (ref 0–14)

## 2024-08-12 LAB — CREAT UR-MCNC: 123.9 MG/DL

## 2024-08-22 ENCOUNTER — HOSPITAL ENCOUNTER (OUTPATIENT)
Dept: HOSPITAL 53 - M LAB REF | Age: 74
End: 2024-08-22
Attending: NURSE PRACTITIONER
Payer: COMMERCIAL

## 2024-08-22 DIAGNOSIS — E88.09: ICD-10-CM

## 2024-08-22 DIAGNOSIS — D50.9: Primary | ICD-10-CM

## 2024-08-22 LAB
ALBUMIN SERPL BCG-MCNC: 2.7 G/DL (ref 3.2–5.2)
ALP SERPL-CCNC: 102 U/L (ref 46–116)
ALT SERPL W P-5'-P-CCNC: 12 U/L (ref 7–40)
AST SERPL-CCNC: 12 U/L (ref ?–34)
BILIRUB CONJ SERPL-MCNC: < 0.1 MG/DL (ref ?–0.4)
BILIRUB SERPL-MCNC: 0.2 MG/DL (ref 0.3–1.2)
FERRITIN SERPL-MCNC: 127.1 NG/ML (ref 10.5–307.3)
IRON SATN MFR SERPL: 34 % (ref 19.7–50)
IRON SERPL-MCNC: 68 UG/DL (ref 65–175)
PROT SERPL-MCNC: 5.1 G/DL (ref 5.7–8.2)
TIBC SERPL-MCNC: 200 UG/DL (ref 250–425)

## 2024-09-16 ENCOUNTER — HOSPITAL ENCOUNTER (INPATIENT)
Dept: HOSPITAL 53 - M ED | Age: 74
LOS: 3 days | Discharge: HOME | DRG: 683 | End: 2024-09-19
Attending: GENERAL PRACTICE | Admitting: GENERAL PRACTICE
Payer: MEDICARE

## 2024-09-16 VITALS — WEIGHT: 137.79 LBS | HEIGHT: 67 IN | BODY MASS INDEX: 21.63 KG/M2

## 2024-09-16 DIAGNOSIS — J44.9: ICD-10-CM

## 2024-09-16 DIAGNOSIS — R26.89: ICD-10-CM

## 2024-09-16 DIAGNOSIS — N17.9: Primary | ICD-10-CM

## 2024-09-16 DIAGNOSIS — Z79.899: ICD-10-CM

## 2024-09-16 DIAGNOSIS — G35: ICD-10-CM

## 2024-09-16 DIAGNOSIS — I95.1: ICD-10-CM

## 2024-09-16 DIAGNOSIS — I12.9: ICD-10-CM

## 2024-09-16 DIAGNOSIS — E87.0: ICD-10-CM

## 2024-09-16 DIAGNOSIS — E78.5: ICD-10-CM

## 2024-09-16 DIAGNOSIS — Z86.73: ICD-10-CM

## 2024-09-16 DIAGNOSIS — F41.9: ICD-10-CM

## 2024-09-16 DIAGNOSIS — F32.A: ICD-10-CM

## 2024-09-16 DIAGNOSIS — E11.42: ICD-10-CM

## 2024-09-16 DIAGNOSIS — E11.22: ICD-10-CM

## 2024-09-16 DIAGNOSIS — F17.210: ICD-10-CM

## 2024-09-16 DIAGNOSIS — D63.1: ICD-10-CM

## 2024-09-16 DIAGNOSIS — M21.372: ICD-10-CM

## 2024-09-16 DIAGNOSIS — D50.9: ICD-10-CM

## 2024-09-16 DIAGNOSIS — I65.29: ICD-10-CM

## 2024-09-16 DIAGNOSIS — Z98.42: ICD-10-CM

## 2024-09-16 DIAGNOSIS — N18.4: ICD-10-CM

## 2024-09-16 DIAGNOSIS — R29.6: ICD-10-CM

## 2024-09-16 DIAGNOSIS — Z98.41: ICD-10-CM

## 2024-09-16 DIAGNOSIS — D41.02: ICD-10-CM

## 2024-09-16 LAB
ALBUMIN SERPL BCG-MCNC: 2.7 G/DL (ref 3.2–5.2)
ALP SERPL-CCNC: 103 U/L (ref 46–116)
ALT SERPL W P-5'-P-CCNC: 14 U/L (ref 7–40)
AST SERPL-CCNC: 10 U/L (ref ?–34)
BASOPHILS # BLD AUTO: 0.1 10^3/UL (ref 0–0.2)
BASOPHILS NFR BLD AUTO: 0.6 % (ref 0–1)
BILIRUB CONJ SERPL-MCNC: < 0.1 MG/DL (ref ?–0.4)
BILIRUB SERPL-MCNC: < 0.2 MG/DL (ref 0.3–1.2)
BUN SERPL-MCNC: 45 MG/DL (ref 9–23)
CALCIUM SERPL-MCNC: 7.8 MG/DL (ref 8.3–10.6)
CHLORIDE SERPL-SCNC: 122 MMOL/L (ref 98–107)
CO2 SERPL-SCNC: 23 MMOL/L (ref 20–31)
CREAT SERPL-MCNC: 3.43 MG/DL (ref 0.7–1.3)
EOSINOPHIL # BLD AUTO: 0.2 10^3/UL (ref 0–0.5)
EOSINOPHIL NFR BLD AUTO: 2 % (ref 0–3)
GFR SERPL CREATININE-BSD FRML MDRD: 18.9 ML/MIN/{1.73_M2} (ref 42–?)
GLUCOSE SERPL-MCNC: 128 MG/DL (ref 74–106)
HCT VFR BLD AUTO: 27.1 % (ref 42–52)
HGB BLD-MCNC: 9.1 G/DL (ref 13.5–17.5)
LIPASE SERPL-CCNC: 100 U/L (ref 12–53)
LYMPHOCYTES # BLD AUTO: 1.6 10^3/UL (ref 1.5–5)
LYMPHOCYTES NFR BLD AUTO: 20.4 % (ref 24–44)
MCH RBC QN AUTO: 33.7 PG (ref 27–33)
MCHC RBC AUTO-ENTMCNC: 33.6 G/DL (ref 32–36.5)
MCV RBC AUTO: 100.4 FL (ref 80–96)
MONOCYTES # BLD AUTO: 0.8 10^3/UL (ref 0–0.8)
MONOCYTES NFR BLD AUTO: 9.7 % (ref 2–8)
NEUTROPHILS # BLD AUTO: 5.2 10^3/UL (ref 1.5–8.5)
NEUTROPHILS NFR BLD AUTO: 67 % (ref 36–66)
PLATELET # BLD AUTO: 128 10^3/UL (ref 150–450)
POTASSIUM SERPL-SCNC: 4.8 MMOL/L (ref 3.5–5.1)
PROT SERPL-MCNC: 5.2 G/DL (ref 5.7–8.2)
RBC # BLD AUTO: 2.7 10^6/UL (ref 4.3–6.1)
SODIUM SERPL-SCNC: 145 MMOL/L (ref 136–145)
WBC # BLD AUTO: 7.8 10^3/UL (ref 4–10)

## 2024-09-17 VITALS — SYSTOLIC BLOOD PRESSURE: 174 MMHG | OXYGEN SATURATION: 96 % | DIASTOLIC BLOOD PRESSURE: 79 MMHG | TEMPERATURE: 98.3 F

## 2024-09-17 LAB
FOLATE SERPL-MCNC: 9.89 NG/ML (ref 5.4–?)
VIT B12 SERPL-MCNC: 399 PG/ML (ref 211–911)

## 2024-09-17 RX ADMIN — INSULIN LISPRO SCH UNITS: 100 INJECTION, SOLUTION INTRAVENOUS; SUBCUTANEOUS at 20:58

## 2024-09-17 RX ADMIN — HYDRALAZINE HYDROCHLORIDE ONE MG: 50 TABLET, FILM COATED ORAL at 09:16

## 2024-09-17 RX ADMIN — TAMSULOSIN HYDROCHLORIDE SCH MG: 0.4 CAPSULE ORAL at 10:02

## 2024-09-17 RX ADMIN — CLOPIDOGREL BISULFATE SCH MG: 75 TABLET ORAL at 10:02

## 2024-09-17 RX ADMIN — OXYCODONE HYDROCHLORIDE AND ACETAMINOPHEN SCH MG: 500 TABLET ORAL at 10:03

## 2024-09-17 RX ADMIN — Medication SCH MG: at 10:57

## 2024-09-17 RX ADMIN — ACETAMINOPHEN SCH MG: 500 TABLET ORAL at 15:15

## 2024-09-17 RX ADMIN — FERROUS GLUCONATE SCH MG: 324 TABLET ORAL at 10:57

## 2024-09-17 RX ADMIN — OMEPRAZOLE SCH MG: 20 CAPSULE, DELAYED RELEASE ORAL at 10:03

## 2024-09-17 RX ADMIN — INSULIN LISPRO SCH UNITS: 100 INJECTION, SOLUTION INTRAVENOUS; SUBCUTANEOUS at 13:33

## 2024-09-17 RX ADMIN — DULOXETINE HYDROCHLORIDE SCH MG: 30 CAPSULE, DELAYED RELEASE ORAL at 10:03

## 2024-09-17 RX ADMIN — SODIUM CHLORIDE ONE MLS/HR: 9 INJECTION, SOLUTION INTRAVENOUS at 08:04

## 2024-09-17 RX ADMIN — HYDRALAZINE HYDROCHLORIDE SCH MG: 50 TABLET, FILM COATED ORAL at 15:14

## 2024-09-17 RX ADMIN — ATORVASTATIN CALCIUM SCH MG: 20 TABLET, FILM COATED ORAL at 20:58

## 2024-09-18 VITALS — TEMPERATURE: 97.7 F | DIASTOLIC BLOOD PRESSURE: 79 MMHG | OXYGEN SATURATION: 98 % | SYSTOLIC BLOOD PRESSURE: 192 MMHG

## 2024-09-18 VITALS — OXYGEN SATURATION: 98 % | TEMPERATURE: 97.9 F | DIASTOLIC BLOOD PRESSURE: 73 MMHG | SYSTOLIC BLOOD PRESSURE: 187 MMHG

## 2024-09-18 VITALS — OXYGEN SATURATION: 98 % | SYSTOLIC BLOOD PRESSURE: 179 MMHG | TEMPERATURE: 98.3 F | DIASTOLIC BLOOD PRESSURE: 74 MMHG

## 2024-09-18 VITALS — OXYGEN SATURATION: 98 % | SYSTOLIC BLOOD PRESSURE: 164 MMHG | TEMPERATURE: 98.6 F | DIASTOLIC BLOOD PRESSURE: 56 MMHG

## 2024-09-18 VITALS — OXYGEN SATURATION: 95 % | SYSTOLIC BLOOD PRESSURE: 176 MMHG | DIASTOLIC BLOOD PRESSURE: 74 MMHG | TEMPERATURE: 98.3 F

## 2024-09-18 VITALS — OXYGEN SATURATION: 98 % | DIASTOLIC BLOOD PRESSURE: 74 MMHG | SYSTOLIC BLOOD PRESSURE: 185 MMHG | TEMPERATURE: 98 F

## 2024-09-18 VITALS — OXYGEN SATURATION: 98 % | DIASTOLIC BLOOD PRESSURE: 73 MMHG | TEMPERATURE: 98.1 F | SYSTOLIC BLOOD PRESSURE: 166 MMHG

## 2024-09-18 VITALS — SYSTOLIC BLOOD PRESSURE: 157 MMHG | OXYGEN SATURATION: 97 % | DIASTOLIC BLOOD PRESSURE: 67 MMHG | TEMPERATURE: 97.3 F

## 2024-09-18 VITALS — TEMPERATURE: 98.2 F | OXYGEN SATURATION: 97 % | SYSTOLIC BLOOD PRESSURE: 160 MMHG | DIASTOLIC BLOOD PRESSURE: 70 MMHG

## 2024-09-18 LAB
BUN SERPL-MCNC: 43 MG/DL (ref 9–23)
CALCIUM SERPL-MCNC: 7.3 MG/DL (ref 8.3–10.6)
CHLORIDE SERPL-SCNC: 123 MMOL/L (ref 98–107)
CHOLEST SERPL-MCNC: 97 MG/DL (ref ?–200)
CHOLEST/HDLC SERPL: 2.51 {RATIO} (ref ?–5)
CO2 SERPL-SCNC: 23 MMOL/L (ref 20–31)
CREAT SERPL-MCNC: 3.25 MG/DL (ref 0.7–1.3)
GFR SERPL CREATININE-BSD FRML MDRD: 20.1 ML/MIN/{1.73_M2} (ref 42–?)
GLUCOSE SERPL-MCNC: 86 MG/DL (ref 74–106)
HCT VFR BLD AUTO: 25 % (ref 42–52)
HDLC SERPL-MCNC: 38.5 MG/DL (ref 40–?)
HGB BLD-MCNC: 8.1 G/DL (ref 13.5–17.5)
LDLC SERPL CALC-MCNC: 44.5 MG/DL (ref ?–100)
MCH RBC QN AUTO: 32.9 PG (ref 27–33)
MCHC RBC AUTO-ENTMCNC: 32.4 G/DL (ref 32–36.5)
MCV RBC AUTO: 101.6 FL (ref 80–96)
NONHDLC SERPL-MCNC: 58.5 MG/DL
PLATELET # BLD AUTO: 106 10^3/UL (ref 150–450)
POTASSIUM SERPL-SCNC: 4.7 MMOL/L (ref 3.5–5.1)
RBC # BLD AUTO: 2.46 10^6/UL (ref 4.3–6.1)
SODIUM SERPL-SCNC: 143 MMOL/L (ref 136–145)
TRIGL SERPL-MCNC: 70 MG/DL (ref ?–150)
WBC # BLD AUTO: 5.4 10^3/UL (ref 4–10)

## 2024-09-18 RX ADMIN — OMEPRAZOLE SCH MG: 20 CAPSULE, DELAYED RELEASE ORAL at 08:55

## 2024-09-18 RX ADMIN — SODIUM CHLORIDE ONE MLS/HR: 9 INJECTION, SOLUTION INTRAVENOUS at 13:24

## 2024-09-18 RX ADMIN — DARBEPOETIN ALFA SCH MCG: 100 INJECTION, SOLUTION INTRAVENOUS; SUBCUTANEOUS at 13:54

## 2024-09-18 RX ADMIN — HYDRALAZINE HYDROCHLORIDE SCH MG: 25 TABLET, FILM COATED ORAL at 13:57

## 2024-09-18 RX ADMIN — ISOSORBIDE DINITRATE SCH MG: 10 TABLET ORAL at 14:45

## 2024-09-19 VITALS — TEMPERATURE: 97.4 F | OXYGEN SATURATION: 96 % | DIASTOLIC BLOOD PRESSURE: 77 MMHG | SYSTOLIC BLOOD PRESSURE: 146 MMHG

## 2024-09-19 VITALS — TEMPERATURE: 98.6 F | SYSTOLIC BLOOD PRESSURE: 176 MMHG | OXYGEN SATURATION: 96 % | DIASTOLIC BLOOD PRESSURE: 78 MMHG

## 2024-09-19 VITALS — SYSTOLIC BLOOD PRESSURE: 162 MMHG | DIASTOLIC BLOOD PRESSURE: 56 MMHG

## 2024-09-19 VITALS — TEMPERATURE: 98.2 F | SYSTOLIC BLOOD PRESSURE: 183 MMHG | DIASTOLIC BLOOD PRESSURE: 80 MMHG | OXYGEN SATURATION: 98 %

## 2024-09-19 VITALS — TEMPERATURE: 98 F | SYSTOLIC BLOOD PRESSURE: 168 MMHG | DIASTOLIC BLOOD PRESSURE: 80 MMHG | OXYGEN SATURATION: 93 %

## 2024-09-19 VITALS — SYSTOLIC BLOOD PRESSURE: 160 MMHG | DIASTOLIC BLOOD PRESSURE: 54 MMHG

## 2024-09-19 VITALS — SYSTOLIC BLOOD PRESSURE: 168 MMHG | DIASTOLIC BLOOD PRESSURE: 80 MMHG

## 2024-09-19 LAB
BUN SERPL-MCNC: 44 MG/DL (ref 9–23)
CALCIUM SERPL-MCNC: 7.7 MG/DL (ref 8.3–10.6)
CHLORIDE SERPL-SCNC: 120 MMOL/L (ref 98–107)
CO2 SERPL-SCNC: 22 MMOL/L (ref 20–31)
CREAT SERPL-MCNC: 3.26 MG/DL (ref 0.7–1.3)
GFR SERPL CREATININE-BSD FRML MDRD: 20 ML/MIN/{1.73_M2} (ref 42–?)
GLUCOSE SERPL-MCNC: 90 MG/DL (ref 74–106)
HCT VFR BLD AUTO: 24.1 % (ref 42–52)
HGB BLD-MCNC: 8 G/DL (ref 13.5–17.5)
MCH RBC QN AUTO: 33.6 PG (ref 27–33)
MCHC RBC AUTO-ENTMCNC: 33.2 G/DL (ref 32–36.5)
MCV RBC AUTO: 101.3 FL (ref 80–96)
PLATELET # BLD AUTO: 110 10^3/UL (ref 150–450)
POTASSIUM SERPL-SCNC: 5 MMOL/L (ref 3.5–5.1)
RBC # BLD AUTO: 2.38 10^6/UL (ref 4.3–6.1)
SODIUM SERPL-SCNC: 142 MMOL/L (ref 136–145)
WBC # BLD AUTO: 7 10^3/UL (ref 4–10)

## 2024-09-19 RX ADMIN — ISOSORBIDE MONONITRATE SCH MG: 10 TABLET ORAL at 13:31

## 2024-09-19 RX ADMIN — ISOSORBIDE DINITRATE ONE MG: 30 TABLET ORAL at 14:53

## 2024-09-19 RX ADMIN — HYDRALAZINE HYDROCHLORIDE SCH MG: 50 TABLET, FILM COATED ORAL at 14:58

## 2024-09-19 RX ADMIN — DOCUSATE SODIUM,SENNOSIDES SCH TAB: 50; 8.6 TABLET, FILM COATED ORAL at 13:34

## 2024-09-19 RX ADMIN — BISACODYL SCH MG: 10 SUPPOSITORY RECTAL at 13:34

## 2024-09-23 ENCOUNTER — HOSPITAL ENCOUNTER (OUTPATIENT)
Dept: HOSPITAL 53 - M LAB REF | Age: 74
End: 2024-09-23
Attending: STUDENT IN AN ORGANIZED HEALTH CARE EDUCATION/TRAINING PROGRAM
Payer: MEDICARE

## 2024-09-23 DIAGNOSIS — L02.413: Primary | ICD-10-CM

## 2024-10-10 ENCOUNTER — HOSPITAL ENCOUNTER (INPATIENT)
Dept: HOSPITAL 53 - M ED | Age: 74
LOS: 4 days | Discharge: HOME HEALTH | DRG: 178 | End: 2024-10-14
Attending: HOSPITALIST | Admitting: INTERNAL MEDICINE
Payer: MEDICARE

## 2024-10-10 VITALS — HEIGHT: 67 IN | WEIGHT: 139.99 LBS | BODY MASS INDEX: 21.97 KG/M2

## 2024-10-10 DIAGNOSIS — E78.5: ICD-10-CM

## 2024-10-10 DIAGNOSIS — F17.200: ICD-10-CM

## 2024-10-10 DIAGNOSIS — Z79.899: ICD-10-CM

## 2024-10-10 DIAGNOSIS — Z98.42: ICD-10-CM

## 2024-10-10 DIAGNOSIS — N28.1: ICD-10-CM

## 2024-10-10 DIAGNOSIS — Z86.73: ICD-10-CM

## 2024-10-10 DIAGNOSIS — Z85.038: ICD-10-CM

## 2024-10-10 DIAGNOSIS — N18.4: ICD-10-CM

## 2024-10-10 DIAGNOSIS — E11.40: ICD-10-CM

## 2024-10-10 DIAGNOSIS — J44.1: ICD-10-CM

## 2024-10-10 DIAGNOSIS — Z79.4: ICD-10-CM

## 2024-10-10 DIAGNOSIS — G35: ICD-10-CM

## 2024-10-10 DIAGNOSIS — D50.9: ICD-10-CM

## 2024-10-10 DIAGNOSIS — N17.9: ICD-10-CM

## 2024-10-10 DIAGNOSIS — E11.22: ICD-10-CM

## 2024-10-10 DIAGNOSIS — E11.65: ICD-10-CM

## 2024-10-10 DIAGNOSIS — I12.9: ICD-10-CM

## 2024-10-10 DIAGNOSIS — N40.0: ICD-10-CM

## 2024-10-10 DIAGNOSIS — E87.0: ICD-10-CM

## 2024-10-10 DIAGNOSIS — Z98.41: ICD-10-CM

## 2024-10-10 DIAGNOSIS — I65.29: ICD-10-CM

## 2024-10-10 DIAGNOSIS — U07.1: Primary | ICD-10-CM

## 2024-10-10 DIAGNOSIS — E87.5: ICD-10-CM

## 2024-10-10 DIAGNOSIS — E87.20: ICD-10-CM

## 2024-10-10 DIAGNOSIS — F41.9: ICD-10-CM

## 2024-10-10 DIAGNOSIS — F32.A: ICD-10-CM

## 2024-10-10 LAB
BASE EXCESS BLDV CALC-SCNC: -7.6 MMOL/L (ref -2–2)
BASOPHILS # BLD AUTO: 0 10^3/UL (ref 0–0.2)
BASOPHILS NFR BLD AUTO: 0.4 % (ref 0–1)
CK MB CFR.DF SERPL CALC: 4.31
CK MB SERPL-MCNC: 2.5 NG/ML (ref ?–3.6)
CK SERPL-CCNC: 58 U/L (ref 46–171)
CO2 BLDV CALC-SCNC: 17.4 MMOL/L (ref 24–28)
EOSINOPHIL # BLD AUTO: 0 10^3/UL (ref 0–0.5)
EOSINOPHIL NFR BLD AUTO: 0.8 % (ref 0–3)
HCO3 BLDV-SCNC: 16.5 MMOL/L (ref 23–27)
HCO3 STD BLDV-SCNC: 18.2 MMOL/L
HCT VFR BLD AUTO: 26.9 % (ref 42–52)
HGB BLD-MCNC: 8.8 G/DL (ref 13.5–17.5)
LYMPHOCYTES # BLD AUTO: 0.5 10^3/UL (ref 1.5–5)
LYMPHOCYTES NFR BLD AUTO: 10.6 % (ref 24–44)
MCH RBC QN AUTO: 33.2 PG (ref 27–33)
MCHC RBC AUTO-ENTMCNC: 32.7 G/DL (ref 32–36.5)
MCV RBC AUTO: 101.5 FL (ref 80–96)
MONOCYTES # BLD AUTO: 0.5 10^3/UL (ref 0–0.8)
MONOCYTES NFR BLD AUTO: 9.4 % (ref 2–8)
NEUTROPHILS # BLD AUTO: 4 10^3/UL (ref 1.5–8.5)
NEUTROPHILS NFR BLD AUTO: 78.2 % (ref 36–66)
PCO2 BLDV: 28.9 MMHG (ref 38–50)
PH BLDV: 7.38 UNITS (ref 7.33–7.43)
PLATELET # BLD AUTO: 90 10^3/UL (ref 150–450)
PO2 BLDV: 98.6 MMHG (ref 30–50)
RBC # BLD AUTO: 2.65 10^6/UL (ref 4.3–6.1)
SAO2 % BLDV: 96.8 % (ref 60–80)
WBC # BLD AUTO: 5.1 10^3/UL (ref 4–10)

## 2024-10-11 VITALS — SYSTOLIC BLOOD PRESSURE: 182 MMHG | OXYGEN SATURATION: 96 % | TEMPERATURE: 98.8 F | DIASTOLIC BLOOD PRESSURE: 69 MMHG

## 2024-10-11 VITALS — TEMPERATURE: 98.2 F | SYSTOLIC BLOOD PRESSURE: 167 MMHG | OXYGEN SATURATION: 91 % | DIASTOLIC BLOOD PRESSURE: 64 MMHG

## 2024-10-11 LAB
ALBUMIN SERPL BCG-MCNC: 2.7 G/DL (ref 3.2–5.2)
ALP SERPL-CCNC: 100 U/L (ref 46–116)
ALT SERPL W P-5'-P-CCNC: 16 U/L (ref 7–40)
APTT BLD: 42.3 SECONDS (ref 24.8–34.2)
AST SERPL-CCNC: 14 U/L (ref ?–34)
BILIRUB CONJ SERPL-MCNC: 0.1 MG/DL (ref ?–0.4)
BILIRUB SERPL-MCNC: 0.2 MG/DL (ref 0.3–1.2)
BUN SERPL-MCNC: 57 MG/DL (ref 9–23)
BUN SERPL-MCNC: 59 MG/DL (ref 9–23)
CALCIUM SERPL-MCNC: 7.7 MG/DL (ref 8.3–10.6)
CALCIUM SERPL-MCNC: 7.8 MG/DL (ref 8.3–10.6)
CHLORIDE SERPL-SCNC: 120 MMOL/L (ref 98–107)
CHLORIDE SERPL-SCNC: 122 MMOL/L (ref 98–107)
CO2 SERPL-SCNC: 17 MMOL/L (ref 20–31)
CO2 SERPL-SCNC: 18 MMOL/L (ref 20–31)
CREAT SERPL-MCNC: 3.39 MG/DL (ref 0.7–1.3)
CREAT SERPL-MCNC: 3.41 MG/DL (ref 0.7–1.3)
CRP SERPL-MCNC: 9.9 MG/DL (ref ?–1)
D DIMER PPP DDU-MCNC: 2.33 UG/ML (ref ?–0.5)
FERRITIN SERPL-MCNC: 542.4 NG/ML (ref 10.5–307.3)
FIBRINOGEN PPP-MCNC: 487 MG/DL (ref 268–480)
GFR SERPL CREATININE-BSD FRML MDRD: 19 ML/MIN/{1.73_M2} (ref 42–?)
GFR SERPL CREATININE-BSD FRML MDRD: 19.1 ML/MIN/{1.73_M2} (ref 42–?)
GLUCOSE SERPL-MCNC: 120 MG/DL (ref 74–106)
GLUCOSE SERPL-MCNC: 138 MG/DL (ref 74–106)
INR PPP: 1.19
IRON SATN MFR SERPL: 9.1 % (ref 19.7–50)
IRON SERPL-MCNC: 16 UG/DL (ref 65–175)
LDH SERPL L TO P-CCNC: 213 U/L (ref 120–246)
MAGNESIUM SERPL-MCNC: 2.4 MG/DL (ref 1.8–2.4)
POTASSIUM SERPL-SCNC: 5.1 MMOL/L (ref 3.5–5.1)
POTASSIUM SERPL-SCNC: 5.3 MMOL/L (ref 3.5–5.1)
PROCALCITONIN SERPL-MCNC: 0.14 NG/ML
PROT SERPL-MCNC: 5.6 G/DL (ref 5.7–8.2)
PROTHROMBIN TIME: 14.8 SECONDS (ref 12.5–14.5)
SODIUM SERPL-SCNC: 143 MMOL/L (ref 136–145)
SODIUM SERPL-SCNC: 144 MMOL/L (ref 136–145)
TIBC SERPL-MCNC: 176 UG/DL (ref 250–425)

## 2024-10-11 RX ADMIN — PANTOPRAZOLE SODIUM SCH MG: 40 TABLET, DELAYED RELEASE ORAL at 14:43

## 2024-10-11 RX ADMIN — ISOSORBIDE MONONITRATE SCH MG: 60 TABLET, EXTENDED RELEASE ORAL at 13:12

## 2024-10-11 RX ADMIN — DULOXETINE HYDROCHLORIDE SCH MG: 30 CAPSULE, DELAYED RELEASE ORAL at 12:33

## 2024-10-11 RX ADMIN — INSULIN LISPRO SCH UNITS: 100 INJECTION, SOLUTION INTRAVENOUS; SUBCUTANEOUS at 08:32

## 2024-10-11 RX ADMIN — FUROSEMIDE SCH MG: 20 TABLET ORAL at 12:34

## 2024-10-11 RX ADMIN — SODIUM CHLORIDE ONE MLS/HR: 9 INJECTION, SOLUTION INTRAVENOUS at 06:45

## 2024-10-11 RX ADMIN — HYDRALAZINE HYDROCHLORIDE ONE MG: 50 TABLET, FILM COATED ORAL at 18:49

## 2024-10-11 RX ADMIN — SODIUM CHLORIDE SCH MG: 9 INJECTION, SOLUTION INTRAVENOUS at 15:54

## 2024-10-11 RX ADMIN — IPRATROPIUM BROMIDE AND ALBUTEROL SCH PUFF: 20; 100 SPRAY, METERED RESPIRATORY (INHALATION) at 08:24

## 2024-10-11 RX ADMIN — INSULIN LISPRO SCH UNITS: 100 INJECTION, SOLUTION INTRAVENOUS; SUBCUTANEOUS at 21:00

## 2024-10-11 RX ADMIN — DOXAZOSIN SCH MG: 1 TABLET ORAL at 18:37

## 2024-10-11 RX ADMIN — HYDRALAZINE HYDROCHLORIDE SCH MG: 50 TABLET, FILM COATED ORAL at 18:37

## 2024-10-11 RX ADMIN — DOCUSATE SODIUM SCH MG: 100 CAPSULE, LIQUID FILLED ORAL at 08:32

## 2024-10-11 RX ADMIN — ATORVASTATIN CALCIUM SCH MG: 20 TABLET, FILM COATED ORAL at 12:34

## 2024-10-11 RX ADMIN — HYDRALAZINE HYDROCHLORIDE ONE MG: 50 TABLET, FILM COATED ORAL at 04:12

## 2024-10-11 RX ADMIN — DOXAZOSIN ONE MG: 1 TABLET ORAL at 18:49

## 2024-10-11 RX ADMIN — HYDRALAZINE HYDROCHLORIDE ONE MG: 20 INJECTION INTRAMUSCULAR; INTRAVENOUS at 00:16

## 2024-10-11 RX ADMIN — CALCITRIOL SCH MCG: 0.25 CAPSULE ORAL at 13:12

## 2024-10-11 RX ADMIN — TAMSULOSIN HYDROCHLORIDE SCH MG: 0.4 CAPSULE ORAL at 12:34

## 2024-10-11 RX ADMIN — CLOPIDOGREL BISULFATE SCH MG: 75 TABLET ORAL at 12:34

## 2024-10-12 VITALS — TEMPERATURE: 98.2 F | DIASTOLIC BLOOD PRESSURE: 62 MMHG | SYSTOLIC BLOOD PRESSURE: 123 MMHG | OXYGEN SATURATION: 93 %

## 2024-10-12 VITALS — SYSTOLIC BLOOD PRESSURE: 166 MMHG | DIASTOLIC BLOOD PRESSURE: 62 MMHG

## 2024-10-12 VITALS — DIASTOLIC BLOOD PRESSURE: 56 MMHG | SYSTOLIC BLOOD PRESSURE: 166 MMHG

## 2024-10-12 VITALS — DIASTOLIC BLOOD PRESSURE: 61 MMHG | TEMPERATURE: 98.1 F | SYSTOLIC BLOOD PRESSURE: 156 MMHG | OXYGEN SATURATION: 94 %

## 2024-10-12 LAB
BASOPHILS # BLD AUTO: 0 10^3/UL (ref 0–0.2)
BASOPHILS NFR BLD AUTO: 0 % (ref 0–1)
BUN SERPL-MCNC: 64 MG/DL (ref 9–23)
CALCIUM SERPL-MCNC: 7.7 MG/DL (ref 8.3–10.6)
CHLORIDE SERPL-SCNC: 121 MMOL/L (ref 98–107)
CO2 SERPL-SCNC: 18 MMOL/L (ref 20–31)
CREAT SERPL-MCNC: 3.57 MG/DL (ref 0.7–1.3)
EOSINOPHIL # BLD AUTO: 0 10^3/UL (ref 0–0.5)
EOSINOPHIL NFR BLD AUTO: 0 % (ref 0–3)
GFR SERPL CREATININE-BSD FRML MDRD: 18 ML/MIN/{1.73_M2} (ref 42–?)
GLUCOSE SERPL-MCNC: 93 MG/DL (ref 74–106)
HCT VFR BLD AUTO: 25.2 % (ref 42–52)
HGB BLD-MCNC: 8.2 G/DL (ref 13.5–17.5)
LYMPHOCYTES # BLD AUTO: 0.8 10^3/UL (ref 1.5–5)
LYMPHOCYTES NFR BLD AUTO: 14.2 % (ref 24–44)
MAGNESIUM SERPL-MCNC: 2.5 MG/DL (ref 1.8–2.4)
MCH RBC QN AUTO: 32.7 PG (ref 27–33)
MCHC RBC AUTO-ENTMCNC: 32.5 G/DL (ref 32–36.5)
MCV RBC AUTO: 100.4 FL (ref 80–96)
MONOCYTES # BLD AUTO: 0.5 10^3/UL (ref 0–0.8)
MONOCYTES NFR BLD AUTO: 9.6 % (ref 2–8)
NEUTROPHILS # BLD AUTO: 4.1 10^3/UL (ref 1.5–8.5)
NEUTROPHILS NFR BLD AUTO: 75.6 % (ref 36–66)
PLATELET # BLD AUTO: 88 10^3/UL (ref 150–450)
POTASSIUM SERPL-SCNC: 4.9 MMOL/L (ref 3.5–5.1)
RBC # BLD AUTO: 2.51 10^6/UL (ref 4.3–6.1)
SODIUM SERPL-SCNC: 143 MMOL/L (ref 136–145)
WBC # BLD AUTO: 5.4 10^3/UL (ref 4–10)

## 2024-10-12 RX ADMIN — OMEPRAZOLE SCH MG: 20 CAPSULE, DELAYED RELEASE ORAL at 09:08

## 2024-10-12 RX ADMIN — SODIUM BICARBONATE SCH MG: 325 TABLET ORAL at 09:08

## 2024-10-12 RX ADMIN — HYDRALAZINE HYDROCHLORIDE SCH MG: 50 TABLET, FILM COATED ORAL at 15:15

## 2024-10-12 RX ADMIN — SODIUM BICARBONATE SCH MG: 325 TABLET ORAL at 17:55

## 2024-10-13 VITALS — SYSTOLIC BLOOD PRESSURE: 163 MMHG | TEMPERATURE: 98.2 F | OXYGEN SATURATION: 93 % | DIASTOLIC BLOOD PRESSURE: 58 MMHG

## 2024-10-13 VITALS — SYSTOLIC BLOOD PRESSURE: 156 MMHG | DIASTOLIC BLOOD PRESSURE: 54 MMHG

## 2024-10-13 VITALS — DIASTOLIC BLOOD PRESSURE: 48 MMHG | TEMPERATURE: 98.6 F | OXYGEN SATURATION: 94 % | SYSTOLIC BLOOD PRESSURE: 157 MMHG

## 2024-10-13 VITALS — TEMPERATURE: 97.7 F | DIASTOLIC BLOOD PRESSURE: 58 MMHG | SYSTOLIC BLOOD PRESSURE: 156 MMHG | OXYGEN SATURATION: 96 %

## 2024-10-13 VITALS — DIASTOLIC BLOOD PRESSURE: 48 MMHG | SYSTOLIC BLOOD PRESSURE: 157 MMHG

## 2024-10-13 VITALS — OXYGEN SATURATION: 94 %

## 2024-10-13 VITALS — OXYGEN SATURATION: 93 %

## 2024-10-13 LAB
BASOPHILS # BLD AUTO: 0 10^3/UL (ref 0–0.2)
BASOPHILS NFR BLD AUTO: 0.2 % (ref 0–1)
BUN SERPL-MCNC: 71 MG/DL (ref 9–23)
CALCIUM SERPL-MCNC: 7.5 MG/DL (ref 8.3–10.6)
CHLORIDE SERPL-SCNC: 120 MMOL/L (ref 98–107)
CO2 SERPL-SCNC: 18 MMOL/L (ref 20–31)
CREAT SERPL-MCNC: 3.73 MG/DL (ref 0.7–1.3)
EOSINOPHIL # BLD AUTO: 0 10^3/UL (ref 0–0.5)
EOSINOPHIL NFR BLD AUTO: 0 % (ref 0–3)
GFR SERPL CREATININE-BSD FRML MDRD: 17.1 ML/MIN/{1.73_M2} (ref 42–?)
GLUCOSE SERPL-MCNC: 96 MG/DL (ref 74–106)
HCT VFR BLD AUTO: 25 % (ref 42–52)
HGB BLD-MCNC: 8.1 G/DL (ref 13.5–17.5)
LYMPHOCYTES # BLD AUTO: 0.7 10^3/UL (ref 1.5–5)
LYMPHOCYTES NFR BLD AUTO: 13.6 % (ref 24–44)
MAGNESIUM SERPL-MCNC: 2.5 MG/DL (ref 1.8–2.4)
MCH RBC QN AUTO: 32.8 PG (ref 27–33)
MCHC RBC AUTO-ENTMCNC: 32.4 G/DL (ref 32–36.5)
MCV RBC AUTO: 101.2 FL (ref 80–96)
MONOCYTES # BLD AUTO: 0.4 10^3/UL (ref 0–0.8)
MONOCYTES NFR BLD AUTO: 8.6 % (ref 2–8)
NEUTROPHILS # BLD AUTO: 4 10^3/UL (ref 1.5–8.5)
NEUTROPHILS NFR BLD AUTO: 77 % (ref 36–66)
PLATELET # BLD AUTO: 87 10^3/UL (ref 150–450)
POTASSIUM SERPL-SCNC: 4.7 MMOL/L (ref 3.5–5.1)
RBC # BLD AUTO: 2.47 10^6/UL (ref 4.3–6.1)
SODIUM SERPL-SCNC: 142 MMOL/L (ref 136–145)
WBC # BLD AUTO: 5.1 10^3/UL (ref 4–10)

## 2024-10-13 RX ADMIN — SODIUM BICARBONATE SCH MG: 325 TABLET ORAL at 17:08

## 2024-10-13 RX ADMIN — HYDRALAZINE HYDROCHLORIDE SCH MG: 50 TABLET, FILM COATED ORAL at 17:08

## 2024-10-13 RX ADMIN — SODIUM CHLORIDE ONE MLS/HR: 9 INJECTION, SOLUTION INTRAVENOUS at 14:00

## 2024-10-14 VITALS — DIASTOLIC BLOOD PRESSURE: 52 MMHG | SYSTOLIC BLOOD PRESSURE: 159 MMHG | TEMPERATURE: 98.6 F | OXYGEN SATURATION: 92 %

## 2024-10-14 VITALS — SYSTOLIC BLOOD PRESSURE: 157 MMHG | DIASTOLIC BLOOD PRESSURE: 56 MMHG | OXYGEN SATURATION: 94 % | TEMPERATURE: 98.2 F

## 2024-10-14 LAB
BASOPHILS # BLD AUTO: 0 10^3/UL (ref 0–0.2)
BASOPHILS NFR BLD AUTO: 0 % (ref 0–1)
BUN SERPL-MCNC: 72 MG/DL (ref 9–23)
CALCIUM SERPL-MCNC: 7.8 MG/DL (ref 8.3–10.6)
CHLORIDE SERPL-SCNC: 119 MMOL/L (ref 98–107)
CO2 SERPL-SCNC: 19 MMOL/L (ref 20–31)
CREAT SERPL-MCNC: 3.92 MG/DL (ref 0.7–1.3)
EOSINOPHIL # BLD AUTO: 0 10^3/UL (ref 0–0.5)
EOSINOPHIL NFR BLD AUTO: 0 % (ref 0–3)
GFR SERPL CREATININE-BSD FRML MDRD: 16.2 ML/MIN/{1.73_M2} (ref 42–?)
GLUCOSE SERPL-MCNC: 76 MG/DL (ref 74–106)
HCT VFR BLD AUTO: 25.3 % (ref 42–52)
HGB BLD-MCNC: 8.4 G/DL (ref 13.5–17.5)
LYMPHOCYTES # BLD AUTO: 0.8 10^3/UL (ref 1.5–5)
LYMPHOCYTES NFR BLD AUTO: 15.9 % (ref 24–44)
MAGNESIUM SERPL-MCNC: 2.3 MG/DL (ref 1.8–2.4)
MCH RBC QN AUTO: 33.3 PG (ref 27–33)
MCHC RBC AUTO-ENTMCNC: 33.2 G/DL (ref 32–36.5)
MCV RBC AUTO: 100.4 FL (ref 80–96)
MONOCYTES # BLD AUTO: 0.5 10^3/UL (ref 0–0.8)
MONOCYTES NFR BLD AUTO: 9.1 % (ref 2–8)
NEUTROPHILS # BLD AUTO: 3.7 10^3/UL (ref 1.5–8.5)
NEUTROPHILS NFR BLD AUTO: 74 % (ref 36–66)
PLATELET # BLD AUTO: 94 10^3/UL (ref 150–450)
POTASSIUM SERPL-SCNC: 4.3 MMOL/L (ref 3.5–5.1)
RBC # BLD AUTO: 2.52 10^6/UL (ref 4.3–6.1)
SODIUM SERPL-SCNC: 143 MMOL/L (ref 136–145)
WBC # BLD AUTO: 5 10^3/UL (ref 4–10)

## 2024-11-08 ENCOUNTER — HOSPITAL ENCOUNTER (INPATIENT)
Dept: HOSPITAL 53 - M ED | Age: 74
LOS: 13 days | Discharge: HOME | DRG: 291 | End: 2024-11-21
Attending: INTERNAL MEDICINE
Payer: MEDICARE

## 2024-11-08 VITALS — OXYGEN SATURATION: 95 % | SYSTOLIC BLOOD PRESSURE: 166 MMHG | TEMPERATURE: 98.1 F | DIASTOLIC BLOOD PRESSURE: 68 MMHG

## 2024-11-08 VITALS — SYSTOLIC BLOOD PRESSURE: 171 MMHG | DIASTOLIC BLOOD PRESSURE: 77 MMHG | TEMPERATURE: 97.3 F | OXYGEN SATURATION: 96 %

## 2024-11-08 VITALS — SYSTOLIC BLOOD PRESSURE: 145 MMHG | TEMPERATURE: 98 F | DIASTOLIC BLOOD PRESSURE: 99 MMHG | OXYGEN SATURATION: 96 %

## 2024-11-08 VITALS — OXYGEN SATURATION: 95 %

## 2024-11-08 VITALS — TEMPERATURE: 97.2 F | OXYGEN SATURATION: 95 % | SYSTOLIC BLOOD PRESSURE: 182 MMHG | DIASTOLIC BLOOD PRESSURE: 81 MMHG

## 2024-11-08 DIAGNOSIS — J44.1: ICD-10-CM

## 2024-11-08 DIAGNOSIS — E11.22: ICD-10-CM

## 2024-11-08 DIAGNOSIS — E78.5: ICD-10-CM

## 2024-11-08 DIAGNOSIS — G93.41: ICD-10-CM

## 2024-11-08 DIAGNOSIS — E11.40: ICD-10-CM

## 2024-11-08 DIAGNOSIS — I25.10: ICD-10-CM

## 2024-11-08 DIAGNOSIS — G35: ICD-10-CM

## 2024-11-08 DIAGNOSIS — Z86.73: ICD-10-CM

## 2024-11-08 DIAGNOSIS — E87.0: ICD-10-CM

## 2024-11-08 DIAGNOSIS — Z86.16: ICD-10-CM

## 2024-11-08 DIAGNOSIS — N17.9: ICD-10-CM

## 2024-11-08 DIAGNOSIS — I95.1: ICD-10-CM

## 2024-11-08 DIAGNOSIS — Z79.4: ICD-10-CM

## 2024-11-08 DIAGNOSIS — N18.6: ICD-10-CM

## 2024-11-08 DIAGNOSIS — R26.89: ICD-10-CM

## 2024-11-08 DIAGNOSIS — J96.01: ICD-10-CM

## 2024-11-08 DIAGNOSIS — I13.2: Primary | ICD-10-CM

## 2024-11-08 DIAGNOSIS — G25.81: ICD-10-CM

## 2024-11-08 DIAGNOSIS — I50.22: ICD-10-CM

## 2024-11-08 DIAGNOSIS — Z85.038: ICD-10-CM

## 2024-11-08 DIAGNOSIS — E87.6: ICD-10-CM

## 2024-11-08 DIAGNOSIS — D50.9: ICD-10-CM

## 2024-11-08 DIAGNOSIS — E87.20: ICD-10-CM

## 2024-11-08 DIAGNOSIS — D63.1: ICD-10-CM

## 2024-11-08 DIAGNOSIS — N40.0: ICD-10-CM

## 2024-11-08 DIAGNOSIS — F32.A: ICD-10-CM

## 2024-11-08 DIAGNOSIS — Z79.899: ICD-10-CM

## 2024-11-08 LAB
ALBUMIN SERPL BCG-MCNC: 2 G/DL (ref 3.2–5.2)
ALP SERPL-CCNC: 96 U/L (ref 40–129)
ALT SERPL W P-5'-P-CCNC: 24 U/L (ref 7–40)
APTT BLD: 35.9 SECONDS (ref 24.8–34.2)
AST SERPL-CCNC: 18 U/L (ref ?–34)
BASE EXCESS BLDA CALC-SCNC: 1.4 MMOL/L (ref -2–2)
BASOPHILS # BLD AUTO: 0 10^3/UL (ref 0–0.2)
BASOPHILS NFR BLD AUTO: 0.3 % (ref 0–1)
BILIRUB CONJ SERPL-MCNC: 0.1 MG/DL (ref ?–0.4)
BILIRUB SERPL-MCNC: 0.3 MG/DL (ref 0.3–1.2)
BUN SERPL-MCNC: 46 MG/DL (ref 9–23)
CALCIUM SERPL-MCNC: 7.3 MG/DL (ref 8.3–10.6)
CHLORIDE SERPL-SCNC: 117 MMOL/L (ref 98–107)
CO2 BLDA CALC-SCNC: 27.1 MMOL/L (ref 23–31)
CO2 SERPL-SCNC: 28 MMOL/L (ref 20–31)
CREAT SERPL-MCNC: 3.37 MG/DL (ref 0.7–1.3)
EOSINOPHIL # BLD AUTO: 0.1 10^3/UL (ref 0–0.5)
EOSINOPHIL NFR BLD AUTO: 1 % (ref 0–3)
EST. AVERAGE GLUCOSE BLD GHB EST-MCNC: 103 MG/DL (ref 60–110)
GFR SERPL CREATININE-BSD FRML MDRD: 19.2 ML/MIN/{1.73_M2} (ref 42–?)
GLUCOSE SERPL-MCNC: 116 MG/DL (ref 74–106)
HCO3 BLDA-SCNC: 25.8 MMOL/L (ref 22–26)
HCO3 STD BLDA-SCNC: 25.7 MMOL/L. (ref 22–26)
HCT VFR BLD AUTO: 23 % (ref 42–52)
HGB BLD-MCNC: 7.6 G/DL (ref 13.5–17.5)
INR PPP: 1.11
LYMPHOCYTES # BLD AUTO: 0.7 10^3/UL (ref 1.5–5)
LYMPHOCYTES NFR BLD AUTO: 12.2 % (ref 24–44)
MAGNESIUM SERPL-MCNC: 2 MG/DL (ref 1.8–2.4)
MCH RBC QN AUTO: 33.8 PG (ref 27–33)
MCHC RBC AUTO-ENTMCNC: 33 G/DL (ref 32–36.5)
MCV RBC AUTO: 102.2 FL (ref 80–96)
MONOCYTES # BLD AUTO: 0.8 10^3/UL (ref 0–0.8)
MONOCYTES NFR BLD AUTO: 13.2 % (ref 2–8)
NEUTROPHILS # BLD AUTO: 4.4 10^3/UL (ref 1.5–8.5)
NEUTROPHILS NFR BLD AUTO: 72.8 % (ref 36–66)
OSMOLALITY SERPL: 314 MOSM/KG (ref 280–301)
PCO2 BLDA: 40.2 MMHG (ref 35–45)
PH BLDA: 7.43 UNITS (ref 7.35–7.45)
PHOSPHATE SERPL-MCNC: 2.6 MG/DL (ref 2.4–5.1)
PLATELET # BLD AUTO: 106 10^3/UL (ref 150–450)
PO2 BLDA: 81.5 MMHG (ref 75–100)
POTASSIUM SERPL-SCNC: 3.9 MMOL/L (ref 3.5–5.1)
PROT SERPL-MCNC: 4.8 G/DL (ref 5.7–8.2)
PROTHROMBIN TIME: 14.6 SECONDS (ref 12.5–14.5)
RBC # BLD AUTO: 2.25 10^6/UL (ref 4.3–6.1)
SAO2 % BLDA: 96 % (ref 95–99)
SODIUM SERPL-SCNC: 148 MMOL/L (ref 136–145)
WBC # BLD AUTO: 6 10^3/UL (ref 4–10)

## 2024-11-08 PROCEDURE — 30233N1 TRANSFUSION OF NONAUTOLOGOUS RED BLOOD CELLS INTO PERIPHERAL VEIN, PERCUTANEOUS APPROACH: ICD-10-PCS | Performed by: INTERNAL MEDICINE

## 2024-11-08 RX ADMIN — DOXAZOSIN SCH MG: 1 TABLET ORAL at 22:24

## 2024-11-08 RX ADMIN — IPRATROPIUM BROMIDE AND ALBUTEROL SULFATE SCH ML: .5; 3 SOLUTION RESPIRATORY (INHALATION) at 20:56

## 2024-11-08 RX ADMIN — FUROSEMIDE ONE MG: 10 INJECTION, SOLUTION INTRAMUSCULAR; INTRAVENOUS at 18:32

## 2024-11-08 RX ADMIN — SODIUM BICARBONATE SCH MG: 325 TABLET ORAL at 22:01

## 2024-11-08 RX ADMIN — HYDRALAZINE HYDROCHLORIDE SCH MG: 50 TABLET, FILM COATED ORAL at 22:03

## 2024-11-08 RX ADMIN — TAMSULOSIN HYDROCHLORIDE SCH MG: 0.4 CAPSULE ORAL at 22:04

## 2024-11-08 RX ADMIN — GUAIFENESIN SCH MG: 600 TABLET, EXTENDED RELEASE ORAL at 22:01

## 2024-11-08 RX ADMIN — DOCUSATE SODIUM SCH MG: 100 CAPSULE, LIQUID FILLED ORAL at 22:01

## 2024-11-08 RX ADMIN — FLUTICASONE PROPIONATE AND SALMETEROL XINAFOATE SCH PUFF: 45; 21 AEROSOL, METERED RESPIRATORY (INHALATION) at 20:56

## 2024-11-08 RX ADMIN — NICOTINE ONE PATCH: 21 PATCH, EXTENDED RELEASE TRANSDERMAL at 22:04

## 2024-11-09 VITALS — DIASTOLIC BLOOD PRESSURE: 75 MMHG | OXYGEN SATURATION: 93 % | SYSTOLIC BLOOD PRESSURE: 144 MMHG | TEMPERATURE: 98.1 F

## 2024-11-09 VITALS — SYSTOLIC BLOOD PRESSURE: 130 MMHG | TEMPERATURE: 97.9 F | DIASTOLIC BLOOD PRESSURE: 52 MMHG | OXYGEN SATURATION: 91 %

## 2024-11-09 VITALS — OXYGEN SATURATION: 98 %

## 2024-11-09 VITALS — DIASTOLIC BLOOD PRESSURE: 48 MMHG | OXYGEN SATURATION: 91 % | TEMPERATURE: 97.5 F | SYSTOLIC BLOOD PRESSURE: 144 MMHG

## 2024-11-09 VITALS — SYSTOLIC BLOOD PRESSURE: 145 MMHG | DIASTOLIC BLOOD PRESSURE: 65 MMHG | OXYGEN SATURATION: 90 % | TEMPERATURE: 97.9 F

## 2024-11-09 VITALS — TEMPERATURE: 97.9 F | OXYGEN SATURATION: 94 % | SYSTOLIC BLOOD PRESSURE: 175 MMHG | DIASTOLIC BLOOD PRESSURE: 63 MMHG

## 2024-11-09 VITALS — OXYGEN SATURATION: 95 %

## 2024-11-09 VITALS — TEMPERATURE: 98.1 F | OXYGEN SATURATION: 94 % | SYSTOLIC BLOOD PRESSURE: 147 MMHG | DIASTOLIC BLOOD PRESSURE: 49 MMHG

## 2024-11-09 VITALS — OXYGEN SATURATION: 80 %

## 2024-11-09 VITALS — OXYGEN SATURATION: 96 %

## 2024-11-09 VITALS — OXYGEN SATURATION: 81 %

## 2024-11-09 VITALS — OXYGEN SATURATION: 92 %

## 2024-11-09 LAB
ALBUMIN SERPL BCG-MCNC: 2.2 G/DL (ref 3.2–5.2)
ALP SERPL-CCNC: 101 U/L (ref 40–129)
ALT SERPL W P-5'-P-CCNC: 22 U/L (ref 7–40)
AST SERPL-CCNC: 18 U/L (ref ?–34)
BILIRUB SERPL-MCNC: 0.4 MG/DL (ref 0.3–1.2)
BUN SERPL-MCNC: 46 MG/DL (ref 9–23)
BUN SERPL-MCNC: 46 MG/DL (ref 9–23)
BUN SERPL-MCNC: 47 MG/DL (ref 9–23)
CALCIUM SERPL-MCNC: 7.4 MG/DL (ref 8.3–10.6)
CALCIUM SERPL-MCNC: 7.5 MG/DL (ref 8.3–10.6)
CALCIUM SERPL-MCNC: 7.5 MG/DL (ref 8.3–10.6)
CALCIUM SERPL-MCNC: 7.8 MG/DL (ref 8.3–10.6)
CALCIUM SERPL-MCNC: 7.8 MG/DL (ref 8.3–10.6)
CHLORIDE SERPL-SCNC: 113 MMOL/L (ref 98–107)
CHLORIDE SERPL-SCNC: 115 MMOL/L (ref 98–107)
CHLORIDE SERPL-SCNC: 115 MMOL/L (ref 98–107)
CHLORIDE SERPL-SCNC: 116 MMOL/L (ref 98–107)
CHLORIDE SERPL-SCNC: 116 MMOL/L (ref 98–107)
CO2 SERPL-SCNC: 26 MMOL/L (ref 20–31)
CO2 SERPL-SCNC: 27 MMOL/L (ref 20–31)
CO2 SERPL-SCNC: 27 MMOL/L (ref 20–31)
CO2 SERPL-SCNC: 28 MMOL/L (ref 20–31)
CO2 SERPL-SCNC: 28 MMOL/L (ref 20–31)
CREAT SERPL-MCNC: 3.35 MG/DL (ref 0.7–1.3)
CREAT SERPL-MCNC: 3.45 MG/DL (ref 0.7–1.3)
CREAT SERPL-MCNC: 3.48 MG/DL (ref 0.7–1.3)
CREAT SERPL-MCNC: 3.52 MG/DL (ref 0.7–1.3)
CREAT SERPL-MCNC: 3.53 MG/DL (ref 0.7–1.3)
GFR SERPL CREATININE-BSD FRML MDRD: 18.2 ML/MIN/{1.73_M2} (ref 42–?)
GFR SERPL CREATININE-BSD FRML MDRD: 18.3 ML/MIN/{1.73_M2} (ref 42–?)
GFR SERPL CREATININE-BSD FRML MDRD: 18.5 ML/MIN/{1.73_M2} (ref 42–?)
GFR SERPL CREATININE-BSD FRML MDRD: 18.7 ML/MIN/{1.73_M2} (ref 42–?)
GFR SERPL CREATININE-BSD FRML MDRD: 19.4 ML/MIN/{1.73_M2} (ref 42–?)
GLUCOSE SERPL-MCNC: 109 MG/DL (ref 74–106)
GLUCOSE SERPL-MCNC: 117 MG/DL (ref 74–106)
GLUCOSE SERPL-MCNC: 119 MG/DL (ref 74–106)
GLUCOSE SERPL-MCNC: 147 MG/DL (ref 74–106)
GLUCOSE SERPL-MCNC: 200 MG/DL (ref 74–106)
HCT VFR BLD AUTO: 25.1 % (ref 42–52)
HCT VFR BLD AUTO: 26.8 % (ref 42–52)
HCT VFR BLD AUTO: 27.1 % (ref 42–52)
HGB BLD-MCNC: 8.4 G/DL (ref 13.5–17.5)
HGB BLD-MCNC: 8.7 G/DL (ref 13.5–17.5)
HGB BLD-MCNC: 8.9 G/DL (ref 13.5–17.5)
MAGNESIUM SERPL-MCNC: 2 MG/DL (ref 1.8–2.4)
MCH RBC QN AUTO: 32.6 PG (ref 27–33)
MCHC RBC AUTO-ENTMCNC: 32.5 G/DL (ref 32–36.5)
MCV RBC AUTO: 100.4 FL (ref 80–96)
PHOSPHATE SERPL-MCNC: 3.8 MG/DL (ref 2.4–5.1)
PLATELET # BLD AUTO: 100 10^3/UL (ref 150–450)
POTASSIUM SERPL-SCNC: 4.2 MMOL/L (ref 3.5–5.1)
POTASSIUM SERPL-SCNC: 4.3 MMOL/L (ref 3.5–5.1)
POTASSIUM SERPL-SCNC: 4.4 MMOL/L (ref 3.5–5.1)
POTASSIUM SERPL-SCNC: 4.4 MMOL/L (ref 3.5–5.1)
POTASSIUM SERPL-SCNC: 4.5 MMOL/L (ref 3.5–5.1)
PROT SERPL-MCNC: 5 G/DL (ref 5.7–8.2)
RBC # BLD AUTO: 2.67 10^6/UL (ref 4.3–6.1)
SODIUM SERPL-SCNC: 142 MMOL/L (ref 136–145)
SODIUM SERPL-SCNC: 145 MMOL/L (ref 136–145)
SODIUM SERPL-SCNC: 146 MMOL/L (ref 136–145)
WBC # BLD AUTO: 6.2 10^3/UL (ref 4–10)

## 2024-11-09 RX ADMIN — FERROUS GLUCONATE SCH MG: 324 TABLET ORAL at 08:50

## 2024-11-09 RX ADMIN — FUROSEMIDE SCH MG: 10 INJECTION, SOLUTION INTRAMUSCULAR; INTRAVENOUS at 08:50

## 2024-11-09 RX ADMIN — OMEPRAZOLE SCH MG: 20 CAPSULE, DELAYED RELEASE ORAL at 08:50

## 2024-11-09 RX ADMIN — ATORVASTATIN CALCIUM SCH MG: 20 TABLET, FILM COATED ORAL at 08:50

## 2024-11-09 RX ADMIN — POTASSIUM CHLORIDE SCH MEQ: 750 TABLET, EXTENDED RELEASE ORAL at 08:50

## 2024-11-09 RX ADMIN — DULOXETINE HYDROCHLORIDE SCH MG: 30 CAPSULE, DELAYED RELEASE ORAL at 08:49

## 2024-11-09 RX ADMIN — ISOSORBIDE MONONITRATE SCH MG: 60 TABLET, EXTENDED RELEASE ORAL at 08:48

## 2024-11-09 RX ADMIN — FUROSEMIDE SCH MG: 10 INJECTION, SOLUTION INTRAMUSCULAR; INTRAVENOUS at 16:35

## 2024-11-09 RX ADMIN — Medication SCH UNITS: at 08:49

## 2024-11-09 RX ADMIN — CLOPIDOGREL BISULFATE SCH MG: 75 TABLET ORAL at 08:49

## 2024-11-10 VITALS — DIASTOLIC BLOOD PRESSURE: 56 MMHG | TEMPERATURE: 98 F | OXYGEN SATURATION: 90 % | SYSTOLIC BLOOD PRESSURE: 163 MMHG

## 2024-11-10 VITALS — SYSTOLIC BLOOD PRESSURE: 168 MMHG | OXYGEN SATURATION: 94 % | TEMPERATURE: 98.4 F | DIASTOLIC BLOOD PRESSURE: 60 MMHG

## 2024-11-10 VITALS — DIASTOLIC BLOOD PRESSURE: 49 MMHG | SYSTOLIC BLOOD PRESSURE: 152 MMHG | OXYGEN SATURATION: 92 % | TEMPERATURE: 98.2 F

## 2024-11-10 VITALS — SYSTOLIC BLOOD PRESSURE: 151 MMHG | DIASTOLIC BLOOD PRESSURE: 58 MMHG | OXYGEN SATURATION: 88 % | TEMPERATURE: 98.8 F

## 2024-11-10 VITALS — DIASTOLIC BLOOD PRESSURE: 42 MMHG | OXYGEN SATURATION: 88 % | TEMPERATURE: 98.6 F | SYSTOLIC BLOOD PRESSURE: 164 MMHG

## 2024-11-10 VITALS — TEMPERATURE: 98.4 F | OXYGEN SATURATION: 91 % | DIASTOLIC BLOOD PRESSURE: 51 MMHG | SYSTOLIC BLOOD PRESSURE: 161 MMHG

## 2024-11-10 VITALS — OXYGEN SATURATION: 86 %

## 2024-11-10 LAB
ALBUMIN SERPL BCG-MCNC: 2.2 G/DL (ref 3.2–5.2)
BASOPHILS # BLD AUTO: 0 10^3/UL (ref 0–0.2)
BASOPHILS NFR BLD AUTO: 0.2 % (ref 0–1)
BUN SERPL-MCNC: 47 MG/DL (ref 9–23)
BUN SERPL-MCNC: 47 MG/DL (ref 9–23)
CALCIUM SERPL-MCNC: 7.2 MG/DL (ref 8.3–10.6)
CALCIUM SERPL-MCNC: 7.6 MG/DL (ref 8.3–10.6)
CHLORIDE SERPL-SCNC: 112 MMOL/L (ref 98–107)
CHLORIDE SERPL-SCNC: 113 MMOL/L (ref 98–107)
CO2 SERPL-SCNC: 25 MMOL/L (ref 20–31)
CO2 SERPL-SCNC: 28 MMOL/L (ref 20–31)
CREAT SERPL-MCNC: 3.56 MG/DL (ref 0.7–1.3)
CREAT SERPL-MCNC: 3.59 MG/DL (ref 0.7–1.3)
EOSINOPHIL # BLD AUTO: 0.1 10^3/UL (ref 0–0.5)
EOSINOPHIL NFR BLD AUTO: 1.7 % (ref 0–3)
FERRITIN SERPL-MCNC: 800.9 NG/ML (ref 10.5–307.3)
GFR SERPL CREATININE-BSD FRML MDRD: 17.9 ML/MIN/{1.73_M2} (ref 42–?)
GFR SERPL CREATININE-BSD FRML MDRD: 18.1 ML/MIN/{1.73_M2} (ref 42–?)
GLUCOSE SERPL-MCNC: 221 MG/DL (ref 74–106)
GLUCOSE SERPL-MCNC: 256 MG/DL (ref 74–106)
HCT VFR BLD AUTO: 24.7 % (ref 42–52)
HGB BLD-MCNC: 7.9 G/DL (ref 13.5–17.5)
IRON SATN MFR SERPL: 26.1 % (ref 19.7–50)
IRON SERPL-MCNC: 42 UG/DL (ref 65–175)
LYMPHOCYTES # BLD AUTO: 0.8 10^3/UL (ref 1.5–5)
LYMPHOCYTES NFR BLD AUTO: 14.1 % (ref 24–44)
MCH RBC QN AUTO: 32.2 PG (ref 27–33)
MCHC RBC AUTO-ENTMCNC: 32 G/DL (ref 32–36.5)
MCV RBC AUTO: 100.8 FL (ref 80–96)
MONOCYTES # BLD AUTO: 0.6 10^3/UL (ref 0–0.8)
MONOCYTES NFR BLD AUTO: 11.1 % (ref 2–8)
NEUTROPHILS # BLD AUTO: 3.9 10^3/UL (ref 1.5–8.5)
NEUTROPHILS NFR BLD AUTO: 72.5 % (ref 36–66)
PHOSPHATE SERPL-MCNC: 4 MG/DL (ref 2.4–5.1)
PLATELET # BLD AUTO: 108 10^3/UL (ref 150–450)
POTASSIUM SERPL-SCNC: 4 MMOL/L (ref 3.5–5.1)
POTASSIUM SERPL-SCNC: 4.3 MMOL/L (ref 3.5–5.1)
RBC # BLD AUTO: 2.45 10^6/UL (ref 4.3–6.1)
SODIUM SERPL-SCNC: 142 MMOL/L (ref 136–145)
SODIUM SERPL-SCNC: 142 MMOL/L (ref 136–145)
TIBC SERPL-MCNC: 161 UG/DL (ref 250–425)
WBC # BLD AUTO: 5.3 10^3/UL (ref 4–10)

## 2024-11-11 VITALS — TEMPERATURE: 99.3 F | DIASTOLIC BLOOD PRESSURE: 55 MMHG | OXYGEN SATURATION: 92 % | SYSTOLIC BLOOD PRESSURE: 155 MMHG

## 2024-11-11 VITALS — TEMPERATURE: 99.7 F | OXYGEN SATURATION: 96 % | SYSTOLIC BLOOD PRESSURE: 121 MMHG | DIASTOLIC BLOOD PRESSURE: 47 MMHG

## 2024-11-11 VITALS — TEMPERATURE: 99.4 F | SYSTOLIC BLOOD PRESSURE: 163 MMHG | OXYGEN SATURATION: 90 % | DIASTOLIC BLOOD PRESSURE: 60 MMHG

## 2024-11-11 VITALS — SYSTOLIC BLOOD PRESSURE: 127 MMHG | DIASTOLIC BLOOD PRESSURE: 49 MMHG | TEMPERATURE: 99.7 F | OXYGEN SATURATION: 94 %

## 2024-11-11 VITALS — DIASTOLIC BLOOD PRESSURE: 53 MMHG | SYSTOLIC BLOOD PRESSURE: 153 MMHG | TEMPERATURE: 99 F | OXYGEN SATURATION: 89 %

## 2024-11-11 VITALS — DIASTOLIC BLOOD PRESSURE: 56 MMHG | TEMPERATURE: 98.8 F | OXYGEN SATURATION: 93 % | SYSTOLIC BLOOD PRESSURE: 163 MMHG

## 2024-11-11 LAB
ALBUMIN SERPL BCG-MCNC: 2.1 G/DL (ref 3.2–5.2)
ALP SERPL-CCNC: 102 U/L (ref 40–129)
ALT SERPL W P-5'-P-CCNC: 18 U/L (ref 7–40)
AST SERPL-CCNC: 14 U/L (ref ?–34)
BASOPHILS # BLD AUTO: 0 10^3/UL (ref 0–0.2)
BASOPHILS NFR BLD AUTO: 0.5 % (ref 0–1)
BILIRUB SERPL-MCNC: 0.2 MG/DL (ref 0.3–1.2)
BUN SERPL-MCNC: 44 MG/DL (ref 9–23)
CALCIUM SERPL-MCNC: 7.3 MG/DL (ref 8.3–10.6)
CHLORIDE SERPL-SCNC: 112 MMOL/L (ref 98–107)
CK MB CFR.DF SERPL CALC: 1.88
CK MB CFR.DF SERPL CALC: 2.3
CK MB CFR.DF SERPL CALC: 2.88
CK MB SERPL-MCNC: 1 NG/ML (ref ?–3.6)
CK MB SERPL-MCNC: 1.2 NG/ML (ref ?–3.6)
CK MB SERPL-MCNC: 1.5 NG/ML (ref ?–3.6)
CK SERPL-CCNC: 52 U/L (ref 46–171)
CK SERPL-CCNC: 52 U/L (ref 46–171)
CK SERPL-CCNC: 53 U/L (ref 46–171)
CO2 SERPL-SCNC: 27 MMOL/L (ref 20–31)
CREAT SERPL-MCNC: 3.66 MG/DL (ref 0.7–1.3)
CRP SERPL-MCNC: 4.3 MG/DL (ref ?–1)
EOSINOPHIL # BLD AUTO: 0.1 10^3/UL (ref 0–0.5)
EOSINOPHIL NFR BLD AUTO: 1.1 % (ref 0–3)
GFR SERPL CREATININE-BSD FRML MDRD: 17.5 ML/MIN/{1.73_M2} (ref 42–?)
GLUCOSE SERPL-MCNC: 151 MG/DL (ref 74–106)
HBV CORE IGM SER QL: NEGATIVE
HBV SURFACE AB SER QL: NEGATIVE
HBV SURFACE AB SER-ACNC: NEGATIVE M[IU]/ML
HCT VFR BLD AUTO: 23.6 % (ref 42–52)
HCV AB SER QL: < 0.02 INDEX (ref ?–0.8)
HGB BLD-MCNC: 7.7 G/DL (ref 13.5–17.5)
LYMPHOCYTES # BLD AUTO: 0.8 10^3/UL (ref 1.5–5)
LYMPHOCYTES NFR BLD AUTO: 13.2 % (ref 24–44)
MCH RBC QN AUTO: 32.9 PG (ref 27–33)
MCHC RBC AUTO-ENTMCNC: 32.6 G/DL (ref 32–36.5)
MCV RBC AUTO: 100.9 FL (ref 80–96)
MONOCYTES # BLD AUTO: 0.6 10^3/UL (ref 0–0.8)
MONOCYTES NFR BLD AUTO: 10.9 % (ref 2–8)
NEUTROPHILS # BLD AUTO: 4.2 10^3/UL (ref 1.5–8.5)
NEUTROPHILS NFR BLD AUTO: 73.9 % (ref 36–66)
PHOSPHATE SERPL-MCNC: 3.6 MG/DL (ref 2.4–5.1)
PLATELET # BLD AUTO: 114 10^3/UL (ref 150–450)
POTASSIUM SERPL-SCNC: 4 MMOL/L (ref 3.5–5.1)
PROT SERPL-MCNC: 4.8 G/DL (ref 5.7–8.2)
RBC # BLD AUTO: 2.34 10^6/UL (ref 4.3–6.1)
SODIUM SERPL-SCNC: 144 MMOL/L (ref 136–145)
WBC # BLD AUTO: 5.7 10^3/UL (ref 4–10)

## 2024-11-11 RX ADMIN — DARBEPOETIN ALFA SCH MCG: 200 INJECTION, SOLUTION INTRAVENOUS; SUBCUTANEOUS at 10:03

## 2024-11-11 RX ADMIN — CALCITRIOL SCH MCG: 0.25 CAPSULE ORAL at 08:31

## 2024-11-12 VITALS — SYSTOLIC BLOOD PRESSURE: 113 MMHG | TEMPERATURE: 98.1 F | DIASTOLIC BLOOD PRESSURE: 46 MMHG | OXYGEN SATURATION: 95 %

## 2024-11-12 VITALS — DIASTOLIC BLOOD PRESSURE: 47 MMHG | TEMPERATURE: 98.3 F | SYSTOLIC BLOOD PRESSURE: 102 MMHG

## 2024-11-12 VITALS — TEMPERATURE: 98.3 F | SYSTOLIC BLOOD PRESSURE: 92 MMHG | DIASTOLIC BLOOD PRESSURE: 45 MMHG

## 2024-11-12 VITALS — TEMPERATURE: 98.1 F | OXYGEN SATURATION: 92 % | SYSTOLIC BLOOD PRESSURE: 118 MMHG | DIASTOLIC BLOOD PRESSURE: 56 MMHG

## 2024-11-12 VITALS — TEMPERATURE: 98.3 F | DIASTOLIC BLOOD PRESSURE: 41 MMHG | SYSTOLIC BLOOD PRESSURE: 97 MMHG

## 2024-11-12 VITALS — DIASTOLIC BLOOD PRESSURE: 45 MMHG | TEMPERATURE: 99.3 F | SYSTOLIC BLOOD PRESSURE: 123 MMHG | OXYGEN SATURATION: 89 %

## 2024-11-12 VITALS — SYSTOLIC BLOOD PRESSURE: 93 MMHG | DIASTOLIC BLOOD PRESSURE: 43 MMHG

## 2024-11-12 VITALS — SYSTOLIC BLOOD PRESSURE: 93 MMHG | DIASTOLIC BLOOD PRESSURE: 44 MMHG

## 2024-11-12 VITALS — DIASTOLIC BLOOD PRESSURE: 47 MMHG | OXYGEN SATURATION: 95 % | TEMPERATURE: 98.1 F | SYSTOLIC BLOOD PRESSURE: 109 MMHG

## 2024-11-12 LAB
ALBUMIN SERPL BCG-MCNC: 1.8 G/DL (ref 3.2–5.2)
BASOPHILS # BLD AUTO: 0 10^3/UL (ref 0–0.2)
BASOPHILS NFR BLD AUTO: 0.3 % (ref 0–1)
BUN SERPL-MCNC: 48 MG/DL (ref 9–23)
CALCIUM SERPL-MCNC: 6.8 MG/DL (ref 8.3–10.6)
CHLORIDE SERPL-SCNC: 112 MMOL/L (ref 98–107)
CO2 SERPL-SCNC: 27 MMOL/L (ref 20–31)
CREAT SERPL-MCNC: 3.91 MG/DL (ref 0.7–1.3)
EOSINOPHIL # BLD AUTO: 0 10^3/UL (ref 0–0.5)
EOSINOPHIL NFR BLD AUTO: 0.7 % (ref 0–3)
GFR SERPL CREATININE-BSD FRML MDRD: 16.2 ML/MIN/{1.73_M2} (ref 42–?)
GLUCOSE SERPL-MCNC: 152 MG/DL (ref 74–106)
HCT VFR BLD AUTO: 21.6 % (ref 42–52)
HCT VFR BLD AUTO: 22 % (ref 42–52)
HCT VFR BLD AUTO: 25.4 % (ref 42–52)
HGB BLD-MCNC: 7 G/DL (ref 13.5–17.5)
HGB BLD-MCNC: 7.1 G/DL (ref 13.5–17.5)
HGB BLD-MCNC: 8.2 G/DL (ref 13.5–17.5)
LYMPHOCYTES # BLD AUTO: 0.7 10^3/UL (ref 1.5–5)
LYMPHOCYTES NFR BLD AUTO: 12.9 % (ref 24–44)
MCH RBC QN AUTO: 32.6 PG (ref 27–33)
MCH RBC QN AUTO: 33 PG (ref 27–33)
MCHC RBC AUTO-ENTMCNC: 32.3 G/DL (ref 32–36.5)
MCHC RBC AUTO-ENTMCNC: 32.4 G/DL (ref 32–36.5)
MCV RBC AUTO: 100.9 FL (ref 80–96)
MCV RBC AUTO: 101.9 FL (ref 80–96)
MONOCYTES # BLD AUTO: 0.6 10^3/UL (ref 0–0.8)
MONOCYTES NFR BLD AUTO: 11.2 % (ref 2–8)
NEUTROPHILS # BLD AUTO: 4.3 10^3/UL (ref 1.5–8.5)
NEUTROPHILS NFR BLD AUTO: 74.4 % (ref 36–66)
PHOSPHATE SERPL-MCNC: 3.3 MG/DL (ref 2.4–5.1)
PLATELET # BLD AUTO: 102 10^3/UL (ref 150–450)
PLATELET # BLD AUTO: 109 10^3/UL (ref 150–450)
POTASSIUM SERPL-SCNC: 3.7 MMOL/L (ref 3.5–5.1)
RBC # BLD AUTO: 2.12 10^6/UL (ref 4.3–6.1)
RBC # BLD AUTO: 2.18 10^6/UL (ref 4.3–6.1)
SODIUM SERPL-SCNC: 142 MMOL/L (ref 136–145)
WBC # BLD AUTO: 5.6 10^3/UL (ref 4–10)
WBC # BLD AUTO: 5.7 10^3/UL (ref 4–10)

## 2024-11-12 PROCEDURE — 02HV33Z INSERTION OF INFUSION DEVICE INTO SUPERIOR VENA CAVA, PERCUTANEOUS APPROACH: ICD-10-PCS | Performed by: RADIOLOGY

## 2024-11-12 PROCEDURE — 0JH60XZ INSERTION OF TUNNELED VASCULAR ACCESS DEVICE INTO CHEST SUBCUTANEOUS TISSUE AND FASCIA, OPEN APPROACH: ICD-10-PCS | Performed by: RADIOLOGY

## 2024-11-12 RX ADMIN — HEPARIN SODIUM SCH UNITS: 1000 INJECTION INTRAVENOUS; SUBCUTANEOUS at 13:22

## 2024-11-12 RX ADMIN — CEFAZOLIN SODIUM ONE MLS/HR: 2 SOLUTION INTRAVENOUS at 08:47

## 2024-11-13 VITALS — TEMPERATURE: 99 F | SYSTOLIC BLOOD PRESSURE: 119 MMHG | OXYGEN SATURATION: 97 % | DIASTOLIC BLOOD PRESSURE: 48 MMHG

## 2024-11-13 VITALS — DIASTOLIC BLOOD PRESSURE: 49 MMHG | OXYGEN SATURATION: 90 % | TEMPERATURE: 97.9 F | SYSTOLIC BLOOD PRESSURE: 115 MMHG

## 2024-11-13 VITALS — DIASTOLIC BLOOD PRESSURE: 56 MMHG | TEMPERATURE: 98.1 F | SYSTOLIC BLOOD PRESSURE: 122 MMHG | OXYGEN SATURATION: 95 %

## 2024-11-13 VITALS — TEMPERATURE: 98.2 F | OXYGEN SATURATION: 97 % | DIASTOLIC BLOOD PRESSURE: 48 MMHG | SYSTOLIC BLOOD PRESSURE: 121 MMHG

## 2024-11-13 VITALS — DIASTOLIC BLOOD PRESSURE: 49 MMHG | OXYGEN SATURATION: 95 % | TEMPERATURE: 97.9 F | SYSTOLIC BLOOD PRESSURE: 111 MMHG

## 2024-11-13 VITALS — TEMPERATURE: 98.2 F | SYSTOLIC BLOOD PRESSURE: 127 MMHG | OXYGEN SATURATION: 95 % | DIASTOLIC BLOOD PRESSURE: 51 MMHG

## 2024-11-13 VITALS — SYSTOLIC BLOOD PRESSURE: 122 MMHG | DIASTOLIC BLOOD PRESSURE: 49 MMHG | TEMPERATURE: 98.4 F | OXYGEN SATURATION: 96 %

## 2024-11-13 VITALS — OXYGEN SATURATION: 90 %

## 2024-11-13 VITALS — OXYGEN SATURATION: 93 %

## 2024-11-13 LAB
ALBUMIN SERPL BCG-MCNC: 1.9 G/DL (ref 3.2–5.2)
ALBUMIN SERPL BCG-MCNC: 2.2 G/DL (ref 3.2–5.2)
BUN SERPL-MCNC: 40 MG/DL (ref 9–23)
BUN SERPL-MCNC: 40 MG/DL (ref 9–23)
CALCIUM SERPL-MCNC: 7.1 MG/DL (ref 8.3–10.6)
CALCIUM SERPL-MCNC: 7.4 MG/DL (ref 8.3–10.6)
CHLORIDE SERPL-SCNC: 107 MMOL/L (ref 98–107)
CHLORIDE SERPL-SCNC: 109 MMOL/L (ref 98–107)
CO2 SERPL-SCNC: 28 MMOL/L (ref 20–31)
CO2 SERPL-SCNC: 29 MMOL/L (ref 20–31)
CREAT SERPL-MCNC: 3.33 MG/DL (ref 0.7–1.3)
CREAT SERPL-MCNC: 3.42 MG/DL (ref 0.7–1.3)
GFR SERPL CREATININE-BSD FRML MDRD: 18.9 ML/MIN/{1.73_M2} (ref 42–?)
GFR SERPL CREATININE-BSD FRML MDRD: 19.5 ML/MIN/{1.73_M2} (ref 42–?)
GLUCOSE SERPL-MCNC: 90 MG/DL (ref 74–106)
GLUCOSE SERPL-MCNC: 97 MG/DL (ref 74–106)
HCT VFR BLD AUTO: 29.8 % (ref 42–52)
HGB BLD-MCNC: 9.7 G/DL (ref 13.5–17.5)
MCH RBC QN AUTO: 32.7 PG (ref 27–33)
MCHC RBC AUTO-ENTMCNC: 32.6 G/DL (ref 32–36.5)
MCV RBC AUTO: 100.3 FL (ref 80–96)
PHOSPHATE SERPL-MCNC: 4.4 MG/DL (ref 2.4–5.1)
PHOSPHATE SERPL-MCNC: 4.5 MG/DL (ref 2.4–5.1)
PLATELET # BLD AUTO: 108 10^3/UL (ref 150–450)
POTASSIUM SERPL-SCNC: 3.7 MMOL/L (ref 3.5–5.1)
POTASSIUM SERPL-SCNC: 3.9 MMOL/L (ref 3.5–5.1)
RBC # BLD AUTO: 2.97 10^6/UL (ref 4.3–6.1)
SODIUM SERPL-SCNC: 141 MMOL/L (ref 136–145)
SODIUM SERPL-SCNC: 141 MMOL/L (ref 136–145)
WBC # BLD AUTO: 6 10^3/UL (ref 4–10)

## 2024-11-13 RX ADMIN — HEPARIN SODIUM SCH UNITS: 1000 INJECTION INTRAVENOUS; SUBCUTANEOUS at 14:01

## 2024-11-13 RX ADMIN — DARBEPOETIN ALFA SCH MCG: 100 INJECTION, SOLUTION INTRAVENOUS; SUBCUTANEOUS at 12:53

## 2024-11-14 VITALS — SYSTOLIC BLOOD PRESSURE: 150 MMHG | OXYGEN SATURATION: 97 % | TEMPERATURE: 98.6 F | DIASTOLIC BLOOD PRESSURE: 62 MMHG

## 2024-11-14 VITALS — DIASTOLIC BLOOD PRESSURE: 64 MMHG | SYSTOLIC BLOOD PRESSURE: 147 MMHG | TEMPERATURE: 97.6 F | OXYGEN SATURATION: 97 %

## 2024-11-14 VITALS — DIASTOLIC BLOOD PRESSURE: 52 MMHG | OXYGEN SATURATION: 98 % | TEMPERATURE: 98.4 F | SYSTOLIC BLOOD PRESSURE: 131 MMHG

## 2024-11-14 VITALS — OXYGEN SATURATION: 95 % | TEMPERATURE: 99 F | DIASTOLIC BLOOD PRESSURE: 48 MMHG | SYSTOLIC BLOOD PRESSURE: 138 MMHG

## 2024-11-14 VITALS — SYSTOLIC BLOOD PRESSURE: 130 MMHG | DIASTOLIC BLOOD PRESSURE: 52 MMHG | OXYGEN SATURATION: 95 % | TEMPERATURE: 98.6 F

## 2024-11-14 VITALS — OXYGEN SATURATION: 96 %

## 2024-11-14 VITALS — OXYGEN SATURATION: 86 %

## 2024-11-14 VITALS — OXYGEN SATURATION: 94 %

## 2024-11-14 VITALS — DIASTOLIC BLOOD PRESSURE: 49 MMHG | OXYGEN SATURATION: 96 % | SYSTOLIC BLOOD PRESSURE: 121 MMHG | TEMPERATURE: 98.8 F

## 2024-11-14 VITALS — OXYGEN SATURATION: 89 %

## 2024-11-14 LAB
ALBUMIN SERPL BCG-MCNC: 1.9 G/DL (ref 3.2–5.2)
BUN SERPL-MCNC: 24 MG/DL (ref 9–23)
CALCIUM SERPL-MCNC: 7.2 MG/DL (ref 8.3–10.6)
CHLORIDE SERPL-SCNC: 105 MMOL/L (ref 98–107)
CO2 SERPL-SCNC: 30 MMOL/L (ref 20–31)
CREAT SERPL-MCNC: 2.5 MG/DL (ref 0.7–1.3)
GFR SERPL CREATININE-BSD FRML MDRD: 27.2 ML/MIN/{1.73_M2} (ref 42–?)
GLUCOSE SERPL-MCNC: 78 MG/DL (ref 74–106)
HCT VFR BLD AUTO: 27.8 % (ref 42–52)
HGB BLD-MCNC: 8.9 G/DL (ref 13.5–17.5)
MCH RBC QN AUTO: 31.7 PG (ref 27–33)
MCHC RBC AUTO-ENTMCNC: 32 G/DL (ref 32–36.5)
MCV RBC AUTO: 98.9 FL (ref 80–96)
PHOSPHATE SERPL-MCNC: 3.7 MG/DL (ref 2.4–5.1)
PLATELET # BLD AUTO: 104 10^3/UL (ref 150–450)
POTASSIUM SERPL-SCNC: 3.5 MMOL/L (ref 3.5–5.1)
RBC # BLD AUTO: 2.81 10^6/UL (ref 4.3–6.1)
SODIUM SERPL-SCNC: 139 MMOL/L (ref 136–145)
WBC # BLD AUTO: 5.3 10^3/UL (ref 4–10)

## 2024-11-14 RX ADMIN — HEPARIN SODIUM SCH UNITS: 1000 INJECTION INTRAVENOUS; SUBCUTANEOUS at 16:37

## 2024-11-15 VITALS — TEMPERATURE: 98.6 F | SYSTOLIC BLOOD PRESSURE: 143 MMHG | OXYGEN SATURATION: 93 % | DIASTOLIC BLOOD PRESSURE: 59 MMHG

## 2024-11-15 VITALS — OXYGEN SATURATION: 98 % | SYSTOLIC BLOOD PRESSURE: 155 MMHG | TEMPERATURE: 98.2 F | DIASTOLIC BLOOD PRESSURE: 67 MMHG

## 2024-11-15 VITALS — TEMPERATURE: 98.8 F | DIASTOLIC BLOOD PRESSURE: 48 MMHG | OXYGEN SATURATION: 95 % | SYSTOLIC BLOOD PRESSURE: 150 MMHG

## 2024-11-15 VITALS — OXYGEN SATURATION: 96 % | TEMPERATURE: 98 F | SYSTOLIC BLOOD PRESSURE: 141 MMHG | DIASTOLIC BLOOD PRESSURE: 64 MMHG

## 2024-11-15 VITALS — OXYGEN SATURATION: 92 % | DIASTOLIC BLOOD PRESSURE: 65 MMHG | TEMPERATURE: 98.6 F | SYSTOLIC BLOOD PRESSURE: 144 MMHG

## 2024-11-15 VITALS — SYSTOLIC BLOOD PRESSURE: 141 MMHG | TEMPERATURE: 96 F | OXYGEN SATURATION: 96 % | DIASTOLIC BLOOD PRESSURE: 64 MMHG

## 2024-11-15 VITALS — TEMPERATURE: 99.1 F | OXYGEN SATURATION: 95 % | DIASTOLIC BLOOD PRESSURE: 52 MMHG | SYSTOLIC BLOOD PRESSURE: 150 MMHG

## 2024-11-15 VITALS — OXYGEN SATURATION: 96 %

## 2024-11-15 VITALS — OXYGEN SATURATION: 96 % | SYSTOLIC BLOOD PRESSURE: 141 MMHG | DIASTOLIC BLOOD PRESSURE: 64 MMHG | TEMPERATURE: 96 F

## 2024-11-15 VITALS — OXYGEN SATURATION: 97 %

## 2024-11-15 VITALS — TEMPERATURE: 99 F

## 2024-11-15 VITALS — OXYGEN SATURATION: 93 %

## 2024-11-15 LAB
ALBUMIN SERPL BCG-MCNC: 2 G/DL (ref 3.2–5.2)
BUN SERPL-MCNC: 14 MG/DL (ref 9–23)
CALCIUM SERPL-MCNC: 7.6 MG/DL (ref 8.3–10.6)
CHLORIDE SERPL-SCNC: 103 MMOL/L (ref 98–107)
CO2 SERPL-SCNC: 30 MMOL/L (ref 20–31)
CREAT SERPL-MCNC: 1.85 MG/DL (ref 0.7–1.3)
GFR SERPL CREATININE-BSD FRML MDRD: 38.4 ML/MIN/{1.73_M2} (ref 42–?)
GLUCOSE SERPL-MCNC: 138 MG/DL (ref 74–106)
HCT VFR BLD AUTO: 29.1 % (ref 42–52)
HGB BLD-MCNC: 9.5 G/DL (ref 13.5–17.5)
MCH RBC QN AUTO: 32.3 PG (ref 27–33)
MCHC RBC AUTO-ENTMCNC: 32.6 G/DL (ref 32–36.5)
MCV RBC AUTO: 99 FL (ref 80–96)
PHOSPHATE SERPL-MCNC: 3.4 MG/DL (ref 2.4–5.1)
PLATELET # BLD AUTO: 127 10^3/UL (ref 150–450)
POTASSIUM SERPL-SCNC: 4 MMOL/L (ref 3.5–5.1)
RBC # BLD AUTO: 2.94 10^6/UL (ref 4.3–6.1)
SODIUM SERPL-SCNC: 137 MMOL/L (ref 136–145)
WBC # BLD AUTO: 4.7 10^3/UL (ref 4–10)

## 2024-11-15 RX ADMIN — LEVALBUTEROL HYDROCHLORIDE PRN MG: 1.25 SOLUTION, CONCENTRATE RESPIRATORY (INHALATION) at 17:33

## 2024-11-15 RX ADMIN — NICOTINE SCH PATCH: 21 PATCH, EXTENDED RELEASE TRANSDERMAL at 13:06

## 2024-11-16 VITALS — SYSTOLIC BLOOD PRESSURE: 169 MMHG | TEMPERATURE: 98.1 F | DIASTOLIC BLOOD PRESSURE: 75 MMHG | OXYGEN SATURATION: 97 %

## 2024-11-16 VITALS — DIASTOLIC BLOOD PRESSURE: 50 MMHG | SYSTOLIC BLOOD PRESSURE: 176 MMHG | OXYGEN SATURATION: 93 % | TEMPERATURE: 98.1 F

## 2024-11-16 VITALS — SYSTOLIC BLOOD PRESSURE: 150 MMHG | DIASTOLIC BLOOD PRESSURE: 48 MMHG | OXYGEN SATURATION: 95 % | TEMPERATURE: 99.3 F

## 2024-11-16 VITALS — DIASTOLIC BLOOD PRESSURE: 58 MMHG | TEMPERATURE: 98.2 F | SYSTOLIC BLOOD PRESSURE: 152 MMHG | OXYGEN SATURATION: 94 %

## 2024-11-16 VITALS — OXYGEN SATURATION: 99 % | TEMPERATURE: 99 F | DIASTOLIC BLOOD PRESSURE: 58 MMHG | SYSTOLIC BLOOD PRESSURE: 192 MMHG

## 2024-11-16 VITALS — DIASTOLIC BLOOD PRESSURE: 58 MMHG | SYSTOLIC BLOOD PRESSURE: 170 MMHG

## 2024-11-16 VITALS — SYSTOLIC BLOOD PRESSURE: 168 MMHG | DIASTOLIC BLOOD PRESSURE: 56 MMHG

## 2024-11-16 VITALS — DIASTOLIC BLOOD PRESSURE: 69 MMHG | OXYGEN SATURATION: 97 % | SYSTOLIC BLOOD PRESSURE: 183 MMHG | TEMPERATURE: 98.8 F

## 2024-11-16 VITALS — DIASTOLIC BLOOD PRESSURE: 67 MMHG | SYSTOLIC BLOOD PRESSURE: 176 MMHG

## 2024-11-16 LAB
ALBUMIN SERPL BCG-MCNC: 2 G/DL (ref 3.2–5.2)
BUN SERPL-MCNC: 22 MG/DL (ref 9–23)
CALCIUM SERPL-MCNC: 7.4 MG/DL (ref 8.3–10.6)
CHLORIDE SERPL-SCNC: 105 MMOL/L (ref 98–107)
CO2 SERPL-SCNC: 30 MMOL/L (ref 20–31)
CREAT SERPL-MCNC: 2.39 MG/DL (ref 0.7–1.3)
GFR SERPL CREATININE-BSD FRML MDRD: 28.6 ML/MIN/{1.73_M2} (ref 42–?)
GLUCOSE SERPL-MCNC: 159 MG/DL (ref 74–106)
HCT VFR BLD AUTO: 26.7 % (ref 42–52)
HGB BLD-MCNC: 8.8 G/DL (ref 13.5–17.5)
MCH RBC QN AUTO: 32.6 PG (ref 27–33)
MCHC RBC AUTO-ENTMCNC: 33 G/DL (ref 32–36.5)
MCV RBC AUTO: 98.9 FL (ref 80–96)
PHOSPHATE SERPL-MCNC: 2.7 MG/DL (ref 2.4–5.1)
PLATELET # BLD AUTO: 130 10^3/UL (ref 150–450)
POTASSIUM SERPL-SCNC: 3.4 MMOL/L (ref 3.5–5.1)
RBC # BLD AUTO: 2.7 10^6/UL (ref 4.3–6.1)
SODIUM SERPL-SCNC: 139 MMOL/L (ref 136–145)
WBC # BLD AUTO: 6.9 10^3/UL (ref 4–10)

## 2024-11-16 RX ADMIN — HEPARIN SODIUM SCH UNITS: 1000 INJECTION INTRAVENOUS; SUBCUTANEOUS at 09:35

## 2024-11-16 RX ADMIN — DULOXETINE SCH MG: 20 CAPSULE, DELAYED RELEASE ORAL at 07:47

## 2024-11-17 VITALS — DIASTOLIC BLOOD PRESSURE: 62 MMHG | OXYGEN SATURATION: 96 % | TEMPERATURE: 97.5 F | SYSTOLIC BLOOD PRESSURE: 168 MMHG

## 2024-11-17 VITALS — OXYGEN SATURATION: 95 % | DIASTOLIC BLOOD PRESSURE: 56 MMHG | SYSTOLIC BLOOD PRESSURE: 186 MMHG

## 2024-11-17 VITALS — SYSTOLIC BLOOD PRESSURE: 182 MMHG | DIASTOLIC BLOOD PRESSURE: 78 MMHG | TEMPERATURE: 100.2 F | OXYGEN SATURATION: 95 %

## 2024-11-17 VITALS — DIASTOLIC BLOOD PRESSURE: 65 MMHG | OXYGEN SATURATION: 95 % | TEMPERATURE: 98.1 F | SYSTOLIC BLOOD PRESSURE: 163 MMHG

## 2024-11-17 VITALS — SYSTOLIC BLOOD PRESSURE: 180 MMHG | DIASTOLIC BLOOD PRESSURE: 56 MMHG | OXYGEN SATURATION: 96 % | TEMPERATURE: 98.6 F

## 2024-11-17 VITALS — SYSTOLIC BLOOD PRESSURE: 178 MMHG | TEMPERATURE: 99 F | DIASTOLIC BLOOD PRESSURE: 60 MMHG | OXYGEN SATURATION: 94 %

## 2024-11-17 VITALS — DIASTOLIC BLOOD PRESSURE: 65 MMHG | SYSTOLIC BLOOD PRESSURE: 176 MMHG | TEMPERATURE: 98.6 F | OXYGEN SATURATION: 94 %

## 2024-11-17 VITALS — DIASTOLIC BLOOD PRESSURE: 54 MMHG | SYSTOLIC BLOOD PRESSURE: 162 MMHG

## 2024-11-17 VITALS — OXYGEN SATURATION: 95 %

## 2024-11-17 VITALS — OXYGEN SATURATION: 96 %

## 2024-11-17 LAB
ALBUMIN SERPL BCG-MCNC: 2 G/DL (ref 3.2–5.2)
BUN SERPL-MCNC: 16 MG/DL (ref 9–23)
CALCIUM SERPL-MCNC: 8.1 MG/DL (ref 8.3–10.6)
CHLORIDE SERPL-SCNC: 109 MMOL/L (ref 98–107)
CO2 SERPL-SCNC: 26 MMOL/L (ref 20–31)
CREAT SERPL-MCNC: 1.94 MG/DL (ref 0.7–1.3)
GFR SERPL CREATININE-BSD FRML MDRD: 36.4 ML/MIN/{1.73_M2} (ref 42–?)
GLUCOSE SERPL-MCNC: 332 MG/DL (ref 74–106)
HCT VFR BLD AUTO: 29.7 % (ref 42–52)
HGB BLD-MCNC: 9.4 G/DL (ref 13.5–17.5)
MCH RBC QN AUTO: 32 PG (ref 27–33)
MCHC RBC AUTO-ENTMCNC: 31.6 G/DL (ref 32–36.5)
MCV RBC AUTO: 101 FL (ref 80–96)
PHOSPHATE SERPL-MCNC: 1.2 MG/DL (ref 2.4–5.1)
PLATELET # BLD AUTO: 149 10^3/UL (ref 150–450)
POTASSIUM SERPL-SCNC: 3.1 MMOL/L (ref 3.5–5.1)
RBC # BLD AUTO: 2.94 10^6/UL (ref 4.3–6.1)
SODIUM SERPL-SCNC: 143 MMOL/L (ref 136–145)
WBC # BLD AUTO: 8.3 10^3/UL (ref 4–10)

## 2024-11-17 RX ADMIN — INSULIN LISPRO ONE UNITS: 100 INJECTION, SOLUTION INTRAVENOUS; SUBCUTANEOUS at 23:04

## 2024-11-17 RX ADMIN — ACETAMINOPHEN PRN MG: 325 TABLET ORAL at 20:29

## 2024-11-17 RX ADMIN — INSULIN LISPRO STA UNITS: 100 INJECTION, SOLUTION INTRAVENOUS; SUBCUTANEOUS at 20:27

## 2024-11-17 RX ADMIN — DIBASIC SODIUM PHOSPHATE, MONOBASIC POTASSIUM PHOSPHATE AND MONOBASIC SODIUM PHOSPHATE SCH MG: 852; 155; 130 TABLET ORAL at 16:28

## 2024-11-17 RX ADMIN — ACETAMINOPHEN ONE MLS/HR: 10 INJECTION, SOLUTION INTRAVENOUS at 23:05

## 2024-11-18 VITALS — OXYGEN SATURATION: 97 %

## 2024-11-18 VITALS — TEMPERATURE: 98.1 F | SYSTOLIC BLOOD PRESSURE: 158 MMHG | OXYGEN SATURATION: 96 % | DIASTOLIC BLOOD PRESSURE: 66 MMHG

## 2024-11-18 VITALS — OXYGEN SATURATION: 98 % | SYSTOLIC BLOOD PRESSURE: 155 MMHG | DIASTOLIC BLOOD PRESSURE: 64 MMHG | TEMPERATURE: 98.1 F

## 2024-11-18 VITALS — OXYGEN SATURATION: 97 % | TEMPERATURE: 98.6 F | SYSTOLIC BLOOD PRESSURE: 162 MMHG | DIASTOLIC BLOOD PRESSURE: 67 MMHG

## 2024-11-18 VITALS — TEMPERATURE: 96.9 F | SYSTOLIC BLOOD PRESSURE: 166 MMHG | DIASTOLIC BLOOD PRESSURE: 64 MMHG | OXYGEN SATURATION: 96 %

## 2024-11-18 VITALS — DIASTOLIC BLOOD PRESSURE: 63 MMHG | SYSTOLIC BLOOD PRESSURE: 155 MMHG | OXYGEN SATURATION: 97 % | TEMPERATURE: 96.9 F

## 2024-11-18 VITALS — SYSTOLIC BLOOD PRESSURE: 174 MMHG | TEMPERATURE: 98.4 F | DIASTOLIC BLOOD PRESSURE: 64 MMHG | OXYGEN SATURATION: 94 %

## 2024-11-18 LAB
ALBUMIN SERPL BCG-MCNC: 2.1 G/DL (ref 3.2–5.2)
BUN SERPL-MCNC: 21 MG/DL (ref 9–23)
CALCIUM SERPL-MCNC: 8 MG/DL (ref 8.3–10.6)
CHLORIDE SERPL-SCNC: 109 MMOL/L (ref 98–107)
CO2 SERPL-SCNC: 25 MMOL/L (ref 20–31)
CREAT SERPL-MCNC: 2.34 MG/DL (ref 0.7–1.3)
GFR SERPL CREATININE-BSD FRML MDRD: 29.3 ML/MIN/{1.73_M2} (ref 42–?)
GLUCOSE SERPL-MCNC: 147 MG/DL (ref 74–106)
HCT VFR BLD AUTO: 31.4 % (ref 42–52)
HGB BLD-MCNC: 10 G/DL (ref 13.5–17.5)
MCH RBC QN AUTO: 31.9 PG (ref 27–33)
MCHC RBC AUTO-ENTMCNC: 31.8 G/DL (ref 32–36.5)
MCV RBC AUTO: 100.3 FL (ref 80–96)
PHOSPHATE SERPL-MCNC: 1.5 MG/DL (ref 2.4–5.1)
PLATELET # BLD AUTO: 155 10^3/UL (ref 150–450)
POTASSIUM SERPL-SCNC: 3.5 MMOL/L (ref 3.5–5.1)
RBC # BLD AUTO: 3.13 10^6/UL (ref 4.3–6.1)
SODIUM SERPL-SCNC: 143 MMOL/L (ref 136–145)
WBC # BLD AUTO: 10.3 10^3/UL (ref 4–10)

## 2024-11-18 PROCEDURE — 5A1D70Z PERFORMANCE OF URINARY FILTRATION, INTERMITTENT, LESS THAN 6 HOURS PER DAY: ICD-10-PCS | Performed by: INTERNAL MEDICINE

## 2024-11-18 RX ADMIN — HEPARIN SODIUM SCH UNITS: 1000 INJECTION INTRAVENOUS; SUBCUTANEOUS at 14:11

## 2024-11-19 VITALS — SYSTOLIC BLOOD PRESSURE: 158 MMHG | TEMPERATURE: 97.9 F | OXYGEN SATURATION: 97 % | DIASTOLIC BLOOD PRESSURE: 51 MMHG

## 2024-11-19 VITALS — SYSTOLIC BLOOD PRESSURE: 172 MMHG | DIASTOLIC BLOOD PRESSURE: 67 MMHG | OXYGEN SATURATION: 97 % | TEMPERATURE: 98.6 F

## 2024-11-19 VITALS — OXYGEN SATURATION: 97 % | TEMPERATURE: 98.8 F | SYSTOLIC BLOOD PRESSURE: 171 MMHG | DIASTOLIC BLOOD PRESSURE: 65 MMHG

## 2024-11-19 VITALS — DIASTOLIC BLOOD PRESSURE: 67 MMHG | OXYGEN SATURATION: 98 % | SYSTOLIC BLOOD PRESSURE: 163 MMHG | TEMPERATURE: 98.6 F

## 2024-11-19 VITALS — DIASTOLIC BLOOD PRESSURE: 67 MMHG | TEMPERATURE: 98.6 F | SYSTOLIC BLOOD PRESSURE: 173 MMHG | OXYGEN SATURATION: 97 %

## 2024-11-19 VITALS — OXYGEN SATURATION: 94 %

## 2024-11-19 LAB
ALBUMIN SERPL BCG-MCNC: 2 G/DL (ref 3.2–5.2)
BUN SERPL-MCNC: 30 MG/DL (ref 9–23)
CALCIUM SERPL-MCNC: 7.3 MG/DL (ref 8.3–10.6)
CHLORIDE SERPL-SCNC: 107 MMOL/L (ref 98–107)
CO2 SERPL-SCNC: 25 MMOL/L (ref 20–31)
CREAT SERPL-MCNC: 2.64 MG/DL (ref 0.7–1.3)
GFR SERPL CREATININE-BSD FRML MDRD: 25.5 ML/MIN/{1.73_M2} (ref 42–?)
GLUCOSE SERPL-MCNC: 199 MG/DL (ref 74–106)
HCT VFR BLD AUTO: 28.7 % (ref 42–52)
HGB BLD-MCNC: 9.3 G/DL (ref 13.5–17.5)
MCH RBC QN AUTO: 32.7 PG (ref 27–33)
MCHC RBC AUTO-ENTMCNC: 32.4 G/DL (ref 32–36.5)
MCV RBC AUTO: 101.1 FL (ref 80–96)
PHOSPHATE SERPL-MCNC: 2.2 MG/DL (ref 2.4–5.1)
PLATELET # BLD AUTO: 160 10^3/UL (ref 150–450)
POTASSIUM SERPL-SCNC: 3.7 MMOL/L (ref 3.5–5.1)
RBC # BLD AUTO: 2.84 10^6/UL (ref 4.3–6.1)
SODIUM SERPL-SCNC: 140 MMOL/L (ref 136–145)
WBC # BLD AUTO: 10.1 10^3/UL (ref 4–10)

## 2024-11-19 RX ADMIN — HEPARIN SODIUM SCH UNITS: 1000 INJECTION INTRAVENOUS; SUBCUTANEOUS at 11:01

## 2024-11-20 VITALS — DIASTOLIC BLOOD PRESSURE: 63 MMHG | SYSTOLIC BLOOD PRESSURE: 164 MMHG | OXYGEN SATURATION: 96 % | TEMPERATURE: 99.3 F

## 2024-11-20 VITALS — TEMPERATURE: 98.9 F | DIASTOLIC BLOOD PRESSURE: 44 MMHG | OXYGEN SATURATION: 97 % | SYSTOLIC BLOOD PRESSURE: 150 MMHG

## 2024-11-20 VITALS — OXYGEN SATURATION: 96 %

## 2024-11-20 VITALS — OXYGEN SATURATION: 94 % | DIASTOLIC BLOOD PRESSURE: 65 MMHG | TEMPERATURE: 98.8 F | SYSTOLIC BLOOD PRESSURE: 171 MMHG

## 2024-11-20 VITALS — SYSTOLIC BLOOD PRESSURE: 155 MMHG | DIASTOLIC BLOOD PRESSURE: 68 MMHG

## 2024-11-20 VITALS — SYSTOLIC BLOOD PRESSURE: 176 MMHG | OXYGEN SATURATION: 96 % | DIASTOLIC BLOOD PRESSURE: 63 MMHG | TEMPERATURE: 99 F

## 2024-11-20 VITALS — DIASTOLIC BLOOD PRESSURE: 60 MMHG | SYSTOLIC BLOOD PRESSURE: 150 MMHG

## 2024-11-20 VITALS — DIASTOLIC BLOOD PRESSURE: 63 MMHG | SYSTOLIC BLOOD PRESSURE: 173 MMHG

## 2024-11-20 VITALS — SYSTOLIC BLOOD PRESSURE: 160 MMHG | DIASTOLIC BLOOD PRESSURE: 58 MMHG

## 2024-11-20 VITALS — TEMPERATURE: 99.3 F | OXYGEN SATURATION: 94 % | SYSTOLIC BLOOD PRESSURE: 179 MMHG | DIASTOLIC BLOOD PRESSURE: 63 MMHG

## 2024-11-20 VITALS — OXYGEN SATURATION: 94 %

## 2024-11-20 VITALS — TEMPERATURE: 98.8 F | DIASTOLIC BLOOD PRESSURE: 67 MMHG | OXYGEN SATURATION: 97 % | SYSTOLIC BLOOD PRESSURE: 177 MMHG

## 2024-11-20 VITALS — SYSTOLIC BLOOD PRESSURE: 150 MMHG | DIASTOLIC BLOOD PRESSURE: 60 MMHG

## 2024-11-20 LAB
ALBUMIN SERPL BCG-MCNC: 2 G/DL (ref 3.2–5.2)
BUN SERPL-MCNC: 18 MG/DL (ref 9–23)
CALCIUM SERPL-MCNC: 7.8 MG/DL (ref 8.3–10.6)
CHLORIDE SERPL-SCNC: 109 MMOL/L (ref 98–107)
CO2 SERPL-SCNC: 25 MMOL/L (ref 20–31)
CREAT SERPL-MCNC: 1.94 MG/DL (ref 0.7–1.3)
GFR SERPL CREATININE-BSD FRML MDRD: 36.4 ML/MIN/{1.73_M2} (ref 42–?)
GLUCOSE SERPL-MCNC: 114 MG/DL (ref 74–106)
HCT VFR BLD AUTO: 31.7 % (ref 42–52)
HGB BLD-MCNC: 9.9 G/DL (ref 13.5–17.5)
MCH RBC QN AUTO: 32.6 PG (ref 27–33)
MCHC RBC AUTO-ENTMCNC: 31.2 G/DL (ref 32–36.5)
MCV RBC AUTO: 104.3 FL (ref 80–96)
PHOSPHATE SERPL-MCNC: 1.4 MG/DL (ref 2.4–5.1)
PLATELET # BLD AUTO: 167 10^3/UL (ref 150–450)
POTASSIUM SERPL-SCNC: 3.3 MMOL/L (ref 3.5–5.1)
RBC # BLD AUTO: 3.04 10^6/UL (ref 4.3–6.1)
SODIUM SERPL-SCNC: 144 MMOL/L (ref 136–145)
WBC # BLD AUTO: 8.6 10^3/UL (ref 4–10)

## 2024-11-20 RX ADMIN — HEPARIN SODIUM SCH UNITS: 1000 INJECTION INTRAVENOUS; SUBCUTANEOUS at 15:40

## 2024-11-20 RX ADMIN — DIBASIC SODIUM PHOSPHATE, MONOBASIC POTASSIUM PHOSPHATE AND MONOBASIC SODIUM PHOSPHATE SCH MG: 852; 155; 130 TABLET ORAL at 16:39

## 2024-11-21 ENCOUNTER — HOSPITAL ENCOUNTER (INPATIENT)
Dept: HOSPITAL 53 - M PM&R | Age: 74
LOS: 12 days | Discharge: HOME HEALTH | DRG: 91 | End: 2024-12-03
Attending: PHYSICAL MEDICINE & REHABILITATION | Admitting: PHYSICAL MEDICINE & REHABILITATION
Payer: MEDICARE

## 2024-11-21 VITALS — TEMPERATURE: 98.2 F | SYSTOLIC BLOOD PRESSURE: 167 MMHG | OXYGEN SATURATION: 100 % | DIASTOLIC BLOOD PRESSURE: 64 MMHG

## 2024-11-21 VITALS — SYSTOLIC BLOOD PRESSURE: 170 MMHG | TEMPERATURE: 98.6 F | DIASTOLIC BLOOD PRESSURE: 52 MMHG | OXYGEN SATURATION: 97 %

## 2024-11-21 VITALS — WEIGHT: 151.9 LBS | HEIGHT: 66 IN | BODY MASS INDEX: 24.41 KG/M2

## 2024-11-21 VITALS — SYSTOLIC BLOOD PRESSURE: 170 MMHG | DIASTOLIC BLOOD PRESSURE: 72 MMHG | OXYGEN SATURATION: 99 % | TEMPERATURE: 98.9 F

## 2024-11-21 VITALS — SYSTOLIC BLOOD PRESSURE: 167 MMHG | DIASTOLIC BLOOD PRESSURE: 64 MMHG

## 2024-11-21 VITALS — OXYGEN SATURATION: 98 % | SYSTOLIC BLOOD PRESSURE: 176 MMHG | TEMPERATURE: 97.8 F | DIASTOLIC BLOOD PRESSURE: 73 MMHG

## 2024-11-21 VITALS — DIASTOLIC BLOOD PRESSURE: 62 MMHG | SYSTOLIC BLOOD PRESSURE: 168 MMHG

## 2024-11-21 VITALS — OXYGEN SATURATION: 97 %

## 2024-11-21 DIAGNOSIS — I65.23: ICD-10-CM

## 2024-11-21 DIAGNOSIS — Z79.899: ICD-10-CM

## 2024-11-21 DIAGNOSIS — E11.40: ICD-10-CM

## 2024-11-21 DIAGNOSIS — F32.A: ICD-10-CM

## 2024-11-21 DIAGNOSIS — R26.89: ICD-10-CM

## 2024-11-21 DIAGNOSIS — I13.2: ICD-10-CM

## 2024-11-21 DIAGNOSIS — D50.9: ICD-10-CM

## 2024-11-21 DIAGNOSIS — M48.061: ICD-10-CM

## 2024-11-21 DIAGNOSIS — E78.5: ICD-10-CM

## 2024-11-21 DIAGNOSIS — G72.81: Primary | ICD-10-CM

## 2024-11-21 DIAGNOSIS — F41.9: ICD-10-CM

## 2024-11-21 DIAGNOSIS — R42: ICD-10-CM

## 2024-11-21 DIAGNOSIS — I95.1: ICD-10-CM

## 2024-11-21 DIAGNOSIS — E11.22: ICD-10-CM

## 2024-11-21 DIAGNOSIS — I10: ICD-10-CM

## 2024-11-21 DIAGNOSIS — G25.81: ICD-10-CM

## 2024-11-21 DIAGNOSIS — J44.9: ICD-10-CM

## 2024-11-21 DIAGNOSIS — N31.2: ICD-10-CM

## 2024-11-21 DIAGNOSIS — D69.6: ICD-10-CM

## 2024-11-21 DIAGNOSIS — D63.1: ICD-10-CM

## 2024-11-21 DIAGNOSIS — F17.210: ICD-10-CM

## 2024-11-21 DIAGNOSIS — N18.6: ICD-10-CM

## 2024-11-21 DIAGNOSIS — R33.9: ICD-10-CM

## 2024-11-21 DIAGNOSIS — Z86.73: ICD-10-CM

## 2024-11-21 DIAGNOSIS — G35: ICD-10-CM

## 2024-11-21 DIAGNOSIS — I25.10: ICD-10-CM

## 2024-11-21 DIAGNOSIS — Z85.038: ICD-10-CM

## 2024-11-21 DIAGNOSIS — E83.39: ICD-10-CM

## 2024-11-21 RX ADMIN — TAMSULOSIN HYDROCHLORIDE SCH MG: 0.4 CAPSULE ORAL at 22:28

## 2024-11-21 RX ADMIN — GUAIFENESIN SCH MG: 600 TABLET, EXTENDED RELEASE ORAL at 22:27

## 2024-11-21 RX ADMIN — DIBASIC SODIUM PHOSPHATE, MONOBASIC POTASSIUM PHOSPHATE AND MONOBASIC SODIUM PHOSPHATE SCH MG: 852; 155; 130 TABLET ORAL at 16:36

## 2024-11-21 RX ADMIN — FLUTICASONE PROPIONATE AND SALMETEROL XINAFOATE SCH PUFF: 45; 21 AEROSOL, METERED RESPIRATORY (INHALATION) at 19:27

## 2024-11-21 RX ADMIN — DOCUSATE SODIUM SCH MG: 100 CAPSULE, LIQUID FILLED ORAL at 21:00

## 2024-11-21 RX ADMIN — IPRATROPIUM BROMIDE AND ALBUTEROL SULFATE SCH ML: .5; 3 SOLUTION RESPIRATORY (INHALATION) at 13:34

## 2024-11-21 RX ADMIN — HEPARIN SODIUM SCH UNITS: 1000 INJECTION INTRAVENOUS; SUBCUTANEOUS at 09:53

## 2024-11-22 VITALS — TEMPERATURE: 98.9 F | SYSTOLIC BLOOD PRESSURE: 176 MMHG | OXYGEN SATURATION: 99 % | DIASTOLIC BLOOD PRESSURE: 66 MMHG

## 2024-11-22 VITALS — TEMPERATURE: 99.5 F | OXYGEN SATURATION: 98 % | DIASTOLIC BLOOD PRESSURE: 50 MMHG | SYSTOLIC BLOOD PRESSURE: 168 MMHG

## 2024-11-22 VITALS — TEMPERATURE: 99.3 F | OXYGEN SATURATION: 98 % | DIASTOLIC BLOOD PRESSURE: 77 MMHG | SYSTOLIC BLOOD PRESSURE: 182 MMHG

## 2024-11-22 VITALS — DIASTOLIC BLOOD PRESSURE: 68 MMHG | SYSTOLIC BLOOD PRESSURE: 180 MMHG | TEMPERATURE: 99.9 F

## 2024-11-22 LAB
ANISOCYTOSIS BLD QL SMEAR: (no result)
BASO STIPL BLD QL SMEAR: (no result)
BUN SERPL-MCNC: 20 MG/DL (ref 9–23)
CALCIUM SERPL-MCNC: 7.4 MG/DL (ref 8.3–10.6)
CHLORIDE SERPL-SCNC: 111 MMOL/L (ref 98–107)
CO2 SERPL-SCNC: 23 MMOL/L (ref 20–31)
CREAT SERPL-MCNC: 1.85 MG/DL (ref 0.7–1.3)
EOSINOPHIL NFR BLD MANUAL: 4 % (ref 0–3)
GFR SERPL CREATININE-BSD FRML MDRD: 38.4 ML/MIN/{1.73_M2} (ref 42–?)
GLUCOSE SERPL-MCNC: 209 MG/DL (ref 74–106)
HCT VFR BLD AUTO: 33 % (ref 42–52)
HGB BLD-MCNC: 10.4 G/DL (ref 13.5–17.5)
LYMPHOCYTES NFR BLD MANUAL: 10 % (ref 16–44)
MACROCYTES BLD QL SMEAR: (no result)
MCH RBC QN AUTO: 32.3 PG (ref 27–33)
MCHC RBC AUTO-ENTMCNC: 31.5 G/DL (ref 32–36.5)
MCV RBC AUTO: 102.5 FL (ref 80–96)
METAMYELOCYTES NFR BLD MANUAL: 1 % (ref 0–0)
MONOCYTES NFR BLD MANUAL: 4 % (ref 0–5)
NEUTROPHILS NFR BLD MANUAL: 77 % (ref 28–66)
PLATELET # BLD AUTO: 141 10^3/UL (ref 150–450)
PLATELET BLD QL SMEAR: NORMAL
POLYCHROMASIA BLD QL SMEAR: (no result)
POTASSIUM SERPL-SCNC: 3.4 MMOL/L (ref 3.5–5.1)
RBC # BLD AUTO: 3.22 10^6/UL (ref 4.3–6.1)
SODIUM SERPL-SCNC: 145 MMOL/L (ref 136–145)
VARIANT LYMPHS NFR BLD MANUAL: 3 % (ref 0–5)
WBC # BLD AUTO: 11.7 10^3/UL (ref 4–10)

## 2024-11-22 RX ADMIN — DULOXETINE SCH MG: 20 CAPSULE, DELAYED RELEASE ORAL at 09:21

## 2024-11-22 RX ADMIN — ACETAMINOPHEN PRN MG: 325 TABLET ORAL at 04:33

## 2024-11-22 RX ADMIN — INSULIN LISPRO SCH UNITS: 100 INJECTION, SOLUTION INTRAVENOUS; SUBCUTANEOUS at 16:39

## 2024-11-22 RX ADMIN — HEPARIN SODIUM SCH UNITS: 1000 INJECTION INTRAVENOUS; SUBCUTANEOUS at 14:25

## 2024-11-22 RX ADMIN — CALCITRIOL SCH MCG: 0.25 CAPSULE ORAL at 09:20

## 2024-11-22 RX ADMIN — CLOPIDOGREL BISULFATE SCH MG: 75 TABLET ORAL at 09:21

## 2024-11-22 RX ADMIN — NICOTINE SCH PATCH: 21 PATCH, EXTENDED RELEASE TRANSDERMAL at 09:22

## 2024-11-22 RX ADMIN — ATORVASTATIN CALCIUM SCH MG: 20 TABLET, FILM COATED ORAL at 09:20

## 2024-11-22 RX ADMIN — OMEPRAZOLE SCH MG: 20 CAPSULE, DELAYED RELEASE ORAL at 09:20

## 2024-11-23 VITALS — SYSTOLIC BLOOD PRESSURE: 158 MMHG | OXYGEN SATURATION: 99 % | DIASTOLIC BLOOD PRESSURE: 68 MMHG | TEMPERATURE: 99.6 F

## 2024-11-23 VITALS — DIASTOLIC BLOOD PRESSURE: 68 MMHG | OXYGEN SATURATION: 95 % | SYSTOLIC BLOOD PRESSURE: 190 MMHG | TEMPERATURE: 99.2 F

## 2024-11-23 VITALS — TEMPERATURE: 98.5 F | SYSTOLIC BLOOD PRESSURE: 170 MMHG | DIASTOLIC BLOOD PRESSURE: 60 MMHG | OXYGEN SATURATION: 92 %

## 2024-11-23 LAB
BASOPHILS # BLD AUTO: 0.1 10^3/UL (ref 0–0.2)
BASOPHILS NFR BLD AUTO: 0.9 % (ref 0–1)
EOSINOPHIL # BLD AUTO: 0.1 10^3/UL (ref 0–0.5)
EOSINOPHIL NFR BLD AUTO: 1.2 % (ref 0–3)
HCT VFR BLD AUTO: 31.9 % (ref 42–52)
HGB BLD-MCNC: 9.8 G/DL (ref 13.5–17.5)
LYMPHOCYTES # BLD AUTO: 1.1 10^3/UL (ref 1.5–5)
LYMPHOCYTES NFR BLD AUTO: 9.7 % (ref 24–44)
MCH RBC QN AUTO: 32.1 PG (ref 27–33)
MCHC RBC AUTO-ENTMCNC: 30.7 G/DL (ref 32–36.5)
MCV RBC AUTO: 104.6 FL (ref 80–96)
MONOCYTES # BLD AUTO: 1.3 10^3/UL (ref 0–0.8)
MONOCYTES NFR BLD AUTO: 10.7 % (ref 2–8)
NEUTROPHILS # BLD AUTO: 8.1 10^3/UL (ref 1.5–8.5)
NEUTROPHILS NFR BLD AUTO: 69.3 % (ref 36–66)
PLATELET # BLD AUTO: 142 10^3/UL (ref 150–450)
RBC # BLD AUTO: 3.05 10^6/UL (ref 4.3–6.1)
WBC # BLD AUTO: 11.7 10^3/UL (ref 4–10)

## 2024-11-23 RX ADMIN — ONDANSETRON HYDROCHLORIDE PRN MG: 4 TABLET, FILM COATED ORAL at 15:58

## 2024-11-24 VITALS — TEMPERATURE: 98.9 F

## 2024-11-24 VITALS — DIASTOLIC BLOOD PRESSURE: 68 MMHG | OXYGEN SATURATION: 98 % | TEMPERATURE: 99 F | SYSTOLIC BLOOD PRESSURE: 150 MMHG

## 2024-11-24 VITALS — SYSTOLIC BLOOD PRESSURE: 182 MMHG | TEMPERATURE: 99.5 F | OXYGEN SATURATION: 95 % | DIASTOLIC BLOOD PRESSURE: 78 MMHG

## 2024-11-24 VITALS — TEMPERATURE: 98.5 F | SYSTOLIC BLOOD PRESSURE: 162 MMHG | OXYGEN SATURATION: 99 % | DIASTOLIC BLOOD PRESSURE: 66 MMHG

## 2024-11-24 VITALS — TEMPERATURE: 100.7 F | OXYGEN SATURATION: 98 %

## 2024-11-24 LAB
ALBUMIN SERPL BCG-MCNC: 2.1 G/DL (ref 3.2–5.2)
ALP SERPL-CCNC: 154 U/L (ref 40–129)
ALT SERPL W P-5'-P-CCNC: 14 U/L (ref 7–40)
AST SERPL-CCNC: 12 U/L (ref ?–34)
BASE EXCESS BLDV CALC-SCNC: -2.1 MMOL/L (ref -2–2)
BASOPHILS # BLD AUTO: 0.1 10^3/UL (ref 0–0.2)
BASOPHILS # BLD AUTO: 0.1 10^3/UL (ref 0–0.2)
BASOPHILS NFR BLD AUTO: 0.5 % (ref 0–1)
BASOPHILS NFR BLD AUTO: 0.6 % (ref 0–1)
BILIRUB CONJ SERPL-MCNC: 0.1 MG/DL (ref ?–0.4)
BILIRUB SERPL-MCNC: 0.4 MG/DL (ref 0.3–1.2)
BUN SERPL-MCNC: 22 MG/DL (ref 9–23)
CALCIUM SERPL-MCNC: 7 MG/DL (ref 8.3–10.6)
CHLORIDE SERPL-SCNC: 110 MMOL/L (ref 98–107)
CK SERPL-CCNC: 29 U/L (ref 46–171)
CO2 BLDV CALC-SCNC: 24.3 MMOL/L (ref 24–28)
CO2 SERPL-SCNC: 24 MMOL/L (ref 20–31)
CREAT SERPL-MCNC: 2.06 MG/DL (ref 0.7–1.3)
CRP SERPL-MCNC: 5.3 MG/DL (ref ?–1)
EOSINOPHIL # BLD AUTO: 0.1 10^3/UL (ref 0–0.5)
EOSINOPHIL # BLD AUTO: 0.1 10^3/UL (ref 0–0.5)
EOSINOPHIL NFR BLD AUTO: 0.5 % (ref 0–3)
EOSINOPHIL NFR BLD AUTO: 0.7 % (ref 0–3)
ERYTHROCYTE [SEDIMENTATION RATE] IN BLOOD BY WESTERGREN METHOD: 7 MM/HR (ref 0–20)
GFR SERPL CREATININE-BSD FRML MDRD: 34 ML/MIN/{1.73_M2} (ref 42–?)
GLUCOSE SERPL-MCNC: 102 MG/DL (ref 74–106)
HCO3 BLDV-SCNC: 23 MMOL/L (ref 23–27)
HCO3 STD BLDV-SCNC: 22.3 MMOL/L
HCT VFR BLD AUTO: 31.5 % (ref 42–52)
HCT VFR BLD AUTO: 34.5 % (ref 42–52)
HGB BLD-MCNC: 10.7 G/DL (ref 13.5–17.5)
HGB BLD-MCNC: 9.9 G/DL (ref 13.5–17.5)
LYMPHOCYTES # BLD AUTO: 0.9 10^3/UL (ref 1.5–5)
LYMPHOCYTES # BLD AUTO: 1.2 10^3/UL (ref 1.5–5)
LYMPHOCYTES NFR BLD AUTO: 6 % (ref 24–44)
LYMPHOCYTES NFR BLD AUTO: 8.9 % (ref 24–44)
MAGNESIUM SERPL-MCNC: 1.6 MG/DL (ref 1.8–2.4)
MCH RBC QN AUTO: 32.2 PG (ref 27–33)
MCH RBC QN AUTO: 32.3 PG (ref 27–33)
MCHC RBC AUTO-ENTMCNC: 31 G/DL (ref 32–36.5)
MCHC RBC AUTO-ENTMCNC: 31.4 G/DL (ref 32–36.5)
MCV RBC AUTO: 102.6 FL (ref 80–96)
MCV RBC AUTO: 104.2 FL (ref 80–96)
MONOCYTES # BLD AUTO: 1.4 10^3/UL (ref 0–0.8)
MONOCYTES # BLD AUTO: 1.4 10^3/UL (ref 0–0.8)
MONOCYTES NFR BLD AUTO: 10.5 % (ref 2–8)
MONOCYTES NFR BLD AUTO: 9.7 % (ref 2–8)
NEUTROPHILS # BLD AUTO: 10 10^3/UL (ref 1.5–8.5)
NEUTROPHILS # BLD AUTO: 11 10^3/UL (ref 1.5–8.5)
NEUTROPHILS NFR BLD AUTO: 74.5 % (ref 36–66)
NEUTROPHILS NFR BLD AUTO: 77.7 % (ref 36–66)
PCO2 BLDV: 40.8 MMHG (ref 38–50)
PH BLDV: 7.37 UNITS (ref 7.33–7.43)
PLATELET # BLD AUTO: 141 10^3/UL (ref 150–450)
PLATELET # BLD AUTO: 147 10^3/UL (ref 150–450)
PO2 BLDV: 40.5 MMHG (ref 30–50)
POTASSIUM SERPL-SCNC: 3.6 MMOL/L (ref 3.5–5.1)
PROCALCITONIN SERPL-MCNC: 0.13 NG/ML
PROT SERPL-MCNC: 5 G/DL (ref 5.7–8.2)
RBC # BLD AUTO: 3.07 10^6/UL (ref 4.3–6.1)
RBC # BLD AUTO: 3.31 10^6/UL (ref 4.3–6.1)
SAO2 % BLDV: 77 % (ref 60–80)
SODIUM SERPL-SCNC: 144 MMOL/L (ref 136–145)
WBC # BLD AUTO: 13.4 10^3/UL (ref 4–10)
WBC # BLD AUTO: 14.2 10^3/UL (ref 4–10)

## 2024-11-24 RX ADMIN — HEPARIN SODIUM SCH UNITS: 1000 INJECTION INTRAVENOUS; SUBCUTANEOUS at 10:47

## 2024-11-25 VITALS — SYSTOLIC BLOOD PRESSURE: 190 MMHG | DIASTOLIC BLOOD PRESSURE: 60 MMHG

## 2024-11-25 VITALS — SYSTOLIC BLOOD PRESSURE: 150 MMHG | DIASTOLIC BLOOD PRESSURE: 62 MMHG

## 2024-11-25 VITALS — SYSTOLIC BLOOD PRESSURE: 180 MMHG | DIASTOLIC BLOOD PRESSURE: 80 MMHG

## 2024-11-25 VITALS — SYSTOLIC BLOOD PRESSURE: 160 MMHG | DIASTOLIC BLOOD PRESSURE: 75 MMHG

## 2024-11-25 VITALS — SYSTOLIC BLOOD PRESSURE: 164 MMHG | DIASTOLIC BLOOD PRESSURE: 73 MMHG | OXYGEN SATURATION: 97 % | TEMPERATURE: 98.9 F

## 2024-11-25 VITALS — OXYGEN SATURATION: 99 % | TEMPERATURE: 98.8 F

## 2024-11-25 VITALS — DIASTOLIC BLOOD PRESSURE: 71 MMHG | TEMPERATURE: 97.9 F | OXYGEN SATURATION: 98 % | SYSTOLIC BLOOD PRESSURE: 161 MMHG

## 2024-11-25 LAB
BASOPHILS # BLD AUTO: 0 10^3/UL (ref 0–0.2)
BASOPHILS NFR BLD AUTO: 0.4 % (ref 0–1)
EOSINOPHIL # BLD AUTO: 0.1 10^3/UL (ref 0–0.5)
EOSINOPHIL NFR BLD AUTO: 0.8 % (ref 0–3)
HCT VFR BLD AUTO: 33.3 % (ref 42–52)
HGB BLD-MCNC: 10.5 G/DL (ref 13.5–17.5)
LYMPHOCYTES # BLD AUTO: 1 10^3/UL (ref 1.5–5)
LYMPHOCYTES NFR BLD AUTO: 10.7 % (ref 24–44)
MCH RBC QN AUTO: 32.5 PG (ref 27–33)
MCHC RBC AUTO-ENTMCNC: 31.5 G/DL (ref 32–36.5)
MCV RBC AUTO: 103.1 FL (ref 80–96)
MONOCYTES # BLD AUTO: 1.2 10^3/UL (ref 0–0.8)
MONOCYTES NFR BLD AUTO: 12.9 % (ref 2–8)
NEUTROPHILS # BLD AUTO: 6.3 10^3/UL (ref 1.5–8.5)
NEUTROPHILS NFR BLD AUTO: 70.1 % (ref 36–66)
PLATELET # BLD AUTO: 152 10^3/UL (ref 150–450)
RBC # BLD AUTO: 3.23 10^6/UL (ref 4.3–6.1)
WBC # BLD AUTO: 9 10^3/UL (ref 4–10)

## 2024-11-26 VITALS — TEMPERATURE: 98.5 F | DIASTOLIC BLOOD PRESSURE: 68 MMHG | SYSTOLIC BLOOD PRESSURE: 151 MMHG | OXYGEN SATURATION: 97 %

## 2024-11-26 VITALS — SYSTOLIC BLOOD PRESSURE: 146 MMHG | DIASTOLIC BLOOD PRESSURE: 70 MMHG

## 2024-11-26 VITALS — TEMPERATURE: 98.4 F | DIASTOLIC BLOOD PRESSURE: 70 MMHG | OXYGEN SATURATION: 100 % | SYSTOLIC BLOOD PRESSURE: 138 MMHG

## 2024-11-26 RX ADMIN — HEPARIN SODIUM SCH UNITS: 1000 INJECTION INTRAVENOUS; SUBCUTANEOUS at 13:04

## 2024-11-26 RX ADMIN — DARBEPOETIN ALFA SCH MCG: 100 INJECTION, SOLUTION INTRAVENOUS; SUBCUTANEOUS at 13:01

## 2024-11-27 VITALS — SYSTOLIC BLOOD PRESSURE: 138 MMHG | OXYGEN SATURATION: 97 % | DIASTOLIC BLOOD PRESSURE: 62 MMHG | TEMPERATURE: 99.4 F

## 2024-11-27 VITALS — TEMPERATURE: 99 F | OXYGEN SATURATION: 95 % | SYSTOLIC BLOOD PRESSURE: 168 MMHG | DIASTOLIC BLOOD PRESSURE: 68 MMHG

## 2024-11-27 VITALS — DIASTOLIC BLOOD PRESSURE: 67 MMHG | SYSTOLIC BLOOD PRESSURE: 147 MMHG | TEMPERATURE: 97.6 F | OXYGEN SATURATION: 96 %

## 2024-11-28 VITALS — SYSTOLIC BLOOD PRESSURE: 158 MMHG | OXYGEN SATURATION: 99 % | TEMPERATURE: 99 F | DIASTOLIC BLOOD PRESSURE: 52 MMHG

## 2024-11-28 VITALS — SYSTOLIC BLOOD PRESSURE: 140 MMHG | DIASTOLIC BLOOD PRESSURE: 50 MMHG | TEMPERATURE: 100 F | OXYGEN SATURATION: 94 %

## 2024-11-28 VITALS — OXYGEN SATURATION: 97 % | SYSTOLIC BLOOD PRESSURE: 140 MMHG | DIASTOLIC BLOOD PRESSURE: 64 MMHG | TEMPERATURE: 98 F

## 2024-11-29 VITALS — SYSTOLIC BLOOD PRESSURE: 155 MMHG | OXYGEN SATURATION: 96 % | TEMPERATURE: 99.8 F | DIASTOLIC BLOOD PRESSURE: 70 MMHG

## 2024-11-29 VITALS — DIASTOLIC BLOOD PRESSURE: 58 MMHG | TEMPERATURE: 99.4 F | OXYGEN SATURATION: 93 % | SYSTOLIC BLOOD PRESSURE: 160 MMHG

## 2024-11-29 VITALS — DIASTOLIC BLOOD PRESSURE: 68 MMHG | TEMPERATURE: 99 F | OXYGEN SATURATION: 94 % | SYSTOLIC BLOOD PRESSURE: 158 MMHG

## 2024-11-29 LAB
ALBUMIN SERPL BCG-MCNC: 1.8 G/DL (ref 3.2–5.2)
BUN SERPL-MCNC: 26 MG/DL (ref 9–23)
CALCIUM SERPL-MCNC: 6.1 MG/DL (ref 8.3–10.6)
CHLORIDE SERPL-SCNC: 104 MMOL/L (ref 98–107)
CO2 SERPL-SCNC: 24 MMOL/L (ref 20–31)
CREAT SERPL-MCNC: 2.75 MG/DL (ref 0.7–1.3)
GFR SERPL CREATININE-BSD FRML MDRD: 24.3 ML/MIN/{1.73_M2} (ref 42–?)
GLUCOSE SERPL-MCNC: 135 MG/DL (ref 74–106)
PHOSPHATE SERPL-MCNC: 6.6 MG/DL (ref 2.4–5.1)
POTASSIUM SERPL-SCNC: 4.2 MMOL/L (ref 3.5–5.1)
SODIUM SERPL-SCNC: 138 MMOL/L (ref 136–145)

## 2024-11-30 VITALS — DIASTOLIC BLOOD PRESSURE: 80 MMHG | OXYGEN SATURATION: 98 % | SYSTOLIC BLOOD PRESSURE: 145 MMHG | TEMPERATURE: 98.9 F

## 2024-11-30 VITALS — SYSTOLIC BLOOD PRESSURE: 144 MMHG | OXYGEN SATURATION: 98 % | TEMPERATURE: 100 F | DIASTOLIC BLOOD PRESSURE: 60 MMHG

## 2024-11-30 VITALS — DIASTOLIC BLOOD PRESSURE: 76 MMHG | TEMPERATURE: 100.2 F | SYSTOLIC BLOOD PRESSURE: 130 MMHG | OXYGEN SATURATION: 97 %

## 2024-11-30 VITALS — DIASTOLIC BLOOD PRESSURE: 60 MMHG | TEMPERATURE: 98.6 F | SYSTOLIC BLOOD PRESSURE: 130 MMHG

## 2024-11-30 RX ADMIN — HEPARIN SODIUM SCH UNITS: 1000 INJECTION INTRAVENOUS; SUBCUTANEOUS at 14:23

## 2024-12-01 VITALS — DIASTOLIC BLOOD PRESSURE: 72 MMHG | TEMPERATURE: 99.4 F | SYSTOLIC BLOOD PRESSURE: 150 MMHG | OXYGEN SATURATION: 98 %

## 2024-12-01 VITALS — OXYGEN SATURATION: 95 % | SYSTOLIC BLOOD PRESSURE: 151 MMHG | DIASTOLIC BLOOD PRESSURE: 52 MMHG | TEMPERATURE: 99.4 F

## 2024-12-01 VITALS — DIASTOLIC BLOOD PRESSURE: 68 MMHG | OXYGEN SATURATION: 97 % | SYSTOLIC BLOOD PRESSURE: 155 MMHG | TEMPERATURE: 99.3 F

## 2024-12-02 VITALS — SYSTOLIC BLOOD PRESSURE: 112 MMHG | DIASTOLIC BLOOD PRESSURE: 54 MMHG | TEMPERATURE: 98.1 F | OXYGEN SATURATION: 96 %

## 2024-12-02 VITALS — OXYGEN SATURATION: 97 % | DIASTOLIC BLOOD PRESSURE: 70 MMHG | SYSTOLIC BLOOD PRESSURE: 170 MMHG | TEMPERATURE: 98.9 F

## 2024-12-02 VITALS — DIASTOLIC BLOOD PRESSURE: 53 MMHG | TEMPERATURE: 99 F | SYSTOLIC BLOOD PRESSURE: 168 MMHG | OXYGEN SATURATION: 94 %

## 2024-12-02 LAB
ALBUMIN SERPL BCG-MCNC: 1.6 G/DL (ref 3.2–5.2)
BUN SERPL-MCNC: 24 MG/DL (ref 9–23)
CALCIUM SERPL-MCNC: 7.4 MG/DL (ref 8.3–10.6)
CHLORIDE SERPL-SCNC: 110 MMOL/L (ref 98–107)
CO2 SERPL-SCNC: 23 MMOL/L (ref 20–31)
CREAT SERPL-MCNC: 2.4 MG/DL (ref 0.7–1.3)
GFR SERPL CREATININE-BSD FRML MDRD: 28.5 ML/MIN/{1.73_M2} (ref 42–?)
GLUCOSE SERPL-MCNC: 79 MG/DL (ref 74–106)
HCT VFR BLD AUTO: 31.3 % (ref 42–52)
HGB BLD-MCNC: 9.9 G/DL (ref 13.5–17.5)
MCH RBC QN AUTO: 32.5 PG (ref 27–33)
MCHC RBC AUTO-ENTMCNC: 31.6 G/DL (ref 32–36.5)
MCV RBC AUTO: 102.6 FL (ref 80–96)
PHOSPHATE SERPL-MCNC: 4.3 MG/DL (ref 2.4–5.1)
PLATELET # BLD AUTO: 175 10^3/UL (ref 150–450)
POTASSIUM SERPL-SCNC: 4.6 MMOL/L (ref 3.5–5.1)
RBC # BLD AUTO: 3.05 10^6/UL (ref 4.3–6.1)
SODIUM SERPL-SCNC: 141 MMOL/L (ref 136–145)
WBC # BLD AUTO: 8.8 10^3/UL (ref 4–10)

## 2024-12-02 RX ADMIN — HEPARIN SODIUM SCH UNITS: 1000 INJECTION INTRAVENOUS; SUBCUTANEOUS at 18:17

## 2024-12-03 VITALS — SYSTOLIC BLOOD PRESSURE: 192 MMHG | DIASTOLIC BLOOD PRESSURE: 82 MMHG

## 2024-12-03 VITALS — DIASTOLIC BLOOD PRESSURE: 90 MMHG | TEMPERATURE: 98.1 F | OXYGEN SATURATION: 94 % | SYSTOLIC BLOOD PRESSURE: 186 MMHG

## 2024-12-03 VITALS — DIASTOLIC BLOOD PRESSURE: 64 MMHG | SYSTOLIC BLOOD PRESSURE: 172 MMHG

## 2024-12-10 ENCOUNTER — HOSPITAL ENCOUNTER (EMERGENCY)
Dept: HOSPITAL 53 - M ED | Age: 74
Discharge: HOME | End: 2024-12-10
Payer: MEDICARE

## 2024-12-10 VITALS — SYSTOLIC BLOOD PRESSURE: 193 MMHG | DIASTOLIC BLOOD PRESSURE: 84 MMHG | OXYGEN SATURATION: 96 %

## 2024-12-10 VITALS — WEIGHT: 140.3 LBS | BODY MASS INDEX: 22.02 KG/M2 | HEIGHT: 67 IN

## 2024-12-10 VITALS — TEMPERATURE: 96.8 F

## 2024-12-10 DIAGNOSIS — Z79.899: ICD-10-CM

## 2024-12-10 DIAGNOSIS — Z86.73: ICD-10-CM

## 2024-12-10 DIAGNOSIS — E11.9: ICD-10-CM

## 2024-12-10 DIAGNOSIS — Z79.02: ICD-10-CM

## 2024-12-10 DIAGNOSIS — N18.6: ICD-10-CM

## 2024-12-10 DIAGNOSIS — Z79.01: ICD-10-CM

## 2024-12-10 DIAGNOSIS — G56.02: ICD-10-CM

## 2024-12-10 DIAGNOSIS — J44.9: ICD-10-CM

## 2024-12-10 DIAGNOSIS — F32.A: ICD-10-CM

## 2024-12-10 DIAGNOSIS — Z79.811: ICD-10-CM

## 2024-12-10 DIAGNOSIS — F41.9: ICD-10-CM

## 2024-12-10 DIAGNOSIS — R53.1: Primary | ICD-10-CM

## 2024-12-10 DIAGNOSIS — I10: ICD-10-CM

## 2024-12-10 DIAGNOSIS — F17.200: ICD-10-CM

## 2024-12-10 DIAGNOSIS — Z79.52: ICD-10-CM

## 2024-12-10 LAB
ALBUMIN SERPL BCG-MCNC: 2.1 G/DL (ref 3.2–5.2)
ALP SERPL-CCNC: 119 U/L (ref 40–129)
ALT SERPL W P-5'-P-CCNC: 22 U/L (ref 7–40)
AST SERPL-CCNC: 27 U/L (ref ?–34)
BASOPHILS # BLD AUTO: 0.1 10^3/UL (ref 0–0.2)
BASOPHILS NFR BLD AUTO: 1 % (ref 0–1)
BILIRUB CONJ SERPL-MCNC: 0.1 MG/DL (ref ?–0.4)
BILIRUB SERPL-MCNC: 0.4 MG/DL (ref 0.3–1.2)
BUN SERPL-MCNC: 18 MG/DL (ref 9–23)
CALCIUM SERPL-MCNC: 8.1 MG/DL (ref 8.3–10.6)
CHLORIDE SERPL-SCNC: 104 MMOL/L (ref 98–107)
CO2 SERPL-SCNC: 32 MMOL/L (ref 20–31)
CREAT SERPL-MCNC: 1.75 MG/DL (ref 0.7–1.3)
EOSINOPHIL # BLD AUTO: 0.4 10^3/UL (ref 0–0.5)
EOSINOPHIL NFR BLD AUTO: 5.1 % (ref 0–3)
GFR SERPL CREATININE-BSD FRML MDRD: 41 ML/MIN/{1.73_M2} (ref 42–?)
GLUCOSE SERPL-MCNC: 84 MG/DL (ref 74–106)
HCT VFR BLD AUTO: 35.9 % (ref 42–52)
HGB BLD-MCNC: 11.6 G/DL (ref 13.5–17.5)
LYMPHOCYTES # BLD AUTO: 1.2 10^3/UL (ref 1.5–5)
LYMPHOCYTES NFR BLD AUTO: 16.4 % (ref 24–44)
MAGNESIUM SERPL-MCNC: 2 MG/DL (ref 1.8–2.4)
MCH RBC QN AUTO: 32.2 PG (ref 27–33)
MCHC RBC AUTO-ENTMCNC: 32.3 G/DL (ref 32–36.5)
MCV RBC AUTO: 99.7 FL (ref 80–96)
MONOCYTES # BLD AUTO: 0.8 10^3/UL (ref 0–0.8)
MONOCYTES NFR BLD AUTO: 10.7 % (ref 2–8)
NEUTROPHILS # BLD AUTO: 4.7 10^3/UL (ref 1.5–8.5)
NEUTROPHILS NFR BLD AUTO: 65.1 % (ref 36–66)
PLATELET # BLD AUTO: 232 10^3/UL (ref 150–450)
POTASSIUM SERPL-SCNC: 4.2 MMOL/L (ref 3.5–5.1)
PROT SERPL-MCNC: 5.5 G/DL (ref 5.7–8.2)
RBC # BLD AUTO: 3.6 10^6/UL (ref 4.3–6.1)
SODIUM SERPL-SCNC: 142 MMOL/L (ref 136–145)
WBC # BLD AUTO: 7.3 10^3/UL (ref 4–10)

## 2024-12-15 ENCOUNTER — HOSPITAL ENCOUNTER (EMERGENCY)
Dept: HOSPITAL 53 - M ED | Age: 74
LOS: 1 days | Discharge: ANOTHER HOSPITAL | End: 2024-12-16
Payer: MEDICARE

## 2024-12-15 VITALS — SYSTOLIC BLOOD PRESSURE: 228 MMHG | DIASTOLIC BLOOD PRESSURE: 86 MMHG

## 2024-12-15 DIAGNOSIS — R07.9: ICD-10-CM

## 2024-12-15 DIAGNOSIS — I62.00: Primary | ICD-10-CM

## 2024-12-15 DIAGNOSIS — Z79.52: ICD-10-CM

## 2024-12-15 DIAGNOSIS — I10: ICD-10-CM

## 2024-12-15 DIAGNOSIS — E11.9: ICD-10-CM

## 2024-12-15 DIAGNOSIS — N18.6: ICD-10-CM

## 2024-12-15 DIAGNOSIS — Z79.02: ICD-10-CM

## 2024-12-15 DIAGNOSIS — F17.200: ICD-10-CM

## 2024-12-15 DIAGNOSIS — Z86.79: ICD-10-CM

## 2024-12-15 DIAGNOSIS — Z79.811: ICD-10-CM

## 2024-12-15 DIAGNOSIS — J44.9: ICD-10-CM

## 2024-12-15 DIAGNOSIS — Z79.899: ICD-10-CM

## 2024-12-15 LAB
ALBUMIN SERPL BCG-MCNC: 2.3 G/DL (ref 3.2–5.2)
ALP SERPL-CCNC: 114 U/L (ref 40–129)
ALT SERPL W P-5'-P-CCNC: 12 U/L (ref 7–40)
AST SERPL-CCNC: 18 U/L (ref ?–34)
BASOPHILS # BLD AUTO: 0.1 10^3/UL (ref 0–0.2)
BASOPHILS NFR BLD AUTO: 1 % (ref 0–1)
BILIRUB CONJ SERPL-MCNC: < 0.1 MG/DL (ref ?–0.4)
BILIRUB SERPL-MCNC: 0.3 MG/DL (ref 0.3–1.2)
BUN SERPL-MCNC: 24 MG/DL (ref 9–23)
CALCIUM SERPL-MCNC: 8.3 MG/DL (ref 8.3–10.6)
CHLORIDE SERPL-SCNC: 107 MMOL/L (ref 98–107)
CK MB CFR.DF SERPL CALC: 6.06
CK MB SERPL-MCNC: 2 NG/ML (ref ?–3.6)
CK SERPL-CCNC: 33 U/L (ref 46–171)
CO2 SERPL-SCNC: 32 MMOL/L (ref 20–31)
CREAT SERPL-MCNC: 2.55 MG/DL (ref 0.7–1.3)
EOSINOPHIL # BLD AUTO: 0.4 10^3/UL (ref 0–0.5)
EOSINOPHIL NFR BLD AUTO: 4.6 % (ref 0–3)
GFR SERPL CREATININE-BSD FRML MDRD: 26.5 ML/MIN/{1.73_M2} (ref 42–?)
GLUCOSE SERPL-MCNC: 137 MG/DL (ref 74–106)
HCT VFR BLD AUTO: 37.1 % (ref 42–52)
HGB BLD-MCNC: 11.8 G/DL (ref 13.5–17.5)
LIPASE SERPL-CCNC: 56 U/L (ref 12–53)
LYMPHOCYTES # BLD AUTO: 1.3 10^3/UL (ref 1.5–5)
LYMPHOCYTES NFR BLD AUTO: 14.1 % (ref 24–44)
MCH RBC QN AUTO: 31.6 PG (ref 27–33)
MCHC RBC AUTO-ENTMCNC: 31.8 G/DL (ref 32–36.5)
MCV RBC AUTO: 99.2 FL (ref 80–96)
MONOCYTES # BLD AUTO: 0.9 10^3/UL (ref 0–0.8)
MONOCYTES NFR BLD AUTO: 9.8 % (ref 2–8)
NEUTROPHILS # BLD AUTO: 6.6 10^3/UL (ref 1.5–8.5)
NEUTROPHILS NFR BLD AUTO: 69.8 % (ref 36–66)
PLATELET # BLD AUTO: 190 10^3/UL (ref 150–450)
POTASSIUM SERPL-SCNC: 3.8 MMOL/L (ref 3.5–5.1)
PROT SERPL-MCNC: 5.6 G/DL (ref 5.7–8.2)
RBC # BLD AUTO: 3.74 10^6/UL (ref 4.3–6.1)
SODIUM SERPL-SCNC: 143 MMOL/L (ref 136–145)
TSH SERPL DL<=0.005 MIU/L-ACNC: 1.97 UIU/ML (ref 0.55–4.78)
WBC # BLD AUTO: 9.4 10^3/UL (ref 4–10)

## 2024-12-15 PROCEDURE — 70450 CT HEAD/BRAIN W/O DYE: CPT

## 2024-12-15 PROCEDURE — 96374 THER/PROPH/DIAG INJ IV PUSH: CPT

## 2024-12-15 PROCEDURE — 80048 BASIC METABOLIC PNL TOTAL CA: CPT

## 2024-12-15 PROCEDURE — 82553 CREATINE MB FRACTION: CPT

## 2024-12-15 PROCEDURE — 99292 CRITICAL CARE ADDL 30 MIN: CPT

## 2024-12-15 PROCEDURE — 93005 ELECTROCARDIOGRAM TRACING: CPT

## 2024-12-15 PROCEDURE — 83690 ASSAY OF LIPASE: CPT

## 2024-12-15 PROCEDURE — 99291 CRITICAL CARE FIRST HOUR: CPT

## 2024-12-15 PROCEDURE — 96375 TX/PRO/DX INJ NEW DRUG ADDON: CPT

## 2024-12-15 PROCEDURE — 82550 ASSAY OF CK (CPK): CPT

## 2024-12-15 PROCEDURE — 80076 HEPATIC FUNCTION PANEL: CPT

## 2024-12-15 PROCEDURE — 94760 N-INVAS EAR/PLS OXIMETRY 1: CPT

## 2024-12-15 PROCEDURE — 71045 X-RAY EXAM CHEST 1 VIEW: CPT

## 2024-12-15 PROCEDURE — 84443 ASSAY THYROID STIM HORMONE: CPT

## 2024-12-15 PROCEDURE — 85025 COMPLETE CBC W/AUTO DIFF WBC: CPT

## 2024-12-15 PROCEDURE — 84484 ASSAY OF TROPONIN QUANT: CPT

## 2024-12-15 PROCEDURE — 93041 RHYTHM ECG TRACING: CPT

## 2024-12-15 RX ADMIN — NITROGLYCERIN PRN MG: 0.4 TABLET SUBLINGUAL at 18:35

## 2024-12-15 RX ADMIN — NICARDIPINE HYDROCHLORIDE SCH MLS/HR: 0.2 INJECTION, SOLUTION INTRAVENOUS at 19:40

## 2024-12-16 VITALS — DIASTOLIC BLOOD PRESSURE: 80 MMHG | OXYGEN SATURATION: 90 % | TEMPERATURE: 98.1 F | SYSTOLIC BLOOD PRESSURE: 144 MMHG
